# Patient Record
Sex: FEMALE | Race: WHITE | Employment: OTHER | URBAN - METROPOLITAN AREA
[De-identification: names, ages, dates, MRNs, and addresses within clinical notes are randomized per-mention and may not be internally consistent; named-entity substitution may affect disease eponyms.]

---

## 2020-01-02 ENCOUNTER — HOSPITAL ENCOUNTER (OUTPATIENT)
Age: 65
Setting detail: SPECIMEN
Discharge: HOME | End: 2020-01-02

## 2020-01-02 LAB
ANION GAP SERPL CALCULATED.3IONS-SCNC: 11 MMOL/L (ref 9–17)
BUN BLDV-MCNC: 19 MG/DL (ref 8–23)
BUN/CREAT BLD: 26 (ref 9–20)
CALCIUM SERPL-MCNC: 8.5 MG/DL (ref 8.6–10.4)
CHLORIDE BLD-SCNC: 101 MMOL/L (ref 98–107)
CO2: 26 MMOL/L (ref 20–31)
CREAT SERPL-MCNC: 0.73 MG/DL (ref 0.5–0.9)
GFR AFRICAN AMERICAN: >60 ML/MIN
GFR NON-AFRICAN AMERICAN: >60 ML/MIN
GFR SERPL CREATININE-BSD FRML MDRD: ABNORMAL ML/MIN/{1.73_M2}
GFR SERPL CREATININE-BSD FRML MDRD: ABNORMAL ML/MIN/{1.73_M2}
GLUCOSE BLD-MCNC: 280 MG/DL (ref 70–99)
HCT VFR BLD CALC: 31.8 % (ref 36.3–47.1)
HEMOGLOBIN: 10 G/DL (ref 11.9–15.1)
MCH RBC QN AUTO: 30 PG (ref 25.2–33.5)
MCHC RBC AUTO-ENTMCNC: 31.4 G/DL (ref 28.4–34.8)
MCV RBC AUTO: 95.5 FL (ref 82.6–102.9)
NRBC AUTOMATED: 0 PER 100 WBC
PDW BLD-RTO: 16.3 % (ref 11.8–14.4)
PLATELET # BLD: ABNORMAL K/UL (ref 138–453)
PLATELET, FLUORESCENCE: 30 K/UL (ref 138–453)
PLATELET, IMMATURE FRACTION: 4.8 % (ref 1.1–10.3)
PMV BLD AUTO: ABNORMAL FL (ref 8.1–13.5)
POTASSIUM SERPL-SCNC: 4.4 MMOL/L (ref 3.7–5.3)
RBC # BLD: 3.33 M/UL (ref 3.95–5.11)
SODIUM BLD-SCNC: 138 MMOL/L (ref 135–144)
WBC # BLD: 3.1 K/UL (ref 3.5–11.3)

## 2020-01-02 PROCEDURE — 80048 BASIC METABOLIC PNL TOTAL CA: CPT

## 2020-01-02 PROCEDURE — 36415 COLL VENOUS BLD VENIPUNCTURE: CPT

## 2020-01-02 PROCEDURE — 85055 RETICULATED PLATELET ASSAY: CPT

## 2020-01-02 PROCEDURE — P9603 ONE-WAY ALLOW PRORATED MILES: HCPCS

## 2020-01-02 PROCEDURE — 85027 COMPLETE CBC AUTOMATED: CPT

## 2020-01-04 ENCOUNTER — HOSPITAL ENCOUNTER (OUTPATIENT)
Age: 65
Setting detail: SPECIMEN
Discharge: HOME | End: 2020-01-04

## 2020-01-04 LAB
HCT VFR BLD CALC: 33.1 % (ref 36.3–47.1)
HEMOGLOBIN: 10.4 G/DL (ref 11.9–15.1)
MCH RBC QN AUTO: 29.7 PG (ref 25.2–33.5)
MCHC RBC AUTO-ENTMCNC: 31.4 G/DL (ref 28.4–34.8)
MCV RBC AUTO: 94.6 FL (ref 82.6–102.9)
NRBC AUTOMATED: 0 PER 100 WBC
PDW BLD-RTO: 16.6 % (ref 11.8–14.4)
PLATELET # BLD: ABNORMAL K/UL (ref 138–453)
PLATELET, FLUORESCENCE: 33 K/UL (ref 138–453)
PLATELET, IMMATURE FRACTION: 4.1 % (ref 1.1–10.3)
PMV BLD AUTO: ABNORMAL FL (ref 8.1–13.5)
RBC # BLD: 3.5 M/UL (ref 3.95–5.11)
WBC # BLD: 2.4 K/UL (ref 3.5–11.3)

## 2020-01-04 PROCEDURE — 85055 RETICULATED PLATELET ASSAY: CPT

## 2020-01-04 PROCEDURE — 36415 COLL VENOUS BLD VENIPUNCTURE: CPT

## 2020-01-04 PROCEDURE — 85027 COMPLETE CBC AUTOMATED: CPT

## 2020-01-04 PROCEDURE — P9603 ONE-WAY ALLOW PRORATED MILES: HCPCS

## 2020-01-09 ENCOUNTER — HOSPITAL ENCOUNTER (OUTPATIENT)
Age: 65
Setting detail: SPECIMEN
Discharge: HOME | End: 2020-01-09

## 2020-01-09 LAB
ANION GAP SERPL CALCULATED.3IONS-SCNC: 11 MMOL/L (ref 9–17)
BUN BLDV-MCNC: 13 MG/DL (ref 8–23)
BUN/CREAT BLD: 23 (ref 9–20)
CALCIUM SERPL-MCNC: 8.6 MG/DL (ref 8.6–10.4)
CHLORIDE BLD-SCNC: 103 MMOL/L (ref 98–107)
CO2: 25 MMOL/L (ref 20–31)
CREAT SERPL-MCNC: 0.56 MG/DL (ref 0.5–0.9)
GFR AFRICAN AMERICAN: >60 ML/MIN
GFR NON-AFRICAN AMERICAN: >60 ML/MIN
GFR SERPL CREATININE-BSD FRML MDRD: ABNORMAL ML/MIN/{1.73_M2}
GFR SERPL CREATININE-BSD FRML MDRD: ABNORMAL ML/MIN/{1.73_M2}
GLUCOSE BLD-MCNC: 229 MG/DL (ref 70–99)
HCT VFR BLD CALC: 32.3 % (ref 36.3–47.1)
HEMOGLOBIN: 10 G/DL (ref 11.9–15.1)
MCH RBC QN AUTO: 29.8 PG (ref 25.2–33.5)
MCHC RBC AUTO-ENTMCNC: 31 G/DL (ref 28.4–34.8)
MCV RBC AUTO: 96.1 FL (ref 82.6–102.9)
NRBC AUTOMATED: 0 PER 100 WBC
PDW BLD-RTO: 17 % (ref 11.8–14.4)
PLATELET # BLD: 50 K/UL (ref 138–453)
PMV BLD AUTO: 11.1 FL (ref 8.1–13.5)
POTASSIUM SERPL-SCNC: 4.1 MMOL/L (ref 3.7–5.3)
RBC # BLD: 3.36 M/UL (ref 3.95–5.11)
SODIUM BLD-SCNC: 139 MMOL/L (ref 135–144)
WBC # BLD: 2.6 K/UL (ref 3.5–11.3)

## 2020-01-09 PROCEDURE — 85027 COMPLETE CBC AUTOMATED: CPT

## 2020-01-09 PROCEDURE — 36415 COLL VENOUS BLD VENIPUNCTURE: CPT

## 2020-01-09 PROCEDURE — P9603 ONE-WAY ALLOW PRORATED MILES: HCPCS

## 2020-01-09 PROCEDURE — 80048 BASIC METABOLIC PNL TOTAL CA: CPT

## 2020-11-18 ENCOUNTER — HOSPITAL ENCOUNTER (OUTPATIENT)
Age: 65
Setting detail: SPECIMEN
Discharge: HOME | End: 2020-11-18
Payer: COMMERCIAL

## 2020-11-18 LAB
ABSOLUTE EOS #: 0.1 K/UL (ref 0–0.44)
ABSOLUTE IMMATURE GRANULOCYTE: <0.03 K/UL (ref 0–0.3)
ABSOLUTE LYMPH #: 1.43 K/UL (ref 1.1–3.7)
ABSOLUTE MONO #: 0.46 K/UL (ref 0.1–1.2)
ABSOLUTE RETIC #: 0.12 M/UL (ref 0.03–0.08)
BASOPHILS # BLD: 1 % (ref 0–2)
BASOPHILS ABSOLUTE: 0.03 K/UL (ref 0–0.2)
BNP INTERPRETATION: NORMAL
C-REACTIVE PROTEIN: 54.9 MG/L (ref 0–5)
DIFFERENTIAL TYPE: ABNORMAL
EOSINOPHILS RELATIVE PERCENT: 2 % (ref 1–4)
FERRITIN: 126 UG/L (ref 13–150)
FOLATE: 13.9 NG/ML
HCT VFR BLD CALC: 39.2 % (ref 36.3–47.1)
HEMOGLOBIN: 12.2 G/DL (ref 11.9–15.1)
IMMATURE GRANULOCYTES: 0 %
IMMATURE RETIC FRACT: 28.9 % (ref 2.7–18.3)
INR BLD: 1.2
IRON SATURATION: 23 % (ref 20–55)
IRON: 58 UG/DL (ref 37–145)
LYMPHOCYTES # BLD: 27 % (ref 24–43)
MCH RBC QN AUTO: 28 PG (ref 25.2–33.5)
MCHC RBC AUTO-ENTMCNC: 31.1 G/DL (ref 28.4–34.8)
MCV RBC AUTO: 89.9 FL (ref 82.6–102.9)
MONOCYTES # BLD: 9 % (ref 3–12)
NRBC AUTOMATED: 0 PER 100 WBC
PDW BLD-RTO: 15.9 % (ref 11.8–14.4)
PLATELET # BLD: 141 K/UL (ref 138–453)
PLATELET ESTIMATE: ABNORMAL
PMV BLD AUTO: 10.9 FL (ref 8.1–13.5)
PRO-BNP: 52 PG/ML
PROTHROMBIN TIME: 12 SEC (ref 9.4–12.6)
RBC # BLD: 4.36 M/UL (ref 3.95–5.11)
RBC # BLD: ABNORMAL 10*6/UL
RETIC %: 2.8 % (ref 0.5–1.9)
RETIC HEMOGLOBIN: 32.3 PG (ref 28.2–35.7)
SEDIMENTATION RATE, ERYTHROCYTE: 112 MM (ref 0–30)
SEG NEUTROPHILS: 61 % (ref 36–65)
SEGMENTED NEUTROPHILS ABSOLUTE COUNT: 3.37 K/UL (ref 1.5–8.1)
TOTAL IRON BINDING CAPACITY: 254 UG/DL (ref 250–450)
UNSATURATED IRON BINDING CAPACITY: 196 UG/DL (ref 112–347)
VITAMIN B-12: 1034 PG/ML (ref 232–1245)
WBC # BLD: 5.4 K/UL (ref 3.5–11.3)
WBC # BLD: ABNORMAL 10*3/UL

## 2020-11-18 PROCEDURE — 85025 COMPLETE CBC W/AUTO DIFF WBC: CPT

## 2020-11-18 PROCEDURE — 83880 ASSAY OF NATRIURETIC PEPTIDE: CPT

## 2020-11-18 PROCEDURE — 82728 ASSAY OF FERRITIN: CPT

## 2020-11-18 PROCEDURE — 85610 PROTHROMBIN TIME: CPT

## 2020-11-18 PROCEDURE — 82746 ASSAY OF FOLIC ACID SERUM: CPT

## 2020-11-18 PROCEDURE — 83540 ASSAY OF IRON: CPT

## 2020-11-18 PROCEDURE — 86038 ANTINUCLEAR ANTIBODIES: CPT

## 2020-11-18 PROCEDURE — 82607 VITAMIN B-12: CPT

## 2020-11-18 PROCEDURE — 86140 C-REACTIVE PROTEIN: CPT

## 2020-11-18 PROCEDURE — 83550 IRON BINDING TEST: CPT

## 2020-11-18 PROCEDURE — 85652 RBC SED RATE AUTOMATED: CPT

## 2020-11-18 PROCEDURE — 85045 AUTOMATED RETICULOCYTE COUNT: CPT

## 2020-11-19 LAB — ANTI-NUCLEAR ANTIBODY (ANA): NEGATIVE

## 2022-02-08 ENCOUNTER — APPOINTMENT (OUTPATIENT)
Dept: GENERAL RADIOLOGY | Facility: CLINIC | Age: 67
DRG: 641 | End: 2022-02-08
Payer: COMMERCIAL

## 2022-02-08 ENCOUNTER — APPOINTMENT (OUTPATIENT)
Dept: CT IMAGING | Facility: CLINIC | Age: 67
DRG: 641 | End: 2022-02-08
Payer: COMMERCIAL

## 2022-02-08 ENCOUNTER — HOSPITAL ENCOUNTER (INPATIENT)
Age: 67
LOS: 2 days | Discharge: HOME | DRG: 641 | End: 2022-02-10
Attending: EMERGENCY MEDICINE | Admitting: INTERNAL MEDICINE
Payer: COMMERCIAL

## 2022-02-08 DIAGNOSIS — J18.9 PNEUMONIA OF LEFT LOWER LOBE DUE TO INFECTIOUS ORGANISM: ICD-10-CM

## 2022-02-08 DIAGNOSIS — K74.60 CIRRHOSIS OF LIVER WITHOUT ASCITES, UNSPECIFIED HEPATIC CIRRHOSIS TYPE (HCC): ICD-10-CM

## 2022-02-08 DIAGNOSIS — J90 PLEURAL EFFUSION ON LEFT: ICD-10-CM

## 2022-02-08 DIAGNOSIS — E11.65 HYPERGLYCEMIA DUE TO DIABETES MELLITUS (HCC): Primary | ICD-10-CM

## 2022-02-08 DIAGNOSIS — E27.8 ADRENAL NODULE (HCC): ICD-10-CM

## 2022-02-08 DIAGNOSIS — K52.9 COLITIS: ICD-10-CM

## 2022-02-08 DIAGNOSIS — K56.41 FECAL IMPACTION (HCC): ICD-10-CM

## 2022-02-08 DIAGNOSIS — E87.6 HYPOKALEMIA: ICD-10-CM

## 2022-02-08 DIAGNOSIS — D64.9 CHRONIC ANEMIA: ICD-10-CM

## 2022-02-08 LAB
ABSOLUTE EOS #: 0.1 K/UL (ref 0–0.4)
ABSOLUTE IMMATURE GRANULOCYTE: ABNORMAL K/UL (ref 0–0.3)
ABSOLUTE LYMPH #: 0.7 K/UL (ref 1–4.8)
ABSOLUTE MONO #: 0.3 K/UL (ref 0.1–1.2)
ALBUMIN SERPL-MCNC: 2.6 G/DL (ref 3.5–5.2)
ALBUMIN/GLOBULIN RATIO: 0.8 (ref 1–2.5)
ALLEN TEST: ABNORMAL
ALP BLD-CCNC: 97 U/L (ref 35–104)
ALT SERPL-CCNC: 9 U/L (ref 5–33)
AMMONIA: 89 UMOL/L (ref 11–51)
AMYLASE: 15 U/L (ref 28–100)
ANION GAP SERPL CALCULATED.3IONS-SCNC: 12 MMOL/L (ref 9–17)
AST SERPL-CCNC: 23 U/L
BASOPHILS # BLD: 1 % (ref 0–2)
BASOPHILS ABSOLUTE: 0 K/UL (ref 0–0.2)
BETA-HYDROXYBUTYRATE: 0.23 MMOL/L (ref 0.02–0.27)
BILIRUB SERPL-MCNC: 0.7 MG/DL (ref 0.3–1.2)
BUN BLDV-MCNC: 12 MG/DL (ref 8–23)
BUN/CREAT BLD: ABNORMAL (ref 9–20)
CALCIUM SERPL-MCNC: 8 MG/DL (ref 8.6–10.4)
CHLORIDE BLD-SCNC: 95 MMOL/L (ref 98–107)
CO2: 29 MMOL/L (ref 20–31)
CREAT SERPL-MCNC: 0.5 MG/DL (ref 0.5–0.9)
DIFFERENTIAL TYPE: ABNORMAL
DIRECT EXAM: NORMAL
EOSINOPHILS RELATIVE PERCENT: 2 % (ref 1–4)
FIO2: ABNORMAL
GFR AFRICAN AMERICAN: >60 ML/MIN
GFR NON-AFRICAN AMERICAN: >60 ML/MIN
GFR SERPL CREATININE-BSD FRML MDRD: ABNORMAL ML/MIN/{1.73_M2}
GFR SERPL CREATININE-BSD FRML MDRD: ABNORMAL ML/MIN/{1.73_M2}
GLUCOSE BLD-MCNC: 389 MG/DL (ref 70–99)
HCO3 VENOUS: 30.3 MMOL/L (ref 22–29)
HCT VFR BLD CALC: 26.6 % (ref 36–46)
HEMOGLOBIN: 8.5 G/DL (ref 12–16)
IMMATURE GRANULOCYTES: ABNORMAL %
INR BLD: 1.1
LACTIC ACID, SEPSIS WHOLE BLOOD: NORMAL MMOL/L (ref 0.5–1.9)
LACTIC ACID, SEPSIS WHOLE BLOOD: NORMAL MMOL/L (ref 0.5–1.9)
LACTIC ACID, SEPSIS: 1.7 MMOL/L (ref 0.5–1.9)
LACTIC ACID, SEPSIS: 1.7 MMOL/L (ref 0.5–1.9)
LIPASE: 18 U/L (ref 13–60)
LYMPHOCYTES # BLD: 27 % (ref 24–44)
Lab: NORMAL
MCH RBC QN AUTO: 24.3 PG (ref 26–34)
MCHC RBC AUTO-ENTMCNC: 31.9 G/DL (ref 31–37)
MCV RBC AUTO: 76.1 FL (ref 80–100)
MODE: ABNORMAL
MONOCYTES # BLD: 11 % (ref 2–11)
NEGATIVE BASE EXCESS, VEN: ABNORMAL (ref 0–2)
NRBC AUTOMATED: ABNORMAL PER 100 WBC
O2 DEVICE/FLOW/%: ABNORMAL
O2 SAT, VEN: 88 % (ref 60–85)
PARTIAL THROMBOPLASTIN TIME: 30.1 SEC (ref 21.3–31.3)
PATIENT TEMP: 37
PCO2, VEN: 37.6 MM HG (ref 41–51)
PDW BLD-RTO: 18.8 % (ref 12.5–15.4)
PH VENOUS: 7.51 (ref 7.32–7.43)
PLATELET # BLD: 83 K/UL (ref 140–450)
PLATELET ESTIMATE: ABNORMAL
PMV BLD AUTO: 8.3 FL (ref 6–12)
PO2, VEN: 49.5 MM HG (ref 30–50)
POC PCO2 TEMP: ABNORMAL MM HG
POC PH TEMP: ABNORMAL
POC PO2 TEMP: ABNORMAL MM HG
POSITIVE BASE EXCESS, VEN: 7 (ref 0–3)
POTASSIUM SERPL-SCNC: 2.4 MMOL/L (ref 3.7–5.3)
PROTHROMBIN TIME: 12 SEC (ref 9.4–12.6)
RBC # BLD: 3.5 M/UL (ref 4–5.2)
RBC # BLD: ABNORMAL 10*6/UL
SAMPLE SITE: ABNORMAL
SARS-COV-2, RAPID: NOT DETECTED
SEG NEUTROPHILS: 59 % (ref 36–66)
SEGMENTED NEUTROPHILS ABSOLUTE COUNT: 1.6 K/UL (ref 1.8–7.7)
SODIUM BLD-SCNC: 136 MMOL/L (ref 135–144)
SPECIMEN DESCRIPTION: NORMAL
SPECIMEN DESCRIPTION: NORMAL
TOTAL CO2, VENOUS: ABNORMAL MMOL/L (ref 23–30)
TOTAL PROTEIN: 6 G/DL (ref 6.4–8.3)
TROPONIN INTERP: ABNORMAL
TROPONIN INTERP: ABNORMAL
TROPONIN T: ABNORMAL NG/ML
TROPONIN T: ABNORMAL NG/ML
TROPONIN, HIGH SENSITIVITY: 25 NG/L (ref 0–14)
TROPONIN, HIGH SENSITIVITY: 26 NG/L (ref 0–14)
WBC # BLD: 2.7 K/UL (ref 3.5–11)
WBC # BLD: ABNORMAL 10*3/UL

## 2022-02-08 PROCEDURE — 99285 EMERGENCY DEPT VISIT HI MDM: CPT

## 2022-02-08 PROCEDURE — 87635 SARS-COV-2 COVID-19 AMP PRB: CPT

## 2022-02-08 PROCEDURE — 71045 X-RAY EXAM CHEST 1 VIEW: CPT

## 2022-02-08 PROCEDURE — 82010 KETONE BODYS QUAN: CPT

## 2022-02-08 PROCEDURE — 96366 THER/PROPH/DIAG IV INF ADDON: CPT

## 2022-02-08 PROCEDURE — 74177 CT ABD & PELVIS W/CONTRAST: CPT

## 2022-02-08 PROCEDURE — 96365 THER/PROPH/DIAG IV INF INIT: CPT

## 2022-02-08 PROCEDURE — 82140 ASSAY OF AMMONIA: CPT

## 2022-02-08 PROCEDURE — 80048 BASIC METABOLIC PNL TOTAL CA: CPT

## 2022-02-08 PROCEDURE — 96367 TX/PROPH/DG ADDL SEQ IV INF: CPT

## 2022-02-08 PROCEDURE — 87040 BLOOD CULTURE FOR BACTERIA: CPT

## 2022-02-08 PROCEDURE — 83735 ASSAY OF MAGNESIUM: CPT

## 2022-02-08 PROCEDURE — 85610 PROTHROMBIN TIME: CPT

## 2022-02-08 PROCEDURE — 83690 ASSAY OF LIPASE: CPT

## 2022-02-08 PROCEDURE — 2580000003 HC RX 258: Performed by: EMERGENCY MEDICINE

## 2022-02-08 PROCEDURE — 83605 ASSAY OF LACTIC ACID: CPT

## 2022-02-08 PROCEDURE — 6370000000 HC RX 637 (ALT 250 FOR IP): Performed by: EMERGENCY MEDICINE

## 2022-02-08 PROCEDURE — 96375 TX/PRO/DX INJ NEW DRUG ADDON: CPT

## 2022-02-08 PROCEDURE — 6360000004 HC RX CONTRAST MEDICATION: Performed by: EMERGENCY MEDICINE

## 2022-02-08 PROCEDURE — 87804 INFLUENZA ASSAY W/OPTIC: CPT

## 2022-02-08 PROCEDURE — 6360000002 HC RX W HCPCS: Performed by: EMERGENCY MEDICINE

## 2022-02-08 PROCEDURE — 80053 COMPREHEN METABOLIC PANEL: CPT

## 2022-02-08 PROCEDURE — 36415 COLL VENOUS BLD VENIPUNCTURE: CPT

## 2022-02-08 PROCEDURE — 85025 COMPLETE CBC W/AUTO DIFF WBC: CPT

## 2022-02-08 PROCEDURE — 96368 THER/DIAG CONCURRENT INF: CPT

## 2022-02-08 PROCEDURE — 82803 BLOOD GASES ANY COMBINATION: CPT

## 2022-02-08 PROCEDURE — 84484 ASSAY OF TROPONIN QUANT: CPT

## 2022-02-08 PROCEDURE — 1200000000 HC SEMI PRIVATE

## 2022-02-08 PROCEDURE — 85730 THROMBOPLASTIN TIME PARTIAL: CPT

## 2022-02-08 PROCEDURE — 51702 INSERT TEMP BLADDER CATH: CPT

## 2022-02-08 PROCEDURE — 93005 ELECTROCARDIOGRAM TRACING: CPT | Performed by: EMERGENCY MEDICINE

## 2022-02-08 PROCEDURE — 82150 ASSAY OF AMYLASE: CPT

## 2022-02-08 RX ORDER — SODIUM CHLORIDE 0.9 % (FLUSH) 0.9 %
5-40 SYRINGE (ML) INJECTION PRN
Status: DISCONTINUED | OUTPATIENT
Start: 2022-02-08 | End: 2022-02-10 | Stop reason: HOSPADM

## 2022-02-08 RX ORDER — POTASSIUM CHLORIDE 20 MEQ/1
40 TABLET, EXTENDED RELEASE ORAL ONCE
Status: COMPLETED | OUTPATIENT
Start: 2022-02-08 | End: 2022-02-08

## 2022-02-08 RX ORDER — SPIRONOLACTONE 100 MG/1
100 TABLET, FILM COATED ORAL DAILY
COMMUNITY
Start: 2021-09-02 | End: 2022-09-18

## 2022-02-08 RX ORDER — DIPHENHYDRAMINE HYDROCHLORIDE 50 MG/ML
25 INJECTION INTRAMUSCULAR; INTRAVENOUS ONCE
Status: COMPLETED | OUTPATIENT
Start: 2022-02-08 | End: 2022-02-08

## 2022-02-08 RX ORDER — NADOLOL 40 MG/1
40 TABLET ORAL DAILY
COMMUNITY
End: 2022-09-18

## 2022-02-08 RX ORDER — POTASSIUM CHLORIDE 7.45 MG/ML
10 INJECTION INTRAVENOUS ONCE
Status: COMPLETED | OUTPATIENT
Start: 2022-02-08 | End: 2022-02-08

## 2022-02-08 RX ORDER — FUROSEMIDE 40 MG/1
40 TABLET ORAL 2 TIMES DAILY
COMMUNITY
End: 2022-09-18

## 2022-02-08 RX ORDER — MAGNESIUM SULFATE 1 G/100ML
1000 INJECTION INTRAVENOUS ONCE
Status: COMPLETED | OUTPATIENT
Start: 2022-02-08 | End: 2022-02-08

## 2022-02-08 RX ORDER — 0.9 % SODIUM CHLORIDE 0.9 %
70 INTRAVENOUS SOLUTION INTRAVENOUS ONCE
Status: COMPLETED | OUTPATIENT
Start: 2022-02-08 | End: 2022-02-08

## 2022-02-08 RX ORDER — TRAZODONE HYDROCHLORIDE 100 MG/1
100 TABLET ORAL NIGHTLY
COMMUNITY
Start: 2021-11-05

## 2022-02-08 RX ORDER — SODIUM CHLORIDE AND POTASSIUM CHLORIDE 300; 900 MG/100ML; MG/100ML
INJECTION, SOLUTION INTRAVENOUS CONTINUOUS
Status: DISCONTINUED | OUTPATIENT
Start: 2022-02-08 | End: 2022-02-08 | Stop reason: SDUPTHER

## 2022-02-08 RX ORDER — PANTOPRAZOLE SODIUM 40 MG/1
40 TABLET, DELAYED RELEASE ORAL 2 TIMES DAILY
Status: ON HOLD | COMMUNITY
End: 2022-08-30 | Stop reason: ALTCHOICE

## 2022-02-08 RX ORDER — 0.9 % SODIUM CHLORIDE 0.9 %
1000 INTRAVENOUS SOLUTION INTRAVENOUS ONCE
Status: COMPLETED | OUTPATIENT
Start: 2022-02-08 | End: 2022-02-09

## 2022-02-08 RX ORDER — SERTRALINE HYDROCHLORIDE 100 MG/1
100 TABLET, FILM COATED ORAL DAILY
Status: ON HOLD | COMMUNITY
End: 2022-09-09

## 2022-02-08 RX ADMIN — Medication 10 ML: at 21:51

## 2022-02-08 RX ADMIN — SODIUM CHLORIDE 1000 ML: 9 INJECTION, SOLUTION INTRAVENOUS at 23:50

## 2022-02-08 RX ADMIN — PIPERACILLIN AND TAZOBACTAM 3375 MG: 3; .375 INJECTION, POWDER, LYOPHILIZED, FOR SOLUTION INTRAVENOUS at 23:29

## 2022-02-08 RX ADMIN — IOPAMIDOL 75 ML: 755 INJECTION, SOLUTION INTRAVENOUS at 21:52

## 2022-02-08 RX ADMIN — POTASSIUM CHLORIDE 40 MEQ: 1500 TABLET, EXTENDED RELEASE ORAL at 20:26

## 2022-02-08 RX ADMIN — POTASSIUM CHLORIDE 10 MEQ: 7.46 INJECTION, SOLUTION INTRAVENOUS at 20:36

## 2022-02-08 RX ADMIN — MAGNESIUM SULFATE HEPTAHYDRATE 1000 MG: 1 INJECTION, SOLUTION INTRAVENOUS at 20:30

## 2022-02-08 RX ADMIN — SODIUM CHLORIDE 70 ML: 9 INJECTION, SOLUTION INTRAVENOUS at 21:51

## 2022-02-08 RX ADMIN — DIPHENHYDRAMINE HYDROCHLORIDE 25 MG: 50 INJECTION INTRAMUSCULAR; INTRAVENOUS at 20:24

## 2022-02-08 ASSESSMENT — ENCOUNTER SYMPTOMS
SHORTNESS OF BREATH: 1
NAUSEA: 1
COUGH: 0
ABDOMINAL DISTENTION: 1
DIARRHEA: 0
VOMITING: 1
BACK PAIN: 0
BLOOD IN STOOL: 0
EYE PAIN: 0
ABDOMINAL PAIN: 1
CONSTIPATION: 1

## 2022-02-08 NOTE — Clinical Note
Patient Class: Inpatient [101]   REQUIRED: Diagnosis: Hypokalemia [097405]   Estimated Length of Stay: Estimated stay of more than 2 midnights   Admitting Provider: Te Renner [[id number]]

## 2022-02-09 PROBLEM — Z79.4 TYPE 2 DIABETES MELLITUS WITH HYPERGLYCEMIA, WITH LONG-TERM CURRENT USE OF INSULIN (HCC): Status: ACTIVE | Noted: 2022-02-09

## 2022-02-09 PROBLEM — D61.818 PANCYTOPENIA (HCC): Status: ACTIVE | Noted: 2022-02-09

## 2022-02-09 PROBLEM — E11.65 HYPERGLYCEMIA DUE TO TYPE 2 DIABETES MELLITUS (HCC): Status: ACTIVE | Noted: 2022-02-09

## 2022-02-09 PROBLEM — I89.0 LYMPHEDEMA: Status: ACTIVE | Noted: 2022-02-09

## 2022-02-09 PROBLEM — I89.0 LYMPHEDEMA: Chronic | Status: ACTIVE | Noted: 2022-02-09

## 2022-02-09 LAB
ABSOLUTE EOS #: 0.05 K/UL (ref 0–0.4)
ABSOLUTE EOS #: 0.1 K/UL (ref 0–0.4)
ABSOLUTE IMMATURE GRANULOCYTE: 0 K/UL (ref 0–0.3)
ABSOLUTE IMMATURE GRANULOCYTE: ABNORMAL K/UL (ref 0–0.3)
ABSOLUTE LYMPH #: 0.47 K/UL (ref 1–4.8)
ABSOLUTE LYMPH #: 0.6 K/UL (ref 1–4.8)
ABSOLUTE MONO #: 0.16 K/UL (ref 0.1–0.8)
ABSOLUTE MONO #: 0.2 K/UL (ref 0.1–1.2)
ABSOLUTE RETIC #: 0.08 M/UL (ref 0.03–0.08)
ABSOLUTE RETIC #: 0.09 M/UL (ref 0.03–0.08)
ALBUMIN SERPL-MCNC: 2.4 G/DL (ref 3.5–5.2)
ALBUMIN/GLOBULIN RATIO: 0.7 (ref 1–2.5)
ALP BLD-CCNC: 91 U/L (ref 35–104)
ALT SERPL-CCNC: 10 U/L (ref 5–33)
AMMONIA: 42 UMOL/L (ref 11–51)
ANION GAP SERPL CALCULATED.3IONS-SCNC: 7 MMOL/L (ref 9–17)
ANION GAP SERPL CALCULATED.3IONS-SCNC: 7 MMOL/L (ref 9–17)
AST SERPL-CCNC: 26 U/L
BASOPHILS # BLD: 1 % (ref 0–2)
BASOPHILS # BLD: 1 % (ref 0–2)
BASOPHILS ABSOLUTE: 0 K/UL (ref 0–0.2)
BASOPHILS ABSOLUTE: 0.03 K/UL (ref 0–0.2)
BILIRUB SERPL-MCNC: 0.7 MG/DL (ref 0.3–1.2)
BUN BLDV-MCNC: 11 MG/DL (ref 8–23)
BUN BLDV-MCNC: 9 MG/DL (ref 8–23)
BUN/CREAT BLD: ABNORMAL (ref 9–20)
BUN/CREAT BLD: ABNORMAL (ref 9–20)
CALCIUM SERPL-MCNC: 7.8 MG/DL (ref 8.6–10.4)
CALCIUM SERPL-MCNC: 8 MG/DL (ref 8.6–10.4)
CHLORIDE BLD-SCNC: 100 MMOL/L (ref 98–107)
CHLORIDE BLD-SCNC: 95 MMOL/L (ref 98–107)
CO2: 30 MMOL/L (ref 20–31)
CO2: 33 MMOL/L (ref 20–31)
CREAT SERPL-MCNC: 0.5 MG/DL (ref 0.5–0.9)
CREAT SERPL-MCNC: 0.6 MG/DL (ref 0.5–0.9)
DIFFERENTIAL TYPE: ABNORMAL
DIFFERENTIAL TYPE: ABNORMAL
EKG ATRIAL RATE: 82 BPM
EKG P AXIS: 60 DEGREES
EKG P-R INTERVAL: 144 MS
EKG Q-T INTERVAL: 406 MS
EKG QRS DURATION: 92 MS
EKG QTC CALCULATION (BAZETT): 474 MS
EKG R AXIS: 13 DEGREES
EKG T AXIS: 86 DEGREES
EKG VENTRICULAR RATE: 82 BPM
EOSINOPHILS RELATIVE PERCENT: 2 % (ref 1–4)
EOSINOPHILS RELATIVE PERCENT: 4 % (ref 1–4)
ESTIMATED AVERAGE GLUCOSE: 246 MG/DL
FERRITIN: 20 UG/L (ref 13–150)
FOLATE: 13.5 NG/ML
GFR AFRICAN AMERICAN: >60 ML/MIN
GFR AFRICAN AMERICAN: >60 ML/MIN
GFR NON-AFRICAN AMERICAN: >60 ML/MIN
GFR NON-AFRICAN AMERICAN: >60 ML/MIN
GFR SERPL CREATININE-BSD FRML MDRD: ABNORMAL ML/MIN/{1.73_M2}
GLUCOSE BLD-MCNC: 256 MG/DL (ref 65–105)
GLUCOSE BLD-MCNC: 264 MG/DL (ref 65–105)
GLUCOSE BLD-MCNC: 358 MG/DL (ref 65–105)
GLUCOSE BLD-MCNC: 369 MG/DL (ref 70–99)
GLUCOSE BLD-MCNC: 415 MG/DL (ref 70–99)
GLUCOSE BLD-MCNC: 451 MG/DL (ref 65–105)
HBA1C MFR BLD: 10.2 % (ref 4–6)
HCT VFR BLD CALC: 25.1 % (ref 36–46)
HCT VFR BLD CALC: 27.7 % (ref 36.3–47.1)
HEMOGLOBIN: 8 G/DL (ref 12–16)
HEMOGLOBIN: 8.2 G/DL (ref 11.9–15.1)
IMMATURE GRANULOCYTES: 0 %
IMMATURE GRANULOCYTES: ABNORMAL %
IMMATURE RETIC FRACT: 25.8 % (ref 2.7–18.3)
IMMATURE RETIC FRACT: 30.2 % (ref 2.7–18.3)
INR BLD: 1.1
IRON SATURATION: 15 % (ref 20–55)
IRON: 41 UG/DL (ref 37–145)
LYMPHOCYTES # BLD: 18 % (ref 24–44)
LYMPHOCYTES # BLD: 30 % (ref 24–44)
MAGNESIUM: 1.8 MG/DL (ref 1.6–2.6)
MCH RBC QN AUTO: 24.4 PG (ref 25.2–33.5)
MCH RBC QN AUTO: 24.6 PG (ref 26–34)
MCHC RBC AUTO-ENTMCNC: 29.6 G/DL (ref 28.4–34.8)
MCHC RBC AUTO-ENTMCNC: 31.9 G/DL (ref 31–37)
MCV RBC AUTO: 77.2 FL (ref 80–100)
MCV RBC AUTO: 82.4 FL (ref 82.6–102.9)
MONOCYTES # BLD: 10 % (ref 2–11)
MONOCYTES # BLD: 6 % (ref 1–7)
MORPHOLOGY: ABNORMAL
NRBC AUTOMATED: 0 PER 100 WBC
NRBC AUTOMATED: ABNORMAL PER 100 WBC
PDW BLD-RTO: 17.5 % (ref 11.8–14.4)
PDW BLD-RTO: 18.8 % (ref 12.5–15.4)
PLATELET # BLD: 70 K/UL (ref 140–450)
PLATELET # BLD: 77 K/UL (ref 138–453)
PLATELET ESTIMATE: ABNORMAL
PLATELET ESTIMATE: ABNORMAL
PMV BLD AUTO: 10.6 FL (ref 8.1–13.5)
PMV BLD AUTO: 8.4 FL (ref 6–12)
POTASSIUM SERPL-SCNC: 2.7 MMOL/L (ref 3.7–5.3)
POTASSIUM SERPL-SCNC: 3.4 MMOL/L (ref 3.7–5.3)
POTASSIUM SERPL-SCNC: 3.5 MMOL/L (ref 3.7–5.3)
PROCALCITONIN: 0.06 NG/ML
PROTHROMBIN TIME: 11.6 SEC (ref 9.4–12.6)
RBC # BLD: 3.25 M/UL (ref 4–5.2)
RBC # BLD: 3.36 M/UL (ref 3.95–5.11)
RBC # BLD: ABNORMAL 10*6/UL
RBC # BLD: ABNORMAL 10*6/UL
RETIC %: 2.5 % (ref 0.5–1.9)
RETIC %: 2.5 % (ref 0.5–1.9)
RETIC HEMOGLOBIN: 26.7 PG (ref 28.2–35.7)
RETIC HEMOGLOBIN: 27.4 PG (ref 28.2–35.7)
SEG NEUTROPHILS: 55 % (ref 36–66)
SEG NEUTROPHILS: 73 % (ref 36–66)
SEGMENTED NEUTROPHILS ABSOLUTE COUNT: 1.1 K/UL (ref 1.8–7.7)
SEGMENTED NEUTROPHILS ABSOLUTE COUNT: 1.89 K/UL (ref 1.8–7.7)
SODIUM BLD-SCNC: 135 MMOL/L (ref 135–144)
SODIUM BLD-SCNC: 137 MMOL/L (ref 135–144)
TOTAL IRON BINDING CAPACITY: 273 UG/DL (ref 250–450)
TOTAL PROTEIN: 5.8 G/DL (ref 6.4–8.3)
UNSATURATED IRON BINDING CAPACITY: 232 UG/DL (ref 112–347)
VITAMIN B-12: 1177 PG/ML (ref 232–1245)
VITAMIN D 25-HYDROXY: 24.3 NG/ML (ref 30–100)
WBC # BLD: 1.9 K/UL (ref 3.5–11)
WBC # BLD: 2.6 K/UL (ref 3.5–11.3)
WBC # BLD: ABNORMAL 10*3/UL
WBC # BLD: ABNORMAL 10*3/UL

## 2022-02-09 PROCEDURE — 96376 TX/PRO/DX INJ SAME DRUG ADON: CPT

## 2022-02-09 PROCEDURE — 6370000000 HC RX 637 (ALT 250 FOR IP): Performed by: NURSE PRACTITIONER

## 2022-02-09 PROCEDURE — 82947 ASSAY GLUCOSE BLOOD QUANT: CPT

## 2022-02-09 PROCEDURE — 6370000000 HC RX 637 (ALT 250 FOR IP): Performed by: EMERGENCY MEDICINE

## 2022-02-09 PROCEDURE — 84145 PROCALCITONIN (PCT): CPT

## 2022-02-09 PROCEDURE — 82746 ASSAY OF FOLIC ACID SERUM: CPT

## 2022-02-09 PROCEDURE — 2700000000 HC OXYGEN THERAPY PER DAY

## 2022-02-09 PROCEDURE — 2580000003 HC RX 258: Performed by: NURSE PRACTITIONER

## 2022-02-09 PROCEDURE — 2580000003 HC RX 258: Performed by: EMERGENCY MEDICINE

## 2022-02-09 PROCEDURE — 1200000000 HC SEMI PRIVATE

## 2022-02-09 PROCEDURE — 82306 VITAMIN D 25 HYDROXY: CPT

## 2022-02-09 PROCEDURE — 99223 1ST HOSP IP/OBS HIGH 75: CPT | Performed by: NURSE PRACTITIONER

## 2022-02-09 PROCEDURE — 82607 VITAMIN B-12: CPT

## 2022-02-09 PROCEDURE — 83036 HEMOGLOBIN GLYCOSYLATED A1C: CPT

## 2022-02-09 PROCEDURE — 96367 TX/PROPH/DG ADDL SEQ IV INF: CPT

## 2022-02-09 PROCEDURE — 36415 COLL VENOUS BLD VENIPUNCTURE: CPT

## 2022-02-09 PROCEDURE — 85045 AUTOMATED RETICULOCYTE COUNT: CPT

## 2022-02-09 PROCEDURE — 82140 ASSAY OF AMMONIA: CPT

## 2022-02-09 PROCEDURE — 96361 HYDRATE IV INFUSION ADD-ON: CPT

## 2022-02-09 PROCEDURE — 96368 THER/DIAG CONCURRENT INF: CPT

## 2022-02-09 PROCEDURE — 82728 ASSAY OF FERRITIN: CPT

## 2022-02-09 PROCEDURE — 83550 IRON BINDING TEST: CPT

## 2022-02-09 PROCEDURE — 94640 AIRWAY INHALATION TREATMENT: CPT

## 2022-02-09 PROCEDURE — 83540 ASSAY OF IRON: CPT

## 2022-02-09 PROCEDURE — 96366 THER/PROPH/DIAG IV INF ADDON: CPT

## 2022-02-09 PROCEDURE — 85610 PROTHROMBIN TIME: CPT

## 2022-02-09 PROCEDURE — 85025 COMPLETE CBC W/AUTO DIFF WBC: CPT

## 2022-02-09 PROCEDURE — 6360000002 HC RX W HCPCS: Performed by: EMERGENCY MEDICINE

## 2022-02-09 PROCEDURE — 80053 COMPREHEN METABOLIC PANEL: CPT

## 2022-02-09 PROCEDURE — 84132 ASSAY OF SERUM POTASSIUM: CPT

## 2022-02-09 RX ORDER — BUDESONIDE 0.25 MG/2ML
0.25 INHALANT ORAL 2 TIMES DAILY
Status: DISCONTINUED | OUTPATIENT
Start: 2022-02-09 | End: 2022-02-09 | Stop reason: CLARIF

## 2022-02-09 RX ORDER — ONDANSETRON 4 MG/1
4 TABLET, ORALLY DISINTEGRATING ORAL EVERY 8 HOURS PRN
Status: DISCONTINUED | OUTPATIENT
Start: 2022-02-09 | End: 2022-02-10 | Stop reason: HOSPADM

## 2022-02-09 RX ORDER — POTASSIUM CHLORIDE 20 MEQ/1
40 TABLET, EXTENDED RELEASE ORAL ONCE
Status: COMPLETED | OUTPATIENT
Start: 2022-02-09 | End: 2022-02-09

## 2022-02-09 RX ORDER — MAGNESIUM SULFATE 1 G/100ML
1000 INJECTION INTRAVENOUS PRN
Status: DISCONTINUED | OUTPATIENT
Start: 2022-02-09 | End: 2022-02-10 | Stop reason: HOSPADM

## 2022-02-09 RX ORDER — OXYBUTYNIN CHLORIDE 5 MG/1
5 TABLET ORAL 2 TIMES DAILY PRN
Status: DISCONTINUED | OUTPATIENT
Start: 2022-02-09 | End: 2022-02-10 | Stop reason: HOSPADM

## 2022-02-09 RX ORDER — POTASSIUM CHLORIDE 7.45 MG/ML
10 INJECTION INTRAVENOUS ONCE
Status: COMPLETED | OUTPATIENT
Start: 2022-02-09 | End: 2022-02-09

## 2022-02-09 RX ORDER — ALBUTEROL SULFATE 2.5 MG/3ML
2.5 SOLUTION RESPIRATORY (INHALATION) EVERY 6 HOURS PRN
Status: DISCONTINUED | OUTPATIENT
Start: 2022-02-09 | End: 2022-02-10 | Stop reason: HOSPADM

## 2022-02-09 RX ORDER — POLYETHYLENE GLYCOL 3350 17 G/17G
17 POWDER, FOR SOLUTION ORAL DAILY
Status: DISCONTINUED | OUTPATIENT
Start: 2022-02-09 | End: 2022-02-10 | Stop reason: HOSPADM

## 2022-02-09 RX ORDER — ARFORMOTEROL TARTRATE 15 UG/2ML
15 SOLUTION RESPIRATORY (INHALATION) 2 TIMES DAILY
Status: DISCONTINUED | OUTPATIENT
Start: 2022-02-09 | End: 2022-02-09 | Stop reason: CLARIF

## 2022-02-09 RX ORDER — SODIUM CHLORIDE 9 MG/ML
INJECTION, SOLUTION INTRAVENOUS CONTINUOUS
Status: DISCONTINUED | OUTPATIENT
Start: 2022-02-09 | End: 2022-02-10

## 2022-02-09 RX ORDER — BISACODYL 10 MG
10 SUPPOSITORY, RECTAL RECTAL DAILY
Status: DISCONTINUED | OUTPATIENT
Start: 2022-02-09 | End: 2022-02-10 | Stop reason: HOSPADM

## 2022-02-09 RX ORDER — ONDANSETRON 2 MG/ML
4 INJECTION INTRAMUSCULAR; INTRAVENOUS EVERY 6 HOURS PRN
Status: DISCONTINUED | OUTPATIENT
Start: 2022-02-09 | End: 2022-02-10 | Stop reason: HOSPADM

## 2022-02-09 RX ORDER — TRAZODONE HYDROCHLORIDE 100 MG/1
100 TABLET ORAL NIGHTLY
Status: DISCONTINUED | OUTPATIENT
Start: 2022-02-09 | End: 2022-02-10 | Stop reason: HOSPADM

## 2022-02-09 RX ORDER — PANTOPRAZOLE SODIUM 40 MG/1
40 TABLET, DELAYED RELEASE ORAL 2 TIMES DAILY
Status: DISCONTINUED | OUTPATIENT
Start: 2022-02-09 | End: 2022-02-10 | Stop reason: HOSPADM

## 2022-02-09 RX ORDER — MAGNESIUM SULFATE 1 G/100ML
1000 INJECTION INTRAVENOUS PRN
Status: DISCONTINUED | OUTPATIENT
Start: 2022-02-09 | End: 2022-02-09 | Stop reason: SDUPTHER

## 2022-02-09 RX ORDER — LACTULOSE 10 G/15ML
20 SOLUTION ORAL ONCE
Status: COMPLETED | OUTPATIENT
Start: 2022-02-09 | End: 2022-02-09

## 2022-02-09 RX ORDER — DEXTROSE MONOHYDRATE 50 MG/ML
100 INJECTION, SOLUTION INTRAVENOUS PRN
Status: DISCONTINUED | OUTPATIENT
Start: 2022-02-09 | End: 2022-02-10 | Stop reason: HOSPADM

## 2022-02-09 RX ORDER — POTASSIUM CHLORIDE 20 MEQ/1
40 TABLET, EXTENDED RELEASE ORAL PRN
Status: DISCONTINUED | OUTPATIENT
Start: 2022-02-09 | End: 2022-02-09 | Stop reason: SDUPTHER

## 2022-02-09 RX ORDER — SODIUM CHLORIDE 9 MG/ML
INJECTION, SOLUTION INTRAVENOUS CONTINUOUS
Status: CANCELLED | OUTPATIENT
Start: 2022-02-09

## 2022-02-09 RX ORDER — SODIUM CHLORIDE 9 MG/ML
25 INJECTION, SOLUTION INTRAVENOUS PRN
Status: DISCONTINUED | OUTPATIENT
Start: 2022-02-09 | End: 2022-02-10 | Stop reason: HOSPADM

## 2022-02-09 RX ORDER — BUDESONIDE AND FORMOTEROL FUMARATE DIHYDRATE 80; 4.5 UG/1; UG/1
2 AEROSOL RESPIRATORY (INHALATION) 2 TIMES DAILY
Status: DISCONTINUED | OUTPATIENT
Start: 2022-02-09 | End: 2022-02-09

## 2022-02-09 RX ORDER — ALBUTEROL SULFATE 90 UG/1
2 AEROSOL, METERED RESPIRATORY (INHALATION) EVERY 6 HOURS PRN
Status: DISCONTINUED | OUTPATIENT
Start: 2022-02-09 | End: 2022-02-10 | Stop reason: HOSPADM

## 2022-02-09 RX ORDER — ALBUTEROL SULFATE 90 UG/1
2 AEROSOL, METERED RESPIRATORY (INHALATION) EVERY 6 HOURS PRN
Status: DISCONTINUED | OUTPATIENT
Start: 2022-02-09 | End: 2022-02-09

## 2022-02-09 RX ORDER — POTASSIUM CHLORIDE 7.45 MG/ML
10 INJECTION INTRAVENOUS PRN
Status: DISCONTINUED | OUTPATIENT
Start: 2022-02-09 | End: 2022-02-09 | Stop reason: SDUPTHER

## 2022-02-09 RX ORDER — PRAVASTATIN SODIUM 20 MG
10 TABLET ORAL DAILY
Status: DISCONTINUED | OUTPATIENT
Start: 2022-02-09 | End: 2022-02-10 | Stop reason: HOSPADM

## 2022-02-09 RX ORDER — BUDESONIDE AND FORMOTEROL FUMARATE DIHYDRATE 80; 4.5 UG/1; UG/1
2 AEROSOL RESPIRATORY (INHALATION) 2 TIMES DAILY
Status: DISCONTINUED | OUTPATIENT
Start: 2022-02-09 | End: 2022-02-10 | Stop reason: HOSPADM

## 2022-02-09 RX ORDER — OXYBUTYNIN CHLORIDE 5 MG/1
5 TABLET ORAL 2 TIMES DAILY PRN
COMMUNITY

## 2022-02-09 RX ORDER — DIPHENHYDRAMINE HCL 25 MG
25 TABLET ORAL EVERY 8 HOURS PRN
Status: DISCONTINUED | OUTPATIENT
Start: 2022-02-09 | End: 2022-02-10 | Stop reason: HOSPADM

## 2022-02-09 RX ORDER — INSULIN GLARGINE 100 [IU]/ML
40 INJECTION, SOLUTION SUBCUTANEOUS DAILY
Status: ON HOLD | COMMUNITY
End: 2022-08-30 | Stop reason: SDUPTHER

## 2022-02-09 RX ORDER — ACETAMINOPHEN 650 MG/1
650 SUPPOSITORY RECTAL EVERY 6 HOURS PRN
Status: DISCONTINUED | OUTPATIENT
Start: 2022-02-09 | End: 2022-02-10 | Stop reason: HOSPADM

## 2022-02-09 RX ORDER — LIRAGLUTIDE 6 MG/ML
1.2 INJECTION SUBCUTANEOUS DAILY
COMMUNITY

## 2022-02-09 RX ORDER — DEXTROSE MONOHYDRATE 25 G/50ML
12.5 INJECTION, SOLUTION INTRAVENOUS PRN
Status: DISCONTINUED | OUTPATIENT
Start: 2022-02-09 | End: 2022-02-09 | Stop reason: RX

## 2022-02-09 RX ORDER — POLYETHYLENE GLYCOL 3350 17 G/17G
17 POWDER, FOR SOLUTION ORAL DAILY PRN
Status: DISCONTINUED | OUTPATIENT
Start: 2022-02-09 | End: 2022-02-10 | Stop reason: HOSPADM

## 2022-02-09 RX ORDER — MAGNESIUM SULFATE 1 G/100ML
1000 INJECTION INTRAVENOUS ONCE
Status: COMPLETED | OUTPATIENT
Start: 2022-02-09 | End: 2022-02-09

## 2022-02-09 RX ORDER — SODIUM CHLORIDE 0.9 % (FLUSH) 0.9 %
10 SYRINGE (ML) INJECTION PRN
Status: DISCONTINUED | OUTPATIENT
Start: 2022-02-09 | End: 2022-02-10 | Stop reason: HOSPADM

## 2022-02-09 RX ORDER — ACETAMINOPHEN 325 MG/1
650 TABLET ORAL EVERY 6 HOURS PRN
Status: DISCONTINUED | OUTPATIENT
Start: 2022-02-09 | End: 2022-02-10 | Stop reason: HOSPADM

## 2022-02-09 RX ORDER — PRAVASTATIN SODIUM 10 MG
10 TABLET ORAL NIGHTLY
COMMUNITY

## 2022-02-09 RX ORDER — INSULIN GLARGINE 100 [IU]/ML
27 INJECTION, SOLUTION SUBCUTANEOUS 2 TIMES DAILY
Status: DISCONTINUED | OUTPATIENT
Start: 2022-02-09 | End: 2022-02-10 | Stop reason: HOSPADM

## 2022-02-09 RX ORDER — NADOLOL 20 MG/1
40 TABLET ORAL DAILY
Status: DISCONTINUED | OUTPATIENT
Start: 2022-02-09 | End: 2022-02-10 | Stop reason: HOSPADM

## 2022-02-09 RX ORDER — NICOTINE POLACRILEX 4 MG
15 LOZENGE BUCCAL PRN
Status: DISCONTINUED | OUTPATIENT
Start: 2022-02-09 | End: 2022-02-10 | Stop reason: HOSPADM

## 2022-02-09 RX ORDER — SERTRALINE HYDROCHLORIDE 100 MG/1
100 TABLET, FILM COATED ORAL DAILY
Status: DISCONTINUED | OUTPATIENT
Start: 2022-02-09 | End: 2022-02-10 | Stop reason: HOSPADM

## 2022-02-09 RX ORDER — POTASSIUM CHLORIDE 7.45 MG/ML
10 INJECTION INTRAVENOUS PRN
Status: DISCONTINUED | OUTPATIENT
Start: 2022-02-09 | End: 2022-02-10 | Stop reason: HOSPADM

## 2022-02-09 RX ORDER — SODIUM CHLORIDE 0.9 % (FLUSH) 0.9 %
5-40 SYRINGE (ML) INJECTION EVERY 12 HOURS SCHEDULED
Status: DISCONTINUED | OUTPATIENT
Start: 2022-02-09 | End: 2022-02-10 | Stop reason: HOSPADM

## 2022-02-09 RX ORDER — ALBUTEROL SULFATE 90 UG/1
2 AEROSOL, METERED RESPIRATORY (INHALATION) EVERY 6 HOURS PRN
COMMUNITY

## 2022-02-09 RX ORDER — POLYETHYLENE GLYCOL 3350 17 G/17G
17 POWDER, FOR SOLUTION ORAL DAILY PRN
Status: ON HOLD | COMMUNITY
End: 2022-09-03 | Stop reason: SDUPTHER

## 2022-02-09 RX ORDER — POTASSIUM CHLORIDE 20 MEQ/1
40 TABLET, EXTENDED RELEASE ORAL PRN
Status: DISCONTINUED | OUTPATIENT
Start: 2022-02-09 | End: 2022-02-10 | Stop reason: HOSPADM

## 2022-02-09 RX ADMIN — BISACODYL 10 MG: 10 SUPPOSITORY RECTAL at 11:42

## 2022-02-09 RX ADMIN — INSULIN LISPRO 15 UNITS: 100 INJECTION, SOLUTION INTRAVENOUS; SUBCUTANEOUS at 18:31

## 2022-02-09 RX ADMIN — APIXABAN 5 MG: 5 TABLET, FILM COATED ORAL at 22:09

## 2022-02-09 RX ADMIN — BUDESONIDE AND FORMOTEROL FUMARATE DIHYDRATE 2 PUFF: 80; 4.5 AEROSOL RESPIRATORY (INHALATION) at 21:12

## 2022-02-09 RX ADMIN — LACTULOSE 20 G: 20 SOLUTION ORAL at 00:37

## 2022-02-09 RX ADMIN — POTASSIUM CHLORIDE 40 MEQ: 20 TABLET, EXTENDED RELEASE ORAL at 11:42

## 2022-02-09 RX ADMIN — POLYETHYLENE GLYCOL 3350 17 G: 17 POWDER, FOR SOLUTION ORAL at 11:42

## 2022-02-09 RX ADMIN — NADOLOL 40 MG: 20 TABLET ORAL at 15:19

## 2022-02-09 RX ADMIN — INSULIN GLARGINE 27 UNITS: 100 INJECTION, SOLUTION SUBCUTANEOUS at 22:09

## 2022-02-09 RX ADMIN — INSULIN LISPRO 5 UNITS: 100 INJECTION, SOLUTION INTRAVENOUS; SUBCUTANEOUS at 22:10

## 2022-02-09 RX ADMIN — APIXABAN 5 MG: 5 TABLET, FILM COATED ORAL at 15:19

## 2022-02-09 RX ADMIN — SERTRALINE HYDROCHLORIDE 100 MG: 100 TABLET ORAL at 15:19

## 2022-02-09 RX ADMIN — SODIUM CHLORIDE: 9 INJECTION, SOLUTION INTRAVENOUS at 12:09

## 2022-02-09 RX ADMIN — POTASSIUM CHLORIDE 10 MEQ: 7.46 INJECTION, SOLUTION INTRAVENOUS at 00:43

## 2022-02-09 RX ADMIN — POTASSIUM CHLORIDE 40 MEQ: 20 TABLET, EXTENDED RELEASE ORAL at 19:20

## 2022-02-09 RX ADMIN — PRAVASTATIN SODIUM 10 MG: 20 TABLET ORAL at 15:19

## 2022-02-09 RX ADMIN — SODIUM CHLORIDE, PRESERVATIVE FREE 10 ML: 5 INJECTION INTRAVENOUS at 22:09

## 2022-02-09 RX ADMIN — PANTOPRAZOLE SODIUM 40 MG: 40 TABLET, DELAYED RELEASE ORAL at 22:09

## 2022-02-09 RX ADMIN — TRAZODONE HYDROCHLORIDE 100 MG: 100 TABLET ORAL at 22:09

## 2022-02-09 RX ADMIN — DIPHENHYDRAMINE HCL 25 MG: 25 TABLET ORAL at 16:25

## 2022-02-09 RX ADMIN — MAGNESIUM SULFATE HEPTAHYDRATE 1000 MG: 1 INJECTION, SOLUTION INTRAVENOUS at 00:58

## 2022-02-09 RX ADMIN — INSULIN GLARGINE 27 UNITS: 100 INJECTION, SOLUTION SUBCUTANEOUS at 15:20

## 2022-02-09 RX ADMIN — VANCOMYCIN HYDROCHLORIDE 1500 MG: 1 INJECTION, POWDER, LYOPHILIZED, FOR SOLUTION INTRAVENOUS at 00:14

## 2022-02-09 RX ADMIN — PANTOPRAZOLE SODIUM 40 MG: 40 TABLET, DELAYED RELEASE ORAL at 15:19

## 2022-02-09 RX ADMIN — INSULIN LISPRO 18 UNITS: 100 INJECTION, SOLUTION INTRAVENOUS; SUBCUTANEOUS at 11:42

## 2022-02-09 RX ADMIN — POTASSIUM CHLORIDE 40 MEQ: 20 TABLET, EXTENDED RELEASE ORAL at 00:40

## 2022-02-09 ASSESSMENT — ENCOUNTER SYMPTOMS
SHORTNESS OF BREATH: 0
WHEEZING: 0
DIARRHEA: 0
STRIDOR: 0
VOMITING: 1
NAUSEA: 0
COUGH: 0
BLOOD IN STOOL: 0
ABDOMINAL PAIN: 1
CONSTIPATION: 1

## 2022-02-09 NOTE — PROGRESS NOTES
Transitions of Care Pharmacy Service   Medication Review    The patient's list of current home medications has been reviewed and updated. Source(s) of information: [facility]/Previous encounter notes    Patient was unable to discuss her medications with me. I updated her medication to the best of my ability with the information from her chart and from [employer]. Please feel free to call with any questions about this encounter. Thank you. Manjit Hunter, [facility]  Transitions of Care Pharmacy Service  Phone:  [phone]  Fax: [phone]      Prior to Admission medications    Medication Sig Start Date End Date Taking?  Authorizing Provider   oxybutynin (DITROPAN) 5 MG tablet Take 5 mg by mouth 2 times daily as needed (bladder spasms)   Yes Historical Provider, MD   insulin glargine (BASAGLAR KWIKPEN) 100 UNIT/ML injection pen Inject 27 Units into the skin 2 times daily   Yes Historical Provider, MD   polyethylene glycol (GLYCOLAX) 17 g packet Take 17 g by mouth daily as needed for Constipation   Yes Historical Provider, MD   Liraglutide (VICTOZA) 18 MG/3ML SOPN SC injection Inject 1.2 mg into the skin daily   Yes Historical Provider, MD   pravastatin (PRAVACHOL) 10 MG tablet Take 10 mg by mouth daily   Yes Historical Provider, MD   fluticasone-vilanterol (BREO ELLIPTA) 100-25 MCG/INH AEPB inhaler Inhale into the lungs daily   Yes Historical Provider, MD   albuterol sulfate HFA (VENTOLIN HFA) 108 (90 Base) MCG/ACT inhaler Inhale 2 puffs into the lungs every 6 hours as needed for Wheezing or Shortness of Breath   Yes Historical Provider, MD   apixaban (ELIQUIS) 5 MG TABS tablet Take 5 mg by mouth 2 times daily    Yes Historical Provider, MD   furosemide (LASIX) 40 MG tablet Take 40 mg by mouth 2 times daily    Yes Historical Provider, MD   sertraline (ZOLOFT) 100 MG tablet Take 100 mg by mouth daily    Yes Historical Provider, MD   spironolactone (ALDACTONE) 100 MG tablet Take 100 mg by mouth daily 9/2/21  Yes Historical Provider, MD   traZODone (DESYREL) 100 MG tablet Take 100 mg by mouth nightly 11/5/21  Yes Historical Provider, MD   pantoprazole (PROTONIX) 40 MG tablet Take 40 mg by mouth 2 times daily   Yes Historical Provider, MD   nadolol (CORGARD) 40 MG tablet Take 40 mg by mouth daily   Yes Historical Provider, MD

## 2022-02-09 NOTE — ED NOTES
[facility] ETA of 0745. Ride booked but RN will call around for sooner ETA.      Sandhya Gabriel RN  02/08/22 0988

## 2022-02-09 NOTE — ED NOTES
[facility] called, relayed blood glucose is 415 to [facility]     Carlos Cuenca, EZRA  02/09/22 4737

## 2022-02-09 NOTE — H&P
[facility]  Office: [address], DO, Dilcia Higgins, DO, Reynaldo Robert, DO, Maynor Mcclellancaryn Chatman, DO, Flori Barahona MD, Keyon Drake MD, Maddi Carrillo MD, Megan Montes MD, Candida Guardado MD, Jory Lopez MD, Jared Rausch MD, Hue Mendes, DO, Claudy Smith DO, Krishan Otoole MD,  Paul Hand DO, Inocencia Dahl MD, Rafal Parish MD, Alycia Wellington MD, Lorrie Palm MD, Bria Desir MD, Tay Chery, [facility], [facility], CNP, Lorin Thomson, CNP, Jared Torres, CNS, Evonne Herrera, CNP, Jorge L Schuster, CNP, Evette Berrios, CNP, Sallie Du, CNP, Shazia Hardy, CNP, Nayeli Araujo PA-C, Kiana Prater, [facility], Marlo Wilson, [facility], Daniel Dee, CNP, Francis Major, CNP, Jose Lopez, CNP, Jade Ledesma, CNP, Vianney Nicole, CNP, Nestor Gallardo, [address]    HISTORY AND PHYSICAL EXAMINATION            Date:   2/9/2022  Patient name:  Leatha Klein  Date of admission:  2/8/2022  6:41 PM  MRN:   [id number]  Account:  [de-identified]  YOB: 1955  PCP:    Leonardo Thornton MD  Room:   2024/2024-01  Code Status:    Full Code    Chief Complaint:     Chief Complaint   Patient presents with    Nausea    Diarrhea    Abdominal Pain     pt has ascites, pt has been tapped past year. History Obtained From:     patient, electronic medical record    History of Present Illness:     Leatha Klein is a 74 y.o. Non- / non  female who presents with Nausea, Diarrhea, and Abdominal Pain (pt has ascites, pt has been tapped past year. )   and is admitted to the hospital for the management of Hypokalemia. According to the ER notes the patient presented with chief complaints of nausea, vomiting, and abdominal pain. Patient tells me her biggest complaint has been nausea/vomitting and constipation. She states she has been able to have very small infrequent bowel movements but nothing significant.   She complains of gas pains. He denies fever, chills, shortness of breath or chest pain. She does chronically wear 2 to 3 L nasal cannula. She states states her  gives her her medicine so she is not sure what she takes but she admits to noncompliance. She said sometimes he does forget. Patient states that she has been bedbound for quite a while but she is not sure how long. She thinks is been at least 4 years since she ambulated. She is alert and oriented to person place and year but she is a poor historian. According to the ER notes when squad arrived to transport the patient showed a glucose over 500. Her medical history includes atrial fib, CAD, CHF, COPD, dementia, fibromyalgia, hypertension, liver disease, and ELONARDA. Per the ER physician the patient's had a history of a paracentesis sometime in 21. Chest x-ray shows borderline cardiomegaly without evidence of CHF. There is note of pleural thickening in the lateral aspect of the left hemithorax differential diagnoses include scarring versus loculated fluid. CT of the abdomen pelvis shows left lower lobe consolidative changes with possible pneumonia with a small left pleural effusion. The stability of this collection is indeterminate by imaging, and although there is no definite gas in the pleural space recommend correlation to exclude empyema. . There is also a large volume of stool in the mildly distended rectosigmoid with wall thickening and surrounding fat stranding. The colon is distended proximal to the rectosigmoid up to 8.4 cm. Differential considerations include colitis, fecal impaction or colonic ileus. Radiologist also recommends 1 year follow-up related to a 12 mm indeterminate right adrenal nodule. Initial potassium was 2.4 after replacement she is 3.5. Initial glucose was 389 and is currently 264. Beta hydroxy 0.23. Ammonia was 89. Patient received a dose of lactulose. Plan to repeat ammonia level.   Patient states she has been on lactulose before at home and refuses to take it. Initial hemoglobin 8.5 and 8.0. The chart her average hemoglobin ranges around 10. The patient denies noticing any dark or bloody stools. She does have thrombocytopenia which appears to be chronic. Plan to continue IV hydration, Dulcolax suppository, MiraLAX, and increase insulin sliding scale to high corrective algorithm. Nursing to verify home meds. Patient had a dose of vancomycin and Zosyn in [place]. Past Medical History:     Past Medical History:   Diagnosis Date    Arthritis     Atrial fibrillation ([address]     CAD (coronary artery disease)     CHF (congestive heart failure) (HCC)     COPD (chronic obstructive pulmonary disease) (HCC)     Dementia (HCC)     Fibromyalgia     Headache     Hypertension     Liver disease     LEONARDA (obstructive sleep apnea)     Panic attack         Past Surgical History:     Past Surgical History:   Procedure Laterality Date    APPENDECTOMY      CARDIAC CATHETERIZATION      CHOLECYSTECTOMY      HYSTERECTOMY      LAMINECTOMY      OVARY REMOVAL          Medications Prior to Admission:     Prior to Admission medications    Medication Sig Start Date End Date Taking?  Authorizing Provider   oxybutynin (DITROPAN) 5 MG tablet Take 5 mg by mouth 2 times daily as needed (bladder spasms)   Yes Historical Provider, MD   insulin glargine (BASAGLAR KWIKPEN) 100 UNIT/ML injection pen Inject 27 Units into the skin 2 times daily   Yes Historical Provider, MD   polyethylene glycol (GLYCOLAX) 17 g packet Take 17 g by mouth daily as needed for Constipation   Yes Historical Provider, MD   Liraglutide (VICTOZA) 18 MG/3ML SOPN SC injection Inject 1.2 mg into the skin daily   Yes Historical Provider, MD   pravastatin (PRAVACHOL) 10 MG tablet Take 10 mg by mouth daily   Yes Historical Provider, MD   fluticasone-vilanterol (BREO ELLIPTA) 100-25 MCG/INH AEPB inhaler Inhale into the lungs daily   Yes Historical Provider, MD albuterol sulfate HFA (VENTOLIN HFA) 108 (90 Base) MCG/ACT inhaler Inhale 2 puffs into the lungs every 6 hours as needed for Wheezing or Shortness of Breath   Yes Historical Provider, MD   apixaban (ELIQUIS) 5 MG TABS tablet Take 5 mg by mouth 2 times daily    Yes Historical Provider, MD   furosemide (LASIX) 40 MG tablet Take 40 mg by mouth 2 times daily    Yes Historical Provider, MD   sertraline (ZOLOFT) 100 MG tablet Take 100 mg by mouth daily    Yes Historical Provider, MD   spironolactone (ALDACTONE) 100 MG tablet Take 100 mg by mouth daily 9/2/21  Yes Historical Provider, MD   traZODone (DESYREL) 100 MG tablet Take 100 mg by mouth nightly 11/5/21  Yes Historical Provider, MD   pantoprazole (PROTONIX) 40 MG tablet Take 40 mg by mouth 2 times daily   Yes Historical Provider, MD   nadolol (CORGARD) 40 MG tablet Take 40 mg by mouth daily   Yes Historical Provider, MD        Allergies:     Aspirin, Effexor [venlafaxine], and Seroquel [quetiapine]    Social History:     Tobacco:    reports that she has never smoked. She has never used smokeless tobacco.  Alcohol:      reports previous alcohol use. Drug Use:  reports no history of drug use. Family History:     Family History   Problem Relation Age of Onset    Heart Failure Mother     Cancer Father     Cancer Sister     Cancer Brother        Review of Systems:     Positive and Negative as described in HPI. Review of Systems   Constitutional: Positive for appetite change (Poor appetite). Negative for chills, diaphoresis and fever. HENT: Negative for congestion and hearing loss. Respiratory: Negative for cough, shortness of breath, wheezing and stridor. On home O2   Cardiovascular: Negative for chest pain, palpitations and leg swelling. Gastrointestinal: Positive for abdominal pain (Gas pain), constipation (She states she has been able to have very small stools) and vomiting. Negative for blood in stool, diarrhea and nausea.    Genitourinary: Negative for dysuria and frequency. Chronic Pedroza   Musculoskeletal: Negative for myalgias. Chronically bedbound with generalized weakness   Skin: Negative for rash. Neurological: Negative for dizziness, seizures and headaches. Psychiatric/Behavioral: The patient is not nervous/anxious. Physical Exam:   BP (!) 115/50   Pulse 71   Temp 97.7 °F (36.5 °C) (Oral)   Resp 18   Ht 5' 2\" (1.575 m)   Wt 216 lb (98 kg)   SpO2 100%   BMI 39.51 kg/m²   Temp (24hrs), Av °F (36.7 °C), Min:97.5 °F (36.4 °C), Max:98.8 °F (37.1 °C)    Recent Labs     22  0904 22  1112   POCGLU 264* 451*       Intake/Output Summary (Last 24 hours) at 2022 1300  Last data filed at 2022 0012  Gross per 24 hour   Intake 250 ml   Output --   Net 250 ml       Physical Exam  Vitals and nursing note reviewed. HENT:      Mouth/Throat:      Mouth: Mucous membranes are dry. Eyes:      Extraocular Movements: Extraocular movements intact. Cardiovascular:      Rate and Rhythm: Normal rate and regular rhythm. Pulses: Normal pulses. Heart sounds: Normal heart sounds. Pulmonary:      Effort: Pulmonary effort is normal.      Breath sounds: Normal breath sounds. Abdominal:      General: Bowel sounds are decreased. Palpations: Abdomen is soft. Tenderness: There is no abdominal tenderness. Musculoskeletal:      Right lower leg: Edema present. Left lower leg: Edema present. Skin:     General: Skin is warm and dry. Capillary Refill: Capillary refill takes less than 2 seconds. Comments: Chronic vascular changes/lymphedema   Neurological:      General: No focal deficit present. Mental Status: She is alert and oriented to person, place, and time. GCS: GCS eye subscore is 4. GCS verbal subscore is 5. GCS motor subscore is 6.    Psychiatric:         Mood and Affect: Mood normal.         Investigations:      Laboratory Testing:  Recent Results (from the past 24 hour(s))   EKG 12 Lead    Collection Time: 02/08/22  6:47 PM   Result Value Ref Range    Ventricular Rate 82 BPM    Atrial Rate 82 BPM    P-R Interval 144 ms    QRS Duration 92 ms    Q-T Interval 406 ms    QTc Calculation (Bazett) 474 ms    P Axis 60 degrees    R Axis 13 degrees    T Axis 86 degrees   Comprehensive Metabolic Panel    Collection Time: 02/08/22  6:50 PM   Result Value Ref Range    Glucose 389 (H) 70 - 99 mg/dL    BUN 12 8 - 23 mg/dL    CREATININE 0.50 0.50 - 0.90 mg/dL    Bun/Cre Ratio NOT REPORTED 9 - 20    Calcium 8.0 (L) 8.6 - 10.4 mg/dL    Sodium 136 135 - 144 mmol/L    Potassium 2.4 (LL) 3.7 - 5.3 mmol/L    Chloride 95 (L) 98 - 107 mmol/L    CO2 29 20 - 31 mmol/L    Anion Gap 12 9 - 17 mmol/L    Alkaline Phosphatase 97 35 - 104 U/L    ALT 9 5 - 33 U/L    AST 23 <32 U/L    Total Bilirubin 0.70 0.3 - 1.2 mg/dL    Total Protein 6.0 (L) 6.4 - 8.3 g/dL    Albumin 2.6 (L) 3.5 - 5.2 g/dL    Albumin/Globulin Ratio 0.8 (L) 1.0 - 2.5    GFR Non-African American >60 >60 mL/min    GFR African American >60 >60 mL/min    GFR Comment          GFR Staging NOT REPORTED    CBC Auto Differential    Collection Time: 02/08/22  6:50 PM   Result Value Ref Range    WBC 2.7 (L) 3.5 - 11.0 k/uL    RBC 3.50 (L) 4.0 - 5.2 m/uL    Hemoglobin 8.5 (L) 12.0 - 16.0 g/dL    Hematocrit 26.6 (L) 36 - 46 %    MCV 76.1 (L) 80 - 100 fL    MCH 24.3 (L) 26 - 34 pg    MCHC 31.9 31 - 37 g/dL    RDW 18.8 (H) 12.5 - 15.4 %    Platelets 83 (L) 650 - 450 k/uL    MPV 8.3 6.0 - 12.0 fL    NRBC Automated NOT REPORTED per 100 WBC    Differential Type NOT REPORTED     Seg Neutrophils 59 36 - 66 %    Lymphocytes 27 24 - 44 %    Monocytes 11 2 - 11 %    Eosinophils % 2 1 - 4 %    Basophils 1 0 - 2 %    Immature Granulocytes NOT REPORTED 0 %    Segs Absolute 1.60 (L) 1.8 - 7.7 k/uL    Absolute Lymph # 0.70 (L) 1.0 - 4.8 k/uL    Absolute Mono # 0.30 0.1 - 1.2 k/uL    Absolute Eos # 0.10 0.0 - 0.4 k/uL    Basophils Absolute 0.00 0.0 - 0.2 k/uL Absolute Immature Granulocyte NOT REPORTED 0.00 - 0.30 k/uL    WBC Morphology NOT REPORTED     RBC Morphology NOT REPORTED     Platelet Estimate NOT REPORTED    Amylase    Collection Time: 02/08/22  6:50 PM   Result Value Ref Range    Amylase 15 (L) 28 - 100 U/L   Lipase    Collection Time: 02/08/22  6:50 PM   Result Value Ref Range    Lipase 18 13 - 60 U/L   Protime-INR    Collection Time: 02/08/22  6:50 PM   Result Value Ref Range    Protime 12.0 9.4 - 12.6 sec    INR 1.1    Troponin    Collection Time: 02/08/22  6:50 PM   Result Value Ref Range    Troponin, High Sensitivity 26 (H) 0 - 14 ng/L    Troponin T NOT REPORTED <0.03 ng/mL    Troponin Interp NOT REPORTED    Beta-Hydroxybutyrate    Collection Time: 02/08/22  6:50 PM   Result Value Ref Range    Beta-Hydroxybutyrate 0.23 0.02 - 0.27 mmol/L   Lactate, Sepsis    Collection Time: 02/08/22  6:50 PM   Result Value Ref Range    Lactic Acid, Sepsis 1.7 0.5 - 1.9 mmol/L    Lactic Acid, Sepsis, Whole Blood NOT REPORTED 0.5 - 1.9 mmol/L   Ammonia    Collection Time: 02/08/22  6:50 PM   Result Value Ref Range    Ammonia 89 (H) 11 - 51 umol/L   COVID-19, Rapid    Collection Time: 02/08/22  6:55 PM    Specimen: Nasopharyngeal Swab   Result Value Ref Range    Specimen Description . NASOPHARYNGEAL SWAB     SARS-CoV-2, Rapid Not Detected Not Detected   Rapid Influenza A/B Antigens    Collection Time: 02/08/22  7:02 PM    Specimen: Nasopharyngeal Swab   Result Value Ref Range    Specimen Description . NASOPHARYNGEAL SWAB     Special Requests NOT REPORTED     Direct Exam       NEGATIVE for Influenza A + B antigens. PCR testing to confirm this result is available upon request.  Specimen will be saved in the laboratory for 7 days. Please call [phone] if PCR testing is indicated.    Venous Blood Gas, POC    Collection Time: 02/08/22  7:10 PM   Result Value Ref Range    pH, Josh 7.514 (H) 7.320 - 7.430    pCO2, Josh 37.6 (L) 41.0 - 51.0 mm Hg pO2, Josh 49.5 30.0 - 50.0 mm Hg    HCO3, Venous 30.3 (H) 22.0 - 29.0 mmol/L    Total CO2, Venous NOT REPORTED 23.0 - 30.0 mmol/L    Negative Base Excess, Josh NOT REPORTED 0.0 - 2.0    Positive Base Excess, Josh 7 (H) 0.0 - 3.0    O2 Sat, Josh 88 (H) 60.0 - 85.0 %    O2 Device/Flow/% NOT REPORTED     Baltazar Test NOT REPORTED     Sample Site NOT REPORTED     Mode NOT REPORTED     FIO2 NOT REPORTED     Pt Temp 37.0     POC pH Temp NOT REPORTED     POC pCO2 Temp NOT REPORTED mm Hg    POC pO2 Temp NOT REPORTED mm Hg   Culture, Blood 1    Collection Time: 02/08/22  8:30 PM    Specimen: Blood   Result Value Ref Range    Specimen Description . BLOOD     Special Requests RIGHT HAND TV:18ML     Culture NO GROWTH <24 HRS    Culture, Blood 1    Collection Time: 02/08/22  8:41 PM    Specimen: Blood   Result Value Ref Range    Specimen Description . BLOOD     Special Requests LEFT HAND TV:15ML     Culture NO GROWTH <24 HRS    APTT    Collection Time: 02/08/22  9:15 PM   Result Value Ref Range    PTT 30.1 21.3 - 31.3 sec   Lactate, Sepsis    Collection Time: 02/08/22  9:16 PM   Result Value Ref Range    Lactic Acid, Sepsis 1.7 0.5 - 1.9 mmol/L    Lactic Acid, Sepsis, Whole Blood NOT REPORTED 0.5 - 1.9 mmol/L   Troponin    Collection Time: 02/08/22  9:16 PM   Result Value Ref Range    Troponin, High Sensitivity 25 (H) 0 - 14 ng/L    Troponin T NOT REPORTED <0.03 ng/mL    Troponin Interp NOT REPORTED    Magnesium    Collection Time: 02/08/22 11:51 PM   Result Value Ref Range    Magnesium 1.8 1.6 - 2.6 mg/dL   Basic Metabolic Panel    Collection Time: 02/08/22 11:51 PM   Result Value Ref Range    Glucose 369 (H) 70 - 99 mg/dL    BUN 11 8 - 23 mg/dL    CREATININE 0.60 0.50 - 0.90 mg/dL    Bun/Cre Ratio NOT REPORTED 9 - 20    Calcium 8.0 (L) 8.6 - 10.4 mg/dL    Sodium 135 135 - 144 mmol/L    Potassium 2.7 (LL) 3.7 - 5.3 mmol/L    Chloride 95 (L) 98 - 107 mmol/L    CO2 33 (H) 20 - 31 mmol/L    Anion Gap 7 (L) 9 - 17 mmol/L    GFR Non-African American >60 >60 mL/min    GFR African American >60 >60 mL/min    GFR Comment          GFR Staging NOT REPORTED    CBC Auto Differential    Collection Time: 02/09/22  6:06 AM   Result Value Ref Range    WBC 1.9 (L) 3.5 - 11.0 k/uL    RBC 3.25 (L) 4.0 - 5.2 m/uL    Hemoglobin 8.0 (L) 12.0 - 16.0 g/dL    Hematocrit 25.1 (L) 36 - 46 %    MCV 77.2 (L) 80 - 100 fL    MCH 24.6 (L) 26 - 34 pg    MCHC 31.9 31 - 37 g/dL    RDW 18.8 (H) 12.5 - 15.4 %    Platelets 70 (L) 508 - 450 k/uL    MPV 8.4 6.0 - 12.0 fL    NRBC Automated NOT REPORTED per 100 WBC    Differential Type NOT REPORTED     Immature Granulocytes NOT REPORTED 0 %    Absolute Immature Granulocyte NOT REPORTED 0.00 - 0.30 k/uL    WBC Morphology NOT REPORTED     RBC Morphology NOT REPORTED     Platelet Estimate NOT REPORTED     Seg Neutrophils 55 36 - 66 %    Lymphocytes 30 24 - 44 %    Monocytes 10 2 - 11 %    Eosinophils % 4 1 - 4 %    Basophils 1 0 - 2 %    Segs Absolute 1.10 (L) 1.8 - 7.7 k/uL    Absolute Lymph # 0.60 (L) 1.0 - 4.8 k/uL    Absolute Mono # 0.20 0.1 - 1.2 k/uL    Absolute Eos # 0.10 0.0 - 0.4 k/uL    Basophils Absolute 0.00 0.0 - 0.2 k/uL   Comprehensive Metabolic Panel    Collection Time: 02/09/22  6:06 AM   Result Value Ref Range    Glucose 415 (HH) 70 - 99 mg/dL    BUN 9 8 - 23 mg/dL    CREATININE 0.50 0.50 - 0.90 mg/dL    Bun/Cre Ratio NOT REPORTED 9 - 20    Calcium 7.8 (L) 8.6 - 10.4 mg/dL    Sodium 137 135 - 144 mmol/L    Potassium 3.5 (L) 3.7 - 5.3 mmol/L    Chloride 100 98 - 107 mmol/L    CO2 30 20 - 31 mmol/L    Anion Gap 7 (L) 9 - 17 mmol/L    Alkaline Phosphatase 91 35 - 104 U/L    ALT 10 5 - 33 U/L    AST 26 <32 U/L    Total Bilirubin 0.70 0.3 - 1.2 mg/dL    Total Protein 5.8 (L) 6.4 - 8.3 g/dL    Albumin 2.4 (L) 3.5 - 5.2 g/dL    Albumin/Globulin Ratio 0.7 (L) 1.0 - 2.5    GFR Non-African American >60 >60 mL/min    GFR African American >60 >60 mL/min    GFR Comment          GFR Staging NOT REPORTED    Protime-INR    Collection Time: 02/09/22  6:06 AM   Result Value Ref Range    Protime 11.6 9.4 - 12.6 sec    INR 1.1    POC Glucose Fingerstick    Collection Time: 02/09/22  9:04 AM   Result Value Ref Range    POC Glucose 264 (H) 65 - 105 mg/dL   Ammonia    Collection Time: 02/09/22 10:15 AM   Result Value Ref Range    Ammonia 42 11 - 51 umol/L   Ferritin    Collection Time: 02/09/22 10:15 AM   Result Value Ref Range    Ferritin 20 13 - 150 ug/L   POC Glucose Fingerstick    Collection Time: 02/09/22 11:12 AM   Result Value Ref Range    POC Glucose 451 (HH) 65 - 105 mg/dL   Reticulocytes    Collection Time: 02/09/22 11:21 AM   Result Value Ref Range    Retic % 2.5 (H) 0.5 - 1.9 %    Absolute Retic # 0.081 (H) 0.030 - 0.080 M/uL    Immature Retic Fract 30.200 (H) 2.7 - 18.3 %    Retic Hemoglobin 27.4 (L) 28.2 - 35.7 pg       Imaging/Diagnostics:  CT ABDOMEN PELVIS W IV CONTRAST Additional Contrast? None    Result Date: 2/8/2022  Left lower lobe consolidative changes. Correlate for pneumonia. Small left pleural effusion with thickened, enhancing pleura. The sterility of this collection is indeterminate by imaging, and although there is no definite gas in the pleural space, recommend clinical correlation to exclude empyema. Large volume of stool in the mildly distended rectosigmoid with wall thickening and surrounding fat stranding. The colon is distended proximal to the rectosigmoid up to 8.4 cm. Differential considerations include stercoral colitis, fecal impaction, and colonic ileus. There is no pneumatosis or free air. Hepatic cirrhosis with stigmata of portal hypertension including esophageal/paraesophageal and gastric varices with small volume perihepatic ascites. 12 mm indeterminate right adrenal nodule which statistically still most likely represents an adenoma. This can be followed up in 1 year with adrenal washout CT or chemical shift MRI. XR CHEST PORTABLE    Result Date: 2/8/2022  1.  Borderline cardiomegaly, without evidence of CHF 2. Scar versus atelectasis, left mid to lower lung zone 3. Pleural based thickening, lateral aspect of the left hemithorax. Differential considerations would include scarring versus loculated fluid. Assessment :      Hospital Problems           Last Modified POA    * (Principal) Hypokalemia 2/9/2022 Yes    CHF (congestive heart failure) (HCC) (Chronic) 2/9/2022 Yes    Atrial fibrillation (HCC) (Chronic) 2/9/2022 Yes    Hypertension (Chronic) 2/9/2022 Yes    LEONARDA (obstructive sleep apnea) (Chronic) 2/9/2022 Yes    Hyperglycemia due to diabetes mellitus ([address] 2/9/2022 Yes    Lymphedema (Chronic) 2/9/2022 Yes    Noncompliance 2/9/2022 Yes    CAD (coronary artery disease) (Chronic) 2/9/2022 Yes    COPD (chronic obstructive pulmonary disease) ([address] (Chronic) 2/9/2022 Yes    Hyperammonemia ([address] 2/9/2022 Yes    Chronic anemia 2/9/2022 Yes    Cirrhosis of liver without ascites ([address] 2/9/2022 Yes    Fecal impaction ([address] 2/9/2022 Yes    Pancytopenia ([address] 2/9/2022 Yes          Plan:     Patient status inpatient in the  Med/Surge     No obvious s/s of infection. Check procalcitonin. Monitor off ATB for now    Hypokalemia; potassium slowly improving. Continue to replace per electrolyte protocol replacement algorithm. Repeat potassium again at 1300    Patient has chronic Pedroza with frequent UTIs; check UA/culture    Constipation CT evidence of infection; Dulcolax suppository now and MiraLAX daily    Type 2 diabetes with home insulin use; patient admits to noncompliance. Start high correction insulin sliding scale. Start home Lantus dose. Check hemoglobin A1c    Hyperammonia; patient received one dose of lactulose. Repeat ammonia level now    History of CHF; waiting for nursing to verify home medication list.  For now monitor closely for fluid overload. Waiting for home medication list every updated    pancytopenia; plan to check iron/folate, vitamin B12, vitamin D.  And peripheral blood smear    History of atrial fib with Eliquis; resume home Eliquis and BB    History of COPD with chronic oxygen use; maintain nasal cannula oxygen keep sats greater than 90 to 91%. Resume home Breo    History of CAD; monitor telemetry. Resume home statin and BB    History of LEONARDA; patient states she is not on CPAP or BiPAP at home currently    Consult  to assist with discharge planning. There is concern that the patient is unable to provide care for herself and is not always getting her medications    Consultations:   [address] [place] [address]    Patient is admitted as inpatient status because of co-morbidities listed above, severity of signs and symptoms as outlined, requirement for current medical therapies and most importantly because of direct risk to patient if care not provided in a hospital setting. Expected length of stay > 48 hours.     LEILA Spear - CNP  2/9/2022  1:00 PM    Copy sent to Dr. Leslie Jackson MD

## 2022-02-09 NOTE — ED NOTES
[place] [id number] ambulance unable to provide ALS transport.       Parisa Jaramillo, EZRA  02/08/22 9060

## 2022-02-09 NOTE — FLOWSHEET NOTE
Patient is awake and alert while reclining in the bed. Patient is on the phone, but requests writer to enter the room. Patient states that she has support from her spouse and daughter and denies any spiritual or emotional concerns. Patient appears to be coping adequately and seems to have adequate support. Patient expresses appreciation for the visit. Spiritual Care will follow as needed. 02/09/22 1630   Encounter Summary   Services provided to: Patient   Referral/Consult From: [facility]   Support System Spouse; Children   Continue Visiting   (2/9/22)   Complexity of Encounter Low   Length of Encounter 15 minutes   Routine   Type Initial   Assessment Approachable   Intervention Active listening;Explored coping resources   Outcome Expressed gratitude

## 2022-02-09 NOTE — ED NOTES
Pt repositioned for comfort, complete linen change. Small amount orange juice given.  Pt denies any complaints, resting comfortably     Belen Srivastava RN  02/09/22 0123

## 2022-02-09 NOTE — FLOWSHEET NOTE
02/09/22 0931   C-SSRS Suicide Screening   1) Within the past month, have you wished you were dead or wished you could go to sleep and not wake up? Yes   2) Have you actually had any thoughts of killing yourself? Yes   3) Have you been thinking about how you might kill yourself? No   4) Have you had these thoughts and had some intention of acting on them? No   5) Have you started to work out or worked out the details of how to kill yourself? Do you intend to carry out this plan? No   6) Have you ever done anything, started to do anything, or prepared to do anything to end your life? No     Patient is bed bound and has thoughts of hurting self in the past. Does not feel suicidal now. Wants to be there for her grandchildren. Wants to be able to function again. Will discuss with nurse.

## 2022-02-09 NOTE — ED PROVIDER NOTES
[facility] ED  [address] [facility]  Phone: [phone]        ADDENDUM:      Care of this patient was assumed from Dr. Tristen Weber. The patient was seen for Nausea, Diarrhea, and Abdominal Pain (pt has ascites, pt has been tapped past year. )  . The patient's initial evaluation and plan have been discussed with the prior provider who initially evaluated the patient. Nursing Notes, Past Medical Hx, Past Surgical Hx, Allergies, were all reviewed. PAST MEDICAL HISTORY    has a past medical history of Arthritis, Atrial fibrillation ([address] CAD (coronary artery disease), CHF (congestive heart failure) ([address] COPD (chronic obstructive pulmonary disease) ([address] Dementia ([address] Fibromyalgia, Headache, Hypertension, Liver disease, LEONARDA (obstructive sleep apnea), and Panic attack. SURGICAL HISTORY      has a past surgical history that includes Cardiac catheterization; Cholecystectomy; Hysterectomy; laminectomy; Ovary removal; and Appendectomy. CURRENT MEDICATIONS       Previous Medications    APIXABAN (ELIQUIS) 5 MG TABS TABLET    as directed    FUROSEMIDE (LASIX) 40 MG TABLET    as directed    METFORMIN HCL  MG/5ML SRER    2  tablets with breakfast and two tablets with evening meal    NADOLOL (CORGARD) 40 MG TABLET    Take 40 mg by mouth daily    PANTOPRAZOLE (PROTONIX) 40 MG TABLET    Take 40 mg by mouth 2 times daily    SERTRALINE (ZOLOFT) 50 MG TABLET    1 tablet    SPIRONOLACTONE (ALDACTONE) 100 MG TABLET    Take 100 mg by mouth daily    TRAZODONE (DESYREL) 100 MG TABLET    Take 100 mg by mouth nightly       ALLERGIES     is allergic to aspirin, effexor [venlafaxine], and seroquel [quetiapine]. Diagnostic Results     LABS:   Results for orders placed or performed during the hospital encounter of 02/08/22   COVID-19, Rapid    Specimen: Nasopharyngeal Swab   Result Value Ref Range    Specimen Description . NASOPHARYNGEAL SWAB     SARS-CoV-2, Rapid Not Detected Not Detected Rapid Influenza A/B Antigens    Specimen: Nasopharyngeal Swab   Result Value Ref Range    Specimen Description . NASOPHARYNGEAL SWAB     Special Requests NOT REPORTED     Direct Exam       NEGATIVE for Influenza A + B antigens. PCR testing to confirm this result is available upon request.  Specimen will be saved in the laboratory for 7 days. Please call [phone] if PCR testing is indicated.    Comprehensive Metabolic Panel   Result Value Ref Range    Glucose 389 (H) 70 - 99 mg/dL    BUN 12 8 - 23 mg/dL    CREATININE 0.50 0.50 - 0.90 mg/dL    Bun/Cre Ratio NOT REPORTED 9 - 20    Calcium 8.0 (L) 8.6 - 10.4 mg/dL    Sodium 136 135 - 144 mmol/L    Potassium 2.4 (LL) 3.7 - 5.3 mmol/L    Chloride 95 (L) 98 - 107 mmol/L    CO2 29 20 - 31 mmol/L    Anion Gap 12 9 - 17 mmol/L    Alkaline Phosphatase 97 35 - 104 U/L    ALT 9 5 - 33 U/L    AST 23 <32 U/L    Total Bilirubin 0.70 0.3 - 1.2 mg/dL    Total Protein 6.0 (L) 6.4 - 8.3 g/dL    Albumin 2.6 (L) 3.5 - 5.2 g/dL    Albumin/Globulin Ratio 0.8 (L) 1.0 - 2.5    GFR Non-African American >60 >60 mL/min    GFR African American >60 >60 mL/min    GFR Comment          GFR Staging NOT REPORTED    CBC Auto Differential   Result Value Ref Range    WBC 2.7 (L) 3.5 - 11.0 k/uL    RBC 3.50 (L) 4.0 - 5.2 m/uL    Hemoglobin 8.5 (L) 12.0 - 16.0 g/dL    Hematocrit 26.6 (L) 36 - 46 %    MCV 76.1 (L) 80 - 100 fL    MCH 24.3 (L) 26 - 34 pg    MCHC 31.9 31 - 37 g/dL    RDW 18.8 (H) 12.5 - 15.4 %    Platelets 83 (L) 489 - 450 k/uL    MPV 8.3 6.0 - 12.0 fL    NRBC Automated NOT REPORTED per 100 WBC    Differential Type NOT REPORTED     Seg Neutrophils 59 36 - 66 %    Lymphocytes 27 24 - 44 %    Monocytes 11 2 - 11 %    Eosinophils % 2 1 - 4 %    Basophils 1 0 - 2 %    Immature Granulocytes NOT REPORTED 0 %    Segs Absolute 1.60 (L) 1.8 - 7.7 k/uL    Absolute Lymph # 0.70 (L) 1.0 - 4.8 k/uL    Absolute Mono # 0.30 0.1 - 1.2 k/uL    Absolute Eos # 0.10 0.0 - 0.4 k/uL    Basophils Absolute 0.00 0.0 - 0.2 k/uL    Absolute Immature Granulocyte NOT REPORTED 0.00 - 0.30 k/uL    WBC Morphology NOT REPORTED     RBC Morphology NOT REPORTED     Platelet Estimate NOT REPORTED    Amylase   Result Value Ref Range    Amylase 15 (L) 28 - 100 U/L   Lipase   Result Value Ref Range    Lipase 18 13 - 60 U/L   Protime-INR   Result Value Ref Range    Protime 12.0 9.4 - 12.6 sec    INR 1.1    Troponin   Result Value Ref Range    Troponin, High Sensitivity 26 (H) 0 - 14 ng/L    Troponin T NOT REPORTED <0.03 ng/mL    Troponin Interp NOT REPORTED    Beta-Hydroxybutyrate   Result Value Ref Range    Beta-Hydroxybutyrate 0.23 0.02 - 0.27 mmol/L   Lactate, Sepsis   Result Value Ref Range    Lactic Acid, Sepsis 1.7 0.5 - 1.9 mmol/L    Lactic Acid, Sepsis, Whole Blood NOT REPORTED 0.5 - 1.9 mmol/L   Lactate, Sepsis   Result Value Ref Range    Lactic Acid, Sepsis 1.7 0.5 - 1.9 mmol/L    Lactic Acid, Sepsis, Whole Blood NOT REPORTED 0.5 - 1.9 mmol/L   Ammonia   Result Value Ref Range    Ammonia 89 (H) 11 - 51 umol/L   APTT   Result Value Ref Range    PTT 30.1 21.3 - 31.3 sec   Troponin   Result Value Ref Range    Troponin, High Sensitivity 25 (H) 0 - 14 ng/L    Troponin T NOT REPORTED <0.03 ng/mL    Troponin Interp NOT REPORTED    Venous Blood Gas, POC   Result Value Ref Range    pH, Josh 7.514 (H) 7.320 - 7.430    pCO2, Josh 37.6 (L) 41.0 - 51.0 mm Hg    pO2, Josh 49.5 30.0 - 50.0 mm Hg    HCO3, Venous 30.3 (H) 22.0 - 29.0 mmol/L    Total CO2, Venous NOT REPORTED 23.0 - 30.0 mmol/L    Negative Base Excess, Josh NOT REPORTED 0.0 - 2.0    Positive Base Excess, Josh 7 (H) 0.0 - 3.0    O2 Sat, Josh 88 (H) 60.0 - 85.0 %    O2 Device/Flow/% NOT REPORTED     Baltazar Test NOT REPORTED     Sample Site NOT REPORTED     Mode NOT REPORTED     FIO2 NOT REPORTED     Pt Temp 37.0     POC pH Temp NOT REPORTED     POC pCO2 Temp NOT REPORTED mm Hg    POC pO2 Temp NOT REPORTED mm Hg       RADIOLOGY:  CT ABDOMEN PELVIS W IV CONTRAST Additional Contrast? None   Final Result   Left lower lobe consolidative changes. Correlate for pneumonia. Small left pleural effusion with thickened, enhancing pleura. The sterility   of this collection is indeterminate by imaging, and although there is no   definite gas in the pleural space, recommend clinical correlation to exclude   empyema. Large volume of stool in the mildly distended rectosigmoid with wall   thickening and surrounding fat stranding. The colon is distended proximal to   the rectosigmoid up to 8.4 cm. Differential considerations include stercoral   colitis, fecal impaction, and colonic ileus. There is no pneumatosis or free   air. Hepatic cirrhosis with stigmata of portal hypertension including   esophageal/paraesophageal and gastric varices with small volume perihepatic   ascites. 12 mm indeterminate right adrenal nodule which statistically still most   likely represents an adenoma. This can be followed up in 1 year with adrenal   washout CT or chemical shift MRI. XR CHEST PORTABLE   Final Result   1. Borderline cardiomegaly, without evidence of CHF   2. Scar versus atelectasis, left mid to lower lung zone   3. Pleural based thickening, lateral aspect of the left hemithorax. Differential considerations would include scarring versus loculated fluid.              RECENT VITALS:  BP: 134/62, Temp: 98.8 °F (37.1 °C), Pulse: 76, Resp: 15     ED Course     The patient was given the following medications:  Orders Placed This Encounter   Medications    magnesium sulfate 1000 mg in dextrose 5% 100 mL IVPB    potassium chloride 10 mEq/100 mL IVPB (Peripheral Line)    potassium chloride (KLOR-CON M) extended release tablet 40 mEq    DISCONTD: 0.9% NaCl with KCl 40 mEq infusion    diphenhydrAMINE (BENADRYL) injection 25 mg    0.9 % sodium chloride bolus    potassium chloride 40 mEq in sodium chloride 0.9 % 1,000 mL infusion    iopamidol (ISOVUE-370) 76 % injection 75 mL    sodium chloride flush 0.9 % injection 5-40 mL    0.9 % sodium chloride bolus    vancomycin (VANCOCIN) 1,500 mg in dextrose 5 % 250 mL IVPB     Order Specific Question:   Antimicrobial Indications     Answer:   Intra-Abdominal Infection     Order Specific Question:   Antimicrobial Indications     Answer:   Pneumonia (CAP)    piperacillin-tazobactam (ZOSYN) 3,375 mg in dextrose 5 % 50 mL IVPB extended infusion (mini-bag)     Order Specific Question:   Antimicrobial Indications     Answer:   Pneumonia (CAP)     Order Specific Question:   Antimicrobial Indications     Answer:   Intra-Abdominal Infection       Medical Decision Making           The patient is a 72-year-old female with history of diabetes noncompliant with her insulin, atrial fibrillation on Eliquis, cirrhosis, ascites, chronic anemia, and last paracentesis approximately 1 year ago who presents for evaluation of generalized abdominal pain, nausea, diarrhea, and generalized itching. Vital signs are stable. Point-of-care glucose was elevated and full work-up has been initiated. Results are pending at time of signout. Lactic acid is normal.  Troponin slightly elevated at 25 and 26. EKG 1847 shows sinus rhythm, rate 82 bpm, normal axis, normal intervals, no ST elevation or depression, generalized T wave flattening, poor R wave progression, Q wave in 3 and aVR, no previous EKG for comparison. She denies any chest pain or shortness of breath. VBG shows metabolic alkalosis. CMP shows hyperglycemia of 389, normal kidney function, and hypokalemia of 2.4. Her potassium was replaced with 10 mEq IV, 40 mEq p.o., and 1 mg magnesium IV. LFTs are unremarkable. Lipase, coags, beta hydroxybutyrate, rapid flu and rapid Covid are unremarkable. Blood cultures were sent. CT abdomen pelvis shows left lower lobe consolidative changes with possibility of pneumonia. There is a small left pleural effusion with thickened enhancing pleura. There is large volume of stool in the mildly distended rectosigmoid with wall thickening and surrounding fat stranding. There is concern for possible colitis. The patient was started on Vanco and Zosyn. CT scan shows incidental findings of cirrhosis and adrenal adenoma and the patient was updated on this. Her pruritus was controlled with Benadryl and IV fluids. Her ammonia was elevated and she was treated with lactulose. This should also help with her constipation. I have low suspicion for sepsis. The patient will need to be admitted for further evaluation and treatment. I spoke to the [facility] FAW[facility], Deleon, at 10:18 PM and she agrees to accept the patient for transfer at [facility]. Disposition     FINAL IMPRESSION      1. Hyperglycemia due to diabetes mellitus ([address]    2. Hypokalemia    3. Chronic anemia    4. Pneumonia of left lower lobe due to infectious organism    5. Colitis    6. Fecal impaction ([address]    7. Cirrhosis of liver without ascites, unspecified hepatic cirrhosis type ([address]    8. Adrenal nodule ([address]    9.  Pleural effusion on left          DISPOSITION/PLAN   DISPOSITION Admitted 02/08/2022 10:26:53 PM      CONDITION ON DISPOSITION:   Stable            (Please note that portions of this note were completed with a voice recognition program.  Efforts were made to edit the dictations but occasionally words are mis-transcribed.)    Carlos Ch DO  Emergency Medicine Physician                  Carlos Ch DO  02/09/22 0005

## 2022-02-09 NOTE — ED NOTES
Room assignment @ [place] [address] 2024. Report number [phone].      Sharla Nye RN  02/08/22 0875

## 2022-02-09 NOTE — ED NOTES
Call light in reach, pt remains on monitor, pt watching TV .lights dimmed.       Josh Alanis RN  02/08/22 0684

## 2022-02-09 NOTE — ED PROVIDER NOTES
[facility]  [address] [facility]  Phone: [phone]        Pt Name: Dedrick Soulier  MRN: [id number]  [place] 1955  Date of evaluation: 2/8/22      CHIEF COMPLAINT       Chief Complaint   Patient presents with    Nausea    Diarrhea    Abdominal Pain     pt has ascites, pt has been tapped past year. HISTORY OF PRESENT ILLNESS    Dedrick Soulier is a 74 y.o. female who presents chief complaint of nausea and vomiting and abdominal pain, patient has a history of ascites she was tapped last year she says just has been feeling good this week not able to keep much down she has had more abdominal distention no fevers or chills no chest pain or shortness of breath she still has some leg swelling. Patient is on Eliquis. And Lasix she also takes insulin but has not taken much recently when squad arrived they checked her sugar was over 500    REVIEW OF SYSTEMS         Review of Systems   Constitutional: Negative for chills and fever. HENT: Negative for congestion and ear pain. Eyes: Negative for pain and visual disturbance. Respiratory: Positive for shortness of breath. Negative for cough. Cardiovascular: Positive for leg swelling. Negative for chest pain and palpitations. Gastrointestinal: Positive for abdominal distention, abdominal pain, constipation, nausea and vomiting. Negative for blood in stool and diarrhea. Endocrine: Positive for polydipsia. Negative for polyuria. Genitourinary: Negative for difficulty urinating, dysuria, frequency, vaginal bleeding and vaginal discharge. Patient has a chronic indwelling Pedroza catheter   Musculoskeletal: Negative for back pain, joint swelling, myalgias, neck pain and neck stiffness. Skin: Negative for rash. Neurological: Positive for numbness. Negative for dizziness, weakness and headaches.         Patient states her hand on the left side feels numb but she is able to move it she also has numbness and a pressure sensation in both feet   Hematological: Negative for adenopathy. Does not bruise/bleed easily. Psychiatric/Behavioral: Negative for confusion, self-injury and suicidal ideas. PAST MEDICAL HISTORY    has no past medical history on file. SURGICAL HISTORY      has no past surgical history on file. CURRENT MEDICATIONS       Previous Medications    APIXABAN (ELIQUIS) 5 MG TABS TABLET    as directed    FUROSEMIDE (LASIX) 40 MG TABLET    as directed    METFORMIN HCL  MG/5ML SRER    2  tablets with breakfast and two tablets with evening meal    NADOLOL (CORGARD) 40 MG TABLET    Take 40 mg by mouth daily    PANTOPRAZOLE (PROTONIX) 40 MG TABLET    Take 40 mg by mouth 2 times daily    SERTRALINE (ZOLOFT) 50 MG TABLET    1 tablet    SPIRONOLACTONE (ALDACTONE) 100 MG TABLET    Take 100 mg by mouth daily    TRAZODONE (DESYREL) 100 MG TABLET    Take 100 mg by mouth nightly       ALLERGIES     is allergic to aspirin, effexor [venlafaxine], and seroquel [quetiapine]. FAMILY HISTORY     has no family status information on file. family history is not on file. SOCIAL HISTORY          PHYSICAL EXAM     INITIAL VITALS:  weight is 92.5 kg (204 lb). Her temperature is 98.8 °F (37.1 °C). Her blood pressure is 108/47 (abnormal) and her pulse is 84. Her respiration is 18. Physical Exam  Constitutional:       General: She is not in acute distress. Appearance: She is well-developed. She is obese. She is ill-appearing. She is not toxic-appearing or diaphoretic. HENT:      Head: Normocephalic and atraumatic. Right Ear: External ear normal.      Left Ear: External ear normal.   Eyes:      Conjunctiva/sclera: Conjunctivae normal.      Pupils: Pupils are equal, round, and reactive to light. Cardiovascular:      Rate and Rhythm: Normal rate and regular rhythm. Pulmonary:      Effort: Pulmonary effort is normal.      Breath sounds: Normal breath sounds.    Abdominal:      General: Bowel sounds are normal. There is distension. Palpations: Abdomen is soft. There is fluid wave. Tenderness: There is generalized abdominal tenderness. Genitourinary:     Comments: Patient has an indwelling Pedroza catheter  Musculoskeletal:         General: No tenderness. Normal range of motion. Cervical back: Normal range of motion. Right lower leg: Edema present. Left lower leg: Edema present. Comments: Patient has some lower extremity edema she has chronic changes to the skin of both lower shins   Skin:     General: Skin is warm and dry. Neurological:      General: No focal deficit present. Mental Status: She is alert and oriented to person, place, and time. Psychiatric:         Mood and Affect: Mood normal.         Behavior: Behavior normal.           DIFFERENTIAL DIAGNOSIS/ MDM:     Nausea vomiting abdominal distention history of ascites will do a work-up patient has been vaccinated for Covid    DIAGNOSTIC RESULTS     EKG: All EKG's are interpreted by the Emergency Department Physician who either signs or Co-signs this chart in the absence of a cardiologist.        RADIOLOGY:   Non-plain film images such as CT, Ultrasound and MRI are read by the radiologist. Plain radiographic images are visualized and the radiologist interpretations are reviewed as follows:         LABS:  No results found for this visit on 02/08/22. EMERGENCY DEPARTMENT COURSE:   Vitals:    Vitals:    02/08/22 1846   BP: (!) 108/47   Pulse: 84   Resp: 18   Temp: 98.8 °F (37.1 °C)   Weight: 92.5 kg (204 lb)     -------------------------  BP: (!) 108/47, Temp: 98.8 °F (37.1 °C), Pulse: 84, Resp: 18          CONSULTS:      PROCEDURES:  None    FINAL IMPRESSION    No diagnosis found. DISPOSITION/PLAN   Of this patient is passed to the oncoming physician at the end of my shift 1915 hrs. pending result    PATIENT REFERRED TO:  No follow-up provider specified.     DISCHARGE MEDICATIONS:  New Prescriptions    No medications on file       (Please note that portions of this note were completed with a voice recognition program.  Efforts were made to edit the dictations but occasionally words are mis-transcribed.)    Mita Esteban MD,, MD, F.A.A.E.M.   Attending Emergency Medicine Physician      Mita Esteban MD  02/08/22 0490

## 2022-02-09 NOTE — ED NOTES
[facility] ambulance unable to provide service at this time.       Leola Scheuermann, EZRA  02/08/22 4729

## 2022-02-09 NOTE — PROGRESS NOTES
Pt admitted to room 2024. Oriented to room, call light and bed mechanics. Side rails up x2. Call light within reach. Orders reviewed.

## 2022-02-10 VITALS
WEIGHT: 211.5 LBS | TEMPERATURE: 98.4 F | HEART RATE: 62 BPM | HEIGHT: 62 IN | OXYGEN SATURATION: 98 % | DIASTOLIC BLOOD PRESSURE: 48 MMHG | BODY MASS INDEX: 38.92 KG/M2 | RESPIRATION RATE: 16 BRPM | SYSTOLIC BLOOD PRESSURE: 99 MMHG

## 2022-02-10 LAB
ALBUMIN SERPL-MCNC: 2.4 G/DL (ref 3.5–5.2)
ALBUMIN/GLOBULIN RATIO: ABNORMAL (ref 1–2.5)
ALP BLD-CCNC: 90 U/L (ref 35–104)
ALT SERPL-CCNC: 14 U/L (ref 5–33)
AMMONIA: 101 UMOL/L (ref 11–51)
ANION GAP SERPL CALCULATED.3IONS-SCNC: 9 MMOL/L (ref 9–17)
AST SERPL-CCNC: 22 U/L
BILIRUB SERPL-MCNC: 0.43 MG/DL (ref 0.3–1.2)
BILIRUBIN DIRECT: 0.22 MG/DL
BILIRUBIN, INDIRECT: 0.21 MG/DL (ref 0–1)
BUN BLDV-MCNC: 11 MG/DL (ref 8–23)
BUN/CREAT BLD: 18 (ref 9–20)
CALCIUM SERPL-MCNC: 8.2 MG/DL (ref 8.6–10.4)
CHLORIDE BLD-SCNC: 107 MMOL/L (ref 98–107)
CO2: 26 MMOL/L (ref 20–31)
CREAT SERPL-MCNC: 0.6 MG/DL (ref 0.5–0.9)
GFR AFRICAN AMERICAN: >60 ML/MIN
GFR NON-AFRICAN AMERICAN: >60 ML/MIN
GFR SERPL CREATININE-BSD FRML MDRD: ABNORMAL ML/MIN/{1.73_M2}
GFR SERPL CREATININE-BSD FRML MDRD: ABNORMAL ML/MIN/{1.73_M2}
GLOBULIN: ABNORMAL G/DL (ref 1.5–3.8)
GLUCOSE BLD-MCNC: 147 MG/DL (ref 65–105)
GLUCOSE BLD-MCNC: 173 MG/DL (ref 65–105)
GLUCOSE BLD-MCNC: 93 MG/DL (ref 65–105)
GLUCOSE BLD-MCNC: 95 MG/DL (ref 70–99)
HCT VFR BLD CALC: 25.9 % (ref 36.3–47.1)
HEMOGLOBIN: 7.7 G/DL (ref 11.9–15.1)
INR BLD: 1.6
MCH RBC QN AUTO: 24.3 PG (ref 25.2–33.5)
MCHC RBC AUTO-ENTMCNC: 29.7 G/DL (ref 28.4–34.8)
MCV RBC AUTO: 81.7 FL (ref 82.6–102.9)
NRBC AUTOMATED: 0 PER 100 WBC
PDW BLD-RTO: 17.4 % (ref 11.8–14.4)
PLATELET # BLD: 71 K/UL (ref 138–453)
PMV BLD AUTO: 9.9 FL (ref 8.1–13.5)
POTASSIUM SERPL-SCNC: 3.9 MMOL/L (ref 3.7–5.3)
PROTHROMBIN TIME: 18.6 SEC (ref 11.5–14.2)
RBC # BLD: 3.17 M/UL (ref 3.95–5.11)
SODIUM BLD-SCNC: 142 MMOL/L (ref 135–144)
TOTAL PROTEIN: 5.6 G/DL (ref 6.4–8.3)
WBC # BLD: 2.8 K/UL (ref 3.5–11.3)

## 2022-02-10 PROCEDURE — 85027 COMPLETE CBC AUTOMATED: CPT

## 2022-02-10 PROCEDURE — 6370000000 HC RX 637 (ALT 250 FOR IP): Performed by: NURSE PRACTITIONER

## 2022-02-10 PROCEDURE — 6370000000 HC RX 637 (ALT 250 FOR IP): Performed by: INTERNAL MEDICINE

## 2022-02-10 PROCEDURE — 99239 HOSP IP/OBS DSCHRG MGMT >30: CPT | Performed by: INTERNAL MEDICINE

## 2022-02-10 PROCEDURE — 82140 ASSAY OF AMMONIA: CPT

## 2022-02-10 PROCEDURE — 97162 PT EVAL MOD COMPLEX 30 MIN: CPT

## 2022-02-10 PROCEDURE — 85610 PROTHROMBIN TIME: CPT

## 2022-02-10 PROCEDURE — 94640 AIRWAY INHALATION TREATMENT: CPT

## 2022-02-10 PROCEDURE — 97167 OT EVAL HIGH COMPLEX 60 MIN: CPT

## 2022-02-10 PROCEDURE — 80048 BASIC METABOLIC PNL TOTAL CA: CPT

## 2022-02-10 PROCEDURE — 94760 N-INVAS EAR/PLS OXIMETRY 1: CPT

## 2022-02-10 PROCEDURE — 80076 HEPATIC FUNCTION PANEL: CPT

## 2022-02-10 PROCEDURE — 36415 COLL VENOUS BLD VENIPUNCTURE: CPT

## 2022-02-10 PROCEDURE — 82947 ASSAY GLUCOSE BLOOD QUANT: CPT

## 2022-02-10 PROCEDURE — 97530 THERAPEUTIC ACTIVITIES: CPT

## 2022-02-10 PROCEDURE — 2580000003 HC RX 258: Performed by: NURSE PRACTITIONER

## 2022-02-10 PROCEDURE — 97535 SELF CARE MNGMENT TRAINING: CPT

## 2022-02-10 RX ORDER — SPIRONOLACTONE 100 MG/1
100 TABLET, FILM COATED ORAL DAILY
Status: DISCONTINUED | OUTPATIENT
Start: 2022-02-10 | End: 2022-02-10 | Stop reason: HOSPADM

## 2022-02-10 RX ORDER — FUROSEMIDE 40 MG/1
40 TABLET ORAL 2 TIMES DAILY
Status: DISCONTINUED | OUTPATIENT
Start: 2022-02-10 | End: 2022-02-10 | Stop reason: HOSPADM

## 2022-02-10 RX ORDER — POTASSIUM CHLORIDE 20 MEQ/1
20 TABLET, EXTENDED RELEASE ORAL DAILY
Qty: 30 TABLET | Refills: 0 | Status: ON HOLD | OUTPATIENT
Start: 2022-02-10 | End: 2022-08-30

## 2022-02-10 RX ADMIN — SERTRALINE HYDROCHLORIDE 100 MG: 100 TABLET ORAL at 09:51

## 2022-02-10 RX ADMIN — POLYETHYLENE GLYCOL 3350 17 G: 17 POWDER, FOR SOLUTION ORAL at 09:50

## 2022-02-10 RX ADMIN — INSULIN GLARGINE 27 UNITS: 100 INJECTION, SOLUTION SUBCUTANEOUS at 09:52

## 2022-02-10 RX ADMIN — DIPHENHYDRAMINE HCL 25 MG: 25 TABLET ORAL at 00:30

## 2022-02-10 RX ADMIN — BUDESONIDE AND FORMOTEROL FUMARATE DIHYDRATE 2 PUFF: 80; 4.5 AEROSOL RESPIRATORY (INHALATION) at 07:46

## 2022-02-10 RX ADMIN — PANTOPRAZOLE SODIUM 40 MG: 40 TABLET, DELAYED RELEASE ORAL at 09:51

## 2022-02-10 RX ADMIN — INSULIN LISPRO 3 UNITS: 100 INJECTION, SOLUTION INTRAVENOUS; SUBCUTANEOUS at 17:41

## 2022-02-10 RX ADMIN — APIXABAN 5 MG: 5 TABLET, FILM COATED ORAL at 09:51

## 2022-02-10 RX ADMIN — NADOLOL 40 MG: 20 TABLET ORAL at 09:50

## 2022-02-10 RX ADMIN — PRAVASTATIN SODIUM 10 MG: 20 TABLET ORAL at 09:51

## 2022-02-10 RX ADMIN — FUROSEMIDE 40 MG: 40 TABLET ORAL at 10:52

## 2022-02-10 RX ADMIN — INSULIN LISPRO 3 UNITS: 100 INJECTION, SOLUTION INTRAVENOUS; SUBCUTANEOUS at 13:39

## 2022-02-10 RX ADMIN — SPIRONOLACTONE 100 MG: 100 TABLET ORAL at 10:52

## 2022-02-10 RX ADMIN — SODIUM CHLORIDE: 9 INJECTION, SOLUTION INTRAVENOUS at 06:00

## 2022-02-10 ASSESSMENT — PAIN SCALES - GENERAL
PAINLEVEL_OUTOF10: 0

## 2022-02-10 NOTE — PROGRESS NOTES
[facility]  Office: [address], DO, Dunia Beltran, DO, Amanda [place], DO, Damien Benavidezi Blood, DO, Loreto Jay MD, Alesia Guy MD, Eva Swartz MD, Muna Metzger MD, Reagan Ruffin MD, Froylan Ceja MD, Chris Polk MD, Jannie Samayoa, DO, Campos Ibrahim, DO, Faiza Coffman MD,  Franky Red, DO, Noemí Scales MD, Dejuan Vazquez MD, Vernon Rene MD, Kwaku Denis MD, Juan Aguilar MD, Kamilla Hammond, [facility], [facility] Brucere, CNP, Freddy Winston, CNP, Lisbeth Strickland, CNS, Gianni Villa, CNP, Kamila Quiles, CNP, Queen Alex, CNP, Christin Parker, CNP, Oumou Brito, CNP, Claudine Churchill PA-C, Delmi Hinds, [facility], Naseem Newberry, [facility], Coco Rene, CNP, Lasandra Runner, CNP, Pati Araujo, CNP, Yfn Kim, CNP, Ubaldo Tay, [facility], [facility]    Progress Note    2/10/2022    10:07 AM    Name:   Sanjay Mario  MRN:     [id number]     Acct:      [de-identified]   Room:   2024/2024-01  IP Day:  2  Admit Date:  2/8/2022  6:41 PM    PCP:   Dixie Campos MD  Code Status:  Full Code    Subjective:     C/C:   Chief Complaint   Patient presents with   Chairez Nausea    Diarrhea    Abdominal Pain     pt has ascites, pt has been tapped past year. Interval History Status: improved. Feels better today  Denies cp/sob/n/v  Previously had diarrhea so took anti-diarrheal (against doctor's advice) and then became constipated: the meds given here allowed her to have large bm    Wants a wheelchair to get to/from doctor's appointments etc-hasn't walked in years    Brief History:     Per my FAWAD:  Christopher Edge is a 74 y.o.  Non- / non  female who presents with Nausea, Diarrhea, and Abdominal Pain (pt has ascites, pt has been tapped past year. )   and is admitted to the hospital for the management of Hypokalemia.     According to the ER notes the patient presented with chief complaints of nausea, vomiting, and abdominal pain. Patient tells me her biggest complaint has been nausea/vomitting and constipation. She states she has been able to have very small infrequent bowel movements but nothing significant. She complains of gas pains. He denies fever, chills, shortness of breath or chest pain. She does chronically wear 2 to 3 L nasal cannula. She states states her  gives her her medicine so she is not sure what she takes but she admits to noncompliance. She said sometimes he does forget. Patient states that she has been bedbound for quite a while but she is not sure how long. She thinks is been at least 4 years since she ambulated. She is alert and oriented to person place and year but she is a poor historian. According to the ER notes when squad arrived to transport the patient showed a glucose over 500. Her medical history includes atrial fib, CAD, CHF, COPD, dementia, fibromyalgia, hypertension, liver disease, and LEONARDA. Per the ER physician the patient's had a history of a paracentesis sometime in 21. \"    Review of Systems:     Constitutional:  negative for chills, fevers, sweats  Respiratory:  negative for cough, dyspnea on exertion, shortness of breath, wheezing  Cardiovascular:  negative for chest pain, chest pressure/discomfort, lower extremity edema, palpitations  Gastrointestinal:  negative for abdominal pain, diarrhea, nausea, vomiting  Neurological:  negative for dizziness, headache    Medications: Allergies:     Allergies   Allergen Reactions    Aspirin      Bleeding disorder    Effexor [Venlafaxine]     Seroquel [Quetiapine]        Current Meds:   Scheduled Meds:    sodium chloride flush  5-40 mL IntraVENous 2 times per day    insulin lispro  0-18 Units SubCUTAneous TID     insulin lispro  0-9 Units SubCUTAneous Nightly    bisacodyl  10 mg Rectal Daily    polyethylene glycol  17 g Oral Daily    apixaban  5 mg Oral BID    insulin glargine  27 Units SubCUTAneous BID    nadolol 40 mg Oral Daily    pantoprazole  40 mg Oral BID    sertraline  100 mg Oral Daily    pravastatin  10 mg Oral Daily    traZODone  100 mg Oral Nightly    budesonide-formoterol  2 puff Inhalation BID     Continuous Infusions:    sodium chloride      dextrose      sodium chloride 50 mL/hr at 02/10/22 0603     PRN Meds: sodium chloride, acetaminophen **OR** acetaminophen, magnesium sulfate, ondansetron **OR** ondansetron, polyethylene glycol, sodium chloride flush, dextrose, glucagon (rDNA), glucose, potassium chloride **OR** potassium alternative oral replacement **OR** potassium chloride, dextrose bolus (hypoglycemia), dextrose bolus (hypoglycemia), oxybutynin, albuterol, albuterol sulfate HFA, diphenhydrAMINE, sodium chloride flush    Data:     Past Medical History:   has a past medical history of Arthritis, Atrial fibrillation ([facility] CAD (coronary artery disease), CHF (congestive heart failure) ([facility] COPD (chronic obstructive pulmonary disease) ([facility] D[facility] ([facility] Fibromyalgia, Headache, Hypertension, Liver disease, LEONARDA (obstructive sleep apnea), and Panic attack. Social History:   reports that she has never smoked. She has never used smokeless tobacco. She reports previous alcohol use. She reports that she does not use drugs. Family History:   Family History   Problem Relation Age of Onset    Heart Failure Mother     Cancer Father     Cancer Sister     Cancer Brother        Vitals:  BP (!) 108/44   Pulse 68   Temp 97.5 °F (36.4 °C) (Oral)   Resp 20   Ht 5' 2\" (1.575 m)   Wt 211 lb 8 oz (95.9 kg)   SpO2 95%   BMI 38.68 kg/m²   Temp (24hrs), Av °F (36.7 °C), Min:97.5 °F (36.4 °C), Max:98.6 °F (37 °C)    Recent Labs     22  1112 22  1630 22  2105 02/10/22  0614   POCGLU 451* 358* 256* 93       I/O (24Hr):     Intake/Output Summary (Last 24 hours) at 2/10/2022 1007  Last data filed at 2/10/2022 0603  Gross per 24 hour   Intake 894.81 ml   Output 1175 ml   Net -280.19 ml Labs:  Hematology:  Recent Labs     02/08/22 1850 02/08/22 1850 02/09/22 0606 02/09/22  1343 02/10/22  0618   WBC 2.7*   < > 1.9* 2.6* 2.8*   RBC 3.50*   < > 3.25* 3.36* 3.17*   HGB 8.5*   < > 8.0* 8.2* 7.7*   HCT 26.6*   < > 25.1* 27.7* 25.9*   MCV 76.1*   < > 77.2* 82.4* 81.7*   MCH 24.3*   < > 24.6* 24.4* 24.3*   MCHC 31.9   < > 31.9 29.6 29.7   RDW 18.8*   < > 18.8* 17.5* 17.4*   PLT 83*   < > 70* 77* 71*   MPV 8.3   < > 8.4 10.6 9.9   INR 1.1  --  1.1  --  1.6    < > = values in this interval not displayed. Chemistry:  Recent Labs     02/08/22  1850 02/08/22  1850 02/08/22  2116 02/08/22  2351 02/08/22  2351 02/09/22  0606 02/09/22  1343 02/10/22  0618      < >  --  135  --  137  --  142   K 2.4*   < >  --  2.7*   < > 3.5* 3.4* 3.9   CL 95*   < >  --  95*  --  100  --  107   CO2 29   < >  --  33*  --  30  --  26   GLUCOSE 389*   < >  --  369*  --  415*  --  95   BUN 12   < >  --  11  --  9  --  11   CREATININE 0.50   < >  --  0.60  --  0.50  --  0.60   MG  --   --   --  1.8  --   --   --   --    ANIONGAP 12   < >  --  7*  --  7*  --  9   LABGLOM >60   < >  --  >60  --  >60  --  >60   GFRAA >60   < >  --  >60  --  >60  --  >60   CALCIUM 8.0*   < >  --  8.0*  --  7.8*  --  8.2*   TROPHS 26*  --  25*  --   --   --   --   --     < > = values in this interval not displayed.      Recent Labs     02/08/22  1850 02/09/22  0606 02/09/22  0904 02/09/22  1015 02/09/22  1112 02/09/22  1630 02/09/22  2105 02/10/22  0614 02/10/22  0618   PROT 6.0* 5.8*  --   --   --   --   --   --  5.6*   LABALBU 2.6* 2.4*  --   --   --   --   --   --  2.4*   LABA1C  --  10.2*  --   --   --   --   --   --   --    AST 23 26  --   --   --   --   --   --  22   ALT 9 10  --   --   --   --   --   --  14   ALKPHOS 97 91  --   --   --   --   --   --  90   BILITOT 0.70 0.70  --   --   --   --   --   --  0.43   BILIDIR  --   --   --   --   --   --   --   --  0.22   AMMONIA 89*  --   --  42  --   --   --   --  101*   AMYLASE 15*  -- --   --   --   --   --   --   --    LIPASE 18  --   --   --   --   --   --   --   --    POCGLU  --   --  264*  --  451* 358* 256* 93  --      ABG:  Lab Results   Component Value Date    FIO2 NOT REPORTED 02/08/2022     Lab Results   Component Value Date/Time    SPECIAL LEFT HAND TV:15ML 02/08/2022 08:41 PM     Lab Results   Component Value Date/Time    CULTURE NO GROWTH 1 DAY 02/08/2022 08:41 PM       Radiology:  CT ABDOMEN PELVIS W IV CONTRAST Additional Contrast? None    Result Date: 2/8/2022  Left lower lobe consolidative changes. Correlate for pneumonia. Small left pleural effusion with thickened, enhancing pleura. The sterility of this collection is indeterminate by imaging, and although there is no definite gas in the pleural space, recommend clinical correlation to exclude empyema. Large volume of stool in the mildly distended rectosigmoid with wall thickening and surrounding fat stranding. The colon is distended proximal to the rectosigmoid up to 8.4 cm. Differential considerations include stercoral colitis, fecal impaction, and colonic ileus. There is no pneumatosis or free air. Hepatic cirrhosis with stigmata of portal hypertension including esophageal/paraesophageal and gastric varices with small volume perihepatic ascites. 12 mm indeterminate right adrenal nodule which statistically still most likely represents an adenoma. This can be followed up in 1 year with adrenal washout CT or chemical shift MRI. XR CHEST PORTABLE    Result Date: 2/8/2022  1. Borderline cardiomegaly, without evidence of CHF 2. Scar versus atelectasis, left mid to lower lung zone 3. Pleural based thickening, lateral aspect of the left hemithorax. Differential considerations would include scarring versus loculated fluid.        Physical Examination:        General appearance:  alert, cooperative and no distress  Mental Status:  oriented to person, place and time and normal affect  Lungs:  clear to auscultation bilaterally, normal effort  Heart:  regular rate and rhythm, no murmur  Abdomen:  soft, nontender, nondistended, normal bowel sounds, no masses, hepatomegaly, splenomegaly; obese  Extremities:  no edema, redness, tenderness in the calves  Skin:  no gross lesions, rashes, induration    Assessment:        Hospital Problems           Last Modified POA    * (Principal) Hypokalemia 2/9/2022 Yes    CHF (congestive heart failure) (HCC) (Chronic) 2/9/2022 Yes    Atrial fibrillation ([address] (Chronic) 2/9/2022 Yes    Hypertension (Chronic) 2/9/2022 Yes    LEONARDA (obstructive sleep apnea) (Chronic) 2/9/2022 Yes    Hyperglycemia due to diabetes mellitus ([address] 2/9/2022 Yes    Lymphedema (Chronic) 2/9/2022 Yes    Noncompliance 2/9/2022 Yes    CAD (coronary artery disease) (Chronic) 2/9/2022 Yes    COPD (chronic obstructive pulmonary disease) (HCC) (Chronic) 2/9/2022 Yes    Hyperammonemia ([address] 2/9/2022 Yes    Chronic anemia 2/9/2022 Yes    Cirrhosis of liver without ascites ([address] 2/9/2022 Yes    Fecal impaction ([address] 2/9/2022 Yes    Pancytopenia ([address] 2/9/2022 Yes          Plan:        k replaced  Glucose controlled  Pt/ot  Order wheelchair for patient  Stop ivf  Resume lasix and aldactone    Rom DENNY Blood, DO  2/10/2022  10:07 AM

## 2022-02-10 NOTE — DISCHARGE INSTR - COC
Continuity of Care Form    Patient Name: Jadon Marcus   :  1955  MRN:  [id number]    Admit date:  2022  Discharge date:  2/10/22    Code Status Order: Full Code   Advance Directives:      Admitting Physician:  aMrbella Chatman DO  PCP: Dwain Bailey MD    Discharging Nurse: [facility] Unit/Room#:   Discharging Unit Phone Number: [phone]    Emergency Contact:   Extended Emergency Contact Information  Primary Emergency Contact: Carolina baird  Home Phone: [phone]  Relation: Child  Secondary Emergency Contact: Sal Zapata  Home Phone: [phone]  Relation: Spouse    Past Surgical History:  Past Surgical History:   Procedure Laterality Date    APPENDECTOMY      CARDIAC CATHETERIZATION      CHOLECYSTECTOMY      HYSTERECTOMY      LAMINECTOMY      OVARY REMOVAL         Immunization History:   Immunization History   Administered Date(s) Administered    COVID-19, J&J, PF, 0.5 mL 2021       Active Problems:  Patient Active Problem List   Diagnosis Code    Hypokalemia E87.6    CHF (congestive heart failure) ([facility]) I50.9    [facility] I48.91    Hypertension I10    LEONARDA (obstructive sleep apnea) G47.33    Hyperglycemia due to diabetes mellitus ([facility] E11.65    Lymphedema I89.0    Noncompliance Z91.19    CAD (coronary artery disease) I25.10    COPD (chronic obstructive pulmonary disease) ([facility] J44.9    Hyperammonemia (HCC) E72.20    Thrombocytopenia (HCC) D69.6    Chronic anemia D64.9    Cirrhosis of liver without ascites ([facility] K74.60    Fecal impaction ([facility]) K56.41    Pancytopenia ([facility]) D61.818       Isolation/Infection:   Isolation            No Isolation          Patient Infection Status       None to display            Nurse Assessment:  Last Vital Signs: BP (!) 104/51   Pulse 66   Temp 98.3 °F (36.8 °C) (Oral)   Resp 16   Ht 5' 2\" (1.575 m)   Wt 211 lb 8 oz (95.9 kg)   SpO2 100%   BMI 38.68 kg/m²     Last documented pain score (0-10 scale): Pain Level: 0  Last Weight:   Wt Readings from Last 1 Encounters:   02/10/22 211 lb 8 oz (95.9 kg)     Mental Status:  oriented and alert    IV Access:  - None    Nursing Mobility/ADLs:  Walking   Dependent  Transfer  Dependent  Bathing  Dependent  Dressing  Dependent  Toileting  Dependent  Feeding  Independent  Med Admin  Assisted  Med Delivery   whole    Wound Care Documentation and Therapy:        Elimination:  Continence: Bowel: Yes  Bladder: No  Urinary Catheter: Indication for Use of Catheter: chronic angeles catheter,present on admission     Colostomy/Ileostomy/Ileal Conduit: No       Date of Last BM: 2/10    Intake/Output Summary (Last 24 hours) at 2/10/2022 1420  Last data filed at 2/10/2022 0603  Gross per 24 hour   Intake 894.81 ml   Output 1175 ml   Net -280.19 ml     I/O last 3 completed shifts: In: 1144.8 [I.V.:994.8; IV Piggyback:150]  Out: 1175 [\[facility]\"":7096]    Safety Concerns: At Risk for Falls    Impairments/Disabilities:      None    Nutrition Therapy:  Current Nutrition Therapy:   - Oral Diet:  Carb Control 4 carbs/meal (1800kcals/day)    Routes of Feeding: Oral  Liquids: No Restrictions  Daily Fluid Restriction: no  Last Modified Barium Swallow with Video (Video Swallowing Test): not done    Treatments at the Time of Hospital Discharge:   Respiratory Treatments: yes  Oxygen Therapy:  is on oxygen at 2 L/min per nasal cannula.   Ventilator:    - No ventilator support    Rehab Therapies: Physical Therapy and Occupational Therapy  Weight Bearing Status/Restrictions: No weight bearing restirctions  Other Medical Equipment (for information only, NOT a DME order):  wheelchair  Other Treatments: Skilled nursing assessment  Med teaching and compliance  HHA for assist with personal care  Angeles catheter care    Patient's personal belongings (please select all that are sent with patient):  Glasses    RN SIGNATURE:  Electronically signed by Jeff Mcdowell RN on 2/10/22 at 4:08 PM EST    CASE MANAGEMENT/SOCIAL WORK SECTION    Inpatient Status Date: ***    Readmission Risk Assessment Score:  Readmission Risk              Risk of Unplanned Readmission:  21           Discharging to Facility/ Agency   Name: [facility] [address]  Address:  Phone: [phone]  Fax: [phone]      / signature: Electronically signed by Paulette Colby RN on 2/10/22 at 2:20 PM EST    PHYSICIAN SECTION    Prognosis: Fair    Condition at Discharge: Stable    Rehab Potential (if transferring to Rehab): Fair    Recommended Labs or Other Treatments After Discharge: pt/ot    Physician Certification: I certify the above information and transfer of Gil Muñiz  is necessary for the continuing treatment of the diagnosis listed and that she requires Home Care for greater 30 days.      Update Admission H&P: No change in H&P    PHYSICIAN SIGNATURE:  Electronically signed by Flynn Chatman DO on 2/10/22 at 5:59 PM EST

## 2022-02-10 NOTE — PLAN OF CARE
Problem: Falls - Risk of:  Goal: Will remain free from falls  Description: Will remain free from falls  Outcome: Ongoing  Goal: Absence of physical injury  Description: Absence of physical injury  Outcome: Ongoing     Problem: Skin Integrity:  Goal: Will show no infection signs and symptoms  Description: Will show no infection signs and symptoms  Outcome: Ongoing  Goal: Absence of new skin breakdown  Description: Absence of new skin breakdown  Outcome: Ongoing     Problem: Injury - Risk of, Physical Injury:  Goal: Will remain free from falls  Description: Will remain free from falls  Outcome: Ongoing  Goal: Absence of physical injury  Description: Absence of physical injury  Outcome: Ongoing     Problem: Fluid Volume:  Goal: Ability to achieve a balanced intake and output will improve  Description: Ability to achieve a balanced intake and output will improve  Outcome: Ongoing     Problem: Physical Regulation:  Goal: Ability to maintain clinical measurements within normal limits will improve  Description: Ability to maintain clinical measurements within normal limits will improve  Outcome: Ongoing  Goal: Will show no signs and symptoms of electrolyte imbalance  Description: Will show no signs and symptoms of electrolyte imbalance  Outcome: Ongoing

## 2022-02-10 NOTE — FLOWSHEET NOTE
SPIRITUAL CARE DEPARTMENT - [facility] [address]  PROGRESS NOTE    Shift date: 02/10/2022  Shift day: Thursday   Shift # 1    Room # 2024/2024-01   Name: Patrick Lopez            Age: 74 y.o. Gender: female          Roman Catholic: [address] [employer] [place] orthodox: NONE    Referral: Routine Visit    Admit Date & Time: 2/8/2022  6:41 PM    PATIENT/EVENT DESCRIPTION:  Patrick Lopez is a 74 y.o. female   Patient sitting up in bed eating her breakfast. Patient welcomed this  in for a visit. SPIRITUAL ASSESSMENT/INTERVENTION:  Patient spoke openly to this . This  actively and compassionately listened to the patient as she shared family dynamics. This patient would like to see her grandson more. This  explored the patients feelings with her and offered words of encouragement to her. This  listened to the patient talk about her medical issues and was empathetic about her ongoing medical problems with her back. This  prayed with the patient and read and also gave the patient the [address]. The patient was grateful for the visit. This  let the patient know that  services were available to her. SPIRITUAL CARE FOLLOW-UP PLAN:  Chaplains will remain available to offer spiritual and emotional support as needed. Electronically signed by Flor Agarwal on 2/10/2022 at 10:27 AM.  [facility]  [phone]     02/10/22 0900   Encounter Summary   Services provided to: Patient   Referral/Consult From: [facility] Family members   Continue Visiting   (02/10/2022)   Complexity of Encounter Low   Length of Encounter 30 minutes   Spiritual Assessment Completed Yes   Routine   Type Follow up   Assessment Calm; Approachable; Loneliness;Coping; Sad   Intervention Active listening;Explored feelings, thoughts, concerns;Nurtured hope;Prayer;[place];Provided reading materials/devotional materials;Sustaining presence/ Ministry of presence;Empowerment; Discussed meaning/purpose   Outcome Comfort;Expressed gratitude;Engaged in conversation;Expressed feelings/needs/concerns;Coping;Encouraged;Receptive;Venting emotion

## 2022-02-10 NOTE — DISCHARGE INSTR - ACTIVITY
Follow with physician in regards to Ct scan results -12 mm indeterminate right adrenal nodule which statistically still most   likely represents an adenoma. This can be followed up in 1 year with adrenal   washout CT or chemical shift MRI.     Take all medications as prescribed
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
## 2022-02-10 NOTE — CARE COORDINATION
Case Management Initial Discharge Plan  Abraham Sampson,             Met with:spouse/SO to discuss discharge plans. Information verified: address, contacts, phone number, , insurance Yes  PCP: Rob Carrillo MD  Date of last visit: 2 weeks ago   comes to home      Insurance Provider: Olvin Abbott Dual    Discharge Planning    Living Arrangements:  Spouse/Significant Other   Support Systems:  Spouse/Significant Dwight Alvarez has 1st story apt  0 stairs to climb to get into front door, 0 stairs to climb to reach second floor  Location of bedroom/bathroom in home  - main floor     Patient able to perform ADL's:Assisted    Current Services (outpatient & in home) SN, PT/OT, HHA  DME equipment: hospital bed, nebulizer, O2 2Lprn  DME provider:  - N/A    Pharmacy: [address]    Potential Assistance Needed:  Home Care, W/C    Patient agreeable to home care: Yes  Freedom of choice provided:  yes    Prior SNF/Rehab Placement and Facility: [facility]  Agreeable to SNF/Rehab: No  [place] of choice provided: n/a   Evaluation: n/a    Expected Discharge date:  22  Patient expects to be discharged to: Follow Up Appointment: Best Day/ Time:      Transportation provider: ambulance  Transportation arrangements needed for discharge: Yes    Readmission Risk              Risk of Unplanned Readmission:  21             Does patient have a readmission risk score greater than 14?: Yes  If yes, follow-up appointment must be made within 7 days of discharge. Goal of Care:       Discharge Plan: Pt requesting writer call  to discuss D/C plan. Spoke to spouse on phone. Lives with  who is caregiver in 1st story apt. Has hospital bed and is bed bound and can't stand since history of back surgery. Pt has chronic angeles. Admitted for hypokalemia.   W/C ordered per  Blood and face sheet, script and face to face note faxed to [facility]. Will be delivered to patients home today after 4pm.  Pt has been D/C and call to [facility]. Will be transported home today at 7pm by ambulance and  informed. Current with [facility] for SN, PT/OT, HHA. Magui informed of admission and D/C home today for resumption of care. BLAISE initated. The Plan for Transition of Care is related to the following treatment goals: SN, PT/OT, HHA    The Patient and/or patient representative  was provided with a choice of provider and agrees   with the discharge plan. [x] Yes [] No    Freedom of choice list was provided with basic dialogue that supports the patient's individualized plan of care/goals, treatment preferences and shares the quality data associated with the providers.  [x] Yes [] No              Electronically signed by Pk Ding RN on 2/10/22 at 2:25 PM EST

## 2022-02-10 NOTE — PROGRESS NOTES
CLINICAL PHARMACY NOTE: MEDS TO BEDS    Total # of Prescriptions Filled: 1   The following medications were delivered to the patient:  · POTASSIUM CHLORIDE CARLOTTA ER 20    Additional Documentation:

## 2022-02-10 NOTE — PLAN OF CARE
Problem: Falls - Risk of:  Goal: Will remain free from falls  Description: Will remain free from falls  2/10/2022 1212 by Ila Carrillo RN  Outcome: Ongoing  Patient is a fall risk during this admission. Fall risk assessment was performed. Patient is absent of falls. Bed is in the lowest position. Wheels on the bed are locked. Call light and bed side table are within reach. Clutter is removed. Patient was educated to call out when needing assistance or wanting to get out of bed. Patient offered toileting assistance during rounding. Hourly rounds have been performed. Problem: Skin Integrity:  Goal: Will show no infection signs and symptoms  Description: Will show no infection signs and symptoms  2/10/2022 1212 by Ila Carrillo RN  Outcome: Ongoing     Problem: Skin Integrity:  Goal: Absence of new skin breakdown  Description: Absence of new skin breakdown  2/10/2022 1212 by Ila Carrillo RN  Outcome: Ongoing   Skin assessment ongoing. Pressure ulcer preventions being practiced - see charting for details. Patient turned every two hours.     Problem: Fluid Volume:  Goal: Ability to achieve a balanced intake and output will improve  Description: Ability to achieve a balanced intake and output will improve  2/10/2022 1212 by Ila Carrillo RN  Outcome: Ongoing     Problem: Physical Regulation:  Goal: Ability to maintain clinical measurements within normal limits will improve  Description: Ability to maintain clinical measurements within normal limits will improve  2/10/2022 1212 by Ila Carrillo RN  Outcome: Ongoing     Problem: Physical Regulation:  Goal: Will show no signs and symptoms of electrolyte imbalance  Description: Will show no signs and symptoms of electrolyte imbalance  2/10/2022 1212 by Ila Carrillo RN  Outcome: Ongoing

## 2022-02-10 NOTE — DISCHARGE SUMMARY
[facility]  Office: [address], DO, Nestor Lombardi, DO, Surjit Guy, DO, Gabino Mitchell Blood, DO, Sam Turner MD, Lucia Damon MD, Julia Chavez MD, Mat Carson MD, Bia Gonzalez MD, Jory Frances MD, Ana Lilia Sanders MD, Davey Lay, DO, Alycia Dozier DO, Bethany Carvajal MD,  Jamarcus Rodriguez, DO, Tomy Parks MD, Shantanu Neal MD, Yonny Pimentel MD, Mike Diaz MD, Brady Hutson MD, Mitesh Barone, [facility], [facility], CNP, Mohit Reyes, CNP, Toni Beach, CNS, Atilio Esparza, CNP, Juany Landers, CNP, Marcel Grajeda, CNP, Ashkan Ocean, CNP, Forrest Adrian, CNP, Lou Hernandez PA-C, Vaishnavi Min, DNP, Landon Grider, [facility], [place], CNP, Filiberto Hollis, CNP, Rosa Isela Flair, CNP, Brian Jatin, CNP, Johnny Restrepo, CNP, [facility]    Discharge Summary     Patient ID: Chato Bourgeois  :  1955   MRN: [id number]     ACCOUNT:  [de-identified]   Patient's PCP: Yajaira Wilde MD  Admit Date: 2022   Discharge Date: 2/10/2022     Length of Stay: 2  Code Status:  Full Code  Admitting Physician: Marnie Chatman DO  Discharge Physician: Marnie Chatman DO     Active Discharge Diagnoses:     Hospital Problem Lists:  Principal Problem:    Hypokalemia  Active Problems:    CHF (congestive heart failure) (HCC)    Atrial fibrillation (HCC)    Hypertension    LEONARDA (obstructive sleep apnea)    Hyperglycemia due to diabetes mellitus ([facility] [address]    Lymphedema    Noncompliance    CAD (coronary artery disease)    COPD (chronic obstructive pulmonary disease) (HCC)    Hyperammonemia (HCC)    Chronic anemia    Cirrhosis of liver without ascites ([facility] [address]    Fecal impaction (HCC)    Pancytopenia ([facility] [address]  Resolved Problems:    * No resolved hospital problems.  *      Admission Condition:  fair     Discharged Condition: stable    Hospital Stay:     Hospital Course:  Chato Bourgeois is a 74 y.o. female who was admitted for the management of  Hypokalemia , presented to ER with Nausea, Diarrhea, and Abdominal Pain (pt has ascites, pt has been tapped past year. )    Admitted with hypokalemia, hyperglycemia and had n/v/abd pain as well. She had constipation, was given meds and had large bm and felt much better, able to take in po. Her k was replaced and glucose controlled. She had admitted to noncompliance with meds and med compliance was encouraged. Significant therapeutic interventions: see above    Significant Diagnostic Studies:   Labs / Micro:  CBC:   Lab Results   Component Value Date    WBC 2.8 02/10/2022    RBC 3.17 02/10/2022    HGB 7.7 02/10/2022    HCT 25.9 02/10/2022    MCV 81.7 02/10/2022    MCH 24.3 02/10/2022    MCHC 29.7 02/10/2022    RDW 17.4 02/10/2022    PLT 71 02/10/2022     CMP:    Lab Results   Component Value Date    GLUCOSE 95 02/10/2022     02/10/2022    K 3.9 02/10/2022     02/10/2022    CO2 26 02/10/2022    BUN 11 02/10/2022    CREATININE 0.60 02/10/2022    ANIONGAP 9 02/10/2022    ALKPHOS 90 02/10/2022    ALT 14 02/10/2022    AST 22 02/10/2022    BILITOT 0.43 02/10/2022    LABALBU 2.4 02/10/2022    ALBUMIN NOT REPORTED 02/10/2022    LABGLOM >60 02/10/2022    GFRAA >60 02/10/2022    GFR      02/10/2022    GFR NOT REPORTED 02/10/2022    PROT 5.6 02/10/2022    CALCIUM 8.2 02/10/2022        Radiology:  CT ABDOMEN PELVIS W IV CONTRAST Additional Contrast? None    Result Date: 2/8/2022  Left lower lobe consolidative changes. Correlate for pneumonia. Small left pleural effusion with thickened, enhancing pleura. The sterility of this collection is indeterminate by imaging, and although there is no definite gas in the pleural space, recommend clinical correlation to exclude empyema. Large volume of stool in the mildly distended rectosigmoid with wall thickening and surrounding fat stranding. The colon is distended proximal to the rectosigmoid up to 8.4 cm.   Differential considerations include stercoral colitis, fecal impaction, and colonic ileus. There is no pneumatosis or free air. Hepatic cirrhosis with stigmata of portal hypertension including esophageal/paraesophageal and gastric varices with small volume perihepatic ascites. 12 mm indeterminate right adrenal nodule which statistically still most likely represents an adenoma. This can be followed up in 1 year with adrenal washout CT or chemical shift MRI. XR CHEST PORTABLE    Result Date: 2/8/2022  1. Borderline cardiomegaly, without evidence of CHF 2. Scar versus atelectasis, left mid to lower lung zone 3. Pleural based thickening, lateral aspect of the left hemithorax. Differential considerations would include scarring versus loculated fluid. Consultations:    Consults:     Final Specialist Recommendations/Findings:   IP CONSULT TO SOCIAL WORK  PHARMACY TO DOSE MEDICATION      The patient was seen and examined on day of discharge and this discharge summary is in conjunction with any daily progress note from day of discharge.     Discharge plan:     Disposition: Home [place] [address]    Physician Follow Up:     King Matt MD  [address] [phone]    In 1 week         Requiring Further Evaluation/Follow Up POST HOSPITALIZATION/Incidental Findings: glucose and k levels    Diet: diabetic diet    Activity: As tolerated    Instructions to Patient: take medications as prescribed      Discharge Medications:      Medication List      START taking these medications    potassium chloride 20 MEQ extended release tablet  Commonly known as: KLOR-CON M  Take 1 tablet by mouth daily        CONTINUE taking these medications    Basaglar KwikPen 100 UNIT/ML injection pen  Generic drug: insulin glargine     Breo Ellipta 100-25 MCG/INH Aepb inhaler  Generic drug: fluticasone-vilanterol     Eliquis 5 MG Tabs tablet  Generic drug: apixaban     furosemide 40 MG tablet  Commonly known as: LASIX     nadolol 40 MG tablet  Commonly known as: CORGARD     oxybutynin 5 MG tablet  Commonly known as: DITROPAN     pantoprazole 40 MG tablet  Commonly known as: PROTONIX     polyethylene glycol 17 g packet  Commonly known as: GLYCOLAX     pravastatin 10 MG tablet  Commonly known as: PRAVACHOL     spironolactone 100 MG tablet  Commonly known as: ALDACTONE     traZODone 100 MG tablet  Commonly known as: DESYREL     Ventolin  (90 Base) MCG/ACT inhaler  Generic drug: albuterol sulfate HFA     Victoza 18 MG/3ML Sopn SC injection  Generic drug: Liraglutide     Zoloft 100 MG tablet  Generic drug: sertraline           Where to Get Your Medications      These medications were sent to [facility] [phone] - [phone]  [address] [place] [address]    Phone: [phone]   potassium chloride 20 MEQ extended release tablet         No discharge procedures on file. Time Spent on discharge is  33 mins in patient examination, evaluation, counseling as well as medication reconciliation, prescriptions for required medications, discharge plan and follow up. Electronically signed by   Ford Chatman DO  2/10/2022  10:15 AM      Thank you Dr. Victoria Reyna MD for the opportunity to be involved in this patient's care.

## 2022-02-10 NOTE — PLAN OF CARE
Leatha Klein was evaluated today and a DME order was entered for a standard wheelchair because she requires this to successfully complete daily living tasks of eating, bathing, toileting, personal cares, grooming and hygiene. A standard manual wheelchair is necessary due to patient's impaired ambulation and mobility restrictions and would be unable to resolve these daily living tasks using a cane or walker. The patient is capable of using a standard wheelchair safely in their home and can maneuver within their home with adequate access. There is a caregiver available to provide necessary assistance. The need for this equipment was discussed with the patient and she understands, is in agreement, and has not expressed an unwillingness to use the wheelchair. Leatha Klein can self propel a wheelchair and needs anti-rollback device because of ramps. Leatha Klein has upper body instability which requires the use of a safety belt for proper positioning. Leatha Klein requires elevating legrest due to significant edema of the lower extremities and requires elevation-she gets this intermittently due to cirrhosis.     Rom DENNY Blood, DO

## 2022-02-11 ENCOUNTER — CARE COORDINATION (OUTPATIENT)
Dept: CASE MANAGEMENT | Age: 67
End: 2022-02-11

## 2022-02-11 LAB
PATHOLOGIST REVIEW: NORMAL
SURGICAL PATHOLOGY REPORT: NORMAL

## 2022-02-11 NOTE — PROGRESS NOTES
Patient discharged to home via [facility] [address]. Discharge instructions given and explained to patient, signature obtained. All patient's belongings packed and taken with patient at time of discharge. Patient stable for discharge.

## 2022-02-11 NOTE — CARE COORDINATION
[facility] Transitions Initial Follow Up Call    Call within 2 business days of discharge: Yes    Patient: Jud Blanchard Patient : 1955   MRN: [id number]   Discharge Date: 2/10/22 RARS: Readmission Risk Score: 16 ( )      Last Discharge [facility]       Complaint Diagnosis Description Type Department Provider    22 Nausea; Diarrhea; Abdominal Pain Hyperglycemia due to diabetes mellitus ([facility] [address] . .. ED to Hosp-Admission (Discharged) (ADMITTED) GUERA Chatman, DO; Carlos MEJIA Wo. .. Attempted to contact. Answering machine picks up to state, \"unavailable\". Care Transitions will attempt to call again. Follow Up  No future appointments.     Lauro Alonso RN

## 2022-02-14 ENCOUNTER — CARE COORDINATION (OUTPATIENT)
Dept: CASE MANAGEMENT | Age: 67
End: 2022-02-14

## 2022-02-14 LAB
CULTURE: NORMAL
CULTURE: NORMAL
Lab: NORMAL
Lab: NORMAL
SPECIMEN DESCRIPTION: NORMAL
SPECIMEN DESCRIPTION: NORMAL

## 2022-07-19 ENCOUNTER — HOSPITAL ENCOUNTER (EMERGENCY)
Age: 67
Discharge: HOME | End: 2022-07-20
Attending: STUDENT IN AN ORGANIZED HEALTH CARE EDUCATION/TRAINING PROGRAM
Payer: COMMERCIAL

## 2022-07-19 ENCOUNTER — APPOINTMENT (OUTPATIENT)
Dept: CT IMAGING | Age: 67
End: 2022-07-19
Payer: COMMERCIAL

## 2022-07-19 VITALS
OXYGEN SATURATION: 92 % | HEIGHT: 62 IN | DIASTOLIC BLOOD PRESSURE: 80 MMHG | HEART RATE: 87 BPM | SYSTOLIC BLOOD PRESSURE: 97 MMHG | RESPIRATION RATE: 20 BRPM | BODY MASS INDEX: 40.3 KG/M2 | TEMPERATURE: 98.2 F | WEIGHT: 219 LBS

## 2022-07-19 DIAGNOSIS — L03.115 CELLULITIS OF RIGHT LOWER EXTREMITY: ICD-10-CM

## 2022-07-19 DIAGNOSIS — R10.9 ABDOMINAL PAIN, UNSPECIFIED ABDOMINAL LOCATION: Primary | ICD-10-CM

## 2022-07-19 DIAGNOSIS — K59.00 CONSTIPATION, UNSPECIFIED CONSTIPATION TYPE: ICD-10-CM

## 2022-07-19 PROCEDURE — 74176 CT ABD & PELVIS W/O CONTRAST: CPT

## 2022-07-19 PROCEDURE — 36415 COLL VENOUS BLD VENIPUNCTURE: CPT

## 2022-07-19 PROCEDURE — 85025 COMPLETE CBC W/AUTO DIFF WBC: CPT

## 2022-07-19 PROCEDURE — 80048 BASIC METABOLIC PNL TOTAL CA: CPT

## 2022-07-19 PROCEDURE — 99284 EMERGENCY DEPT VISIT MOD MDM: CPT

## 2022-07-19 ASSESSMENT — PAIN SCALES - GENERAL: PAINLEVEL_OUTOF10: 6

## 2022-07-19 ASSESSMENT — PAIN DESCRIPTION - LOCATION: LOCATION: ABDOMEN

## 2022-07-19 ASSESSMENT — PAIN - FUNCTIONAL ASSESSMENT: PAIN_FUNCTIONAL_ASSESSMENT: 0-10

## 2022-07-20 LAB
ABSOLUTE EOS #: 0.08 K/UL (ref 0–0.44)
ABSOLUTE IMMATURE GRANULOCYTE: 0.02 K/UL (ref 0–0.3)
ABSOLUTE LYMPH #: 0.89 K/UL (ref 1.1–3.7)
ABSOLUTE MONO #: 0.33 K/UL (ref 0.1–1.2)
ANION GAP SERPL CALCULATED.3IONS-SCNC: 6 MMOL/L (ref 9–17)
BASOPHILS # BLD: 1 % (ref 0–2)
BASOPHILS ABSOLUTE: <0.03 K/UL (ref 0–0.2)
BUN BLDV-MCNC: 15 MG/DL (ref 8–23)
BUN/CREAT BLD: 19 (ref 9–20)
CALCIUM SERPL-MCNC: 8.4 MG/DL (ref 8.6–10.4)
CHLORIDE BLD-SCNC: 98 MMOL/L (ref 98–107)
CO2: 34 MMOL/L (ref 20–31)
CREAT SERPL-MCNC: 0.8 MG/DL (ref 0.5–0.9)
EOSINOPHILS RELATIVE PERCENT: 2 % (ref 1–4)
GFR AFRICAN AMERICAN: >60 ML/MIN
GFR NON-AFRICAN AMERICAN: >60 ML/MIN
GFR SERPL CREATININE-BSD FRML MDRD: ABNORMAL ML/MIN/{1.73_M2}
GLUCOSE BLD-MCNC: 174 MG/DL (ref 70–99)
HCT VFR BLD CALC: 30.9 % (ref 36.3–47.1)
HEMOGLOBIN: 9.2 G/DL (ref 11.9–15.1)
IMMATURE GRANULOCYTES: 1 %
LYMPHOCYTES # BLD: 22 % (ref 24–43)
MCH RBC QN AUTO: 24.9 PG (ref 25.2–33.5)
MCHC RBC AUTO-ENTMCNC: 29.8 G/DL (ref 28.4–34.8)
MCV RBC AUTO: 83.7 FL (ref 82.6–102.9)
MONOCYTES # BLD: 8 % (ref 3–12)
NRBC AUTOMATED: 0 PER 100 WBC
PDW BLD-RTO: 18.1 % (ref 11.8–14.4)
PLATELET # BLD: 85 K/UL (ref 138–453)
PMV BLD AUTO: 10 FL (ref 8.1–13.5)
POTASSIUM SERPL-SCNC: 3.1 MMOL/L (ref 3.7–5.3)
RBC # BLD: 3.69 M/UL (ref 3.95–5.11)
RBC # BLD: ABNORMAL 10*6/UL
SEG NEUTROPHILS: 66 % (ref 36–65)
SEGMENTED NEUTROPHILS ABSOLUTE COUNT: 2.8 K/UL (ref 1.5–8.1)
SODIUM BLD-SCNC: 138 MMOL/L (ref 135–144)
WBC # BLD: 4.1 K/UL (ref 3.5–11.3)

## 2022-07-20 PROCEDURE — 6370000000 HC RX 637 (ALT 250 FOR IP): Performed by: NURSE PRACTITIONER

## 2022-07-20 RX ORDER — CEPHALEXIN 500 MG/1
500 CAPSULE ORAL 4 TIMES DAILY
Qty: 40 CAPSULE | Refills: 0 | Status: SHIPPED | OUTPATIENT
Start: 2022-07-20 | End: 2022-07-30

## 2022-07-20 RX ORDER — POLYETHYLENE GLYCOL 3350 17 G/17G
17 POWDER, FOR SOLUTION ORAL DAILY
Qty: 116 G | Refills: 0 | Status: ON HOLD | OUTPATIENT
Start: 2022-07-20 | End: 2022-08-30

## 2022-07-20 RX ADMIN — MAGNESIUM CITRATE 296 ML: 1.75 LIQUID ORAL at 03:01

## 2022-07-20 NOTE — ED PROVIDER NOTES
[address] ED  eMERGENCY dEPARTMENT eNCOUnter      Pt Name: Ana Silver  MRN: [id number]  [place] 1955  Date of evaluation: 7/19/2022  Provider: LEILA Calle CNP    CHIEF COMPLAINT       Chief Complaint   Patient presents with    Abdominal Pain     States her abdomen has been distended for x5 years    Wound Check     Pt states her home health sent her in to have her right leg checked for infection         HISTORY OF PRESENT ILLNESS  (Location/Symptom, Timing/Onset, Context/Setting, Quality, Duration, Modifying Factors, Severity.)   Ana Silver is a 74 y.o. female who presents to the emergency department via EMS for abd pain/distension intermittent x5 years, evaluation of a wound to her right lower leg with onset of 2 weeks ago, and to have her renal function checked. She has a hx of cirrhosis of the liver. Denies injury to the leg. Denies fever, chills, N/V/D. Reports constipation for a few days. She is nonambulatory which is unchanged. Rates her pain 6/10 at this time. Nursing Notes were reviewed.     ALLERGIES     Aspirin, Effexor [venlafaxine], and Seroquel [quetiapine]    CURRENT MEDICATIONS       Previous Medications    ALBUTEROL SULFATE HFA (VENTOLIN HFA) 108 (90 BASE) MCG/ACT INHALER    Inhale 2 puffs into the lungs every 6 hours as needed for Wheezing or Shortness of Breath    APIXABAN (ELIQUIS) 5 MG TABS TABLET    Take 5 mg by mouth 2 times daily     FLUTICASONE-VILANTEROL (BREO ELLIPTA) 100-25 MCG/INH AEPB INHALER    Inhale into the lungs daily    FUROSEMIDE (LASIX) 40 MG TABLET    Take 40 mg by mouth 2 times daily     INSULIN GLARGINE (BASAGLAR KWIKPEN) 100 UNIT/ML INJECTION PEN    Inject 27 Units into the skin 2 times daily    LIRAGLUTIDE (VICTOZA) 18 MG/3ML SOPN SC INJECTION    Inject 1.2 mg into the skin daily    NADOLOL (CORGARD) 40 MG TABLET    Take 40 mg by mouth daily    OXYBUTYNIN (DITROPAN) 5 MG TABLET    Take 5 mg by mouth 2 times daily as needed (bladder spasms)    PANTOPRAZOLE (PROTONIX) 40 MG TABLET    Take 40 mg by mouth 2 times daily    POLYETHYLENE GLYCOL (GLYCOLAX) 17 G PACKET    Take 17 g by mouth daily as needed for Constipation    POTASSIUM CHLORIDE (KLOR-CON M) 20 MEQ EXTENDED RELEASE TABLET    Take 1 tablet by mouth daily    PRAVASTATIN (PRAVACHOL) 10 MG TABLET    Take 10 mg by mouth daily    SERTRALINE (ZOLOFT) 100 MG TABLET    Take 100 mg by mouth daily     SPIRONOLACTONE (ALDACTONE) 100 MG TABLET    Take 100 mg by mouth daily    TRAZODONE (DESYREL) 100 MG TABLET    Take 100 mg by mouth nightly       PAST MEDICAL HISTORY         Diagnosis Date    Arthritis     Atrial fibrillation (HCC)     CAD (coronary artery disease)     CHF (congestive heart failure) (HCC)     COPD (chronic obstructive pulmonary disease) (HCC)     Dementia (HCC)     Fibromyalgia     Headache     Hypertension     Liver disease     LEONARDA (obstructive sleep apnea)     Panic attack        SURGICAL HISTORY           Procedure Laterality Date    APPENDECTOMY      CARDIAC CATHETERIZATION      CHOLECYSTECTOMY      HYSTERECTOMY (CERVIX STATUS UNKNOWN)      LAMINECTOMY      OVARY REMOVAL           FAMILY HISTORY           Problem Relation Age of Onset    Heart Failure Mother     Cancer Father     Cancer Sister     Cancer Brother      Family Status   Relation Name Status    Mother      Father      Sister      Brother          SOCIAL HISTORY      reports that she has never smoked. She has never used smokeless tobacco. She reports that she does not currently use alcohol. She reports that she does not use drugs. REVIEW OF SYSTEMS    (2-9 systems for level 4, 10 or more for level 5)     Review of Systems   Constitutional:  Negative for chills, diaphoresis, fatigue and fever. HENT:  Negative for congestion and sore throat. Respiratory:  Negative for cough and shortness of breath. Cardiovascular:  Negative for chest pain.    Gastrointestinal:  Positive for abdominal distention and abdominal pain. Negative for diarrhea, nausea and vomiting. Genitourinary:  Negative for dysuria. Musculoskeletal:  Negative for arthralgias, back pain, myalgias and neck pain. Skin:  Positive for color change and wound. Negative for rash. Neurological:  Negative for dizziness, speech difficulty, light-headedness and headaches. Except as noted above the remainder of the review of systems was reviewed and negative. PHYSICAL EXAM    (up to 7 for level 4, 8 or more for level 5)     ED Triage Vitals [07/19/22 2141]   BP Temp Temp Source Heart Rate Resp SpO2 Height Weight   97/80 98.2 °F (36.8 °C) Oral 87 20 92 % 5' 2\" (1.575 m) 219 lb (99.3 kg)     Physical Exam  Vitals reviewed. Constitutional:       General: She is not in acute distress. Appearance: She is well-developed. She is not diaphoretic. Eyes:      General: No scleral icterus. Conjunctiva/sclera: Conjunctivae normal.   Cardiovascular:      Rate and Rhythm: Normal rate. Pulses: Normal pulses. Pulmonary:      Effort: Pulmonary effort is normal. No respiratory distress. Breath sounds: No stridor. Abdominal:      Palpations: Abdomen is soft. Tenderness: There is no abdominal tenderness. There is no guarding. Comments: Abd round   Musculoskeletal:      Cervical back: Neck supple. Right lower leg: Edema (1+) present. Left lower leg: Edema (1+) present. Skin:     General: Skin is warm and dry. Capillary Refill: Capillary refill takes 2 to 3 seconds. Findings: No rash. Comments: Chronic vascular discoloration to BLE. There is weeping edema with an open, superficial area to her right anterior lower leg. There is tan-colored drainage on the dressing. Neurological:      Mental Status: She is alert and oriented to person, place, and time.    Psychiatric:         Behavior: Behavior normal.          DIAGNOSTIC RESULTS     RADIOLOGY:   Non-plain film images such as volvulus or obstruction. Colonic dilatation extends to the anus. Small bowel loops are normal in caliber. Pelvis: The urinary bladder is decompressed by Pedroza catheter. There is a small fat containing right inguinal hernia. The uterus is surgically absent. Peritoneum/Retroperitoneum: Mildly prominent nonspecific upper abdominal lymph nodes. There are also upper abdominal varices. No free air or free fluid. There is a protuberant abdomen with thinning of the anterior abdominal wall musculature. Bones/Soft Tissues: Multilevel degenerative changes in the thoracic and lumbar spine. Remote postsurgical changes in the lower lumbar spine. The colon is redundant and markedly distended by gas and stool. Chronic dilatation extends to the anus. Differential diagnosis includes Juan Jose syndrome, colonic ileus and rectosigmoid fecal impaction. Cholelithiasis in a contracted gallbladder. Cirrhosis with portal venous hypertension and splenomegaly. Incomplete visualization of grossly unchanged left basilar pleural and parenchymal disease with left lower lobe consolidation, volume loss and loculated pleural fluid with thickened pleura. Persistent rounded pneumonia and chronic empyema are considerations. Follow-up CT chest recommended.          LABS:  Labs Reviewed   BASIC METABOLIC PANEL - Abnormal; Notable for the following components:       Result Value    Glucose 174 (*)     Calcium 8.4 (*)     Potassium 3.1 (*)     CO2 34 (*)     Anion Gap 6 (*)     All other components within normal limits   CBC WITH AUTO DIFFERENTIAL - Abnormal; Notable for the following components:    RBC 3.69 (*)     Hemoglobin 9.2 (*)     Hematocrit 30.9 (*)     MCH 24.9 (*)     RDW 18.1 (*)     Platelets 85 (*)     Seg Neutrophils 66 (*)     Lymphocytes 22 (*)     Immature Granulocytes 1 (*)     Absolute Lymph # 0.89 (*)     All other components within normal limits       All other labs were within normal range or not returned as of this dictation. EMERGENCY DEPARTMENT COURSE and DIFFERENTIAL DIAGNOSIS/MDM:   Vitals:    Vitals:    07/19/22 2141   BP: 97/80   Pulse: 87   Resp: 20   Temp: 98.2 °F (36.8 °C)   TempSrc: Oral   SpO2: 92%   Weight: 219 lb (99.3 kg)   Height: 5' 2\" (1.575 m)       MEDICATIONS GIVEN IN THE ED:  Medications   magnesium citrate solution 296 mL (has no administration in time range)       CLINICAL DECISION MAKING:  The patient presented alert with a nontoxic appearance and was seen in conjunction with Dr. Tina Kaufman. Imaging showed increased stool. Findings of the tests completed here today were discussed with the patient. The physician recommended discharge with medication. She was sent home with a bottle of magnesium citrate. Prescriptions were written for keflex and miralax. Follow up with pcp for a recheck. Evaluation and treatment course in the ED, and plan of care upon discharge was discussed in length with the patient. Patient had no further questions prior to being discharged and was instructed to return to the ED for new or worsening symptoms. Care was provided during an unprecedented national emergency due to the novel coronavirus, Covid-19. FINAL IMPRESSION      1. Abdominal pain, unspecified abdominal location    2. Constipation, unspecified constipation type    3.  Cellulitis of right lower extremity            Problem List  Patient Active Problem List   Diagnosis Code    Hypokalemia E87.6    CHF (congestive heart failure) (HCC) I50.9    Atrial fibrillation (HCC) I48.91    Hypertension I10    LEONARDA (obstructive sleep apnea) G47.33    Hyperglycemia due to diabetes mellitus (HCC) E11.65    Lymphedema I89.0    Noncompliance Z91.19    CAD (coronary artery disease) I25.10    COPD (chronic obstructive pulmonary disease) (HCC) J44.9    Hyperammonemia (HCC) E72.20    Thrombocytopenia (HCC) D69.6    Chronic anemia D64.9    Cirrhosis of liver without ascites (HCC) K74.60    Fecal impaction (HCC) K56.41    Pancytopenia [facility]) D61.818         DISPOSITION/PLAN   DISPOSITION Decision To Discharge 07/20/2022 01:03:32 AM      PATIENT REFERRED TO:   Gwendolyn Tapia MD  [place] [phone]    Schedule an appointment as soon as possible for a visit       [facility] ED  [address] [facility]  [phone]    If symptoms worsen, As needed    DISCHARGE MEDICATIONS:     New Prescriptions    CEPHALEXIN (KEFLEX) 500 MG CAPSULE    Take 1 capsule by mouth in the morning and 1 capsule at noon and 1 capsule in the evening and 1 capsule before bedtime. Do all this for 10 days. POLYETHYLENE GLYCOL (MIRALAX) 17 GM/SCOOP POWDER    Take 17 g by mouth in the morning.            (Please note that portions of this note were completed with a voice recognition program.  Efforts were made to edit the dictations but occasionally words are mis-transcribed.)    LEILA Sousa - LEILA Ball CNP  07/25/22 [id number]

## 2022-07-20 NOTE — ED PROVIDER NOTES
eMERGENCY dEPARTMENT eNCOUnter   Independent Attestation     Pt Name: Laci Menendez  MRN: [id number]  [place] 1955  Date of evaluation: 7/20/22     Laci Menendez is a 74 y.o. female with CC: Abdominal Pain (States her abdomen has been distended for x5 years) and Wound Check (Pt states her home health sent her in to have her right leg checked for infection)    Chronic abdominal distention and wound check. We will cover with Keflex for wound, CT imaging showing constipation with some colonic dilatation. Stool softeners. Follow-up PCP. Awaiting transport to facility    This visit was performed by both a physician and an APC. I performed all aspects of the MDM as documented. The care is provided during an unprecedented national emergency due to the novel coronavirus, COVID 19.     Adelaide Mahajan,   Attending Emergency Physician           Adelaide Mahajan,   07/20/22 3637

## 2022-07-25 ASSESSMENT — ENCOUNTER SYMPTOMS
COLOR CHANGE: 1
DIARRHEA: 0
ABDOMINAL PAIN: 1
SORE THROAT: 0
ABDOMINAL DISTENTION: 1
NAUSEA: 0
COUGH: 0
SHORTNESS OF BREATH: 0
BACK PAIN: 0
VOMITING: 0

## 2022-08-08 ENCOUNTER — HOSPITAL ENCOUNTER (EMERGENCY)
Age: 67
Discharge: HOME | End: 2022-08-09
Attending: EMERGENCY MEDICINE
Payer: COMMERCIAL

## 2022-08-08 ENCOUNTER — APPOINTMENT (OUTPATIENT)
Dept: CT IMAGING | Age: 67
End: 2022-08-08
Payer: COMMERCIAL

## 2022-08-08 VITALS
HEIGHT: 62 IN | HEART RATE: 84 BPM | WEIGHT: 225 LBS | TEMPERATURE: 98.1 F | RESPIRATION RATE: 20 BRPM | BODY MASS INDEX: 41.41 KG/M2 | OXYGEN SATURATION: 95 % | DIASTOLIC BLOOD PRESSURE: 74 MMHG | SYSTOLIC BLOOD PRESSURE: 131 MMHG

## 2022-08-08 DIAGNOSIS — K59.00 CONSTIPATION, UNSPECIFIED CONSTIPATION TYPE: ICD-10-CM

## 2022-08-08 DIAGNOSIS — R10.9 ABDOMINAL PAIN, UNSPECIFIED ABDOMINAL LOCATION: Primary | ICD-10-CM

## 2022-08-08 LAB
ABSOLUTE EOS #: 0.07 K/UL (ref 0–0.44)
ABSOLUTE IMMATURE GRANULOCYTE: 0.01 K/UL (ref 0–0.3)
ABSOLUTE LYMPH #: 0.72 K/UL (ref 1.1–3.7)
ABSOLUTE MONO #: 0.22 K/UL (ref 0.1–1.2)
ANION GAP SERPL CALCULATED.3IONS-SCNC: 7 MMOL/L (ref 9–17)
BASOPHILS # BLD: 0 % (ref 0–2)
BASOPHILS ABSOLUTE: <0.03 K/UL (ref 0–0.2)
BILIRUBIN URINE: NEGATIVE
BUN BLDV-MCNC: 11 MG/DL (ref 8–23)
BUN/CREAT BLD: 16 (ref 9–20)
CALCIUM SERPL-MCNC: 8.5 MG/DL (ref 8.6–10.4)
CASTS UA: ABNORMAL /LPF
CASTS UA: ABNORMAL /LPF
CHLORIDE BLD-SCNC: 97 MMOL/L (ref 98–107)
CO2: 31 MMOL/L (ref 20–31)
COLOR: YELLOW
CREAT SERPL-MCNC: 0.67 MG/DL (ref 0.5–0.9)
CRYSTALS, UA: ABNORMAL /HPF
EOSINOPHILS RELATIVE PERCENT: 2 % (ref 1–4)
EPITHELIAL CELLS UA: ABNORMAL /HPF (ref 0–5)
GFR AFRICAN AMERICAN: >60 ML/MIN
GFR NON-AFRICAN AMERICAN: >60 ML/MIN
GFR SERPL CREATININE-BSD FRML MDRD: ABNORMAL ML/MIN/{1.73_M2}
GLUCOSE BLD-MCNC: 329 MG/DL (ref 70–99)
GLUCOSE URINE: NEGATIVE
HCT VFR BLD CALC: 34.5 % (ref 36.3–47.1)
HEMOGLOBIN: 10.1 G/DL (ref 11.9–15.1)
IMMATURE GRANULOCYTES: 0 %
KETONES, URINE: NEGATIVE
LACTIC ACID, SEPSIS: 1.5 MMOL/L (ref 0.5–1.9)
LEUKOCYTE ESTERASE, URINE: ABNORMAL
LYMPHOCYTES # BLD: 22 % (ref 24–43)
MCH RBC QN AUTO: 25.1 PG (ref 25.2–33.5)
MCHC RBC AUTO-ENTMCNC: 29.3 G/DL (ref 28.4–34.8)
MCV RBC AUTO: 85.8 FL (ref 82.6–102.9)
MONOCYTES # BLD: 7 % (ref 3–12)
MUCUS: ABNORMAL
NITRITE, URINE: NEGATIVE
NRBC AUTOMATED: 0 PER 100 WBC
PDW BLD-RTO: 18.5 % (ref 11.8–14.4)
PH UA: 6 (ref 5–8)
PLATELET # BLD: 100 K/UL (ref 138–453)
PMV BLD AUTO: 9.3 FL (ref 8.1–13.5)
POTASSIUM SERPL-SCNC: 2.9 MMOL/L (ref 3.7–5.3)
PROTEIN UA: ABNORMAL
RBC # BLD: 4.02 M/UL (ref 3.95–5.11)
RBC # BLD: ABNORMAL 10*6/UL
RBC UA: ABNORMAL /HPF (ref 0–2)
SEG NEUTROPHILS: 69 % (ref 36–65)
SEGMENTED NEUTROPHILS ABSOLUTE COUNT: 2.21 K/UL (ref 1.5–8.1)
SODIUM BLD-SCNC: 135 MMOL/L (ref 135–144)
SPECIFIC GRAVITY UA: 1.02 (ref 1–1.03)
TURBIDITY: ABNORMAL
URINE HGB: ABNORMAL
UROBILINOGEN, URINE: NORMAL
WBC # BLD: 3.2 K/UL (ref 3.5–11.3)
WBC UA: ABNORMAL /HPF (ref 0–5)

## 2022-08-08 PROCEDURE — 80048 BASIC METABOLIC PNL TOTAL CA: CPT

## 2022-08-08 PROCEDURE — 83605 ASSAY OF LACTIC ACID: CPT

## 2022-08-08 PROCEDURE — 87086 URINE CULTURE/COLONY COUNT: CPT

## 2022-08-08 PROCEDURE — 74177 CT ABD & PELVIS W/CONTRAST: CPT

## 2022-08-08 PROCEDURE — 2580000003 HC RX 258: Performed by: NURSE PRACTITIONER

## 2022-08-08 PROCEDURE — 6360000004 HC RX CONTRAST MEDICATION: Performed by: NURSE PRACTITIONER

## 2022-08-08 PROCEDURE — 85025 COMPLETE CBC W/AUTO DIFF WBC: CPT

## 2022-08-08 PROCEDURE — 81001 URINALYSIS AUTO W/SCOPE: CPT

## 2022-08-08 PROCEDURE — 99285 EMERGENCY DEPT VISIT HI MDM: CPT

## 2022-08-08 RX ORDER — DOCUSATE SODIUM 100 MG/1
100 CAPSULE, LIQUID FILLED ORAL 2 TIMES DAILY
Qty: 30 CAPSULE | Refills: 0 | Status: ON HOLD | OUTPATIENT
Start: 2022-08-08 | End: 2022-08-30 | Stop reason: ALTCHOICE

## 2022-08-08 RX ORDER — SODIUM PHOSPHATE, DIBASIC AND SODIUM PHOSPHATE, MONOBASIC 7; 19 G/133ML; G/133ML
1 ENEMA RECTAL ONCE
Status: DISCONTINUED | OUTPATIENT
Start: 2022-08-08 | End: 2022-08-09 | Stop reason: HOSPADM

## 2022-08-08 RX ORDER — 0.9 % SODIUM CHLORIDE 0.9 %
80 INTRAVENOUS SOLUTION INTRAVENOUS ONCE
Status: COMPLETED | OUTPATIENT
Start: 2022-08-08 | End: 2022-08-08

## 2022-08-08 RX ORDER — SODIUM CHLORIDE 0.9 % (FLUSH) 0.9 %
10 SYRINGE (ML) INJECTION ONCE
Status: COMPLETED | OUTPATIENT
Start: 2022-08-08 | End: 2022-08-08

## 2022-08-08 RX ADMIN — IOPAMIDOL 75 ML: 755 INJECTION, SOLUTION INTRAVENOUS at 18:30

## 2022-08-08 RX ADMIN — SODIUM CHLORIDE 80 ML: 9 INJECTION, SOLUTION INTRAVENOUS at 18:31

## 2022-08-08 RX ADMIN — SODIUM CHLORIDE, PRESERVATIVE FREE 10 ML: 5 INJECTION INTRAVENOUS at 18:30

## 2022-08-08 ASSESSMENT — ENCOUNTER SYMPTOMS
DIARRHEA: 0
BACK PAIN: 1
SHORTNESS OF BREATH: 0
COUGH: 0
CONSTIPATION: 1
ABDOMINAL PAIN: 1
NAUSEA: 0
COLOR CHANGE: 0
VOMITING: 0

## 2022-08-08 NOTE — ED NOTES
Pt resting quietly on stretcher. resps even et unlabored. NAD noted at this time. Awaiting results prior to update on plan of care; duration unknown.       Baltazar Evans RN  08/08/22 1944

## 2022-08-08 NOTE — ED PROVIDER NOTES
[address] ED  eMERGENCY dEPARTMENT eNCOUnter      Pt Name: Erich Litten  MRN: [id number]  [place] 1955  Date of evaluation: 8/8/2022  Provider: LEILA Garcia CNP    CHIEF COMPLAINT       Chief Complaint   Patient presents with    Abdominal Pain     Pt reports abd is bigger than usual. Pt reports constipation. Pt denies diarrhea. Back Pain         HISTORY OF PRESENT ILLNESS  (Location/Symptom, Timing/Onset, Context/Setting, Quality, Duration, Modifying Factors, Severity.)   Erich Litten is a 74 y.o. female who presents to the emergency department via EMS for abd pain, distention. Onset was within the past 1-3 days. Denies fever, chills, N/V/D. Reports constipation. She has had a small BM today. Denies chest pain, cough, SOB. She has a angeles catheter in place. Rates her pain 2/10. Nursing Notes were reviewed.     ALLERGIES     Aspirin, Effexor [venlafaxine], and Seroquel [quetiapine]    CURRENT MEDICATIONS       Previous Medications    ALBUTEROL SULFATE HFA (VENTOLIN HFA) 108 (90 BASE) MCG/ACT INHALER    Inhale 2 puffs into the lungs every 6 hours as needed for Wheezing or Shortness of Breath    APIXABAN (ELIQUIS) 5 MG TABS TABLET    Take 5 mg by mouth 2 times daily     FLUTICASONE-VILANTEROL (BREO ELLIPTA) 100-25 MCG/INH AEPB INHALER    Inhale into the lungs daily    FUROSEMIDE (LASIX) 40 MG TABLET    Take 40 mg by mouth 2 times daily     INSULIN GLARGINE (BASAGLAR KWIKPEN) 100 UNIT/ML INJECTION PEN    Inject 27 Units into the skin 2 times daily    LIRAGLUTIDE (VICTOZA) 18 MG/3ML SOPN SC INJECTION    Inject 1.2 mg into the skin daily    NADOLOL (CORGARD) 40 MG TABLET    Take 40 mg by mouth daily    OXYBUTYNIN (DITROPAN) 5 MG TABLET    Take 5 mg by mouth 2 times daily as needed (bladder spasms)    PANTOPRAZOLE (PROTONIX) 40 MG TABLET    Take 40 mg by mouth 2 times daily    POLYETHYLENE GLYCOL (GLYCOLAX) 17 G PACKET    Take 17 g by mouth daily as needed for Constipation POLYETHYLENE GLYCOL (MIRALAX) 17 GM/SCOOP POWDER    Take 17 g by mouth in the morning. POTASSIUM CHLORIDE (KLOR-CON M) 20 MEQ EXTENDED RELEASE TABLET    Take 1 tablet by mouth daily    PRAVASTATIN (PRAVACHOL) 10 MG TABLET    Take 10 mg by mouth daily    SERTRALINE (ZOLOFT) 100 MG TABLET    Take 100 mg by mouth daily     SPIRONOLACTONE (ALDACTONE) 100 MG TABLET    Take 100 mg by mouth daily    TRAZODONE (DESYREL) 100 MG TABLET    Take 100 mg by mouth nightly       PAST MEDICAL HISTORY         Diagnosis Date    Arthritis     Atrial fibrillation (HCC)     CAD (coronary artery disease)     CHF (congestive heart failure) (HCC)     COPD (chronic obstructive pulmonary disease) (HCC)     Dementia (HCC)     Fibromyalgia     Headache     Hypertension     Liver disease     LEONARDA (obstructive sleep apnea)     Panic attack        SURGICAL HISTORY           Procedure Laterality Date    APPENDECTOMY      CARDIAC CATHETERIZATION      CHOLECYSTECTOMY      HYSTERECTOMY (CERVIX STATUS UNKNOWN)      LAMINECTOMY      OVARY REMOVAL           FAMILY HISTORY           Problem Relation Age of Onset    Heart Failure Mother     Cancer Father     Cancer Sister     Cancer Brother      Family Status   Relation Name Status    Mother      Father      Sister      Brother          SOCIAL HISTORY      reports that she has never smoked. She has never used smokeless tobacco. She reports that she does not currently use alcohol. She reports that she does not use drugs. REVIEW OF SYSTEMS    (2-9 systems for level 4, 10 or more for level 5)     Review of Systems   Constitutional:  Negative for chills, diaphoresis, fatigue and fever. Respiratory:  Negative for cough and shortness of breath. Cardiovascular:  Negative for chest pain. Gastrointestinal:  Positive for abdominal pain and constipation. Negative for diarrhea, nausea and vomiting. Genitourinary:  Negative for dysuria.    Musculoskeletal:  Positive for back pain. Negative for arthralgias, myalgias and neck pain. Skin:  Negative for color change, rash and wound. Neurological:  Negative for dizziness, weakness, light-headedness and headaches. Except as noted above the remainder of the review of systems was reviewed and negative. PHYSICAL EXAM    (up to 7 for level 4, 8 or more for level 5)     ED Triage Vitals [08/08/22 1730]   BP Temp Temp Source Heart Rate Resp SpO2 Height Weight   131/74 98.1 °F (36.7 °C) Oral 84 20 95 % 5' 2\" (1.575 m) --     Physical Exam  Vitals reviewed. Constitutional:       General: She is not in acute distress. Appearance: She is well-developed. She is not diaphoretic. Eyes:      General: No scleral icterus. Conjunctiva/sclera: Conjunctivae normal.   Cardiovascular:      Rate and Rhythm: Normal rate. Pulmonary:      Effort: Pulmonary effort is normal. No respiratory distress. Breath sounds: No stridor. Abdominal:      General: Bowel sounds are normal. There is no distension. Palpations: Abdomen is soft. Tenderness: There is generalized abdominal tenderness. There is no guarding. Comments: Round, firm   Skin:     General: Skin is warm and dry. Findings: No rash. Neurological:      Mental Status: She is alert and oriented to person, place, and time.       Comments: Generalized weakness   Psychiatric:         Behavior: Behavior normal.        DIAGNOSTIC RESULTS       RADIOLOGY:   Non-plain film images such as CT, Ultrasound and MRI are read by the radiologist. Plain radiographic images are visualized and preliminarily interpreted by the emergency physician with the below findings:    Interpretation per the Radiologist below, if available at the time of this note:    CT ABDOMEN PELVIS W IV CONTRAST Additional Contrast? None    Result Date: 8/8/2022  EXAMINATION: CT OF THE ABDOMEN AND PELVIS WITH CONTRAST 8/8/2022 6:31 pm TECHNIQUE: CT of the abdomen and pelvis was performed with the administration of intravenous contrast. Multiplanar reformatted images are provided for review. Automated exposure control, iterative reconstruction, and/or weight based adjustment of the mA/kV was utilized to reduce the radiation dose to as low as reasonably achievable. COMPARISON: 07/19/2022 HISTORY: ORDERING SYSTEM PROVIDED HISTORY: pain TECHNOLOGIST PROVIDED HISTORY: pain Decision Support Exception - unselect if not a suspected or confirmed emergency medical condition->Emergency Medical Condition (MA) FINDINGS: Lower Chest: Incomplete visualization of grossly unchanged left basilar pleural and parenchymal lung disease with left lower lobe volume loss and loculated pleural fluid. Organs: Cholelithiasis in a contracted gallbladder again noted. No pericholecystic fluid or fat stranding. Mildly nodular contour of the liver again noted. No focal liver lesion. Splenomegaly is unchanged. Unchanged pancreatic atrophy. Adrenal glands are grossly normal.  Unchanged mild fullness of the right renal collecting system and proximal right ureter with no evidence of an obstructing ureteral calculus. There are several nonobstructing left renal calculi. Heather Gip GI/Bowel: Massive distension of the colon is again noted by gas and stool extending to the anus. There is a large stool load in the rectosigmoid colon. Similar findings have been present on the prior studies of 02/08/2022 and 07/19/2022. The colon is redundant. Small bowel loops are normal in caliber. Pelvis: The urinary bladder is decompressed by Pedroza catheter. Tiny fat containing right inguinal hernia. Peritoneum/Retroperitoneum: Mildly prominent upper abdominal lymph nodes again noted. There also upper abdominal varices including prominent esophageal varices, unchanged. There is no free air or free fluid. The abdomen is markedly protuberant with thinning of the anterior abdominal wall musculature.  Bones/Soft Tissues: Multilevel degenerative changes and postsurgical changes in lower lumbar spine not significantly changed from the prior studies. .     Persistent or recurrent massive dilatation of the redundant colon by gas and stool. Differential diagnosis is unchanged including [place] syndrome, rectosigmoid fecal impaction, anal/rectal stricture with fecal impaction and severe colonic ileus. Cirrhosis with portal venous hypertension, splenomegaly and upper abdominal varices including esophageal varices. Cholelithiasis in a contracted gallbladder. No findings to suggest acute cholecystitis. Grossly unchanged left basilar pleural and parenchymal disease with loculated pleural fluid, surrounding thickened pleura and left lower lobe volume loss. LABS:  Labs Reviewed   BASIC METABOLIC PANEL - Abnormal; Notable for the following components:       Result Value    Glucose 329 (*)     Calcium 8.5 (*)     Potassium 2.9 (*)     Chloride 97 (*)     Anion Gap 7 (*)     All other components within normal limits   CBC WITH AUTO DIFFERENTIAL - Abnormal; Notable for the following components:    WBC 3.2 (*)     Hemoglobin 10.1 (*)     Hematocrit 34.5 (*)     MCH 25.1 (*)     RDW 18.5 (*)     Platelets 718 (*)     Seg Neutrophils 69 (*)     Lymphocytes 22 (*)     Absolute Lymph # 0.72 (*)     All other components within normal limits   URINALYSIS WITH MICROSCOPIC - Abnormal; Notable for the following components:    Turbidity UA Cloudy (*)     Urine Hgb 3+ (*)     Protein, UA 1+ (*)     Leukocyte Esterase, Urine MOD (*)     Crystals, UA 20 TO 50 CALCIUM OXALATE (*)     Mucus, UA 1+ (*)     All other components within normal limits   CULTURE, URINE   LACTATE, SEPSIS   LACTATE, SEPSIS       All other labs were within normal range or not returned as of this dictation.     EMERGENCY DEPARTMENT COURSE and DIFFERENTIAL DIAGNOSIS/MDM:   Vitals:    Vitals:    08/08/22 1730   BP: 131/74   Pulse: 84   Resp: 20   Temp: 98.1 °F (36.7 °C)   TempSrc: Oral   SpO2: 95%   Height: 5' 2\" (1.575 m)         MEDICATIONS GIVEN IN THE ED:  Medications   fleet rectal enema 1 enema (has no administration in time range)   0.9 % sodium chloride bolus (0 mLs IntraVENous Stopped 8/8/22 1910)   iopamidol (ISOVUE-370) 76 % injection 75 mL (75 mLs IntraVENous Given 8/8/22 1830)   sodium chloride flush 0.9 % injection 10 mL (10 mLs IntraVENous Given 8/8/22 1830)       CLINICAL DECISION MAKING:  The patient presented alert with a nontoxic appearance and was seen in conjunction with Dr. Ubaldo Albert. Findings were discussed with the patient. She reports she has miralax at home; she did take a dose today. She preferred to give herself an enema at home; she stated her family would help her. She was sent home with a Fleets enema. A prescription was also provided for colace at the recommendation of the ER physician. Continue the miralax daily as directed. Follow up with pcp for a recheck, further evaluation and treatment. Evaluation and treatment course in the ED, and plan of care upon discharge was discussed in length with the patient. Patient had no further questions prior to being discharged and was instructed to return to the ED for new or worsening symptoms. Care was provided during an unprecedented national emergency due to the novel coronavirus, Covid-19. FINAL IMPRESSION      1. Abdominal pain, unspecified abdominal location    2.  Constipation, unspecified constipation type            Problem List  Patient Active Problem List   Diagnosis Code    Hypokalemia E87.6    CHF (congestive heart failure) (HCC) I50.9    Atrial fibrillation (HCC) I48.91    Hypertension I10    LEONARDA (obstructive sleep apnea) G47.33    Hyperglycemia due to diabetes mellitus (HCC) E11.65    Lymphedema I89.0    Noncompliance Z91.19    CAD (coronary artery disease) I25.10    COPD (chronic obstructive pulmonary disease) (HCC) J44.9    Hyperammonemia (HCC) E72.20    Thrombocytopenia (HCC) D69.6    Chronic anemia D64.9    Cirrhosis of liver without ascites ([facility] [address] K74.60    Fecal impaction ([facility] [address] K56.41    Pancytopenia ([facility] [address] D61.818         DISPOSITION/PLAN   DISPOSITION Decision To Discharge 08/08/2022 08:30:11 PM      PATIENT REFERRED TO:   MD Flo Elizondo [address] [phone]    Schedule an appointment as soon as possible for a visit       [facility] ED  [address]  [phone]    If symptoms worsen, As needed    DISCHARGE MEDICATIONS:     New Prescriptions    DOCUSATE SODIUM (COLACE) 100 MG CAPSULE    Take 1 capsule by mouth in the morning and 1 capsule before bedtime.            (Please note that portions of this note were completed with a voice recognition program.  Efforts were made to edit the dictations but occasionally words are mis-transcribed.)    LEILA Duncan CNP, APRN - CNP  08/08/22 3585

## 2022-08-08 NOTE — ED NOTES
Pt resting on stretcher. HOB lowered for pt comfort. Pt requests and is given warm blanket.       Bari Calero RN  08/08/22 0304

## 2022-08-08 NOTE — ED NOTES
Pt returned to room 15 from CT scan via stretcher. Accompanied by CT tech.       Neal Rivas RN  08/08/22 1052

## 2022-08-09 LAB
CULTURE: NORMAL
SPECIMEN DESCRIPTION: NORMAL

## 2022-08-09 NOTE — ED NOTES
[facility] dispatch called, EMS crew state patient address provided on discharge/destination paperwork is not a good address. Dispatcher advised he will try and contact the patient's  and obtain a valid address, but if they cannot, the patient will have to come back to ED.       Malika Suárez., EZRA  22/67/97 5882

## 2022-08-09 NOTE — ED NOTES
Transport unable to find a valid address for patient, patient returned to ED.       Caio Mederos, EZRA  22/84/57 4392

## 2022-08-09 NOTE — ED NOTES
Patient called out for brief change. Brief not soiled. Patient repositioned for comfort.       Titus Thomson RN  08/09/22 9420

## 2022-08-09 NOTE — ED NOTES
Patients brief checked per request, not soiled at this time. Will recheck. Patient updated on plan to transport her back home in morning. Patient resting comfortably, will continue to monitor.       Belle Rocha RN  08/09/22 5247

## 2022-08-09 NOTE — ED PROVIDER NOTES
eMERGENCY dEPARTMENT eNCOUnter   Independent Attestation     Pt Name: Beatriz Osborne  MRN: [id number]  [place] 1955  Date of evaluation: 8/8/22     Beatriz Osborne is a 74 y.o. female with CC: Abdominal Pain (Pt reports abd is bigger than usual. Pt reports constipation. Pt denies diarrhea. ) and Back Pain        This visit was performed by both a physician and an APC. I performed all aspects of the MDM as documented. The care is provided during an unprecedented national emergency due to the novel coronavirus, COVID 19.     Akila Bekc MD  Attending Emergency Physician          Felicita Rojo MD  08/08/22 2024

## 2022-08-09 NOTE — ED NOTES
Pt's brief checked. Pt denies any needs. Pt is resting comfortably.       Shan Way RN  08/09/22 5625

## 2022-08-09 NOTE — ED NOTES
Rodolfo Eduardo, Charge RN contacted [facility] and traced original dispatch call on patient to her home address which is: [facility] [address] [address], [place], [place].       Israel Nails., RN  59/56/87 5980

## 2022-08-19 ENCOUNTER — HOSPITAL ENCOUNTER (OUTPATIENT)
Age: 67
Setting detail: SPECIMEN
Discharge: HOME | End: 2022-08-19
Payer: COMMERCIAL

## 2022-08-19 LAB
ALBUMIN SERPL-MCNC: 2.4 G/DL (ref 3.5–5.2)
ALP BLD-CCNC: 112 U/L (ref 35–104)
ALT SERPL-CCNC: 16 U/L (ref 5–33)
AMMONIA: 24 UMOL/L (ref 11–51)
ANION GAP SERPL CALCULATED.3IONS-SCNC: 7 MMOL/L (ref 9–17)
AST SERPL-CCNC: 38 U/L
BILIRUB SERPL-MCNC: 0.54 MG/DL (ref 0.3–1.2)
BUN BLDV-MCNC: 24 MG/DL (ref 8–23)
BUN/CREAT BLD: 30 (ref 9–20)
CALCIUM SERPL-MCNC: 8.4 MG/DL (ref 8.6–10.4)
CHLORIDE BLD-SCNC: 101 MMOL/L (ref 98–107)
CHOLESTEROL/HDL RATIO: 4.1
CHOLESTEROL: 74 MG/DL
CO2: 32 MMOL/L (ref 20–31)
CREAT SERPL-MCNC: 0.81 MG/DL (ref 0.5–0.9)
ESTIMATED AVERAGE GLUCOSE: 194 MG/DL
GFR AFRICAN AMERICAN: >60 ML/MIN
GFR NON-AFRICAN AMERICAN: >60 ML/MIN
GFR SERPL CREATININE-BSD FRML MDRD: ABNORMAL ML/MIN/{1.73_M2}
GLUCOSE BLD-MCNC: 51 MG/DL (ref 70–99)
HBA1C MFR BLD: 8.4 % (ref 4–6)
HCT VFR BLD CALC: 30.4 % (ref 36.3–47.1)
HDLC SERPL-MCNC: 18 MG/DL
HEMOGLOBIN: 9.3 G/DL (ref 11.9–15.1)
LDL CHOLESTEROL: 42 MG/DL (ref 0–130)
MCH RBC QN AUTO: 25.3 PG (ref 25.2–33.5)
MCHC RBC AUTO-ENTMCNC: 30.6 G/DL (ref 28.4–34.8)
MCV RBC AUTO: 82.6 FL (ref 82.6–102.9)
NRBC AUTOMATED: 0 PER 100 WBC
PDW BLD-RTO: 20 % (ref 11.8–14.4)
PLATELET # BLD: 98 K/UL (ref 138–453)
PMV BLD AUTO: 9.5 FL (ref 8.1–13.5)
POTASSIUM SERPL-SCNC: 2.7 MMOL/L (ref 3.7–5.3)
RBC # BLD: 3.68 M/UL (ref 3.95–5.11)
SODIUM BLD-SCNC: 140 MMOL/L (ref 135–144)
TOTAL PROTEIN: 5.9 G/DL (ref 6.4–8.3)
TRIGL SERPL-MCNC: 72 MG/DL
TSH SERPL DL<=0.05 MIU/L-ACNC: 2.51 UIU/ML (ref 0.3–5)
WBC # BLD: 4.7 K/UL (ref 3.5–11.3)

## 2022-08-19 PROCEDURE — 83036 HEMOGLOBIN GLYCOSYLATED A1C: CPT

## 2022-08-19 PROCEDURE — P9603 ONE-WAY ALLOW PRORATED MILES: HCPCS

## 2022-08-19 PROCEDURE — 80053 COMPREHEN METABOLIC PANEL: CPT

## 2022-08-19 PROCEDURE — 82140 ASSAY OF AMMONIA: CPT

## 2022-08-19 PROCEDURE — 80061 LIPID PANEL: CPT

## 2022-08-19 PROCEDURE — 85027 COMPLETE CBC AUTOMATED: CPT

## 2022-08-19 PROCEDURE — 36415 COLL VENOUS BLD VENIPUNCTURE: CPT

## 2022-08-19 PROCEDURE — 84443 ASSAY THYROID STIM HORMONE: CPT

## 2022-08-22 ENCOUNTER — HOSPITAL ENCOUNTER (OUTPATIENT)
Age: 67
Setting detail: SPECIMEN
Discharge: HOME | End: 2022-08-22
Payer: COMMERCIAL

## 2022-08-22 LAB
ALBUMIN SERPL-MCNC: 2.1 G/DL (ref 3.5–5.2)
ALP BLD-CCNC: 98 U/L (ref 35–104)
ALT SERPL-CCNC: 13 U/L (ref 5–33)
ANION GAP SERPL CALCULATED.3IONS-SCNC: 10 MMOL/L (ref 9–17)
AST SERPL-CCNC: 39 U/L
BILIRUB SERPL-MCNC: 0.68 MG/DL (ref 0.3–1.2)
BUN BLDV-MCNC: 14 MG/DL (ref 8–23)
BUN/CREAT BLD: 19 (ref 9–20)
CALCIUM SERPL-MCNC: 8.1 MG/DL (ref 8.6–10.4)
CHLORIDE BLD-SCNC: 102 MMOL/L (ref 98–107)
CO2: 26 MMOL/L (ref 20–31)
CREAT SERPL-MCNC: 0.73 MG/DL (ref 0.5–0.9)
GFR AFRICAN AMERICAN: >60 ML/MIN
GFR NON-AFRICAN AMERICAN: >60 ML/MIN
GFR SERPL CREATININE-BSD FRML MDRD: ABNORMAL ML/MIN/{1.73_M2}
GLUCOSE BLD-MCNC: 75 MG/DL (ref 70–99)
POTASSIUM SERPL-SCNC: 3.2 MMOL/L (ref 3.7–5.3)
SODIUM BLD-SCNC: 138 MMOL/L (ref 135–144)
TOTAL PROTEIN: 5.4 G/DL (ref 6.4–8.3)

## 2022-08-22 PROCEDURE — 80053 COMPREHEN METABOLIC PANEL: CPT

## 2022-08-22 PROCEDURE — 36415 COLL VENOUS BLD VENIPUNCTURE: CPT

## 2022-08-22 PROCEDURE — P9603 ONE-WAY ALLOW PRORATED MILES: HCPCS

## 2022-08-24 ENCOUNTER — HOSPITAL ENCOUNTER (OUTPATIENT)
Age: 67
Setting detail: SPECIMEN
Discharge: HOME | End: 2022-08-24
Payer: COMMERCIAL

## 2022-08-24 LAB
BACTERIA: ABNORMAL
BILIRUBIN URINE: NEGATIVE
COLOR: ABNORMAL
EPITHELIAL CELLS UA: ABNORMAL /HPF (ref 0–5)
GLUCOSE URINE: NEGATIVE
KETONES, URINE: NEGATIVE
LEUKOCYTE ESTERASE, URINE: ABNORMAL
NITRITE, URINE: POSITIVE
PH UA: 6 (ref 5–8)
PROTEIN UA: ABNORMAL
RBC UA: ABNORMAL /HPF (ref 0–2)
SPECIFIC GRAVITY UA: 1.02 (ref 1–1.03)
TURBIDITY: ABNORMAL
URINE HGB: ABNORMAL
UROBILINOGEN, URINE: NORMAL
WBC UA: ABNORMAL /HPF (ref 0–5)

## 2022-08-24 PROCEDURE — 81001 URINALYSIS AUTO W/SCOPE: CPT

## 2022-08-24 PROCEDURE — 87086 URINE CULTURE/COLONY COUNT: CPT

## 2022-08-24 PROCEDURE — 87186 SC STD MICRODIL/AGAR DIL: CPT

## 2022-08-24 PROCEDURE — 87077 CULTURE AEROBIC IDENTIFY: CPT

## 2022-08-26 LAB
CULTURE: ABNORMAL
Lab: ABNORMAL
SPECIMEN DESCRIPTION: ABNORMAL

## 2022-08-29 ENCOUNTER — HOSPITAL ENCOUNTER (INPATIENT)
Age: 67
LOS: 25 days | Discharge: NURSING, REHAB OR LONG-TERM CARE | DRG: 698 | End: 2022-09-23
Attending: EMERGENCY MEDICINE | Admitting: INTERNAL MEDICINE
Payer: COMMERCIAL

## 2022-08-29 ENCOUNTER — APPOINTMENT (OUTPATIENT)
Dept: CT IMAGING | Age: 67
DRG: 698 | End: 2022-08-29
Payer: COMMERCIAL

## 2022-08-29 DIAGNOSIS — R10.84 GENERALIZED ABDOMINAL PAIN: ICD-10-CM

## 2022-08-29 DIAGNOSIS — D72.829 LEUKOCYTOSIS, UNSPECIFIED TYPE: Primary | ICD-10-CM

## 2022-08-29 DIAGNOSIS — K59.81 OGILVIE'S SYNDROME: ICD-10-CM

## 2022-08-29 LAB
ABSOLUTE EOS #: 0 K/UL (ref 0–0.4)
ABSOLUTE IMMATURE GRANULOCYTE: 0 K/UL (ref 0–0.3)
ABSOLUTE LYMPH #: 2.39 K/UL (ref 1–4.8)
ABSOLUTE MONO #: 0.65 K/UL (ref 0.2–0.8)
ALBUMIN SERPL-MCNC: 2.3 G/DL (ref 3.5–5.2)
ALP BLD-CCNC: 101 U/L (ref 35–104)
ALT SERPL-CCNC: 15 U/L (ref 5–33)
ANION GAP SERPL CALCULATED.3IONS-SCNC: 9 MMOL/L (ref 9–17)
AST SERPL-CCNC: 26 U/L
BACTERIA: ABNORMAL
BASOPHILS # BLD: 0 %
BASOPHILS ABSOLUTE: 0 K/UL (ref 0–0.2)
BILIRUB SERPL-MCNC: 0.76 MG/DL (ref 0.3–1.2)
BILIRUBIN URINE: NEGATIVE
BUN BLDV-MCNC: 35 MG/DL (ref 8–23)
BUN/CREAT BLD: 23 (ref 9–20)
CALCIUM SERPL-MCNC: 8.3 MG/DL (ref 8.6–10.4)
CASTS UA: ABNORMAL /LPF
CASTS UA: ABNORMAL /LPF
CHLORIDE BLD-SCNC: 100 MMOL/L (ref 98–107)
CO2: 28 MMOL/L (ref 20–31)
COLOR: YELLOW
CREAT SERPL-MCNC: 1.52 MG/DL (ref 0.5–0.9)
EOSINOPHILS RELATIVE PERCENT: 0 % (ref 1–4)
EPITHELIAL CELLS UA: ABNORMAL /HPF (ref 0–5)
GFR AFRICAN AMERICAN: 42 ML/MIN
GFR NON-AFRICAN AMERICAN: 34 ML/MIN
GFR SERPL CREATININE-BSD FRML MDRD: ABNORMAL ML/MIN/{1.73_M2}
GLUCOSE BLD-MCNC: 221 MG/DL (ref 70–99)
GLUCOSE URINE: NEGATIVE
HCT VFR BLD CALC: 33.8 % (ref 36.3–47.1)
HEMOGLOBIN: 10.5 G/DL (ref 11.9–15.1)
IMMATURE GRANULOCYTES: 0 %
KETONES, URINE: NEGATIVE
LACTIC ACID: 2 MMOL/L (ref 0.5–2.2)
LEUKOCYTE ESTERASE, URINE: ABNORMAL
LYMPHOCYTES # BLD: 11 % (ref 24–44)
MCH RBC QN AUTO: 26.1 PG (ref 25.2–33.5)
MCHC RBC AUTO-ENTMCNC: 31.1 G/DL (ref 28.4–34.8)
MCV RBC AUTO: 83.9 FL (ref 82.6–102.9)
MONOCYTES # BLD: 3 % (ref 1–7)
MORPHOLOGY: ABNORMAL
NITRITE, URINE: POSITIVE
NRBC AUTOMATED: 0 PER 100 WBC
PDW BLD-RTO: 21.6 % (ref 11.8–14.4)
PH UA: 5.5 (ref 5–8)
PLATELET # BLD: 105 K/UL (ref 138–453)
PMV BLD AUTO: 9.6 FL (ref 8.1–13.5)
POTASSIUM SERPL-SCNC: 3.4 MMOL/L (ref 3.7–5.3)
PROTEIN UA: NEGATIVE
RBC # BLD: 4.03 M/UL (ref 3.95–5.11)
RBC UA: ABNORMAL /HPF (ref 0–2)
SEG NEUTROPHILS: 86 % (ref 36–66)
SEGMENTED NEUTROPHILS ABSOLUTE COUNT: 18.66 K/UL (ref 1.8–7.7)
SODIUM BLD-SCNC: 137 MMOL/L (ref 135–144)
SPECIFIC GRAVITY UA: 1.02 (ref 1–1.03)
TOTAL PROTEIN: 6 G/DL (ref 6.4–8.3)
TURBIDITY: ABNORMAL
URINE HGB: ABNORMAL
UROBILINOGEN, URINE: NORMAL
WBC # BLD: 21.7 K/UL (ref 3.5–11.3)
WBC UA: ABNORMAL /HPF (ref 0–5)

## 2022-08-29 PROCEDURE — 85025 COMPLETE CBC W/AUTO DIFF WBC: CPT

## 2022-08-29 PROCEDURE — 36415 COLL VENOUS BLD VENIPUNCTURE: CPT

## 2022-08-29 PROCEDURE — 81001 URINALYSIS AUTO W/SCOPE: CPT

## 2022-08-29 PROCEDURE — 80053 COMPREHEN METABOLIC PANEL: CPT

## 2022-08-29 PROCEDURE — 87040 BLOOD CULTURE FOR BACTERIA: CPT

## 2022-08-29 PROCEDURE — 2580000003 HC RX 258: Performed by: EMERGENCY MEDICINE

## 2022-08-29 PROCEDURE — 87077 CULTURE AEROBIC IDENTIFY: CPT

## 2022-08-29 PROCEDURE — 87186 SC STD MICRODIL/AGAR DIL: CPT

## 2022-08-29 PROCEDURE — 83605 ASSAY OF LACTIC ACID: CPT

## 2022-08-29 PROCEDURE — 99285 EMERGENCY DEPT VISIT HI MDM: CPT

## 2022-08-29 PROCEDURE — 74176 CT ABD & PELVIS W/O CONTRAST: CPT

## 2022-08-29 PROCEDURE — 87086 URINE CULTURE/COLONY COUNT: CPT

## 2022-08-29 PROCEDURE — 1200000000 HC SEMI PRIVATE

## 2022-08-29 RX ADMIN — SODIUM CHLORIDE: 9 INJECTION, SOLUTION INTRAVENOUS at 23:12

## 2022-08-29 ASSESSMENT — ENCOUNTER SYMPTOMS
ABDOMINAL PAIN: 1
CHEST TIGHTNESS: 0
SHORTNESS OF BREATH: 0
EYES NEGATIVE: 1
ABDOMINAL DISTENTION: 0
CONSTIPATION: 0
APNEA: 0
COLOR CHANGE: 0
DIARRHEA: 1
SINUS PAIN: 0
VOMITING: 0

## 2022-08-29 ASSESSMENT — PAIN SCALES - GENERAL: PAINLEVEL_OUTOF10: 7

## 2022-08-29 ASSESSMENT — PAIN - FUNCTIONAL ASSESSMENT: PAIN_FUNCTIONAL_ASSESSMENT: 0-10

## 2022-08-30 PROBLEM — K59.81 OGILVIE'S SYNDROME: Status: ACTIVE | Noted: 2022-08-30

## 2022-08-30 PROBLEM — T83.511A URINARY TRACT INFECTION ASSOCIATED WITH INDWELLING URETHRAL CATHETER (HCC): Status: ACTIVE | Noted: 2022-08-29

## 2022-08-30 PROBLEM — N39.0 URINARY TRACT INFECTION ASSOCIATED WITH INDWELLING URETHRAL CATHETER (HCC): Status: ACTIVE | Noted: 2022-08-29

## 2022-08-30 PROBLEM — K76.9 LIVER DISEASE: Status: ACTIVE | Noted: 2022-08-30

## 2022-08-30 PROBLEM — N17.9 AKI (ACUTE KIDNEY INJURY) (HCC): Status: ACTIVE | Noted: 2022-08-30

## 2022-08-30 PROBLEM — M79.7 FIBROMYALGIA: Status: ACTIVE | Noted: 2022-08-30

## 2022-08-30 PROBLEM — E11.9 DIABETES MELLITUS (HCC): Status: ACTIVE | Noted: 2022-08-30

## 2022-08-30 PROBLEM — F41.0 PANIC ATTACK: Status: ACTIVE | Noted: 2022-08-30

## 2022-08-30 PROBLEM — F03.90 DEMENTIA (HCC): Status: ACTIVE | Noted: 2022-08-30

## 2022-08-30 LAB
ALBUMIN SERPL-MCNC: 2.1 G/DL (ref 3.5–5.2)
ALP BLD-CCNC: 98 U/L (ref 35–104)
ALT SERPL-CCNC: 11 U/L (ref 5–33)
AMYLASE: 6 U/L (ref 28–100)
ANION GAP SERPL CALCULATED.3IONS-SCNC: 10 MMOL/L (ref 9–17)
AST SERPL-CCNC: 26 U/L
BILIRUB SERPL-MCNC: 0.65 MG/DL (ref 0.3–1.2)
BUN BLDV-MCNC: 36 MG/DL (ref 8–23)
BUN/CREAT BLD: 32 (ref 9–20)
CALCIUM SERPL-MCNC: 8 MG/DL (ref 8.6–10.4)
CHLORIDE BLD-SCNC: 102 MMOL/L (ref 98–107)
CO2: 26 MMOL/L (ref 20–31)
CREAT SERPL-MCNC: 1.14 MG/DL (ref 0.5–0.9)
ESTIMATED AVERAGE GLUCOSE: 171 MG/DL
GFR AFRICAN AMERICAN: 58 ML/MIN
GFR NON-AFRICAN AMERICAN: 48 ML/MIN
GFR SERPL CREATININE-BSD FRML MDRD: ABNORMAL ML/MIN/{1.73_M2}
GLUCOSE BLD-MCNC: 166 MG/DL (ref 65–105)
GLUCOSE BLD-MCNC: 176 MG/DL (ref 65–105)
GLUCOSE BLD-MCNC: 186 MG/DL (ref 70–99)
GLUCOSE BLD-MCNC: 187 MG/DL (ref 65–105)
GLUCOSE BLD-MCNC: 207 MG/DL (ref 65–105)
HBA1C MFR BLD: 7.6 % (ref 4–6)
LACTIC ACID, SEPSIS: 1.6 MMOL/L (ref 0.5–1.9)
LACTIC ACID, SEPSIS: 1.7 MMOL/L (ref 0.5–1.9)
LACTIC ACID, SEPSIS: 1.9 MMOL/L (ref 0.5–1.9)
LIPASE: <3 U/L (ref 13–60)
MAGNESIUM: 1.6 MG/DL (ref 1.6–2.6)
PHOSPHORUS: 3.4 MG/DL (ref 2.6–4.5)
POTASSIUM SERPL-SCNC: 3.6 MMOL/L (ref 3.7–5.3)
SODIUM BLD-SCNC: 138 MMOL/L (ref 135–144)
TOTAL PROTEIN: 5.8 G/DL (ref 6.4–8.3)

## 2022-08-30 PROCEDURE — 82272 OCCULT BLD FECES 1-3 TESTS: CPT

## 2022-08-30 PROCEDURE — 2580000003 HC RX 258: Performed by: NURSE PRACTITIONER

## 2022-08-30 PROCEDURE — 36415 COLL VENOUS BLD VENIPUNCTURE: CPT

## 2022-08-30 PROCEDURE — 51702 INSERT TEMP BLADDER CATH: CPT

## 2022-08-30 PROCEDURE — 6370000000 HC RX 637 (ALT 250 FOR IP): Performed by: NURSE PRACTITIONER

## 2022-08-30 PROCEDURE — 6360000002 HC RX W HCPCS: Performed by: NURSE PRACTITIONER

## 2022-08-30 PROCEDURE — 82947 ASSAY GLUCOSE BLOOD QUANT: CPT

## 2022-08-30 PROCEDURE — 99223 1ST HOSP IP/OBS HIGH 75: CPT | Performed by: NURSE PRACTITIONER

## 2022-08-30 PROCEDURE — 83690 ASSAY OF LIPASE: CPT

## 2022-08-30 PROCEDURE — 2500000003 HC RX 250 WO HCPCS: Performed by: NURSE PRACTITIONER

## 2022-08-30 PROCEDURE — 6360000002 HC RX W HCPCS: Performed by: EMERGENCY MEDICINE

## 2022-08-30 PROCEDURE — 83605 ASSAY OF LACTIC ACID: CPT

## 2022-08-30 PROCEDURE — 83735 ASSAY OF MAGNESIUM: CPT

## 2022-08-30 PROCEDURE — 84100 ASSAY OF PHOSPHORUS: CPT

## 2022-08-30 PROCEDURE — 2580000003 HC RX 258: Performed by: EMERGENCY MEDICINE

## 2022-08-30 PROCEDURE — 82150 ASSAY OF AMYLASE: CPT

## 2022-08-30 PROCEDURE — 83630 LACTOFERRIN FECAL (QUAL): CPT

## 2022-08-30 PROCEDURE — 6360000002 HC RX W HCPCS: Performed by: STUDENT IN AN ORGANIZED HEALTH CARE EDUCATION/TRAINING PROGRAM

## 2022-08-30 PROCEDURE — 83036 HEMOGLOBIN GLYCOSYLATED A1C: CPT

## 2022-08-30 PROCEDURE — 2700000000 HC OXYGEN THERAPY PER DAY

## 2022-08-30 PROCEDURE — 80053 COMPREHEN METABOLIC PANEL: CPT

## 2022-08-30 PROCEDURE — 6370000000 HC RX 637 (ALT 250 FOR IP): Performed by: STUDENT IN AN ORGANIZED HEALTH CARE EDUCATION/TRAINING PROGRAM

## 2022-08-30 PROCEDURE — 94760 N-INVAS EAR/PLS OXIMETRY 1: CPT

## 2022-08-30 PROCEDURE — 1200000000 HC SEMI PRIVATE

## 2022-08-30 PROCEDURE — 87040 BLOOD CULTURE FOR BACTERIA: CPT

## 2022-08-30 RX ORDER — POTASSIUM CHLORIDE 20 MEQ/1
40 TABLET, EXTENDED RELEASE ORAL PRN
Status: DISCONTINUED | OUTPATIENT
Start: 2022-08-30 | End: 2022-09-01

## 2022-08-30 RX ORDER — POTASSIUM CHLORIDE 7.45 MG/ML
10 INJECTION INTRAVENOUS
Status: DISPENSED | OUTPATIENT
Start: 2022-08-30 | End: 2022-08-30

## 2022-08-30 RX ORDER — OXYBUTYNIN CHLORIDE 5 MG/1
5 TABLET ORAL 2 TIMES DAILY PRN
Status: DISCONTINUED | OUTPATIENT
Start: 2022-08-30 | End: 2022-09-23 | Stop reason: HOSPADM

## 2022-08-30 RX ORDER — POLYETHYLENE GLYCOL 3350 17 G/17G
17 POWDER, FOR SOLUTION ORAL 2 TIMES DAILY
Status: DISCONTINUED | OUTPATIENT
Start: 2022-08-30 | End: 2022-08-30

## 2022-08-30 RX ORDER — GABAPENTIN 300 MG/1
300 CAPSULE ORAL 3 TIMES DAILY
COMMUNITY

## 2022-08-30 RX ORDER — ALBUTEROL SULFATE 90 UG/1
2 AEROSOL, METERED RESPIRATORY (INHALATION) EVERY 6 HOURS PRN
Status: DISCONTINUED | OUTPATIENT
Start: 2022-08-30 | End: 2022-09-23 | Stop reason: HOSPADM

## 2022-08-30 RX ORDER — ACETAMINOPHEN 650 MG/1
650 SUPPOSITORY RECTAL EVERY 6 HOURS PRN
Status: DISCONTINUED | OUTPATIENT
Start: 2022-08-30 | End: 2022-09-23 | Stop reason: HOSPADM

## 2022-08-30 RX ORDER — POLYETHYLENE GLYCOL 3350 17 G/17G
17 POWDER, FOR SOLUTION ORAL 4 TIMES DAILY
Status: DISPENSED | OUTPATIENT
Start: 2022-08-30 | End: 2022-09-01

## 2022-08-30 RX ORDER — 0.9 % SODIUM CHLORIDE 0.9 %
500 INTRAVENOUS SOLUTION INTRAVENOUS ONCE
Status: COMPLETED | OUTPATIENT
Start: 2022-08-30 | End: 2022-08-30

## 2022-08-30 RX ORDER — ACETAMINOPHEN 325 MG/1
650 TABLET ORAL EVERY 6 HOURS PRN
Status: DISCONTINUED | OUTPATIENT
Start: 2022-08-30 | End: 2022-09-23 | Stop reason: HOSPADM

## 2022-08-30 RX ORDER — INSULIN LISPRO 100 [IU]/ML
0-4 INJECTION, SOLUTION INTRAVENOUS; SUBCUTANEOUS NIGHTLY
Status: DISCONTINUED | OUTPATIENT
Start: 2022-08-30 | End: 2022-09-03

## 2022-08-30 RX ORDER — POLYETHYLENE GLYCOL 3350 17 G/17G
17 POWDER, FOR SOLUTION ORAL DAILY PRN
Status: DISCONTINUED | OUTPATIENT
Start: 2022-08-30 | End: 2022-08-30

## 2022-08-30 RX ORDER — ONDANSETRON 2 MG/ML
4 INJECTION INTRAMUSCULAR; INTRAVENOUS EVERY 6 HOURS PRN
Status: DISCONTINUED | OUTPATIENT
Start: 2022-08-30 | End: 2022-09-23 | Stop reason: HOSPADM

## 2022-08-30 RX ORDER — SERTRALINE HYDROCHLORIDE 100 MG/1
100 TABLET, FILM COATED ORAL DAILY
Status: DISCONTINUED | OUTPATIENT
Start: 2022-08-31 | End: 2022-09-04

## 2022-08-30 RX ORDER — ENOXAPARIN SODIUM 100 MG/ML
30 INJECTION SUBCUTANEOUS 2 TIMES DAILY
Status: DISCONTINUED | OUTPATIENT
Start: 2022-08-30 | End: 2022-09-04

## 2022-08-30 RX ORDER — ONDANSETRON 4 MG/1
4 TABLET, ORALLY DISINTEGRATING ORAL EVERY 8 HOURS PRN
Status: DISCONTINUED | OUTPATIENT
Start: 2022-08-30 | End: 2022-09-23 | Stop reason: HOSPADM

## 2022-08-30 RX ORDER — OMEPRAZOLE 20 MG/1
20 CAPSULE, DELAYED RELEASE ORAL 2 TIMES DAILY
COMMUNITY
End: 2022-09-18

## 2022-08-30 RX ORDER — SODIUM CHLORIDE 9 MG/ML
INJECTION, SOLUTION INTRAVENOUS CONTINUOUS
Status: DISCONTINUED | OUTPATIENT
Start: 2022-08-30 | End: 2022-09-03

## 2022-08-30 RX ORDER — 0.9 % SODIUM CHLORIDE 0.9 %
1000 INTRAVENOUS SOLUTION INTRAVENOUS ONCE
Status: DISCONTINUED | OUTPATIENT
Start: 2022-08-30 | End: 2022-09-23 | Stop reason: HOSPADM

## 2022-08-30 RX ORDER — POTASSIUM CHLORIDE 750 MG/1
10 TABLET, EXTENDED RELEASE ORAL DAILY
Status: ON HOLD | COMMUNITY
End: 2022-09-09

## 2022-08-30 RX ORDER — LACTULOSE 10 G/15ML
20 SOLUTION ORAL 2 TIMES DAILY
Status: ON HOLD | COMMUNITY
End: 2022-09-09

## 2022-08-30 RX ORDER — MIDODRINE HYDROCHLORIDE 10 MG/1
10 TABLET ORAL ONCE
Status: COMPLETED | OUTPATIENT
Start: 2022-08-30 | End: 2022-08-30

## 2022-08-30 RX ORDER — MAGNESIUM SULFATE 1 G/100ML
1000 INJECTION INTRAVENOUS PRN
Status: DISCONTINUED | OUTPATIENT
Start: 2022-08-30 | End: 2022-09-17 | Stop reason: SDUPTHER

## 2022-08-30 RX ORDER — SENNA PLUS 8.6 MG/1
1 TABLET ORAL DAILY
COMMUNITY

## 2022-08-30 RX ORDER — POTASSIUM CHLORIDE 7.45 MG/ML
10 INJECTION INTRAVENOUS PRN
Status: DISCONTINUED | OUTPATIENT
Start: 2022-08-30 | End: 2022-09-01

## 2022-08-30 RX ORDER — 0.9 % SODIUM CHLORIDE 0.9 %
500 INTRAVENOUS SOLUTION INTRAVENOUS ONCE
Status: COMPLETED | OUTPATIENT
Start: 2022-08-30 | End: 2022-08-31

## 2022-08-30 RX ORDER — SODIUM CHLORIDE 0.9 % (FLUSH) 0.9 %
5-40 SYRINGE (ML) INJECTION EVERY 12 HOURS SCHEDULED
Status: DISCONTINUED | OUTPATIENT
Start: 2022-08-30 | End: 2022-09-23 | Stop reason: HOSPADM

## 2022-08-30 RX ORDER — LANOLIN ALCOHOL/MO/W.PET/CERES
325 CREAM (GRAM) TOPICAL
COMMUNITY

## 2022-08-30 RX ORDER — SODIUM CHLORIDE 0.9 % (FLUSH) 0.9 %
10 SYRINGE (ML) INJECTION PRN
Status: DISCONTINUED | OUTPATIENT
Start: 2022-08-30 | End: 2022-09-23 | Stop reason: HOSPADM

## 2022-08-30 RX ORDER — MAGNESIUM SULFATE 1 G/100ML
1000 INJECTION INTRAVENOUS
Status: COMPLETED | OUTPATIENT
Start: 2022-08-30 | End: 2022-08-30

## 2022-08-30 RX ORDER — LOSARTAN POTASSIUM 25 MG/1
25 TABLET ORAL DAILY
COMMUNITY
End: 2022-09-18

## 2022-08-30 RX ORDER — BUDESONIDE AND FORMOTEROL FUMARATE DIHYDRATE 80; 4.5 UG/1; UG/1
2 AEROSOL RESPIRATORY (INHALATION) 2 TIMES DAILY
Status: DISCONTINUED | OUTPATIENT
Start: 2022-08-30 | End: 2022-09-06

## 2022-08-30 RX ORDER — POTASSIUM CHLORIDE 20 MEQ/1
10 TABLET, EXTENDED RELEASE ORAL DAILY
Status: DISCONTINUED | OUTPATIENT
Start: 2022-08-30 | End: 2022-09-10

## 2022-08-30 RX ORDER — PANTOPRAZOLE SODIUM 40 MG/1
40 TABLET, DELAYED RELEASE ORAL
Status: DISCONTINUED | OUTPATIENT
Start: 2022-08-31 | End: 2022-09-04

## 2022-08-30 RX ORDER — DEXTROSE MONOHYDRATE 100 MG/ML
INJECTION, SOLUTION INTRAVENOUS CONTINUOUS PRN
Status: DISCONTINUED | OUTPATIENT
Start: 2022-08-30 | End: 2022-09-23 | Stop reason: HOSPADM

## 2022-08-30 RX ORDER — SODIUM CHLORIDE 9 MG/ML
INJECTION, SOLUTION INTRAVENOUS PRN
Status: DISCONTINUED | OUTPATIENT
Start: 2022-08-30 | End: 2022-09-23 | Stop reason: HOSPADM

## 2022-08-30 RX ORDER — INSULIN LISPRO 100 [IU]/ML
0-8 INJECTION, SOLUTION INTRAVENOUS; SUBCUTANEOUS
Status: DISCONTINUED | OUTPATIENT
Start: 2022-08-30 | End: 2022-09-03

## 2022-08-30 RX ORDER — INSULIN GLARGINE 100 [IU]/ML
40 INJECTION, SOLUTION SUBCUTANEOUS DAILY
Status: ON HOLD | COMMUNITY
End: 2022-09-18 | Stop reason: SDUPTHER

## 2022-08-30 RX ADMIN — POLYETHYLENE GLYCOL 3350 17 G: 17 POWDER, FOR SOLUTION ORAL at 20:21

## 2022-08-30 RX ADMIN — POTASSIUM CHLORIDE 10 MEQ: 7.45 INJECTION INTRAVENOUS at 03:20

## 2022-08-30 RX ADMIN — SODIUM CHLORIDE: 9 INJECTION, SOLUTION INTRAVENOUS at 16:29

## 2022-08-30 RX ADMIN — MAGNESIUM SULFATE HEPTAHYDRATE 1000 MG: 1 INJECTION, SOLUTION INTRAVENOUS at 11:44

## 2022-08-30 RX ADMIN — POTASSIUM CHLORIDE 10 MEQ: 7.45 INJECTION INTRAVENOUS at 04:23

## 2022-08-30 RX ADMIN — SODIUM CHLORIDE 500 ML: 0.9 INJECTION, SOLUTION INTRAVENOUS at 00:05

## 2022-08-30 RX ADMIN — POTASSIUM CHLORIDE 10 MEQ: 7.46 INJECTION, SOLUTION INTRAVENOUS at 12:47

## 2022-08-30 RX ADMIN — MIDODRINE HYDROCHLORIDE 10 MG: 10 TABLET ORAL at 20:19

## 2022-08-30 RX ADMIN — ENOXAPARIN SODIUM 30 MG: 100 INJECTION SUBCUTANEOUS at 10:00

## 2022-08-30 RX ADMIN — POLYETHYLENE GLYCOL 3350 17 G: 17 POWDER, FOR SOLUTION ORAL at 16:36

## 2022-08-30 RX ADMIN — CEFEPIME 2000 MG: 2 INJECTION, POWDER, FOR SOLUTION INTRAVENOUS at 00:00

## 2022-08-30 RX ADMIN — SILVER SULFADIAZINE: 10 CREAM TOPICAL at 20:20

## 2022-08-30 RX ADMIN — POTASSIUM CHLORIDE 10 MEQ: 7.45 INJECTION INTRAVENOUS at 05:28

## 2022-08-30 RX ADMIN — POTASSIUM CHLORIDE 10 MEQ: 7.46 INJECTION, SOLUTION INTRAVENOUS at 10:01

## 2022-08-30 RX ADMIN — POTASSIUM CHLORIDE 10 MEQ: 7.45 INJECTION INTRAVENOUS at 06:30

## 2022-08-30 RX ADMIN — POLYETHYLENE GLYCOL 3350 17 G: 17 POWDER, FOR SOLUTION ORAL at 12:42

## 2022-08-30 RX ADMIN — ONDANSETRON 4 MG: 2 INJECTION INTRAMUSCULAR; INTRAVENOUS at 23:50

## 2022-08-30 RX ADMIN — SODIUM CHLORIDE: 9 INJECTION, SOLUTION INTRAVENOUS at 05:31

## 2022-08-30 RX ADMIN — SODIUM CHLORIDE 500 ML: 9 INJECTION, SOLUTION INTRAVENOUS at 22:37

## 2022-08-30 RX ADMIN — MAGNESIUM SULFATE HEPTAHYDRATE 1000 MG: 1 INJECTION, SOLUTION INTRAVENOUS at 12:47

## 2022-08-30 RX ADMIN — POTASSIUM CHLORIDE 10 MEQ: 7.46 INJECTION, SOLUTION INTRAVENOUS at 11:45

## 2022-08-30 RX ADMIN — POTASSIUM CHLORIDE 10 MEQ: 7.45 INJECTION INTRAVENOUS at 14:17

## 2022-08-30 RX ADMIN — ENOXAPARIN SODIUM 30 MG: 100 INJECTION SUBCUTANEOUS at 20:19

## 2022-08-30 RX ADMIN — MAGNESIUM SULFATE HEPTAHYDRATE 1000 MG: 1 INJECTION, SOLUTION INTRAVENOUS at 10:06
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
## 2022-08-30 NOTE — PROGRESS NOTES
Transitions of Care Pharmacy Service   Medication Review    The patient's list of prescription medications was confirmed by her pharmacies. Source(s) of information: [facility] refill report, Melissa Boston, [address] [facility] pharmacy      Please feel free to call me with any questions about this encounter. Thank you. Turner Mosquera, [facility]   Transitions of Care Pharmacy Service  Phone:  [phone]  Fax: [phone]      Electronically signed by Turner Mosquera [facility] on 8/30/2022 at 6:44 PM           Medications Prior to Admission:   insulin glargine (LANTUS SOLOSTAR) 100 UNIT/ML injection pen, Inject 40 Units into the skin Daily  omeprazole (PRILOSEC) 20 MG delayed release capsule, Take 20 mg by mouth 2 times daily  potassium chloride (KLOR-CON M) 10 MEQ extended release tablet, Take 10 mEq by mouth daily  ferrous sulfate (FE TABS 325) 325 (65 Fe) MG EC tablet, Take 325 mg by mouth daily (with breakfast)  gabapentin (NEURONTIN) 300 MG capsule, Take 300 mg by mouth 3 times daily.   losartan (COZAAR) 25 MG tablet, Take 25 mg by mouth daily  senna (SENOKOT) 8.6 MG tablet, Take 1 tablet by mouth daily  lactulose (CHRONULAC) 10 GM/15ML solution, Take 20 g by mouth 2 times daily 30 mL BID  silver sulfADIAZINE (SILVADENE) 1 % cream, Apply topically nightly Indications: to burns  oxybutynin (DITROPAN) 5 MG tablet, Take 5 mg by mouth 2 times daily as needed (bladder spasms)  polyethylene glycol (GLYCOLAX) 17 g packet, Take 17 g by mouth daily as needed for Constipation  Liraglutide (VICTOZA) 18 MG/3ML SOPN SC injection, Inject 1.2 mg into the skin daily  pravastatin (PRAVACHOL) 10 MG tablet, Take 10 mg by mouth nightly  fluticasone-vilanterol (BREO ELLIPTA) 100-25 MCG/INH AEPB inhaler, Inhale 1 puff into the lungs daily  albuterol sulfate HFA (VENTOLIN HFA) 108 (90 Base) MCG/ACT inhaler, Inhale 2 puffs into the lungs every 6 hours as needed for Wheezing or Shortness of Breath  apixaban (ELIQUIS) 5 MG TABS tablet, Take 5 mg by mouth 2 times daily   furosemide (LASIX) 40 MG tablet, Take 40 mg by mouth 2 times daily   sertraline (ZOLOFT) 100 MG tablet, Take 100 mg by mouth daily   spironolactone (ALDACTONE) 100 MG tablet, Take 100 mg by mouth daily  traZODone (DESYREL) 100 MG tablet, Take 100 mg by mouth nightly  nadolol (CORGARD) 40 MG tablet, Take 40 mg by mouth daily

## 2022-08-30 NOTE — PROGRESS NOTES
Pt admitted to room 2022 from ER. Oriented to room and call light/tv controls. Bed in lowest position, wheels locked, 2/4 side rails up  Call light in reach, room free of clutter, adequate lighting provided. RN unable to complete database d/t patient's dementia and phone number of spouse Felicita Garland [phone] on facesheet is disconnected. [id number] Patient is a diabetic. RN Shelia Mas NP regarding insulin scales and accu checks. RN also update NP on current bp with 500mL NS bolus almost complete. [address] rec consult entered.
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
## 2022-08-30 NOTE — ED NOTES
ED to inpatient nurses report     Chief Complaint   Patient presents with    Abdominal Pain     N/V/D    Hematuria     X few months        Present to ED from home for abd pain and N/V/D that started yesterday. Pt rates abd pain 7/10. Pt states hx cirrhosis. Pt has angeles catheter in place. Pt also c/o hematuria. Pt states hematuria has been going on for a few months. LOC: alert and orientated to name, place, date  Vital signs   Vitals:    08/29/22 2043 08/29/22 2044 08/29/22 2144 08/29/22 2145   BP:    107/69   Pulse: 75      Resp:       Temp:       SpO2: (!) 80% 91% 97%    Weight:       Height:          Oxygen Baseline 3L NC    Current needs required 3L NC   LDAs:   Peripheral IV 08/29/22 Left Forearm (Active)   Site Assessment Clean, dry & intact 08/29/22 2102   Line Status Blood return noted;Normal saline locked;Specimen collected; Flushed 08/29/22 2102     Mobility: Requires assistance * 1  Fall Risk:    Pending ED orders: cefepime, lactic, cultures.   Present condition: stable  Code Status:   Consults: IP CONSULT TO INTERNAL MEDICINE  []  Hospitalist  Completed  [] yes [] no Who:   [x]  Medicine  Completed  [x] yes [] No Who:   []  Cardiology  Completed  [] yes [] No Who:   []  GI   Completed  [] yes [] No Who:   []  Neurology  Completed  [] yes [] No Who:   []  Nephrology Completed  [] yes [] No Who:    []  Vascular  Completed  [] yes [] No Who:   []  Ortho  Completed  [] yes [] No Who:     []  Surgery  Completed  [] yes [] No Who:    []  Urology  Completed  [] yes [] No Who:    []  CT Surgery Completed  [] yes [] No Who:   []  Podiatry  Completed  [] yes [] No Who:    []  Other    Completed  [] yes [] No Who:    Interventions: CT abd/pelvis, NS @ 125mL/hr  Important Events: WBC 21.7, hgb 10.5, BUN 35, Creatinine 1.52; urine-->nitrate pos, mod leuks, mod bacteria, +2hgb ; CT abd/pelvis--> 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

## 2022-08-30 NOTE — CARE COORDINATION
Social Work-Contacted patient's  regarding dc plans. He has been caring for patient for 5 years. She has been bed bound. She has a cash lift, but  states that he can no longer operate it. She was recently dc form [facility] after being there for 2 weeks. Discussed with him if he would like her to go to SNF for short term care. He states that he does not know what he wants. Discussed if he spoke with the Ananth Jolley  regarding aide service. He states that he spoke with  and she is to be adding services.  Left message with [facility] regarding Passport services/ Nabil Borden

## 2022-08-30 NOTE — ACP (ADVANCE CARE PLANNING)
Advance Care Planning     Advance Care Planning Activator (Inpatient)  Conversation Note      Date of ACP Conversation: 8/30/2022     Conversation Conducted with: Patient with Decision Making Capacity    ACP Activator: Rhetthelene Mancuso, [address]:     Current Designated Health Care Decision Maker:     Click here to complete [address] including section of the Healthcare Decision Maker Relationship (ie \"Primary\")  Today we documented Decision Maker(s) consistent with Legal Next of Kin hierarchy. Care Preferences    Ventilation: \"If you were in your present state of health and suddenly became very ill and were unable to breathe on your own, what would your preference be about the use of a ventilator (breathing machine) if it were available to you? \"      Would the patient desire the use of ventilator (breathing machine)?: yes    \"If your health worsens and it becomes clear that your chance of recovery is unlikely, what would your preference be about the use of a ventilator (breathing machine) if it were available to you? \"     Would the patient desire the use of ventilator (breathing machine)?: No      Resuscitation  \"CPR works best to restart the heart when there is a sudden event, like a heart attack, in someone who is otherwise healthy. Unfortunately, CPR does not typically restart the heart for people who have serious health conditions or who are very sick. \"    \"In the event your heart stopped as a result of an underlying serious health condition, would you want attempts to be made to restart your heart (answer \"yes\" for attempt to resuscitate) or would you prefer a natural death (answer \"no\" for do not attempt to resuscitate)? \" yes       [] Yes   [x] No   Educated Patient / Kaden Ramirez regarding differences between Advance Directives and portable DNR orders.     Length of ACP Conversation in minutes:  2    Conversation Outcomes:  [x] ACP discussion completed  [] Existing advance directive reviewed with patient; no changes to patient's previously recorded wishes  [] New Advance Directive completed  [] Portable Do Not Rescitate prepared for Provider review and signature  [] POLST/POST/MOLST/MOST prepared for Provider review and signature      Follow-up plan:    [] Schedule follow-up conversation to continue planning  [] Referred individual to Provider for additional questions/concerns   [] Advised patient/agent/surrogate to review completed ACP document and update if needed with changes in condition, patient preferences or care setting    [] This note routed to one or more involved healthcare providers
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
## 2022-08-30 NOTE — DISCHARGE INSTR - COC
None to display            Nurse Assessment:  Last Vital Signs: BP (!) 99/36   Pulse 65   Temp 97.5 °F (36.4 °C) (Oral)   Resp 19   Ht 5' 2\" (1.575 m)   Wt 208 lb 6.4 oz (94.5 kg)   SpO2 99%   BMI 38.12 kg/m²     Last documented pain score (0-10 scale): Pain Level: 7  Last Weight:   Wt Readings from Last 1 Encounters:   08/30/22 208 lb 6.4 oz (94.5 kg)     Mental Status:  alert and Confused at times    IV Access:  - None    Nursing Mobility/ADLs:  Walking   Dependent  Transfer  Dependent  Bathing  Dependent  Dressing  Dependent  Toileting  Dependent  Feeding  Assisted  Med Admin  Dependent  Med Delivery   whole, crushed, and crush big pills and give with apple sauce or pudding    Wound Care Documentation and Therapy:   Wound Care Documentation:  Wound 09/13/22 Abdomen Lower;Medial;Right (Active)   Wound Image   09/19/22 1719   Wound Etiology Burn 09/19/22 1719   Dressing Status Clean 09/18/22 2000   Wound Cleansed Soap and water 09/16/22 0000   Dressing/Treatment Betadine swabs/povidone iodine 09/19/22 1719   Dressing Change Due 09/20/22 09/19/22 1719   Wound Length (cm) 4.8 cm 09/19/22 1719   Wound Width (cm) 5.3 cm 09/19/22 1719   Wound Depth (cm) 0.1 cm 09/19/22 1719   Wound Surface Area (cm^2) 25.44 cm^2 09/19/22 1719   Wound Volume (cm^3) 2.544 cm^3 09/19/22 1719   Wound Assessment Dry;Eschar dry 09/19/22 1719   Drainage Amount None 09/19/22 1719   Drainage Description Thin 09/13/22 2318   Odor None 09/19/22 1719   Priya-wound Assessment Intact 09/19/22 1719   Margins Undefined edges 09/19/22 1719   Wound Thickness Description not for Pressure Injury Full thickness 09/19/22 1719   Number of days: 5       Wound 09/13/22 Radial Distal;Right ecchymotic (Active)   Dressing Status Dry 09/18/22 2000   Wound Cleansed Soap and water 09/14/22 2000   Dressing/Treatment Open to air 09/16/22 0000   Drainage Amount None 09/16/22 0000   Margins Attached edges 09/14/22 0308   Number of days: 5       Wound 09/14/22 Coccyx Mid small in mid with another area above coccyx and another small area below coccyx right buttocks (Active)   Wound Etiology Pressure Stage 2 09/16/22 0000   Dressing Status Intact;Dry;Clean 09/19/22 1300   Wound Cleansed Soap and water 09/15/22 0000   Dressing/Treatment Zinc paste 09/19/22 1300   Drainage Amount Scant 09/16/22 0000   Drainage Description Sanguinous 09/16/22 0000   Margins Defined edges 09/16/22 0000   Wound Thickness Description not for Pressure Injury Partial thickness 09/16/22 0000   Number of days: 5       Wound Abdomen Left;Medial;Upper (Active)   Wound Image   09/19/22 1719   Wound Etiology Burn 09/19/22 1719   Wound Cleansed Soap and water 09/19/22 1719   Dressing/Treatment Alginate with Ag; Foam impregnated with Ag 09/19/22 1719   Dressing Change Due 09/21/22 09/19/22 1719   Wound Length (cm) 3.5 cm 09/19/22 1719   Wound Width (cm) 2.8 cm 09/19/22 1719   Wound Depth (cm) 0.1 cm 09/19/22 1719   Wound Surface Area (cm^2) 9.8 cm^2 09/19/22 1719   Wound Volume (cm^3) 0.98 cm^3 09/19/22 1719   Wound Assessment Pink/red;Granulation tissue 09/19/22 1719   Drainage Amount Moderate 09/19/22 1719   Drainage Description Serosanguinous 09/19/22 1719   Odor None 09/19/22 1719   Margins Attached edges; Defined edges 09/19/22 1719   Wound Thickness Description not for Pressure Injury Full thickness 09/19/22 1719   Number of days:        Abdominal wound care: upper wound: use calcium alginate to cover wound, then cover with foam dressing. Change dressing every other day and as needed if loose or soiled. Lower abdominal and small lower abdominal wounds with dry stable eschar: paint daily with betadine daily. Elimination:  Continence:    Bowel: No  Bladder: No  Urinary Catheter: Indication for Use of Catheter: Acute urinary retention/obstruction   Colostomy/Ileostomy/Ileal Conduit: No       Date of Last BM: 09/22/2022    Intake/Output Summary (Last 24 hours) at 8/30/2022 1411  Last data filed at 8/30/2022 0525  Gross per 24 hour   Intake --   Output 400 ml   Net -400 ml     I/O last 3 completed shifts:  In: -   Out: 400 [Urine:400]    Safety Concerns:     Sundowners [place] and At Risk for Falls    Impairments/Disabilities:      Hearing    Nutrition Therapy:  Current Nutrition Therapy:   - Oral Nutrition Supplement:  Standard  three times a day    Routes of Feeding: Oral  Liquids: Nectar Thick Liquids  Daily Fluid Restriction: no  Last Modified Barium Swallow with Video (Video Swallowing Test): done on ***/***    Treatments at the Time of Hospital Discharge:   Respiratory Treatments: Nebulizers, inhaler  Oxygen Therapy:  is on oxygen at 3-5 L/min per nasal cannula. As needed  Ventilator:    - No ventilator support    Rehab Therapies: Physical Therapy, Occupational Therapy, and Speech/Language Therapy  Weight Bearing Status/Restrictions: No weight bearing restrictions  Other Medical Equipment (for information only, NOT a DME order):  wheelchair  Other Treatments: ***    Patient's personal belongings (please select all that are sent with patient): Foot wear, pants, shirts, under garments     RN SIGNATURE:  Electronically signed by Lakia Palma RN on 9/23/22 at 11:47 AM EDT    CASE MANAGEMENT/SOCIAL WORK SECTION    Inpatient Status Date: 08/29/2022    Readmission Risk Assessment Score:  Readmission Risk              Risk of Unplanned Readmission:  28           Discharging to Facility/ Agency   The [facility] you have chosen:  Name: [employer]   Address: [address]  Phone:  [phone]  Fax: [phone]     / signature: Electronically signed by ALFONSO Garcia on 9/16/22 at 3:44 PM EDT    PHYSICIAN SECTION    Prognosis: Fair    Condition at Discharge: Stable    Rehab Potential (if transferring to Rehab): Fair    Recommended Labs or Other Treatments After Discharge: PT, OT and speech therapy evaluate and treat.   Advance from puréed diet to regular diet as swallow function improves. CBC, BMP and mag in 1 week    Physician Certification: I certify the above information and transfer of Behzad Bone  is necessary for the continuing treatment of the diagnosis listed and that she requires [facility] for less 30 days.      Update Admission H&P: No change in H&P    PHYSICIAN SIGNATURE:  Electronically signed by Chang Carrillo DO on 9/23/2022 at 11:12 AM

## 2022-08-30 NOTE — ED PROVIDER NOTES
Code    Hypokalemia E87.6    CHF (congestive heart failure) (HCC) I50.9    Atrial fibrillation (HCC) I48.91    Hypertension I10    LEONARDA (obstructive sleep apnea) G47.33    Hyperglycemia due to diabetes mellitus (HCC) E11.65    Lymphedema I89.0    Noncompliance Z91.19    CAD (coronary artery disease) I25.10    COPD (chronic obstructive pulmonary disease) (HCC) J44.9    Hyperammonemia (HCC) E72.20    Thrombocytopenia (HCC) D69.6    Chronic anemia D64.9    Cirrhosis of liver without ascites (HCC) K74.60    Fecal impaction (HCC) K56.41    Pancytopenia (HCC) D61.818    Leukocytosis D72.829     SURGICAL HISTORY       Past Surgical History:   Procedure Laterality Date    APPENDECTOMY      CARDIAC CATHETERIZATION      CHOLECYSTECTOMY      HYSTERECTOMY (CERVIX STATUS UNKNOWN)      LAMINECTOMY      OVARY REMOVAL       CURRENT MEDICATIONS       Previous Medications    ALBUTEROL SULFATE HFA (VENTOLIN HFA) 108 (90 BASE) MCG/ACT INHALER    Inhale 2 puffs into the lungs every 6 hours as needed for Wheezing or Shortness of Breath    APIXABAN (ELIQUIS) 5 MG TABS TABLET    Take 5 mg by mouth 2 times daily     DOCUSATE SODIUM (COLACE) 100 MG CAPSULE    Take 1 capsule by mouth in the morning and 1 capsule before bedtime.     FLUTICASONE-VILANTEROL (BREO ELLIPTA) 100-25 MCG/INH AEPB INHALER    Inhale into the lungs daily    FUROSEMIDE (LASIX) 40 MG TABLET    Take 40 mg by mouth 2 times daily     INSULIN GLARGINE (BASAGLAR KWIKPEN) 100 UNIT/ML INJECTION PEN    Inject 27 Units into the skin 2 times daily    LIRAGLUTIDE (VICTOZA) 18 MG/3ML SOPN SC INJECTION    Inject 1.2 mg into the skin daily    NADOLOL (CORGARD) 40 MG TABLET    Take 40 mg by mouth daily    OXYBUTYNIN (DITROPAN) 5 MG TABLET    Take 5 mg by mouth 2 times daily as needed (bladder spasms)    PANTOPRAZOLE (PROTONIX) 40 MG TABLET    Take 40 mg by mouth 2 times daily    POLYETHYLENE GLYCOL (GLYCOLAX) 17 G PACKET    Take 17 g by mouth daily as needed for Constipation POLYETHYLENE GLYCOL (MIRALAX) 17 GM/SCOOP POWDER    Take 17 g by mouth in the morning. POTASSIUM CHLORIDE (KLOR-CON M) 20 MEQ EXTENDED RELEASE TABLET    Take 1 tablet by mouth daily    PRAVASTATIN (PRAVACHOL) 10 MG TABLET    Take 10 mg by mouth daily    SERTRALINE (ZOLOFT) 100 MG TABLET    Take 100 mg by mouth daily     SPIRONOLACTONE (ALDACTONE) 100 MG TABLET    Take 100 mg by mouth daily    TRAZODONE (DESYREL) 100 MG TABLET    Take 100 mg by mouth nightly     ALLERGIES     is allergic to aspirin, effexor [venlafaxine], and seroquel [quetiapine]. FAMILY HISTORY     She indicated that her mother is . She indicated that her father is . She indicated that her sister is . She indicated that her brother is . SOCIAL HISTORY       Social History     Tobacco Use    Smoking status: Never    Smokeless tobacco: Never   Substance Use Topics    Alcohol use: Not Currently     Comment: She states she used to be a heavy drinker but she quit about 13 years ago    Drug use: Never     PHYSICAL EXAM     INITIAL VITALS: /69   Pulse 75   Temp 97.9 °F (36.6 °C)   Resp 20   Ht 5' 2\" (1.575 m)   Wt 225 lb (102.1 kg)   SpO2 97%   BMI 41.15 kg/m²    Physical Exam  Constitutional:       General: She is not in acute distress. Appearance: She is well-developed. HENT:      Head: Normocephalic. Eyes:      Pupils: Pupils are equal, round, and reactive to light. Cardiovascular:      Rate and Rhythm: Normal rate and regular rhythm. Heart sounds: Normal heart sounds. Pulmonary:      Effort: Pulmonary effort is normal. No respiratory distress. Breath sounds: Normal breath sounds. Abdominal:      General: Bowel sounds are normal. There is distension. Palpations: Abdomen is soft. Tenderness: There is generalized abdominal tenderness. Musculoskeletal:         General: Normal range of motion. Skin:     General: Skin is warm and dry.    Neurological:      Mental Status: She is alert and oriented to person, place, and time. MEDICAL DECISION MAKIN-year-old female presents emergency room for generalized abdominal discomfort and concern of urinary tract infection. Patient has chronic indwelling Pedroza urinalysis is overall unhelpful at this time however does show white blood cells and bacteria does show many epithelial cells as well. Patient does have unspecified leukocytosis with a white count of 21.4. Etiology is unclear at this time. Plan is to start broad-spectrum antibiotics. Case discussed with Naomi. Patient noted to have significantly distended abdomen on exam she does have appropriate bowel sounds. CT imaging does show considerable distention of the colon without acute obstruction. This has been shown on previous imaging studies before and appears relatively unchanged. CRITICAL CARE:       PROCEDURES:    Procedures    DIAGNOSTIC RESULTS   EKG:All EKG's are interpreted by the Emergency Department Physician who either signs or Co-signs this chart in the absence of a cardiologist.        RADIOLOGY:All plain film, CT, MRI, and formal ultrasound images (except ED bedside ultrasound) are read by the radiologist, see reports below, unless otherwisenoted in MDM or here. CT ABDOMEN PELVIS WO CONTRAST Additional Contrast? None   Final Result   Severe dilatation of the colon without evidence of obstruction, with   air-fluid levels within the colon, concerning for [place] syndrome, however   anal/rectal stricture cannot be ruled out. Colonic dilatation was visualized   on prior exams. Sequelae of cirrhosis with portal hypertension, including paraesophageal   varices, similar to prior. LABS: All lab results were reviewed by myself, and all abnormals are listed below.   Labs Reviewed   CBC WITH AUTO DIFFERENTIAL - Abnormal; Notable for the following components:       Result Value    WBC 21.7 (*)     Hemoglobin 10.5 (*)     Hematocrit 33.8 (*)     RDW 21.6 (*)     Platelets 892 (*)     Seg Neutrophils 86 (*)     Lymphocytes 11 (*)     Eosinophils % 0 (*)     Segs Absolute 18.66 (*)     All other components within normal limits   COMPREHENSIVE METABOLIC PANEL - Abnormal; Notable for the following components:    Glucose 221 (*)     BUN 35 (*)     Creatinine 1.52 (*)     Bun/Cre Ratio 23 (*)     Calcium 8.3 (*)     Potassium 3.4 (*)     Total Protein 6.0 (*)     Albumin 2.3 (*)     GFR Non- 34 (*)     GFR  42 (*)     All other components within normal limits   URINALYSIS WITH REFLEX TO CULTURE - Abnormal; Notable for the following components:    Turbidity UA Cloudy (*)     Urine Hgb 2+ (*)     Nitrite, Urine POSITIVE (*)     Leukocyte Esterase, Urine MOD (*)     All other components within normal limits   MICROSCOPIC URINALYSIS - Abnormal; Notable for the following components:    Bacteria, UA MODERATE (*)     All other components within normal limits   CULTURE, URINE   CULTURE, BLOOD 1   CULTURE, BLOOD 1   LACTIC ACID   LACTATE, SEPSIS   LACTATE, SEPSIS       EMERGENCY DEPARTMENTCOURSE:         Vitals:    Vitals:    08/29/22 2043 08/29/22 2044 08/29/22 2144 08/29/22 2145   BP:    107/69   Pulse: 75      Resp:       Temp:       SpO2: (!) 80% 91% 97%    Weight:       Height:           The patient was given the following medications while in the emergency department:  Orders Placed This Encounter   Medications    sodium chloride 0.9 % 1,000 mL infusion    cefepime (MAXIPIME) 2000 mg IVPB minibag     Order Specific Question:   Antimicrobial Indications     Answer:   Urinary Tract Infection     CONSULTS:  IP CONSULT TO INTERNAL MEDICINE    FINAL IMPRESSION      1. Leukocytosis, unspecified type    2. Generalized abdominal pain          DISPOSITION/PLAN   DISPOSITION Admitted 08/29/2022 11:13:51 PM      PATIENT REFERRED TO:  No follow-up provider specified.   DISCHARGE MEDICATIONS:  New Prescriptions    No medications on file     Barbara Suarez MD  Attending Emergency Physician      Care during this encounter was due to an unprecedented national emergency due to COVID-19.             Paola Em MD  08/29/22 5368

## 2022-08-31 ENCOUNTER — APPOINTMENT (OUTPATIENT)
Dept: GENERAL RADIOLOGY | Age: 67
DRG: 698 | End: 2022-08-31
Payer: COMMERCIAL

## 2022-08-31 PROBLEM — M47.812 CERVICAL SPONDYLOSIS WITHOUT MYELOPATHY: Status: ACTIVE | Noted: 2022-08-31

## 2022-08-31 PROBLEM — I85.00 ESOPHAGEAL VARICES (HCC): Status: ACTIVE | Noted: 2022-08-31

## 2022-08-31 PROBLEM — A41.9 SEPSIS (HCC): Status: ACTIVE | Noted: 2019-10-15

## 2022-08-31 PROBLEM — R19.7 DIARRHEA: Status: ACTIVE | Noted: 2022-08-31

## 2022-08-31 PROBLEM — J96.21 ACUTE ON CHRONIC RESPIRATORY FAILURE WITH HYPOXEMIA (HCC): Status: ACTIVE | Noted: 2020-02-21

## 2022-08-31 PROBLEM — G83.4 CAUDA EQUINA SYNDROME (HCC): Status: ACTIVE | Noted: 2020-09-20

## 2022-08-31 PROBLEM — I08.1 DISORDERS OF BOTH MITRAL AND TRICUSPID VALVES: Status: ACTIVE | Noted: 2022-08-31

## 2022-08-31 PROBLEM — G25.0 ESSENTIAL TREMOR: Status: ACTIVE | Noted: 2022-08-31

## 2022-08-31 PROBLEM — G06.2 EPIDURAL ABSCESS: Status: ACTIVE | Noted: 2022-08-31

## 2022-08-31 PROBLEM — N13.30 HYDRONEPHROSIS: Status: ACTIVE | Noted: 2022-08-31

## 2022-08-31 PROBLEM — J15.9 BACTERIAL PNEUMONIA: Status: ACTIVE | Noted: 2022-08-31

## 2022-08-31 PROBLEM — M15.9 DEGENERATIVE JOINT DISEASE INVOLVING MULTIPLE JOINTS: Status: ACTIVE | Noted: 2022-08-31

## 2022-08-31 PROBLEM — M54.16 LUMBAR RADICULOPATHY: Status: ACTIVE | Noted: 2020-09-20

## 2022-08-31 PROBLEM — K21.9 GASTROESOPHAGEAL REFLUX DISEASE: Status: ACTIVE | Noted: 2022-08-31

## 2022-08-31 PROBLEM — I95.9 HYPOTENSION: Status: ACTIVE | Noted: 2022-08-31

## 2022-08-31 PROBLEM — J45.909 ASTHMA WITHOUT STATUS ASTHMATICUS: Status: ACTIVE | Noted: 2022-08-31

## 2022-08-31 PROBLEM — S00.93XA HEAD CONTUSION: Status: ACTIVE | Noted: 2022-08-31

## 2022-08-31 PROBLEM — R65.21 SHOCK, SEPTIC (HCC): Status: ACTIVE | Noted: 2022-08-31

## 2022-08-31 PROBLEM — I50.32 CHRONIC DIASTOLIC CHF (CONGESTIVE HEART FAILURE), NYHA CLASS 3 (HCC): Status: ACTIVE | Noted: 2022-08-31

## 2022-08-31 PROBLEM — K70.30 ALCOHOLIC CIRRHOSIS (HCC): Status: ACTIVE | Noted: 2022-08-31

## 2022-08-31 PROBLEM — S32.000D COMPRESSION FRACTURE OF LUMBAR VERTEBRA WITH ROUTINE HEALING: Status: ACTIVE | Noted: 2022-08-31

## 2022-08-31 PROBLEM — R53.83 FATIGUE: Status: ACTIVE | Noted: 2022-08-31

## 2022-08-31 PROBLEM — R60.1 ANASARCA: Status: ACTIVE | Noted: 2021-08-07

## 2022-08-31 PROBLEM — A41.9 SHOCK, SEPTIC (HCC): Status: ACTIVE | Noted: 2022-08-31

## 2022-08-31 PROBLEM — M51.26 DISPLACEMENT OF LUMBAR INTERVERTEBRAL DISC WITHOUT MYELOPATHY: Status: ACTIVE | Noted: 2022-08-31

## 2022-08-31 PROBLEM — G93.32 CHRONIC FATIGUE SYNDROME: Status: ACTIVE | Noted: 2022-08-31

## 2022-08-31 PROBLEM — R33.8 ACUTE URINARY RETENTION: Status: ACTIVE | Noted: 2020-09-20

## 2022-08-31 PROBLEM — N17.9 AKI (ACUTE KIDNEY INJURY) (HCC): Status: RESOLVED | Noted: 2022-08-30 | Resolved: 2022-08-31

## 2022-08-31 LAB
ABSOLUTE EOS #: 0 K/UL (ref 0–0.44)
ABSOLUTE IMMATURE GRANULOCYTE: 0.21 K/UL (ref 0–0.3)
ABSOLUTE LYMPH #: 0.83 K/UL (ref 1.1–3.7)
ABSOLUTE MONO #: 0.83 K/UL (ref 0.1–1.2)
ANION GAP SERPL CALCULATED.3IONS-SCNC: 7 MMOL/L (ref 9–17)
BASOPHILS # BLD: 0 % (ref 0–2)
BASOPHILS ABSOLUTE: 0 K/UL (ref 0–0.2)
BUN BLDV-MCNC: 28 MG/DL (ref 8–23)
BUN/CREAT BLD: 36 (ref 9–20)
CALCIUM SERPL-MCNC: 7.7 MG/DL (ref 8.6–10.4)
CHLORIDE BLD-SCNC: 107 MMOL/L (ref 98–107)
CO2: 24 MMOL/L (ref 20–31)
CREAT SERPL-MCNC: 0.77 MG/DL (ref 0.5–0.9)
EOSINOPHILS RELATIVE PERCENT: 0 % (ref 1–4)
GFR AFRICAN AMERICAN: >60 ML/MIN
GFR NON-AFRICAN AMERICAN: >60 ML/MIN
GFR SERPL CREATININE-BSD FRML MDRD: ABNORMAL ML/MIN/{1.73_M2}
GLUCOSE BLD-MCNC: 148 MG/DL (ref 65–105)
GLUCOSE BLD-MCNC: 165 MG/DL (ref 70–99)
GLUCOSE BLD-MCNC: 167 MG/DL (ref 65–105)
GLUCOSE BLD-MCNC: 179 MG/DL (ref 65–105)
HCT VFR BLD CALC: 33.4 % (ref 36.3–47.1)
HEMOGLOBIN: 10.2 G/DL (ref 11.9–15.1)
IMMATURE GRANULOCYTES: 1 %
LACTIC ACID, SEPSIS: 1.1 MMOL/L (ref 0.5–1.9)
LACTIC ACID, SEPSIS: 1.6 MMOL/L (ref 0.5–1.9)
LYMPHOCYTES # BLD: 4 % (ref 24–43)
MAGNESIUM: 2.1 MG/DL (ref 1.6–2.6)
MCH RBC QN AUTO: 26.3 PG (ref 25.2–33.5)
MCHC RBC AUTO-ENTMCNC: 30.5 G/DL (ref 28.4–34.8)
MCV RBC AUTO: 86.1 FL (ref 82.6–102.9)
MONOCYTES # BLD: 4 % (ref 3–12)
MORPHOLOGY: ABNORMAL
NRBC AUTOMATED: 0 PER 100 WBC
PDW BLD-RTO: 21.3 % (ref 11.8–14.4)
PHOSPHORUS: 2.8 MG/DL (ref 2.6–4.5)
PLATELET # BLD: 121 K/UL (ref 138–453)
PMV BLD AUTO: 9.4 FL (ref 8.1–13.5)
POTASSIUM SERPL-SCNC: 3.1 MMOL/L (ref 3.7–5.3)
RBC # BLD: 3.88 M/UL (ref 3.95–5.11)
SEG NEUTROPHILS: 91 % (ref 36–65)
SEGMENTED NEUTROPHILS ABSOLUTE COUNT: 18.83 K/UL (ref 1.5–8.1)
SODIUM BLD-SCNC: 138 MMOL/L (ref 135–144)
WBC # BLD: 20.7 K/UL (ref 3.5–11.3)

## 2022-08-31 PROCEDURE — 2000000000 HC ICU R&B

## 2022-08-31 PROCEDURE — 82947 ASSAY GLUCOSE BLOOD QUANT: CPT

## 2022-08-31 PROCEDURE — 2580000003 HC RX 258: Performed by: NURSE PRACTITIONER

## 2022-08-31 PROCEDURE — 6370000000 HC RX 637 (ALT 250 FOR IP): Performed by: NURSE PRACTITIONER

## 2022-08-31 PROCEDURE — 83735 ASSAY OF MAGNESIUM: CPT

## 2022-08-31 PROCEDURE — 80048 BASIC METABOLIC PNL TOTAL CA: CPT

## 2022-08-31 PROCEDURE — 99233 SBSQ HOSP IP/OBS HIGH 50: CPT | Performed by: NURSE PRACTITIONER

## 2022-08-31 PROCEDURE — 2580000003 HC RX 258: Performed by: FAMILY MEDICINE

## 2022-08-31 PROCEDURE — 83605 ASSAY OF LACTIC ACID: CPT

## 2022-08-31 PROCEDURE — 6360000002 HC RX W HCPCS: Performed by: NURSE PRACTITIONER

## 2022-08-31 PROCEDURE — 6370000000 HC RX 637 (ALT 250 FOR IP): Performed by: STUDENT IN AN ORGANIZED HEALTH CARE EDUCATION/TRAINING PROGRAM

## 2022-08-31 PROCEDURE — 74018 RADEX ABDOMEN 1 VIEW: CPT

## 2022-08-31 PROCEDURE — 36415 COLL VENOUS BLD VENIPUNCTURE: CPT

## 2022-08-31 PROCEDURE — 85025 COMPLETE CBC W/AUTO DIFF WBC: CPT

## 2022-08-31 PROCEDURE — 71045 X-RAY EXAM CHEST 1 VIEW: CPT

## 2022-08-31 PROCEDURE — 2700000000 HC OXYGEN THERAPY PER DAY

## 2022-08-31 PROCEDURE — 6360000002 HC RX W HCPCS: Performed by: FAMILY MEDICINE

## 2022-08-31 PROCEDURE — 84100 ASSAY OF PHOSPHORUS: CPT

## 2022-08-31 PROCEDURE — 94761 N-INVAS EAR/PLS OXIMETRY MLT: CPT

## 2022-08-31 PROCEDURE — 2500000003 HC RX 250 WO HCPCS: Performed by: NURSE PRACTITIONER

## 2022-08-31 RX ORDER — 0.9 % SODIUM CHLORIDE 0.9 %
500 INTRAVENOUS SOLUTION INTRAVENOUS ONCE
Status: COMPLETED | OUTPATIENT
Start: 2022-08-31 | End: 2022-08-31

## 2022-08-31 RX ORDER — TRAZODONE HYDROCHLORIDE 100 MG/1
100 TABLET ORAL NIGHTLY
Status: DISCONTINUED | OUTPATIENT
Start: 2022-09-01 | End: 2022-09-23 | Stop reason: HOSPADM

## 2022-08-31 RX ORDER — MORPHINE SULFATE 2 MG/ML
1 INJECTION, SOLUTION INTRAMUSCULAR; INTRAVENOUS EVERY 4 HOURS PRN
Status: DISCONTINUED | OUTPATIENT
Start: 2022-08-31 | End: 2022-09-23 | Stop reason: HOSPADM

## 2022-08-31 RX ORDER — 0.9 % SODIUM CHLORIDE 0.9 %
250 INTRAVENOUS SOLUTION INTRAVENOUS ONCE
Status: COMPLETED | OUTPATIENT
Start: 2022-08-31 | End: 2022-08-31

## 2022-08-31 RX ADMIN — SODIUM CHLORIDE: 9 INJECTION, SOLUTION INTRAVENOUS at 08:28

## 2022-08-31 RX ADMIN — SODIUM CHLORIDE 250 ML: 9 INJECTION, SOLUTION INTRAVENOUS at 04:09

## 2022-08-31 RX ADMIN — POLYETHYLENE GLYCOL 3350 17 G: 17 POWDER, FOR SOLUTION ORAL at 22:06

## 2022-08-31 RX ADMIN — MORPHINE SULFATE 1 MG: 2 INJECTION, SOLUTION INTRAMUSCULAR; INTRAVENOUS at 11:45

## 2022-08-31 RX ADMIN — ONDANSETRON 4 MG: 2 INJECTION INTRAMUSCULAR; INTRAVENOUS at 06:34

## 2022-08-31 RX ADMIN — CEFEPIME 2000 MG: 2 INJECTION, POWDER, FOR SOLUTION INTRAVENOUS at 00:36

## 2022-08-31 RX ADMIN — ENOXAPARIN SODIUM 30 MG: 100 INJECTION SUBCUTANEOUS at 22:06

## 2022-08-31 RX ADMIN — Medication 7 MCG/MIN: at 08:29

## 2022-08-31 RX ADMIN — SODIUM CHLORIDE: 9 INJECTION, SOLUTION INTRAVENOUS at 00:27

## 2022-08-31 RX ADMIN — POTASSIUM CHLORIDE 10 MEQ: 1500 TABLET, EXTENDED RELEASE ORAL at 11:46

## 2022-08-31 RX ADMIN — SODIUM CHLORIDE 500 ML: 0.9 INJECTION, SOLUTION INTRAVENOUS at 02:19

## 2022-08-31 RX ADMIN — Medication 5 MCG/MIN: at 06:31

## 2022-08-31 RX ADMIN — PANTOPRAZOLE SODIUM 40 MG: 40 TABLET, DELAYED RELEASE ORAL at 05:31

## 2022-08-31 RX ADMIN — ENOXAPARIN SODIUM 30 MG: 100 INJECTION SUBCUTANEOUS at 11:46

## 2022-08-31 RX ADMIN — MORPHINE SULFATE 1 MG: 2 INJECTION, SOLUTION INTRAMUSCULAR; INTRAVENOUS at 16:16

## 2022-08-31 RX ADMIN — SERTRALINE 100 MG: 100 TABLET, FILM COATED ORAL at 11:46

## 2022-08-31 RX ADMIN — POLYETHYLENE GLYCOL 3350 17 G: 17 POWDER, FOR SOLUTION ORAL at 16:05

## 2022-08-31 RX ADMIN — POLYETHYLENE GLYCOL 3350 17 G: 17 POWDER, FOR SOLUTION ORAL at 18:05

## 2022-08-31 RX ADMIN — CEFEPIME 2000 MG: 2 INJECTION, POWDER, FOR SOLUTION INTRAVENOUS at 22:04

## 2022-08-31 ASSESSMENT — PAIN DESCRIPTION - DESCRIPTORS: DESCRIPTORS: DISCOMFORT

## 2022-08-31 ASSESSMENT — PAIN SCALES - GENERAL
PAINLEVEL_OUTOF10: 7
PAINLEVEL_OUTOF10: 8

## 2022-08-31 ASSESSMENT — PAIN DESCRIPTION - LOCATION: LOCATION: BACK;ABDOMEN

## 2022-08-31 NOTE — PROGRESS NOTES
Jose Benton will be transferred to [facility], room 2029, for A levophed gtt. Report called to nurse.
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
## 2022-08-31 NOTE — PROGRESS NOTES
toxic-appearing or diaphoretic. HENT:      Head: Normocephalic and atraumatic. Right Ear: External ear normal.      Left Ear: External ear normal.      Nose: Nose normal.      Mouth/Throat:      Mouth: Mucous membranes are dry. Eyes:      General: No scleral icterus. Right eye: No discharge. Left eye: No discharge. Extraocular Movements: Extraocular movements intact. Conjunctiva/sclera: Conjunctivae normal.      Pupils: Pupils are equal, round, and reactive to light. Cardiovascular:      Rate and Rhythm: Normal rate and regular rhythm. Pulses: Normal pulses. Heart sounds: Normal heart sounds. No murmur heard. No friction rub. No gallop. Pulmonary:      Effort: Pulmonary effort is normal. No respiratory distress. Breath sounds: No wheezing, rhonchi or rales. Abdominal:      General: There is distension. Palpations: There is no mass. Tenderness: There is no abdominal tenderness. There is guarding. Hernia: No hernia is present. Comments: Tinkling bowel sounds right upper quadrant, hypoactive left abd quadrants   Musculoskeletal:         General: Normal range of motion. Cervical back: Normal range of motion and neck supple. Skin:     Coloration: Skin is not jaundiced. Findings: No bruising, erythema, lesion or rash. Neurological:      Mental Status: She is alert. Mental status is at baseline. Psychiatric:         Attention and Perception: She is inattentive. She perceives visual hallucinations. Mood and Affect: Mood is anxious. Affect is flat. Speech: Speech normal.         Judgment: Judgment is impulsive.        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    [place] (acute kidney injury) ([facility] 8/30/2022 Yes    Hypotension 8/31/2022 Yes    Hypertension (Chronic) 8/30/2022 Yes    LEONARDA (obstructive sleep apnea) (Chronic) 8/30/2022 Yes    Lymphedema (Chronic) 8/30/2022 Yes    COPD (chronic obstructive pulmonary disease) (HCC) (Chronic) 8/30/2022 Yes       Medications: Allergies:     Allergies   Allergen Reactions    Quetiapine      Other reaction(s): Unknown  Other reaction(s): Hallucinations  seroquel    Venlafaxine      Other reaction(s): Hallucinations, Unknown  effexor      Aspirin Other (See Comments)     Bleeding disorder  Other reaction(s): Unknown  Bleeding disorder      Fentanyl      Other reaction(s): Unknown    Other Other (See Comments)     \"NO SUPPOSED TO HAVE ASPIRIN\"    Quetiapine Fumarate      Other reaction(s): Unknown    Venlafaxine Hcl      Other reaction(s): Unknown       Current Meds:   Scheduled Meds:    sodium chloride  500 mL IntraVENous Once    sodium chloride flush  5-40 mL IntraVENous 2 times per day    enoxaparin  30 mg SubCUTAneous BID    cefepime  2,000 mg IntraVENous Q24H    polyethylene glycol  17 g Oral 4x Daily    insulin lispro  0-8 Units SubCUTAneous TID WC    insulin lispro  0-4 Units SubCUTAneous Nightly    sertraline  100 mg Oral Daily    potassium chloride  10 mEq Oral Daily    pantoprazole  40 mg Oral QAM AC    budesonide-formoterol  2 puff Inhalation BID    silver sulfADIAZINE   Topical Nightly    [Held by provider] sodium chloride  1,000 mL IntraVENous Once     Continuous Infusions:    sodium chloride      dextrose      sodium chloride 75 mL/hr at 08/31/22 0027     PRN Meds: sodium chloride flush, sodium chloride, potassium chloride **OR** potassium alternative oral replacement **OR** potassium chloride, magnesium sulfate, ondansetron **OR** ondansetron, acetaminophen **OR** acetaminophen, glucose, dextrose bolus **OR** dextrose bolus, glucagon (rDNA), dextrose, oxybutynin, albuterol sulfate HFA    Data:     Past Medical History:   has a past medical history of Arthritis, CAD (coronary artery disease), CHF (congestive heart failure) ([facility] [address] COPD (chronic obstructive pulmonary disease) ([facility] [address] Dementia ([facility] Diabetes mellitus ([facility] Fibromyalgia, Headache, Hypertension, Liver disease, LEONARDA (obstructive sleep apnea), Panic attack, and Paroxysmal atrial fibrillation ([facility] Vitals:  BP (!) 91/36   Pulse 65   Temp 97.8 °F (36.6 °C) (Oral)   Resp 18   Ht 5' 2\" (1.575 m)   Wt 208 lb 6.4 oz (94.5 kg)   SpO2 100%   BMI 38.12 kg/m²   Temp (24hrs), Av.6 °F (36.4 °C), Min:97.3 °F (36.3 °C), Max:97.8 °F (36.6 °C)    Recent Labs     22  0624 22  1109 22  1506 22  1924   POCGLU 176* 187* 207* 166*       I/O (24Hr): Intake/Output Summary (Last 24 hours) at 2022 0236  Last data filed at 2022 2358  Gross per 24 hour   Intake --   Output 1300 ml   Net -1300 ml       Labs:    [unfilled]    Lab Results   Component Value Date/Time    SPECIAL 7ML RT [facility] 2022 12:57 AM     Lab Results   Component Value Date/Time    CULTURE NO GROWTH 12 HOURS 2022 12:57 AM       [unfilled]    Radiology:    CT ABDOMEN PELVIS WO CONTRAST Additional Contrast? None    Result Date: 2022  Severe dilatation of the colon without evidence of obstruction, with air-fluid levels within the colon, concerning for Juan Jose syndrome, however anal/rectal stricture cannot be ruled out. Colonic dilatation was visualized on prior exams. Sequelae of cirrhosis with portal hypertension, including paraesophageal varices, similar to prior.        LEILA Felix NP  2022  2:36 AM

## 2022-08-31 NOTE — CONSULTS
Pulmonary Medicine and [address] MD      Patient - Davi Medrano   MRN -  [id number]   [facility]   - 1955      Date of Admission -  2022  8:35 PM  Date of evaluation -  2022  Room - -   Judy Yao MD Primary Care Physician - Royer Villalba MD     Reason for Consult    Sepsis     Assessment   Chronic hypoxic respiratory failure  Hypotension/Septic Shock requiring vasopressors   UTI/Indwelling angeles  Juan Jose's syndrome   Obstructive sleep apnea/Obesity  BROOKLYN   COPD ? A. fib, CAD, CHF, DM, dementia, HTN, liver cirrhosis, esophageal varices    Recommendations   Oxygen via nasal cannula, keep SPO2 90% or greater  Continue Levophed, titrate for MAP 65 to 75 mmHg   IV fluids at 50 ml/hr  Continue cefepime   Continue Symbicort   Colorectal surgery following, plans for decompressive colonoscopy in the a.m.   X-ray chest in am  Labs: CBC and BMP in am  DVT prophylaxis with low molecular weight heparin  GI prophylaxis, Protonix  Will follow with you    Problem List      Patient Active Problem List   Diagnosis    Hypokalemia    CHF (congestive heart failure) (HCC)    Atrial fibrillation (HCC)    Hypertension    LEONARDA (obstructive sleep apnea)    Hyperglycemia due to diabetes mellitus ([address]    Lymphedema    Noncompliance    CAD (coronary artery disease)    COPD (chronic obstructive pulmonary disease) (HCC)    Hyperammonemia (HCC)    Thrombocytopenia (HCC)    Chronic anemia    Cirrhosis of liver without ascites (HCC)    Fecal impaction (HCC)    Pancytopenia (HCC)    Urinary tract infection associated with indwelling urethral catheter (HCC)    Juan Jose's syndrome    BROOKLYN (acute kidney injury) ([address]    Dementia ([address]    Diabetes mellitus ([address]    Fibromyalgia    Liver disease    Panic attack    Hypotension    Chronic diastolic CHF (congestive heart failure), NYHA class 3 ([address]    Acute urinary retention    Acute on chronic respiratory failure with hypoxemia (HCC)    Alcoholic cirrhosis (HCC)    Anasarca    Asthma without status asthmaticus    Bacterial pneumonia    Cauda equina syndrome (HCC)    Sepsis (HCC)    Hydronephrosis    Hyperlipidemia    Head contusion    Gastroesophageal reflux disease    Chronic fatigue syndrome    Fatigue    Essential tremor    Esophageal varices (HCC)    Epidural abscess    Displacement of lumbar intervertebral disc without myelopathy    Degenerative joint disease involving multiple joints    Diarrhea    Disorders of both mitral and tricuspid valves    Cervical spondylosis without myelopathy    Compression fracture of lumbar vertebra with routine healing    Lumbar radiculopathy       hospitals     Collette Breech is 74 y.o., female presented to the emergency room 2 days ago with complaints of generalized abdominal pain and concern for urinary tract infection. She has a chronic indwelling Pedroza and apparently urine had looked more cloudy than normal and she did see some blood in it. He also was reporting abdominal distention as well as for pain. He denied any nausea or vomiting. She did have loose stools however they prior to presentation. He had CT of the abdomen which showed dilatation of the colon without evidence of obstruction concerning for Trinity syndrome. She was admitted for further management of this and urinary tract infection. She apparently had some hypotension and hallucinations overnight, transferred to ICU. This morning she was started on Levophed drip, PICC line was placed. She is currently in bed no distress on Levophed 9 mics. She denies any chest pain shortness of breath or cough. She does complain of significant abdominal discomfort. She has FMS in place. She wears oxygen at home 3 L years, she thinks due to heart failure, she tells me she never smoked.     PMHx   Past Medical History      Diagnosis Date    Arthritis     CAD (coronary artery disease)     CHF (congestive heart failure) ([facility] abdomen  Neurologic - CN II-XII are grossly intact. There are no focal motor or sensory deficits  Skin - No bruising or bleeding  Extremities - No cyanosis, clubbing or edema.   Scratches/sores to bilateral lower extremities    Labs  - Old records and notes have been reviewed in [facility]   CBC     Lab Results   Component Value Date/Time    WBC 20.7 08/31/2022 06:42 AM    RBC 3.88 08/31/2022 06:42 AM    HGB 10.2 08/31/2022 06:42 AM    HCT 33.4 08/31/2022 06:42 AM     08/31/2022 06:42 AM    MCV 86.1 08/31/2022 06:42 AM    MCH 26.3 08/31/2022 06:42 AM    MCHC 30.5 08/31/2022 06:42 AM    RDW 21.3 08/31/2022 06:42 AM    LYMPHOPCT 4 08/31/2022 06:42 AM    MONOPCT 4 08/31/2022 06:42 AM    BASOPCT 0 08/31/2022 06:42 AM    MONOSABS 0.83 08/31/2022 06:42 AM    LYMPHSABS 0.83 08/31/2022 06:42 AM    EOSABS 0.00 08/31/2022 06:42 AM    BASOSABS 0.00 08/31/2022 06:42 AM    DIFFTYPE NOT REPORTED 02/09/2022 01:43 PM     BMP   Lab Results   Component Value Date/Time     08/31/2022 06:42 AM    K 3.1 08/31/2022 06:42 AM     08/31/2022 06:42 AM    CO2 24 08/31/2022 06:42 AM    BUN 28 08/31/2022 06:42 AM    CREATININE 0.77 08/31/2022 06:42 AM    GLUCOSE 165 08/31/2022 06:42 AM    CALCIUM 7.7 08/31/2022 06:42 AM    MG 2.1 08/31/2022 06:42 AM     LFTS  Lab Results   Component Value Date/Time    ALKPHOS 98 08/30/2022 07:54 AM    ALT 11 08/30/2022 07:54 AM    AST 26 08/30/2022 07:54 AM    PROT 5.8 08/30/2022 07:54 AM    BILITOT 0.65 08/30/2022 07:54 AM    BILIDIR 0.22 02/10/2022 06:18 AM    IBILI 0.21 02/10/2022 06:18 AM    LABALBU 2.1 08/30/2022 07:54 AM     PTT  Lab Results   Component Value Date    APTT 30.1 02/08/2022     INR   Lab Results   Component Value Date    INR 1.6 02/10/2022    INR 1.1 02/09/2022    INR 1.1 02/08/2022    PROTIME 18.6 (H) 02/10/2022    PROTIME 11.6 02/09/2022    PROTIME 12.0 02/08/2022       Radiology    CT Scans  CT abdomen pelvis 8/29/2022  Severe dilatation of the colon without evidence of

## 2022-08-31 NOTE — PROGRESS NOTES
Physician Progress Note      Zeferino Rivera  CSN #:                  [id number]  :                       1955  ADMIT DATE:       2022 8:35 PM  [address] DATE:  RESPONDING  PROVIDER #:        KIKE GUANACO APRN - NP          QUERY TEXT:    Pt admitted with UTI, Juan Jose's syndrome. Pt noted to have hypotension   requiring Levophed and ICU bed. If possible, please document in the progress   notes and discharge summary if you are evaluating and/or treating any of the   following: The medical record reflects the following:  Risk Factors: UTI, Juan Jose syndrome  Clinical Indicators: WBC 21.7-> 20.7 , Lactic 2.0-> 1.9->1.7->1.6->1.1->1.6. UA: cloudy, + nitrate, mod leukocyte, mod bacteria , 20-50 WBC. hypotension   overnight 89/36- 500 cc bolus did not correct. tx to ICU for levophed. Hypotensive 83/59, 80/34. per PN :  BP has been running in the 80s with   MAP in the 50s. She has received a 500 mL NS bolus x1 and 10 mg midodrine. Treatment: IVF bolus 1750 cc total, IVF @ 50, Cefepime,  midodrine 10mg x1,   Levophed  Options provided:  -- Septic Shock  -- Hypovolemic Shock  -- Hypovolemia without Shock  -- Hypotension without Shock  -- Other - I will add my own diagnosis  -- Disagree - Not applicable / Not valid  -- Disagree - Clinically unable to determine / Unknown  -- Refer to Clinical Documentation Reviewer    PROVIDER RESPONSE TEXT:    This patient is in septic shock.     Query created by: Lidia Perez on 2022 8:14 AM      Electronically signed by:  Tracey Barroso NP 2022 8:42 AM
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
## 2022-08-31 NOTE — PROGRESS NOTES
General Surgery:  Daily Progress Note                    PATIENT NAME: Gene Sanchez     TODAY'S DATE: 8/31/2022, 6:21 AM  CC:  I'm the same    SUBJECTIVE:     Pt seen and examined at bedside.  Hypotensive overnight, similar to initial vitals but not responsive to bolus per primary, transfer to ICU for levophed, afebrile, NSR, still distended this AM and struggling to pass flatus, denies CP or SOB       OBJECTIVE:   VITALS:  BP (!) 89/36   Pulse 69   Temp 97.7 °F (36.5 °C)   Resp 18   Ht 5' 2\" (1.575 m)   Wt 209 lb (94.8 kg)   SpO2 99%   BMI 38.23 kg/m²      INTAKE/OUTPUT:      Intake/Output Summary (Last 24 hours) at 8/31/2022 5612  Last data filed at 8/30/2022 2358  Gross per 24 hour   Intake --   Output 900 ml   Net -900 ml       PHYSICAL EXAM:  General Appearance: alert, oriented to self and situation, obese  HEENT:  Normocephalic, atraumatic, mucus membranes moist   Heart: Heart regular rate and rhythm  Lungs: No chest wall tenderness., Breathing Pattern: regular, no distress  Abdomen:taut, tympanitic, ttp L hemiabdomen, distended, without guarding or peritoneal signs   Extremities: dependent edema to BLE  Skin: rash to BLE pretibial area    Data:  CBC with Differential:    Lab Results   Component Value Date/Time    WBC 21.7 08/29/2022 09:08 PM    RBC 4.03 08/29/2022 09:08 PM    HGB 10.5 08/29/2022 09:08 PM    HCT 33.8 08/29/2022 09:08 PM     08/29/2022 09:08 PM    MCV 83.9 08/29/2022 09:08 PM    MCH 26.1 08/29/2022 09:08 PM    MCHC 31.1 08/29/2022 09:08 PM    RDW 21.6 08/29/2022 09:08 PM    LYMPHOPCT 11 08/29/2022 09:08 PM    MONOPCT 3 08/29/2022 09:08 PM    BASOPCT 0 08/29/2022 09:08 PM    MONOSABS 0.65 08/29/2022 09:08 PM    LYMPHSABS 2.39 08/29/2022 09:08 PM    EOSABS 0.00 08/29/2022 09:08 PM    BASOSABS 0.00 08/29/2022 09:08 PM    DIFFTYPE NOT REPORTED 02/09/2022 01:43 PM     BMP:    Lab Results   Component Value Date/Time     08/30/2022 07:54 AM    K 3.6 08/30/2022 07:54 AM     08/30/2022 07:54 AM    CO2 26 08/30/2022 07:54 AM    BUN 36 08/30/2022 07:54 AM    LABALBU 2.1 08/30/2022 07:54 AM    CREATININE 1.14 08/30/2022 07:54 AM    CALCIUM 8.0 08/30/2022 07:54 AM    GFRAA 58 08/30/2022 07:54 AM    LABGLOM 48 08/30/2022 07:54 AM    GLUCOSE 186 08/30/2022 07:54 AM     Magnesium:    Lab Results   Component Value Date/Time    MG 1.6 08/30/2022 07:54 AM     Phosphorus:    Lab Results   Component Value Date/Time    PHOS 3.4 08/30/2022 07:54 AM       Radiology Review:  CT ABDOMEN PELVIS WO CONTRAST Additional Contrast? 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 Colonic dilatation was visualized on prior exams. Sequelae of cirrhosis with portal hypertension, including paraesophageal varices, similar to prior.         ASSESSMENT:  Active Hospital Problems    Diagnosis Date Noted    Hypotension [I95.9] 08/31/2022     Priority: Medium    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] 08/30/2022     Priority: Medium    BROOKLYN (acute kidney injury) ([facility] [N17.9] 08/30/2022     Priority: Medium    Urinary tract infection associated with indwelling urethral catheter ([facility] [facility] [B32.383N, N39.0] 08/29/2022     Priority: Medium    Lymphedema [I89.0] 02/09/2022    COPD (chronic obstructive pulmonary disease) ([facility] [J44.9]     LEONARDA (obstructive sleep apnea) [G47.33]     Hypertension [I10]        74 y.o. female with oglvies syndrome and severe colonic dilation    Plan:  CLD today and continue miralax, NPO at midnight for decompressive colonoscopy in AM  KUB this morning to assess for free air  Obtain BMP, Mg, Phos  Will attempt bedside rectal tube decompression  If patient worsens clinically or has evidence of free air will need emergent operative intervention, discussed with patient and questions answered    Electronically signed by Nita Hall DO  on 8/31/2022 at 6:21 AM  I Dr. Shani Elias saw and examined the patient. I have edited the above and agree with the above. Alvin Parrish  Colorectal Surgery

## 2022-08-31 NOTE — PLAN OF CARE
Problem: Pain  Goal: Verbalizes/displays adequate comfort level or baseline comfort level  Outcome: Progressing  Flowsheets (Taken 8/31/2022 0322)  Verbalizes/displays adequate comfort level or baseline comfort level:   Encourage patient to monitor pain and request assistance   Assess pain using appropriate pain scale   Implement non-pharmacological measures as appropriate and evaluate response   Consider cultural and social influences on pain and pain management     Problem: Skin/Tissue Integrity  Goal: Absence of new skin breakdown  Description: 1. Monitor for areas of redness and/or skin breakdown  2. Assess vascular access sites hourly  3. Every 4-6 hours minimum:  Change oxygen saturation probe site  4. Every 4-6 hours:  If on nasal continuous positive airway pressure, respiratory therapy assess nares and determine need for appliance change or resting period. Outcome: Progressing  Note: Checked for incontinence every 2 hours and prn. Pericare as needed. Chronic angeles catheter in place. Assisted to reposition off back Q2hrs. On waffle mattress. Heels off bed with pillows. Mepilex as preventative to coccyx. Mepilex to skin tear on abdomen. Silvadene ointment applied to burns. Will continue to monitor. Will continue to monitor.        Problem: Safety - Adult  Goal: Free from fall injury  Outcome: Progressing  Flowsheets (Taken 8/31/2022 0322)  Free From Fall Injury: Instruct family/caregiver on patient safety     Problem: ABCDS Injury Assessment  Goal: Absence of physical injury  Outcome: Progressing  Flowsheets (Taken 8/31/2022 0322)  Absence of Physical Injury: Implement safety measures based on patient assessment     Problem: Chronic Conditions and Co-morbidities  Goal: Patient's chronic conditions and co-morbidity symptoms are monitored and maintained or improved  Outcome: Progressing  Flowsheets (Taken 8/31/2022 0322)  Care Plan - Patient's Chronic Conditions and Co-Morbidity Symptoms are Monitored and Maintained or Improved:   Monitor and assess patient's chronic conditions and comorbid symptoms for stability, deterioration, or improvement   Collaborate with multidisciplinary team to address chronic and comorbid conditions and prevent exacerbation or deterioration   Update acute care plan with appropriate goals if chronic or comorbid symptoms are exacerbated and prevent overall improvement and discharge

## 2022-09-01 ENCOUNTER — ANESTHESIA EVENT (OUTPATIENT)
Dept: OPERATING ROOM | Age: 67
DRG: 698 | End: 2022-09-01
Payer: COMMERCIAL

## 2022-09-01 ENCOUNTER — ANESTHESIA (OUTPATIENT)
Dept: OPERATING ROOM | Age: 67
DRG: 698 | End: 2022-09-01
Payer: COMMERCIAL

## 2022-09-01 ENCOUNTER — APPOINTMENT (OUTPATIENT)
Dept: GENERAL RADIOLOGY | Age: 67
DRG: 698 | End: 2022-09-01
Payer: COMMERCIAL

## 2022-09-01 LAB
ABSOLUTE EOS #: 0 K/UL (ref 0–0.4)
ABSOLUTE IMMATURE GRANULOCYTE: 0 K/UL (ref 0–0.3)
ABSOLUTE LYMPH #: 1.23 K/UL (ref 1–4.8)
ABSOLUTE MONO #: 1.03 K/UL (ref 0.2–0.8)
ANION GAP SERPL CALCULATED.3IONS-SCNC: 12 MMOL/L (ref 9–17)
BASOPHILS # BLD: 0 %
BASOPHILS ABSOLUTE: 0 K/UL (ref 0–0.2)
BUN BLDV-MCNC: 23 MG/DL (ref 8–23)
BUN/CREAT BLD: 31 (ref 9–20)
CALCIUM SERPL-MCNC: 8 MG/DL (ref 8.6–10.4)
CHLORIDE BLD-SCNC: 107 MMOL/L (ref 98–107)
CO2: 18 MMOL/L (ref 20–31)
CREAT SERPL-MCNC: 0.75 MG/DL (ref 0.5–0.9)
EOSINOPHILS RELATIVE PERCENT: 0 % (ref 1–4)
GFR AFRICAN AMERICAN: >60 ML/MIN
GFR NON-AFRICAN AMERICAN: >60 ML/MIN
GFR SERPL CREATININE-BSD FRML MDRD: ABNORMAL ML/MIN/{1.73_M2}
GLUCOSE BLD-MCNC: 186 MG/DL (ref 65–105)
GLUCOSE BLD-MCNC: 196 MG/DL (ref 65–105)
GLUCOSE BLD-MCNC: 208 MG/DL (ref 70–99)
GLUCOSE BLD-MCNC: 221 MG/DL (ref 65–105)
GLUCOSE BLD-MCNC: 224 MG/DL (ref 65–105)
HCT VFR BLD CALC: 34.9 % (ref 36.3–47.1)
HEMOGLOBIN: 10.3 G/DL (ref 11.9–15.1)
IMMATURE GRANULOCYTES: 0 %
LYMPHOCYTES # BLD: 6 % (ref 24–44)
MAGNESIUM: 2.2 MG/DL (ref 1.6–2.6)
MCH RBC QN AUTO: 25.9 PG (ref 25.2–33.5)
MCHC RBC AUTO-ENTMCNC: 29.5 G/DL (ref 28.4–34.8)
MCV RBC AUTO: 87.7 FL (ref 82.6–102.9)
MONOCYTES # BLD: 5 % (ref 1–7)
MORPHOLOGY: ABNORMAL
NRBC AUTOMATED: 0 PER 100 WBC
PDW BLD-RTO: 21.4 % (ref 11.8–14.4)
PLATELET # BLD: 124 K/UL (ref 138–453)
PMV BLD AUTO: 9.5 FL (ref 8.1–13.5)
POTASSIUM SERPL-SCNC: 2.9 MMOL/L (ref 3.7–5.3)
RBC # BLD: 3.98 M/UL (ref 3.95–5.11)
SEG NEUTROPHILS: 89 % (ref 36–66)
SEGMENTED NEUTROPHILS ABSOLUTE COUNT: 18.24 K/UL (ref 1.8–7.7)
SODIUM BLD-SCNC: 137 MMOL/L (ref 135–144)
WBC # BLD: 20.5 K/UL (ref 3.5–11.3)

## 2022-09-01 PROCEDURE — 80048 BASIC METABOLIC PNL TOTAL CA: CPT

## 2022-09-01 PROCEDURE — 2580000003 HC RX 258: Performed by: STUDENT IN AN ORGANIZED HEALTH CARE EDUCATION/TRAINING PROGRAM

## 2022-09-01 PROCEDURE — 6370000000 HC RX 637 (ALT 250 FOR IP): Performed by: NURSE PRACTITIONER

## 2022-09-01 PROCEDURE — 2500000003 HC RX 250 WO HCPCS: Performed by: STUDENT IN AN ORGANIZED HEALTH CARE EDUCATION/TRAINING PROGRAM

## 2022-09-01 PROCEDURE — 3700000000 HC ANESTHESIA ATTENDED CARE: Performed by: COLON & RECTAL SURGERY

## 2022-09-01 PROCEDURE — 82947 ASSAY GLUCOSE BLOOD QUANT: CPT

## 2022-09-01 PROCEDURE — 2709999900 HC NON-CHARGEABLE SUPPLY: Performed by: COLON & RECTAL SURGERY

## 2022-09-01 PROCEDURE — 6360000002 HC RX W HCPCS: Performed by: NURSE PRACTITIONER

## 2022-09-01 PROCEDURE — 36415 COLL VENOUS BLD VENIPUNCTURE: CPT

## 2022-09-01 PROCEDURE — 6370000000 HC RX 637 (ALT 250 FOR IP): Performed by: STUDENT IN AN ORGANIZED HEALTH CARE EDUCATION/TRAINING PROGRAM

## 2022-09-01 PROCEDURE — 0D7N8ZZ DILATION OF SIGMOID COLON, VIA NATURAL OR ARTIFICIAL OPENING ENDOSCOPIC: ICD-10-PCS | Performed by: COLON & RECTAL SURGERY

## 2022-09-01 PROCEDURE — 3700000001 HC ADD 15 MINUTES (ANESTHESIA): Performed by: COLON & RECTAL SURGERY

## 2022-09-01 PROCEDURE — 6360000002 HC RX W HCPCS: Performed by: INTERNAL MEDICINE

## 2022-09-01 PROCEDURE — 2580000003 HC RX 258: Performed by: NURSE PRACTITIONER

## 2022-09-01 PROCEDURE — 85025 COMPLETE CBC W/AUTO DIFF WBC: CPT

## 2022-09-01 PROCEDURE — 6360000002 HC RX W HCPCS: Performed by: STUDENT IN AN ORGANIZED HEALTH CARE EDUCATION/TRAINING PROGRAM

## 2022-09-01 PROCEDURE — 83735 ASSAY OF MAGNESIUM: CPT

## 2022-09-01 PROCEDURE — 2000000000 HC ICU R&B

## 2022-09-01 PROCEDURE — 99233 SBSQ HOSP IP/OBS HIGH 50: CPT | Performed by: NURSE PRACTITIONER

## 2022-09-01 PROCEDURE — 3609027000 HC COLONOSCOPY: Performed by: COLON & RECTAL SURGERY

## 2022-09-01 PROCEDURE — 6360000002 HC RX W HCPCS: Performed by: ANESTHESIOLOGY

## 2022-09-01 PROCEDURE — 71045 X-RAY EXAM CHEST 1 VIEW: CPT

## 2022-09-01 RX ORDER — POTASSIUM CHLORIDE 20 MEQ/1
40 TABLET, EXTENDED RELEASE ORAL ONCE
Status: COMPLETED | OUTPATIENT
Start: 2022-09-01 | End: 2022-09-01

## 2022-09-01 RX ORDER — POTASSIUM CHLORIDE 29.8 MG/ML
20 INJECTION INTRAVENOUS PRN
Status: DISCONTINUED | OUTPATIENT
Start: 2022-09-01 | End: 2022-09-23 | Stop reason: HOSPADM

## 2022-09-01 RX ORDER — MIDODRINE HYDROCHLORIDE 5 MG/1
5 TABLET ORAL 2 TIMES DAILY WITH MEALS
Status: DISCONTINUED | OUTPATIENT
Start: 2022-09-01 | End: 2022-09-02

## 2022-09-01 RX ORDER — PROPOFOL 10 MG/ML
INJECTION, EMULSION INTRAVENOUS PRN
Status: DISCONTINUED | OUTPATIENT
Start: 2022-09-01 | End: 2022-09-01 | Stop reason: SDUPTHER

## 2022-09-01 RX ADMIN — POLYETHYLENE GLYCOL 3350 17 G: 17 POWDER, FOR SOLUTION ORAL at 10:16

## 2022-09-01 RX ADMIN — POTASSIUM CHLORIDE 20 MEQ: 29.8 INJECTION, SOLUTION INTRAVENOUS at 06:41

## 2022-09-01 RX ADMIN — POTASSIUM CHLORIDE 10 MEQ: 1500 TABLET, EXTENDED RELEASE ORAL at 10:16

## 2022-09-01 RX ADMIN — SODIUM CHLORIDE: 9 INJECTION, SOLUTION INTRAVENOUS at 17:48

## 2022-09-01 RX ADMIN — PANTOPRAZOLE SODIUM 40 MG: 40 TABLET, DELAYED RELEASE ORAL at 06:43

## 2022-09-01 RX ADMIN — ENOXAPARIN SODIUM 30 MG: 100 INJECTION SUBCUTANEOUS at 12:54

## 2022-09-01 RX ADMIN — MIDODRINE HYDROCHLORIDE 5 MG: 5 TABLET ORAL at 10:40

## 2022-09-01 RX ADMIN — CEFEPIME 2000 MG: 2 INJECTION, POWDER, FOR SOLUTION INTRAVENOUS at 12:53

## 2022-09-01 RX ADMIN — SODIUM CHLORIDE, PRESERVATIVE FREE 5 ML: 5 INJECTION INTRAVENOUS at 21:14

## 2022-09-01 RX ADMIN — ENOXAPARIN SODIUM 30 MG: 100 INJECTION SUBCUTANEOUS at 21:11

## 2022-09-01 RX ADMIN — MORPHINE SULFATE 1 MG: 2 INJECTION, SOLUTION INTRAMUSCULAR; INTRAVENOUS at 23:53

## 2022-09-01 RX ADMIN — PROPOFOL 20 MG: 10 INJECTION, EMULSION INTRAVENOUS at 11:22

## 2022-09-01 RX ADMIN — MIDODRINE HYDROCHLORIDE 5 MG: 5 TABLET ORAL at 17:43

## 2022-09-01 RX ADMIN — SODIUM CHLORIDE: 9 INJECTION, SOLUTION INTRAVENOUS at 05:09

## 2022-09-01 RX ADMIN — MORPHINE SULFATE 1 MG: 2 INJECTION, SOLUTION INTRAMUSCULAR; INTRAVENOUS at 10:43

## 2022-09-01 RX ADMIN — TRAZODONE HYDROCHLORIDE 100 MG: 100 TABLET ORAL at 21:14

## 2022-09-01 RX ADMIN — CEFEPIME 2000 MG: 2 INJECTION, POWDER, FOR SOLUTION INTRAVENOUS at 21:13

## 2022-09-01 RX ADMIN — SERTRALINE 100 MG: 100 TABLET, FILM COATED ORAL at 12:54

## 2022-09-01 RX ADMIN — POTASSIUM CHLORIDE 40 MEQ: 1500 TABLET, EXTENDED RELEASE ORAL at 10:16

## 2022-09-01 RX ADMIN — Medication 16 MCG/MIN: at 17:47

## 2022-09-01 RX ADMIN — SODIUM CHLORIDE: 9 INJECTION, SOLUTION INTRAVENOUS at 11:32

## 2022-09-01 RX ADMIN — SILVER SULFADIAZINE: 10 CREAM TOPICAL at 21:11

## 2022-09-01 RX ADMIN — Medication 9 MCG/MIN: at 12:48

## 2022-09-01 RX ADMIN — TRAZODONE HYDROCHLORIDE 100 MG: 100 TABLET ORAL at 00:40

## 2022-09-01 RX ADMIN — INSULIN LISPRO 2 UNITS: 100 INJECTION, SOLUTION INTRAVENOUS; SUBCUTANEOUS at 17:42

## 2022-09-01 RX ADMIN — POTASSIUM CHLORIDE 20 MEQ: 29.8 INJECTION, SOLUTION INTRAVENOUS at 10:12

## 2022-09-01 ASSESSMENT — PAIN DESCRIPTION - ORIENTATION: ORIENTATION: RIGHT;LOWER;UPPER

## 2022-09-01 ASSESSMENT — PAIN SCALES - GENERAL
PAINLEVEL_OUTOF10: 9
PAINLEVEL_OUTOF10: 9

## 2022-09-01 ASSESSMENT — PAIN DESCRIPTION - LOCATION
LOCATION: ABDOMEN;BACK
LOCATION: ABDOMEN

## 2022-09-01 ASSESSMENT — PAIN - FUNCTIONAL ASSESSMENT: PAIN_FUNCTIONAL_ASSESSMENT: PREVENTS OR INTERFERES SOME ACTIVE ACTIVITIES AND ADLS

## 2022-09-01 ASSESSMENT — PAIN DESCRIPTION - ONSET: ONSET: PROGRESSIVE

## 2022-09-01 ASSESSMENT — PAIN DESCRIPTION - DESCRIPTORS
DESCRIPTORS: DISCOMFORT;SHARP
DESCRIPTORS: DISCOMFORT

## 2022-09-01 ASSESSMENT — PAIN DESCRIPTION - FREQUENCY: FREQUENCY: INTERMITTENT

## 2022-09-01 NOTE — ANESTHESIA POSTPROCEDURE EVALUATION
Department of Anesthesiology  Postprocedure Note    Patient: Horace Mcduffie  MRN: [id number]  YOB: 1955  Date of evaluation: 9/1/2022      Procedure Summary     Date: 09/01/22 Room / Location: [place] [address] / [facility] - INPATIENT    Anesthesia Start: 1114 Anesthesia Stop: 1222    Procedure: DECOMPRESSIVE COLONOSCOPY Diagnosis:       Juan Jose's syndrome      ([place]'s syndrome [K59.81])    Surgeons: Justice Eid MD Responsible Provider: Taylor Hutchinson MD    Anesthesia Type: MAC ASA Status: 4 - Emergent          Anesthesia Type: No value filed.     Bhanu Phase I:      Bhanu Phase II:        Anesthesia Post Evaluation    Patient location during evaluation: ICU  Patient participation: complete - patient participated  Level of consciousness: awake  Airway patency: patent  Nausea & Vomiting: no nausea  Complications: no  Cardiovascular status: blood pressure returned to baseline  Respiratory status: acceptable  Hydration status: euvolemic  Comments: Multimodal analgesia pain management as indicated by procedure  Multimodal analgesia pain management approach

## 2022-09-01 NOTE — BRIEF OP NOTE
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]    Post-Op Diagnosis: Same       Procedure(s):  DECOMPRESSIVE COLONOSCOPY    Surgeon(s):  Alvin Fields MD    Assistant:  Resident: DO Magdalena Pena DO     Anesthesia: Monitor Anesthesia Care    Estimated Blood Loss (mL): None    Complications: None    Specimens:   * No specimens in log *    Implants:  * No implants in log *      Drains:   Urinary Catheter 08/30/22 2 Way (Active)   $ Urethral catheter insertion $ Not inserted for procedure 08/30/22 1534   Catheter Indications Urinary retention (acute or chronic), continuous bladder irrigation or bladder outlet obstruction;Prolonged immobilization (e.g. unstable thoracic or lumbar spine, multiple traumatic injuries such as pelvic fractures) 09/01/22 0400   Site Assessment Pink;Moist 09/01/22 0400   Urine Color Sudha 09/01/22 0400   Urine Appearance Sediment 09/01/22 0400   Collection Container Standard 09/01/22 0400   Securement Method Leg strap 09/01/22 0400   Catheter Care Completed Yes 08/31/22 0800   Catheter Best Practices  Bag below bladder;Catheter secured to thigh; Tamper seal intact; Bag not on floor; Lack of dependent loop in tubing;Drainage bag less than half full 09/01/22 0400   Status Draining 09/01/22 0400   Output (mL) 550 mL 09/01/22 0648       Fecal Management System 08/31/22 (Active)   Stool Appearance Loose 09/01/22 0400   Stool Color Brown 09/01/22 0400   Position of Indicator Line Visible 09/01/22 0400   Balloon Volume Verified Yes 09/01/22 0400   Tube Irrigated No 09/01/22 0400   Stool Amount Large 08/31/22 0717       [REMOVED] Urinary Catheter  (Removed)   Site Assessment No urethral drainage 08/31/22 0028   Urine Color Yellow 08/31/22 0028   Urine Appearance Clear 08/31/22 0028   Urine Odor Malodorous 08/30/22 0525   Collection Container Standard 08/31/22 0028 Securement Method Securing device (Describe) 08/30/22 1200   Catheter Care Completed Yes 08/31/22 0028   Status Patent;Draining 08/30/22 0800   Output (mL) 450 mL 08/30/22 1534     Findings:  Successful decompressive colonoscopy. Scope was passed to the mid transverse colon.      Kiana Summers, DO  General Surgery PGY-4

## 2022-09-01 NOTE — PROGRESS NOTES
Colorectal Surgery:  Daily Progress Note                    PATIENT NAME: Sarah Gay     TODAY'S DATE: 9/1/2022  CC:  abdominal discomfort     SUBJECTIVE:     Pt seen and examined at bedside. Afebrile, HR 60's, requiring 9 of levo for pressor support. Endorses generalized abdominal discomfort. No nausea or emesis. Rectal tube with liquid stool. NPO for decompressive colonoscopy today.      OBJECTIVE:   VITALS:  BP (!) 100/33   Pulse 70   Temp 97.5 °F (36.4 °C) (Temporal)   Resp 14   Ht 5' 2\" (1.575 m)   Wt 209 lb (94.8 kg)   SpO2 100%   BMI 38.23 kg/m²      INTAKE/OUTPUT:      Intake/Output Summary (Last 24 hours) at 9/1/2022 1226  Last data filed at 9/1/2022 1132  Gross per 24 hour   Intake 783.1 ml   Output 1450 ml   Net -666.9 ml         PHYSICAL EXAM:  General Appearance: alert, oriented to self and situation, obese  HEENT:  Normocephalic, atraumatic, mucus membranes moist   Heart: Heart regular rate and rhythm  Lungs: No chest wall tenderness., Breathing Pattern: regular, no distress  Abdomen: Distended, minimal diffuse tenderness, tympanic, no peritoneal signs   Extremities: dependent edema to BLE  Skin: rash to BLE pretibial area    Data:  CBC with Differential:    Lab Results   Component Value Date/Time    WBC 20.5 09/01/2022 03:49 AM    RBC 3.98 09/01/2022 03:49 AM    HGB 10.3 09/01/2022 03:49 AM    HCT 34.9 09/01/2022 03:49 AM     09/01/2022 03:49 AM    MCV 87.7 09/01/2022 03:49 AM    MCH 25.9 09/01/2022 03:49 AM    MCHC 29.5 09/01/2022 03:49 AM    RDW 21.4 09/01/2022 03:49 AM    LYMPHOPCT 6 09/01/2022 03:49 AM    MONOPCT 5 09/01/2022 03:49 AM    BASOPCT 0 09/01/2022 03:49 AM    MONOSABS 1.03 09/01/2022 03:49 AM    LYMPHSABS 1.23 09/01/2022 03:49 AM    EOSABS 0.00 09/01/2022 03:49 AM    BASOSABS 0.00 09/01/2022 03:49 AM    DIFFTYPE NOT REPORTED 02/09/2022 01:43 PM     BMP:    Lab Results   Component Value Date/Time     09/01/2022 03:49 AM    K 2.9 09/01/2022 03:49 AM     09/01/2022 03:49 AM    CO2 18 09/01/2022 03:49 AM    BUN 23 09/01/2022 03:49 AM    LABALBU 2.1 08/30/2022 07:54 AM    CREATININE 0.75 09/01/2022 03:49 AM    CALCIUM 8.0 09/01/2022 03:49 AM    GFRAA >60 09/01/2022 03:49 AM    LABGLOM >60 09/01/2022 03:49 AM    GLUCOSE 208 09/01/2022 03:49 AM     Magnesium:    Lab Results   Component Value Date/Time    MG 2.2 09/01/2022 03:49 AM     Phosphorus:    Lab Results   Component Value Date/Time    PHOS 2.8 08/31/2022 06:42 AM       Radiology Review:  CT ABDOMEN PELVIS WO CONTRAST Additional Contrast? None    Result Date: 8/29/2022  Severe dilatation of the colon without evidence of obstruction, with air-fluid levels within the colon, concerning for [place] syndrome, however anal/rectal stricture cannot be ruled out. Colonic dilatation was visualized on prior exams. Sequelae of cirrhosis with portal hypertension, including paraesophageal varices, similar to prior. ASSESSMENT:  Active Hospital Problems    Diagnosis Date Noted    Hypotension [I95.9] 08/31/2022     Priority: Medium    Shock, septic ([facility] [A41.9, R65.21] 08/31/2022     Priority: Medium    [place]'s syndrome [K59.81] 08/30/2022     Priority: Medium    Urinary tract infection associated with indwelling urethral catheter ([facility] [A34.657W, N39.0] 08/29/2022     Priority: Medium    Lymphedema [I89.0] 02/09/2022    COPD (chronic obstructive pulmonary disease) ([facility] [J44.9]     LEONARDA (obstructive sleep apnea) [G47.33]     Hypertension [I10]     Hepatic cirrhosis ([address] [K74.60]        74 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 Will need to continue rectal tube post colonoscopy as well. Replace potassium to goal of 4. Give 40mEq oral now. Continue with IV replacement. Medical management per primary     Cheryle Late, Berenda Albert PGY-4    I Dr. Abilio Tijerina saw and examined the patient.  I have edited the above and agree with the above. Alvin Parrish  Colorectal Surgery

## 2022-09-01 NOTE — OP NOTE
Operative Note      Patient: Landon Mitchell  YOB: 1955  MRN: [id number]    Date of Procedure: 9/1/2022    Pre-Op Diagnosis: [place]se[place]'s syndrome [K59.81]    Post-Op Diagnosis: Same       Procedure(s):  DECOMPRESSIVE COLONOSCOPY    Surgeon(s):  Alvin Pascual Ala, MD    Assistant:   Resident: Cheryle Late, DO    Anesthesia: Monitor Anesthesia Care    Estimated Blood Loss (mL): None    Complications: None    Specimens:   * No specimens in log *    Implants:  * No implants in log *      Drains:   Urinary Catheter 08/30/22 2 Way (Active)   $ Urethral catheter insertion $ Not inserted for procedure 08/30/22 1534   Catheter Indications Urinary retention (acute or chronic), continuous bladder irrigation or bladder outlet obstruction;Prolonged immobilization (e.g. unstable thoracic or lumbar spine, multiple traumatic injuries such as pelvic fractures) 09/01/22 0400   Site Assessment Pink;Moist 09/01/22 0400   Urine Color Sudha 09/01/22 0400   Urine Appearance Sediment 09/01/22 0400   Collection Container Standard 09/01/22 0400   Securement Method Leg strap 09/01/22 0400   Catheter Care Completed Yes 08/31/22 0800   Catheter Best Practices  Bag below bladder;Catheter secured to thigh; Tamper seal intact; Bag not on floor; Lack of dependent loop in tubing;Drainage bag less than half full 09/01/22 0400   Status Draining 09/01/22 0400   Output (mL) 550 mL 09/01/22 0648       Fecal Management System 08/31/22 (Active)   Stool Appearance Loose 09/01/22 0400   Stool Color Brown 09/01/22 0400   Position of Indicator Line Visible 09/01/22 0400   Balloon Volume Verified Yes 09/01/22 0400   Tube Irrigated No 09/01/22 0400   Stool Amount Large 08/31/22 0717       [REMOVED] Urinary Catheter  (Removed)   Site Assessment No urethral drainage 08/31/22 0028   Urine Color Yellow 08/31/22 0028   Urine Appearance Clear 08/31/22 0028   Urine Odor Malodorous 08/30/22 0525   Collection Container Standard 08/31/22 0028   Securement Method Securing device (Describe) 08/30/22 1200   Catheter Care Completed Yes 08/31/22 0028   Status Patent;Draining 08/30/22 0800   Output (mL) 450 mL 08/30/22 1534     Findings: Successful decompressive colonoscopy. Scope was passed to the mid transverse colon. Detailed Description of Procedure:     HISTORY: The patient is a 74 y.o.  female with history of [place]'s. The decision was made to proceed to the operating room for decompressive colonoscopy. Risks, benefits, expected outcome, and alternatives to the procedure were explained and all questions answered. Patient understands and signed a consent for procedure. PROCEDURE: The patient was brought to the operating room and placed on the operating table in a left lateral position. A time out was performed to confirm patient, procedure, location, and allergies. MAC anesthesia was given. The patient's rectal tube balloon was desufflated and the tube moved with a large amount of liquid stool and air released. The tube was then removed. A rectal exam was performed and no abnormalities noted. The scope was then inserted and advanced through the rectum and sigmoid colon. The sigmoid colon was tortuous but the scope was able to be safely advanced. Areas of the sigmoid were dilated with some mucosal hyperenhancement indicative of stretching/dilation. The scope was advanced to the mid transverse colon. No masses seen. The transverse colon was also dilated. The colon was decompressed from the scope location in the mid transverse colon. The colon was able to be decompressed and the scope was carefully withdrawn. No immediate complications. The patient's abdomen was soft and much less distended at the conclusion of the procedure. Prep: Fair. Liquid stool did have to be suctioned throughout the colon. Dr. Cheryl George was present for the entire case. Jennifer Hoyt DO  General Surgery PGY-4  I Dr. Cheryl George was present throughout and performed the entire procedure.      Alvin ESPINOZA Francois  Colorectal Surgery
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
## 2022-09-01 NOTE — ANESTHESIA PRE PROCEDURE
Department of Anesthesiology  Preprocedure Note       Name:  Halima Anaya   Age:  74 y.o.  :  1955                                          MRN:  [id number]         Date:  2022      Surgeon: Abilio Hensley):  Luma Reed MD    Procedure: Procedure(s):  DECOMPRESSIVE COLONOSCOPY    Medications prior to admission:   Prior to Admission medications    Medication Sig Start Date End Date Taking? Authorizing Provider   insulin glargine (LANTUS SOLOSTAR) 100 UNIT/ML injection pen Inject 40 Units into the skin Daily   Yes Historical Provider, MD   omeprazole (PRILOSEC) 20 MG delayed release capsule Take 20 mg by mouth 2 times daily   Yes Historical Provider, MD   potassium chloride (KLOR-CON M) 10 MEQ extended release tablet Take 10 mEq by mouth daily   Yes Historical Provider, MD   ferrous sulfate (FE TABS 325) 325 (65 Fe) MG EC tablet Take 325 mg by mouth daily (with breakfast)   Yes Historical Provider, MD   gabapentin (NEURONTIN) 300 MG capsule Take 300 mg by mouth 3 times daily.    Yes Historical Provider, MD   losartan (COZAAR) 25 MG tablet Take 25 mg by mouth daily   Yes Historical Provider, MD   senna (SENOKOT) 8.6 MG tablet Take 1 tablet by mouth daily   Yes Historical Provider, MD   lactulose (CHRONULAC) 10 GM/15ML solution Take 20 g by mouth 2 times daily 30 mL BID   Yes Historical Provider, MD   silver sulfADIAZINE (SILVADENE) 1 % cream Apply topically nightly Indications: to burns   Yes Historical Provider, MD   oxybutynin (DITROPAN) 5 MG tablet Take 5 mg by mouth 2 times daily as needed (bladder spasms)    Historical Provider, MD   polyethylene glycol (GLYCOLAX) 17 g packet Take 17 g by mouth daily as needed for Constipation    Historical Provider, MD   Liraglutide (VICTOZA) 18 MG/3ML SOPN SC injection Inject 1.2 mg into the skin daily    Historical Provider, MD   pravastatin (PRAVACHOL) 10 MG tablet Take 10 mg by mouth nightly    Historical Provider, MD   fluticasone-vilanterol (BREO ELLIPTA) 100-25 MCG/INH AEPB inhaler Inhale 1 puff into the lungs daily    Historical Provider, MD   albuterol sulfate HFA (VENTOLIN HFA) 108 (90 Base) MCG/ACT inhaler Inhale 2 puffs into the lungs every 6 hours as needed for Wheezing or Shortness of Breath    Historical Provider, MD   apixaban (ELIQUIS) 5 MG TABS tablet Take 5 mg by mouth 2 times daily     Historical Provider, MD   furosemide (LASIX) 40 MG tablet Take 40 mg by mouth 2 times daily     Historical Provider, MD   sertraline (ZOLOFT) 100 MG tablet Take 100 mg by mouth daily     Historical Provider, MD   spironolactone (ALDACTONE) 100 MG tablet Take 100 mg by mouth daily 9/2/21   Historical Provider, MD   traZODone (DESYREL) 100 MG tablet Take 100 mg by mouth nightly 11/5/21   Historical Provider, MD   nadolol (CORGARD) 40 MG tablet Take 40 mg by mouth daily    Historical Provider, MD       Current medications:    Current Facility-Administered Medications   Medication Dose Route Frequency Provider Last Rate Last Admin    [MAR Hold] potassium chloride 20 mEq/50 mL IVPB (Central Line)  20 mEq IntraVENous PRN Mireya Cui MD 50 mL/hr at 09/01/22 1012 20 mEq at 09/01/22 1012    [MAR Hold] midodrine (PROAMATINE) tablet 5 mg  5 mg Oral BID WC LEILA Mathew NP   5 mg at 09/01/22 1040    [MAR Hold] norepinephrine (LEVOPHED) 16 mg in dextrose 5% 250 mL infusion  1-100 mcg/min IntraVENous Continuous Woodward Brittle, APRN - NP 8.4 mL/hr at 09/01/22 1114 9 mcg/min at 09/01/22 1114    [MAR Hold] morphine (PF) injection 1 mg  1 mg IntraVENous Q4H PRN LEILA Mathew NP   1 mg at 09/01/22 1043    [MAR Hold] cefepime (MAXIPIME) 2000 mg IVPB minibag  2,000 mg IntraVENous Q12H Shalini Fitzgerald MD   Stopped at 09/01/22 0204    [MAR Hold] traZODone (DESYREL) tablet 100 mg  100 mg Oral Nightly LEILA Garcia CNP   100 mg at 09/01/22 0040    [MAR Hold] sodium chloride flush 0.9 % injection 5-40 mL  5-40 mL IntraVENous 2 times per day Anshu Parada, APRN - CNP        [MAR Hold] sodium chloride flush 0.9 % injection 10 mL  10 mL IntraVENous PRN LEILA Lincoln CNP        [facility] Hold] 0.9 % sodium chloride infusion   IntraVENous PRN LEILA Lincoln CNP        [facility] Hold] magnesium sulfate 1000 mg in dextrose 5% 100 mL IVPB  1,000 mg IntraVENous PRN LEILA Lincoln CNP        [facility] Hold] enoxaparin Sodium (LOVENOX) injection 30 mg  30 mg SubCUTAneous BID LEILA Lincoln CNP   30 mg at 08/31/22 2206    [MAR Hold] ondansetron (ZOFRAN-ODT) disintegrating tablet 4 mg  4 mg Oral Q8H PRN LEILA Lincoln CNP        Or    [MAR Hold] ondansetron (ZOFRAN) injection 4 mg  4 mg IntraVENous Q6H PRN LEILA Lincoln CNP   4 mg at 08/31/22 0634    [MAR Hold] acetaminophen (TYLENOL) tablet 650 mg  650 mg Oral Q6H PRN LEILA Lincoln CNP        Or    [MAR Hold] acetaminophen (TYLENOL) suppository 650 mg  650 mg Rectal Q6H PRN LEILA Lincoln CNP        [facility] Hold] glucose chewable tablet 16 g  4 tablet Oral PRN LEILA Hermosillo CNP        [facility] Hold] dextrose bolus 10% 125 mL  125 mL IntraVENous PRN LEILA Hermosillo CNP        Or    [MAR Hold] dextrose bolus 10% 250 mL  250 mL IntraVENous PRN LEILA Hermosillo CNP        [MAR Hold] glucagon (rDNA) injection 1 mg  1 mg SubCUTAneous PRN LEILA Hermosillo CNP        [facility] Hold] dextrose 10 % infusion   IntraVENous Continuous PRN LEILA Hermosillo CNP        [facility] Hold] 0.9 % sodium chloride infusion   IntraVENous Continuous LEILA Pena NP 50 mL/hr at 09/01/22 0648 Rate Verify at 09/01/22 0648    polyethylene glycol (GLYCOLAX) packet 17 g  17 g Oral 4x Daily Geeta Peacock, DO   17 g at 09/01/22 1016    [MAR Hold] insulin lispro (HUMALOG) injection vial 0-8 Units  0-8 Units SubCUTAneous TID LEILA Heart NP [facility] Hold] insulin lispro (HUMALOG) injection vial 0-4 Units 0-4 Units SubCUTAneous Nightly LEILA Powell NP        [facility] Hold] sertraline (ZOLOFT) tablet 100 mg  100 mg Oral Daily LEILA Powell NP   100 mg at 08/31/22 1146    [MAR Hold] potassium chloride (KLOR-CON M) extended release tablet 10 mEq  10 mEq Oral Daily LEILA Powell NP   10 mEq at 09/01/22 1016    [MAR Hold] pantoprazole (PROTONIX) tablet 40 mg  40 mg Oral QAM AC LEILA Powell NP   40 mg at 09/01/22 0643    [MAR Hold] oxybutynin (DITROPAN) tablet 5 mg  5 mg Oral BID PRN LEILA Powell NP        [MAR Hold] budesonide-formoterol (SYMBICORT) 80-4.5 MCG/ACT inhaler 2 puff  2 puff Inhalation BID LEILA Powell NP        [facility] Hold] albuterol sulfate HFA (PROVENTIL;VENTOLIN;PROAIR) 108 (90 Base) MCG/ACT inhaler 2 puff  2 puff Inhalation Q6H PRN LEILA Powell NP        [facility] Hold] silver sulfADIAZINE (SILVADENE) 1 % cream   Topical Nightly LEILA Powell NP   Given at 08/30/22 2020    [Held by provider] 0.9 % sodium chloride bolus  1,000 mL IntraVENous Once LEILA Carreon CNP         Facility-Administered Medications Ordered in Other Encounters   Medication Dose Route Frequency Provider Last Rate Last Admin    propofol injection   IntraVENous PRN Roxianne Kocher, DO   20 mg at 09/01/22 1122       Allergies:     Allergies   Allergen Reactions    Quetiapine      Other reaction(s): Unknown  Other reaction(s): Hallucinations  seroquel    Venlafaxine      Other reaction(s): Hallucinations, Unknown  effexor      Aspirin Other (See Comments)     Bleeding disorder  Other reaction(s): Unknown  Bleeding disorder      Fentanyl      Other reaction(s): Unknown    Other Other (See Comments)     \"NO SUPPOSED TO HAVE ASPIRIN\"    Quetiapine Fumarate      Other reaction(s): Unknown    Venlafaxine Hcl      Other reaction(s): Unknown       Problem List:    Patient Active Problem List   Diagnosis Code    Hypokalemia E87.6    CHF (congestive heart failure) ([facility] [address] I50.9    Atrial fibrillation (HCC) I48.91    Hypertension I10    LEONARDA (obstructive sleep apnea) G47.33    Hyperglycemia due to diabetes mellitus (HCC) E11.65    Lymphedema I89.0    Noncompliance Z91.19    CAD (coronary artery disease) I25.10    COPD (chronic obstructive pulmonary disease) (HCC) J44.9    Hyperammonemia (HCC) E72.20    Thrombocytopenia (HCC) D69.6    Chronic anemia D64.9    Hepatic cirrhosis (HCC) K74.60    Fecal impaction (HCC) K56.41    Pancytopenia (HCC) D61.818    Urinary tract infection associated with indwelling urethral catheter (HCC) T83.511A, N39.0    [place]'s syndrome K59.81    Dementia (HCC) F03.90    Diabetes mellitus (HCC) E11.9    Fibromyalgia M79.7    Liver disease K76.9    Panic attack F41.0    Hypotension I95.9    Chronic diastolic CHF (congestive heart failure), NYHA class 3 (HCC) I50.32    Acute urinary retention R33.8    Acute on chronic respiratory failure with hypoxemia (HCC) K08.34    Alcoholic cirrhosis (HCC) A08.25    Anasarca R60.1    Asthma without status asthmaticus J45.909    Bacterial pneumonia J15.9    Cauda equina syndrome (HCC) G83.4    Sepsis (HCC) A41.9    Hydronephrosis N13.30    Hyperlipidemia E78.5    Head contusion S00.93XA    Gastroesophageal reflux disease K21.9    Chronic fatigue syndrome R53.82    Fatigue R53.83    Essential tremor G25.0    Esophageal varices (HCC) I85.00    Epidural abscess G06.2    Displacement of lumbar intervertebral disc without myelopathy M51.26    Degenerative joint disease involving multiple joints M15.9    Diarrhea R19.7    Disorders of both mitral and tricuspid valves I08.1    Cervical spondylosis without myelopathy M47.812    Compression fracture of lumbar vertebra with routine healing S32.000D    Lumbar radiculopathy M54.16    Shock, septic (HCC) A41.9, R65.21       Past Medical History:        Diagnosis Date    Arthritis     CAD (coronary artery disease)     [facility] failure) ([facility]     COPD (chronic obstructive pulmonary disease) (HCC)     Dementia (HCC)     Diabetes mellitus ([facility]     Fibromyalgia     Headache     Hypertension     Liver disease     LEONARDA (obstructive sleep apnea)     Panic attack     Paroxysmal atrial fibrillation (HCC)        Past Surgical History:        Procedure Laterality Date    APPENDECTOMY      CARDIAC CATHETERIZATION      CHOLECYSTECTOMY      HYSTERECTOMY (CERVIX STATUS UNKNOWN)      LAMINECTOMY      OVARY REMOVAL         Social History:    Social History     Tobacco Use    Smoking status: Never    Smokeless tobacco: Never   Substance Use Topics    Alcohol use: Not Currently     Comment: She states she used to be a heavy drinker but she quit about 13 years ago                                Counseling given: Not Answered      Vital Signs (Current):   Vitals:    09/01/22 0800 09/01/22 0900 09/01/22 1000 09/01/22 1100   BP: (!) 108/44 (!) 109/39 (!) 108/43 (!) 100/33   Pulse: 69 67 69 70   Resp: 13 15 13 14   Temp: 97.5 °F (36.4 °C)      TempSrc: Temporal      SpO2: 100% 100% 100% 100%   Weight:       Height:                                                  BP Readings from Last 3 Encounters:   09/01/22 (!) 100/33   08/08/22 131/74   07/19/22 97/80       NPO Status:                                                                                 BMI:   Wt Readings from Last 3 Encounters:   09/01/22 209 lb (94.8 kg)   08/08/22 225 lb (102.1 kg)   07/19/22 219 lb (99.3 kg)     Body mass index is 38.23 kg/m².     CBC:   Lab Results   Component Value Date/Time    WBC 20.5 09/01/2022 03:49 AM    RBC 3.98 09/01/2022 03:49 AM    HGB 10.3 09/01/2022 03:49 AM    HCT 34.9 09/01/2022 03:49 AM    MCV 87.7 09/01/2022 03:49 AM    RDW 21.4 09/01/2022 03:49 AM     09/01/2022 03:49 AM       CMP:   Lab Results   Component Value Date/Time     09/01/2022 03:49 AM    K 2.9 09/01/2022 03:49 AM     09/01/2022 03:49 AM    CO2 18 09/01/2022 03:49 AM    BUN 23 09/01/2022 03:49 AM    CREATININE 0.75 09/01/2022 03:49 AM    GFRAA >60 09/01/2022 03:49 AM    LABGLOM >60 09/01/2022 03:49 AM    GLUCOSE 208 09/01/2022 03:49 AM    PROT 5.8 08/30/2022 07:54 AM    CALCIUM 8.0 09/01/2022 03:49 AM    BILITOT 0.65 08/30/2022 07:54 AM    ALKPHOS 98 08/30/2022 07:54 AM    AST 26 08/30/2022 07:54 AM    ALT 11 08/30/2022 07:54 AM       POC Tests:   Recent Labs     09/01/22  0718   POCGLU 186*       Coags:   Lab Results   Component Value Date/Time    PROTIME 18.6 02/10/2022 06:18 AM    INR 1.6 02/10/2022 06:18 AM    APTT 30.1 02/08/2022 09:15 PM       HCG (If Applicable): No results found for: PREGTESTUR, PREGSERUM, HCG, HCGQUANT     ABGs: No results found for: PHART, PO2ART, KJC7SGS, JXW1WYB, BEART, D4TVOPXN     Type & Screen (If Applicable):  No results found for: LABABO, LABRH    Drug/Infectious Status (If Applicable):  No results found for: HIV, HEPCAB    COVID-19 Screening (If Applicable):   Lab Results   Component Value Date/Time    COVID19 Not Detected 02/08/2022 06:55 PM           Anesthesia Evaluation  Patient summary reviewed and Nursing notes reviewed no history of anesthetic complications:   Airway: Mallampati: II  TM distance: >3 FB   Neck ROM: full  Mouth opening: > = 3 FB   Dental:    (+) edentulous      Pulmonary:   (+) COPD:  sleep apnea:  decreased breath sounds asthma:                            Cardiovascular:  Exercise tolerance: poor (<4 METS),   (+) hypertension:, CAD:, CHF:,           Rate: normal                    Neuro/Psych:   (+) headaches:, psychiatric history:            GI/Hepatic/Renal:   (+) GERD: well controlled, liver disease:,           Endo/Other:    (+) Diabetes, . Abdominal:   (+) obese,           Vascular: Other Findings:           Anesthesia Plan      MAC     ASA 4 - emergent       Induction: intravenous.     MIPS: prophylactic pharmacologic antiemetic agents not administered perioperatively for documented reasons. Anesthetic plan and risks discussed with patient.         Attending anesthesiologist reviewed and agrees with Preprocedure content                Matthias Gabriel DO   9/1/2022

## 2022-09-01 NOTE — PROGRESS NOTES
s/p decompressive colonoscopy   X-ray chest in am  Labs: CBC and BMP in am  DVT prophylaxis with low molecular weight heparin  GI prophylaxis, Protonix  Will follow with you    Mathew Gibson MD, [facility]  Pulmonary Critical Care and Sleep Medicine,  [facility]  Cell: [phone]  Office: [phone]

## 2022-09-01 NOTE — PROGRESS NOTES
End Of Shift Note  [facility]  Summary of shift: 7p-7a    Vitals:    Vitals:    09/01/22 0300 09/01/22 0400 09/01/22 0500 09/01/22 0600   BP: (!) 106/46 (!) 109/45 (!) 106/38 (!) 109/44   Pulse: 67 69 67 68   Resp: 14 14 14 14   Temp:  (!) 96.5 °F (35.8 °C)     TempSrc:  Temporal     SpO2: 100% 100% 100% 100%   Weight:  209 lb (94.8 kg)     Height:            I&O:   Intake/Output Summary (Last 24 hours) at 9/1/2022 0719  Last data filed at 9/1/2022 0648  Gross per 24 hour   Intake 683.1 ml   Output 1585 ml   Net -901.9 ml       Resp Status: 2L NC    Ventilator Settings:    Critical Care IV infusions:   norepinephrine 9 mcg/min (09/01/22 0648)    sodium chloride      dextrose      sodium chloride 50 mL/hr at 09/01/22 0648        LDA:   PICC Double Lumen 74/98/79 Right Cephalic (Active)   Number of days: 0       Peripheral IV 08/29/22 Left Forearm (Active)   Number of days: 2       Urinary Catheter 08/30/22 2 Way (Active)   Number of days: 1       Fecal Management System 08/31/22 (Active)   Number of days: 1       Norepinephrine continues . No c/o pain over night, trazedone for sleep.  Yara Arriaga RN

## 2022-09-02 ENCOUNTER — APPOINTMENT (OUTPATIENT)
Dept: GENERAL RADIOLOGY | Age: 67
DRG: 698 | End: 2022-09-02
Payer: COMMERCIAL

## 2022-09-02 LAB
ABSOLUTE EOS #: 0.22 K/UL (ref 0–0.4)
ABSOLUTE IMMATURE GRANULOCYTE: 0.11 K/UL (ref 0–0.3)
ABSOLUTE LYMPH #: 1.21 K/UL (ref 1–4.8)
ABSOLUTE MONO #: 0.77 K/UL (ref 0.2–0.8)
ANION GAP SERPL CALCULATED.3IONS-SCNC: 13 MMOL/L (ref 9–17)
BASOPHILS # BLD: 0 %
BASOPHILS ABSOLUTE: 0 K/UL (ref 0–0.2)
BUN BLDV-MCNC: 21 MG/DL (ref 8–23)
BUN/CREAT BLD: 29 (ref 9–20)
CALCIUM SERPL-MCNC: 8.3 MG/DL (ref 8.6–10.4)
CHLORIDE BLD-SCNC: 108 MMOL/L (ref 98–107)
CO2: 19 MMOL/L (ref 20–31)
CREAT SERPL-MCNC: 0.72 MG/DL (ref 0.5–0.9)
CULTURE: ABNORMAL
DATE, STOOL #1: ABNORMAL
EOSINOPHILS RELATIVE PERCENT: 2 % (ref 1–4)
GFR AFRICAN AMERICAN: >60 ML/MIN
GFR NON-AFRICAN AMERICAN: >60 ML/MIN
GFR SERPL CREATININE-BSD FRML MDRD: ABNORMAL ML/MIN/{1.73_M2}
GLUCOSE BLD-MCNC: 209 MG/DL (ref 65–105)
GLUCOSE BLD-MCNC: 211 MG/DL (ref 65–105)
GLUCOSE BLD-MCNC: 247 MG/DL (ref 65–105)
GLUCOSE BLD-MCNC: 277 MG/DL (ref 70–99)
GLUCOSE BLD-MCNC: 301 MG/DL (ref 65–105)
HCT VFR BLD CALC: 31.3 % (ref 36.3–47.1)
HEMOCCULT SP1 STL QL: POSITIVE
HEMOGLOBIN: 9.6 G/DL (ref 11.9–15.1)
IMMATURE GRANULOCYTES: 1 %
LACTOFERRIN, QUAL: ABNORMAL
LYMPHOCYTES # BLD: 11 % (ref 24–44)
MAGNESIUM: 2.1 MG/DL (ref 1.6–2.6)
MCH RBC QN AUTO: 26.2 PG (ref 25.2–33.5)
MCHC RBC AUTO-ENTMCNC: 30.7 G/DL (ref 28.4–34.8)
MCV RBC AUTO: 85.3 FL (ref 82.6–102.9)
MONOCYTES # BLD: 7 % (ref 1–7)
MORPHOLOGY: ABNORMAL
NRBC AUTOMATED: 0 PER 100 WBC
PDW BLD-RTO: 21.4 % (ref 11.8–14.4)
PLATELET # BLD: 117 K/UL (ref 138–453)
PMV BLD AUTO: 8.9 FL (ref 8.1–13.5)
POTASSIUM SERPL-SCNC: 2.7 MMOL/L (ref 3.7–5.3)
POTASSIUM SERPL-SCNC: 3.4 MMOL/L (ref 3.7–5.3)
POTASSIUM SERPL-SCNC: 4.1 MMOL/L (ref 3.7–5.3)
RBC # BLD: 3.67 M/UL (ref 3.95–5.11)
SEG NEUTROPHILS: 79 % (ref 36–66)
SEGMENTED NEUTROPHILS ABSOLUTE COUNT: 8.69 K/UL (ref 1.8–7.7)
SODIUM BLD-SCNC: 140 MMOL/L (ref 135–144)
SPECIMEN DESCRIPTION: ABNORMAL
TIME, STOOL #1: 345
WBC # BLD: 11 K/UL (ref 3.5–11.3)

## 2022-09-02 PROCEDURE — 6370000000 HC RX 637 (ALT 250 FOR IP): Performed by: STUDENT IN AN ORGANIZED HEALTH CARE EDUCATION/TRAINING PROGRAM

## 2022-09-02 PROCEDURE — 80048 BASIC METABOLIC PNL TOTAL CA: CPT

## 2022-09-02 PROCEDURE — 82947 ASSAY GLUCOSE BLOOD QUANT: CPT

## 2022-09-02 PROCEDURE — 2700000000 HC OXYGEN THERAPY PER DAY

## 2022-09-02 PROCEDURE — 99233 SBSQ HOSP IP/OBS HIGH 50: CPT | Performed by: NURSE PRACTITIONER

## 2022-09-02 PROCEDURE — 2580000003 HC RX 258: Performed by: STUDENT IN AN ORGANIZED HEALTH CARE EDUCATION/TRAINING PROGRAM

## 2022-09-02 PROCEDURE — 6360000002 HC RX W HCPCS: Performed by: STUDENT IN AN ORGANIZED HEALTH CARE EDUCATION/TRAINING PROGRAM

## 2022-09-02 PROCEDURE — 85025 COMPLETE CBC W/AUTO DIFF WBC: CPT

## 2022-09-02 PROCEDURE — 83735 ASSAY OF MAGNESIUM: CPT

## 2022-09-02 PROCEDURE — 74018 RADEX ABDOMEN 1 VIEW: CPT

## 2022-09-02 PROCEDURE — 84132 ASSAY OF SERUM POTASSIUM: CPT

## 2022-09-02 PROCEDURE — 71045 X-RAY EXAM CHEST 1 VIEW: CPT

## 2022-09-02 PROCEDURE — 6370000000 HC RX 637 (ALT 250 FOR IP): Performed by: NURSE PRACTITIONER

## 2022-09-02 PROCEDURE — 2000000000 HC ICU R&B

## 2022-09-02 PROCEDURE — 2500000003 HC RX 250 WO HCPCS: Performed by: STUDENT IN AN ORGANIZED HEALTH CARE EDUCATION/TRAINING PROGRAM

## 2022-09-02 PROCEDURE — 94761 N-INVAS EAR/PLS OXIMETRY MLT: CPT

## 2022-09-02 RX ORDER — POLYETHYLENE GLYCOL 3350 17 G/17G
17 POWDER, FOR SOLUTION ORAL DAILY
Status: DISCONTINUED | OUTPATIENT
Start: 2022-09-03 | End: 2022-09-23 | Stop reason: HOSPADM

## 2022-09-02 RX ORDER — MIDODRINE HYDROCHLORIDE 5 MG/1
5 TABLET ORAL
Status: DISCONTINUED | OUTPATIENT
Start: 2022-09-02 | End: 2022-09-04

## 2022-09-02 RX ORDER — POTASSIUM CHLORIDE 20 MEQ/1
40 TABLET, EXTENDED RELEASE ORAL ONCE
Status: COMPLETED | OUTPATIENT
Start: 2022-09-02 | End: 2022-09-02

## 2022-09-02 RX ORDER — POTASSIUM CHLORIDE 20 MEQ/1
40 TABLET, EXTENDED RELEASE ORAL ONCE
Status: DISCONTINUED | OUTPATIENT
Start: 2022-09-02 | End: 2022-09-13

## 2022-09-02 RX ADMIN — INSULIN LISPRO 6 UNITS: 100 INJECTION, SOLUTION INTRAVENOUS; SUBCUTANEOUS at 12:00

## 2022-09-02 RX ADMIN — MIDODRINE HYDROCHLORIDE 5 MG: 5 TABLET ORAL at 12:00

## 2022-09-02 RX ADMIN — SODIUM CHLORIDE, PRESERVATIVE FREE 10 ML: 5 INJECTION INTRAVENOUS at 08:20

## 2022-09-02 RX ADMIN — Medication 15 MCG/MIN: at 05:48

## 2022-09-02 RX ADMIN — MIDODRINE HYDROCHLORIDE 5 MG: 5 TABLET ORAL at 08:19

## 2022-09-02 RX ADMIN — MORPHINE SULFATE 1 MG: 2 INJECTION, SOLUTION INTRAMUSCULAR; INTRAVENOUS at 22:41

## 2022-09-02 RX ADMIN — ENOXAPARIN SODIUM 30 MG: 100 INJECTION SUBCUTANEOUS at 08:17

## 2022-09-02 RX ADMIN — POTASSIUM CHLORIDE 20 MEQ: 29.8 INJECTION, SOLUTION INTRAVENOUS at 06:47

## 2022-09-02 RX ADMIN — SERTRALINE 100 MG: 100 TABLET, FILM COATED ORAL at 08:19

## 2022-09-02 RX ADMIN — SODIUM CHLORIDE, PRESERVATIVE FREE 10 ML: 5 INJECTION INTRAVENOUS at 08:10

## 2022-09-02 RX ADMIN — MORPHINE SULFATE 1 MG: 2 INJECTION, SOLUTION INTRAMUSCULAR; INTRAVENOUS at 14:46

## 2022-09-02 RX ADMIN — POTASSIUM CHLORIDE 20 MEQ: 29.8 INJECTION, SOLUTION INTRAVENOUS at 08:15

## 2022-09-02 RX ADMIN — POTASSIUM CHLORIDE 10 MEQ: 1500 TABLET, EXTENDED RELEASE ORAL at 08:19

## 2022-09-02 RX ADMIN — MORPHINE SULFATE 1 MG: 2 INJECTION, SOLUTION INTRAMUSCULAR; INTRAVENOUS at 09:47

## 2022-09-02 RX ADMIN — POTASSIUM CHLORIDE 20 MEQ: 29.8 INJECTION, SOLUTION INTRAVENOUS at 05:43

## 2022-09-02 RX ADMIN — INSULIN LISPRO 2 UNITS: 100 INJECTION, SOLUTION INTRAVENOUS; SUBCUTANEOUS at 17:13

## 2022-09-02 RX ADMIN — POTASSIUM CHLORIDE 40 MEQ: 1500 TABLET, EXTENDED RELEASE ORAL at 06:50

## 2022-09-02 RX ADMIN — PANTOPRAZOLE SODIUM 40 MG: 40 TABLET, DELAYED RELEASE ORAL at 05:46

## 2022-09-02 RX ADMIN — SILVER SULFADIAZINE: 10 CREAM TOPICAL at 20:37

## 2022-09-02 RX ADMIN — CEFEPIME 2000 MG: 2 INJECTION, POWDER, FOR SOLUTION INTRAVENOUS at 20:37

## 2022-09-02 RX ADMIN — ENOXAPARIN SODIUM 30 MG: 100 INJECTION SUBCUTANEOUS at 20:38

## 2022-09-02 RX ADMIN — POTASSIUM CHLORIDE 20 MEQ: 29.8 INJECTION, SOLUTION INTRAVENOUS at 16:45

## 2022-09-02 RX ADMIN — MIDODRINE HYDROCHLORIDE 5 MG: 5 TABLET ORAL at 17:16

## 2022-09-02 RX ADMIN — POTASSIUM CHLORIDE 20 MEQ: 29.8 INJECTION, SOLUTION INTRAVENOUS at 15:30

## 2022-09-02 RX ADMIN — SODIUM CHLORIDE, PRESERVATIVE FREE 10 ML: 5 INJECTION INTRAVENOUS at 20:38

## 2022-09-02 RX ADMIN — INSULIN LISPRO 2 UNITS: 100 INJECTION, SOLUTION INTRAVENOUS; SUBCUTANEOUS at 08:16

## 2022-09-02 RX ADMIN — Medication 14 MCG/MIN: at 22:40

## 2022-09-02 RX ADMIN — CEFEPIME 2000 MG: 2 INJECTION, POWDER, FOR SOLUTION INTRAVENOUS at 09:40

## 2022-09-02 ASSESSMENT — PAIN DESCRIPTION - DESCRIPTORS
DESCRIPTORS: ACHING;DISCOMFORT
DESCRIPTORS: DISCOMFORT
DESCRIPTORS: ACHING;DISCOMFORT

## 2022-09-02 ASSESSMENT — PAIN DESCRIPTION - FREQUENCY: FREQUENCY: INTERMITTENT

## 2022-09-02 ASSESSMENT — PAIN SCALES - GENERAL
PAINLEVEL_OUTOF10: 3
PAINLEVEL_OUTOF10: 0
PAINLEVEL_OUTOF10: 0
PAINLEVEL_OUTOF10: 7
PAINLEVEL_OUTOF10: 5
PAINLEVEL_OUTOF10: 8
PAINLEVEL_OUTOF10: 8
PAINLEVEL_OUTOF10: 7

## 2022-09-02 ASSESSMENT — PAIN DESCRIPTION - LOCATION
LOCATION: BUTTOCKS
LOCATION: BUTTOCKS
LOCATION: GENERALIZED
LOCATION: BUTTOCKS

## 2022-09-02 ASSESSMENT — PAIN - FUNCTIONAL ASSESSMENT: PAIN_FUNCTIONAL_ASSESSMENT: ACTIVITIES ARE NOT PREVENTED

## 2022-09-02 NOTE — PROGRESS NOTES
Pulmonary Critical Care Progress Note  Marlo Hong MD     Patient seen for the follow up of  Urinary tract infection associated with indwelling urethral catheter ([address]     Subjective:  No significant overnight events noted. She is fairly sleepy right now she just received some pain medicine. She remains on Levophed drip, currently at 16 mics. She has FMS in place for decompression. She had decompression colonoscopy yesterday. She denies any significant shortness of breath. No chest pain. Mild occasional cough, mostly dry. Examination:  Vitals: BP (!) 125/46   Pulse 80   Temp 97.9 °F (36.6 °C) (Tympanic)   Resp 14   Ht 5' 2\" (1.575 m)   Wt 214 lb 9.6 oz (97.3 kg)   SpO2 97%   BMI 39.25 kg/m²   General appearance: Sleepy but arousable  Neck: No JVD  Lungs: moderate air exchange, + basal crackles   Heart: regular rate and rhythm, S1, S2 normal, no gallop  Abdomen: Soft, non tender, + BS  Extremities: no cyanosis or clubbing. No significant edema, scratches bilateral lower legs     LABs:  CBC:   Recent Labs     09/01/22  0349 09/02/22  0345   WBC 20.5* 11.0   HGB 10.3* 9.6*   HCT 34.9* 31.3*   * 117*     BMP:   Recent Labs     09/01/22  0349 09/02/22  0345    140   K 2.9* 2.7*   CO2 18* 19*   BUN 23 21   CREATININE 0.75 0.72   LABGLOM >60 >60   GLUCOSE 208* 277*     ABG:  Lab Results   Component Value Date/Time    FIO2 NOT REPORTED 02/08/2022 07:10 PM       Lab Results   Component Value Date/Time    FIO2 NOT REPORTED 02/08/2022 07:10 PM     Radiology:  9/2/22 CXR      Impression:  Chronic hypoxic respiratory failure  Hypotension/Septic Shock requiring vasopressors   UTI/Indwelling angeles  Juan Jose's syndrome   Obstructive sleep apnea/Obesity  [place]   COPD ?   A. fib, CAD, CHF, DM, dementia, HTN, liver cirrhosis, esophageal varices    Recommendations:  Oxygen via nasal cannula, keep SPO2 90% or greater  Continue Levophed, wean as able, keep MAP 65 to 75 mmHg   Continue ProAmatine  IV fluids at 50 ml/hr  Continue cefepime   Continue Symbicort   Electrolyte replacement per sliding scale  Colorectal surgery following, s/p decompressive colonoscopy. Continue FMS for decompression.   X-ray chest in am  Labs: CBC and BMP in am  DVT prophylaxis with low molecular weight heparin  GI prophylaxis, Protonix  Will follow with you    Electronically signed by     Zac Vo MD on 9/2/2022 at 1:58 PM  Pulmonary Critical Care and Sleep Medicine,  [facility]  Cell: [phone]  Office: [phone]

## 2022-09-02 NOTE — PROGRESS NOTES
Colorectal Surgery:  Daily Progress Note                    PATIENT NAME: Horace Mcduffie     TODAY'S DATE: 9/2/2022  CC:  abdominal discomfort     SUBJECTIVE:     Pt seen and examined at bedside. Afebrile, HR 70's, requiring 15 of levo for pressor support. On palpation, no discomfort. No emesis. Rectal tube with liquid stool-100cc since placement after colonoscopy yesterday. KUB pending this morning.    OBJECTIVE:   VITALS:  BP (!) 97/35   Pulse 72   Temp 97.8 °F (36.6 °C) (Temporal)   Resp 14   Ht 5' 2\" (1.575 m)   Wt 214 lb 9.6 oz (97.3 kg)   SpO2 100%   BMI 39.25 kg/m²      INTAKE/OUTPUT:      Intake/Output Summary (Last 24 hours) at 9/2/2022 0708  Last data filed at 9/2/2022 0600  Gross per 24 hour   Intake 1883.61 ml   Output 1050 ml   Net 833.61 ml       PHYSICAL EXAM:  General Appearance: alert, oriented to self and situation, obese  HEENT:  Normocephalic, atraumatic, mucus membranes moist   Heart: Heart regular rate and rhythm  Lungs: No chest wall tenderness., Breathing Pattern: regular, no distress  Abdomen: Distended, minimal diffuse tenderness, tympanic, no peritoneal signs   Extremities: dependent edema to BLE  Skin: rash to BLE pretibial area    Data:  CBC with Differential:    Lab Results   Component Value Date/Time    WBC 11.0 09/02/2022 03:45 AM    RBC 3.67 09/02/2022 03:45 AM    HGB 9.6 09/02/2022 03:45 AM    HCT 31.3 09/02/2022 03:45 AM     09/02/2022 03:45 AM    MCV 85.3 09/02/2022 03:45 AM    MCH 26.2 09/02/2022 03:45 AM    MCHC 30.7 09/02/2022 03:45 AM    RDW 21.4 09/02/2022 03:45 AM    LYMPHOPCT PENDING 09/02/2022 03:45 AM    MONOPCT PENDING 09/02/2022 03:45 AM    BASOPCT PENDING 09/02/2022 03:45 AM    MONOSABS PENDING 09/02/2022 03:45 AM    LYMPHSABS PENDING 09/02/2022 03:45 AM    EOSABS PENDING 09/02/2022 03:45 AM    BASOSABS PENDING 09/02/2022 03:45 AM    DIFFTYPE NOT REPORTED 02/09/2022 01:43 PM     BMP:    Lab Results   Component Value Date/Time     09/02/2022 03:45 AM

## 2022-09-02 NOTE — PLAN OF CARE
Problem: Discharge Planning  Goal: Discharge to home or other facility with appropriate resources  9/2/2022 1823 by Tian Gutierrez RN  Outcome: Progressing  Flowsheets (Taken 9/2/2022 0800)  Discharge to home or other facility with appropriate resources: Identify barriers to discharge with patient and caregiver  9/2/2022 0615 by Pao Coffey RN  Outcome: Progressing  Flowsheets (Taken 9/1/2022 1945)  Discharge to home or other facility with appropriate resources: Identify barriers to discharge with patient and caregiver     Problem: Pain  Goal: Verbalizes/displays adequate comfort level or baseline comfort level  9/2/2022 1823 by Tian Gutierrez RN  Outcome: Progressing  9/2/2022 0615 by Pao Coffey RN  Outcome: Progressing  Flowsheets (Taken 9/1/2022 1945)  Verbalizes/displays adequate comfort level or baseline comfort level: Encourage patient to monitor pain and request assistance     Problem: Skin/Tissue Integrity  Goal: Absence of new skin breakdown  Description: 1. Monitor for areas of redness and/or skin breakdown  2. Assess vascular access sites hourly  3. Every 4-6 hours minimum:  Change oxygen saturation probe site  4. Every 4-6 hours:  If on nasal continuous positive airway pressure, respiratory therapy assess nares and determine need for appliance change or resting period.   9/2/2022 1823 by Tian Gutierrez RN  Outcome: Progressing  9/2/2022 0615 by Pao Coffey RN  Outcome: Progressing     Problem: Safety - Adult  Goal: Free from fall injury  9/2/2022 1823 by Tian Gutierrez RN  Outcome: Progressing  9/2/2022 0615 by Pao Coffey RN  Outcome: Progressing  Flowsheets (Taken 9/1/2022 1945)  Free From Fall Injury: Instruct family/caregiver on patient safety     Problem: ABCDS Injury Assessment  Goal: Absence of physical injury  9/2/2022 1823 by Tian Gutierrez RN  Outcome: Progressing  9/2/2022 0615 by Pao Coffey RN  Outcome: Progressing  Flowsheets (Taken 9/1/2022 1945)  Absence of Physical Injury: Implement safety measures based on patient assessment     Problem: Chronic Conditions and Co-morbidities  Goal: Patient's chronic conditions and co-morbidity symptoms are monitored and maintained or improved  9/2/2022 1823 by Dixon Almodovar RN  Outcome: Progressing  Flowsheets (Taken 9/2/2022 0800)  Care Plan - Patient's Chronic Conditions and Co-Morbidity Symptoms are Monitored and Maintained or Improved: Monitor and assess patient's chronic conditions and comorbid symptoms for stability, deterioration, or improvement  9/2/2022 0615 by Cari Gibbons RN  Outcome: Progressing  Flowsheets (Taken 9/1/2022 1945)  Care Plan - Patient's Chronic Conditions and Co-Morbidity Symptoms are Monitored and Maintained or Improved: Monitor and assess patient's chronic conditions and comorbid symptoms for stability, deterioration, or improvement     Problem: Nutrition Deficit:  Goal: Optimize nutritional status  Outcome: Progressing

## 2022-09-02 NOTE — PROGRESS NOTES
[facility]  PROGRESS NOTE    Room # 2029/2029-01   Name: Davi Medrano              Reason for visit: Routine    I visited the patient. Admit Date & Time: 8/29/2022  8:35 PM    Assessment:  Davi Medrano is a 74 y.o. female. Upon entering the room patient was sleeping. Intervention:   provided a ministry presence and brief prayer. Outcome:  Patient did not respond. Plan:  Chaplains will remain available to offer spiritual and emotional support as needed.     Electronically signed by Richy Gomez on 9/2/2022 at 8:45 AM.  Joseph           09/02/22 0730   Encounter Summary   Encounter Overview/Reason  Initial Encounter   Service Provided For: Patient   Referral/Consult From: [facility]   Support System Unknown   Last Encounter    (9/1/2022)   Complexity of Encounter Low   Begin Time 0730   End Time  0731   Total Time Calculated 1 min   Encounter    Type Initial Screen/Assessment   Assessment/Intervention/Outcome   Assessment Unable to assess   Intervention Prayer (assurance of)/[place]   Outcome Did not respond

## 2022-09-02 NOTE — PLAN OF CARE
Problem: Discharge Planning  Goal: Discharge to home or other facility with appropriate resources  9/2/2022 0615 by Phyllis Rasmussen RN  Outcome: Progressing  Flowsheets (Taken 9/1/2022 1945)  Discharge to home or other facility with appropriate resources: Identify barriers to discharge with patient and caregiver  9/1/2022 1836 by Klarissa Durán RN  Outcome: Progressing     Problem: Pain  Goal: Verbalizes/displays adequate comfort level or baseline comfort level  9/2/2022 0615 by Phyllis Rasmussen RN  Outcome: Progressing  Flowsheets (Taken 9/1/2022 1945)  Verbalizes/displays adequate comfort level or baseline comfort level: Encourage patient to monitor pain and request assistance  9/1/2022 1836 by Klarissa Durán RN  Outcome: Progressing     Problem: Skin/Tissue Integrity  Goal: Absence of new skin breakdown  Description: 1. Monitor for areas of redness and/or skin breakdown  2. Assess vascular access sites hourly  3. Every 4-6 hours minimum:  Change oxygen saturation probe site  4. Every 4-6 hours:  If on nasal continuous positive airway pressure, respiratory therapy assess nares and determine need for appliance change or resting period.   9/2/2022 0615 by Phyllis Rasmussen RN  Outcome: Progressing  9/1/2022 1836 by Klarissa Durán RN  Outcome: Progressing     Problem: Safety - Adult  Goal: Free from fall injury  9/2/2022 0615 by Phyllis Rasmussen RN  Outcome: Progressing  Flowsheets (Taken 9/1/2022 1945)  Free From Fall Injury: Instruct family/caregiver on patient safety  9/1/2022 1836 by Klarissa Durán RN  Outcome: Progressing     Problem: ABCDS Injury Assessment  Goal: Absence of physical injury  9/2/2022 0615 by Phyllis Rasmussen RN  Outcome: Progressing  Flowsheets (Taken 9/1/2022 1945)  Absence of Physical Injury: Implement safety measures based on patient assessment  9/1/2022 1836 by Klarissa Durán RN  Outcome: Progressing     Problem: Chronic Conditions and Co-morbidities  Goal: Patient's chronic conditions and co-morbidity symptoms are monitored and maintained or improved  9/2/2022 0615 by Aaron Garsai, RN  Outcome: Progressing  Flowsheets (Taken 9/1/2022 1945)  Care Plan - Patient's Chronic Conditions and Co-Morbidity Symptoms are Monitored and Maintained or Improved: Monitor and assess patient's chronic conditions and comorbid symptoms for stability, deterioration, or improvement  9/1/2022 1836 by Mark Farooq, RN  Outcome: Progressing
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
## 2022-09-03 ENCOUNTER — APPOINTMENT (OUTPATIENT)
Dept: GENERAL RADIOLOGY | Age: 67
DRG: 698 | End: 2022-09-03
Payer: COMMERCIAL

## 2022-09-03 ENCOUNTER — APPOINTMENT (OUTPATIENT)
Dept: CT IMAGING | Age: 67
DRG: 698 | End: 2022-09-03
Payer: COMMERCIAL

## 2022-09-03 PROBLEM — D72.829 LEUKOCYTOSIS: Status: ACTIVE | Noted: 2022-09-03

## 2022-09-03 PROBLEM — R29.90 STROKE-LIKE SYMPTOMS: Status: ACTIVE | Noted: 2022-09-03

## 2022-09-03 PROBLEM — G93.40 ENCEPHALOPATHY ACUTE: Status: ACTIVE | Noted: 2022-09-03

## 2022-09-03 LAB
ABSOLUTE EOS #: 0.2 K/UL (ref 0–0.4)
ABSOLUTE IMMATURE GRANULOCYTE: 0.1 K/UL (ref 0–0.3)
ABSOLUTE LYMPH #: 1.12 K/UL (ref 1–4.8)
ABSOLUTE MONO #: 1.02 K/UL (ref 0.2–0.8)
ANION GAP SERPL CALCULATED.3IONS-SCNC: 8 MMOL/L (ref 9–17)
BASOPHILS # BLD: 1 %
BASOPHILS ABSOLUTE: 0.1 K/UL (ref 0–0.2)
BUN BLDV-MCNC: 19 MG/DL (ref 8–23)
BUN/CREAT BLD: 26 (ref 9–20)
CALCIUM SERPL-MCNC: 8.3 MG/DL (ref 8.6–10.4)
CHLORIDE BLD-SCNC: 110 MMOL/L (ref 98–107)
CO2: 19 MMOL/L (ref 20–31)
CREAT SERPL-MCNC: 0.72 MG/DL (ref 0.5–0.9)
EOSINOPHILS RELATIVE PERCENT: 2 % (ref 1–4)
GFR AFRICAN AMERICAN: >60 ML/MIN
GFR NON-AFRICAN AMERICAN: >60 ML/MIN
GFR SERPL CREATININE-BSD FRML MDRD: ABNORMAL ML/MIN/{1.73_M2}
GLUCOSE BLD-MCNC: 223 MG/DL (ref 65–105)
GLUCOSE BLD-MCNC: 234 MG/DL (ref 65–105)
GLUCOSE BLD-MCNC: 252 MG/DL (ref 65–105)
GLUCOSE BLD-MCNC: 288 MG/DL (ref 70–99)
GLUCOSE BLD-MCNC: 355 MG/DL (ref 65–105)
HCT VFR BLD CALC: 29.7 % (ref 36.3–47.1)
HEMOGLOBIN: 9.2 G/DL (ref 11.9–15.1)
IMMATURE GRANULOCYTES: 1 %
LYMPHOCYTES # BLD: 11 % (ref 24–44)
MCH RBC QN AUTO: 25.7 PG (ref 25.2–33.5)
MCHC RBC AUTO-ENTMCNC: 31 G/DL (ref 28.4–34.8)
MCV RBC AUTO: 83 FL (ref 82.6–102.9)
MONOCYTES # BLD: 10 % (ref 1–7)
MORPHOLOGY: ABNORMAL
NRBC AUTOMATED: 0 PER 100 WBC
PDW BLD-RTO: 21.5 % (ref 11.8–14.4)
PLATELET # BLD: 105 K/UL (ref 138–453)
PMV BLD AUTO: 8.8 FL (ref 8.1–13.5)
POTASSIUM SERPL-SCNC: 3.8 MMOL/L (ref 3.7–5.3)
RBC # BLD: 3.58 M/UL (ref 3.95–5.11)
SEG NEUTROPHILS: 75 % (ref 36–66)
SEGMENTED NEUTROPHILS ABSOLUTE COUNT: 7.66 K/UL (ref 1.8–7.7)
SODIUM BLD-SCNC: 137 MMOL/L (ref 135–144)
WBC # BLD: 10.2 K/UL (ref 3.5–11.3)

## 2022-09-03 PROCEDURE — 80048 BASIC METABOLIC PNL TOTAL CA: CPT

## 2022-09-03 PROCEDURE — 2580000003 HC RX 258: Performed by: STUDENT IN AN ORGANIZED HEALTH CARE EDUCATION/TRAINING PROGRAM

## 2022-09-03 PROCEDURE — 70498 CT ANGIOGRAPHY NECK: CPT

## 2022-09-03 PROCEDURE — 6360000004 HC RX CONTRAST MEDICATION: Performed by: FAMILY MEDICINE

## 2022-09-03 PROCEDURE — 82947 ASSAY GLUCOSE BLOOD QUANT: CPT

## 2022-09-03 PROCEDURE — 6360000002 HC RX W HCPCS: Performed by: STUDENT IN AN ORGANIZED HEALTH CARE EDUCATION/TRAINING PROGRAM

## 2022-09-03 PROCEDURE — APPSS45 APP SPLIT SHARED TIME 31-45 MINUTES: Performed by: NURSE PRACTITIONER

## 2022-09-03 PROCEDURE — 85025 COMPLETE CBC W/AUTO DIFF WBC: CPT

## 2022-09-03 PROCEDURE — 94761 N-INVAS EAR/PLS OXIMETRY MLT: CPT

## 2022-09-03 PROCEDURE — 2500000003 HC RX 250 WO HCPCS: Performed by: NURSE PRACTITIONER

## 2022-09-03 PROCEDURE — 92610 EVALUATE SWALLOWING FUNCTION: CPT

## 2022-09-03 PROCEDURE — 2000000000 HC ICU R&B

## 2022-09-03 PROCEDURE — 6370000000 HC RX 637 (ALT 250 FOR IP): Performed by: STUDENT IN AN ORGANIZED HEALTH CARE EDUCATION/TRAINING PROGRAM

## 2022-09-03 PROCEDURE — 71045 X-RAY EXAM CHEST 1 VIEW: CPT

## 2022-09-03 PROCEDURE — 2700000000 HC OXYGEN THERAPY PER DAY

## 2022-09-03 PROCEDURE — 99449 NTRPROF PH1/NTRNET/EHR 31/>: CPT | Performed by: PSYCHIATRY & NEUROLOGY

## 2022-09-03 PROCEDURE — 6360000002 HC RX W HCPCS: Performed by: NURSE PRACTITIONER

## 2022-09-03 PROCEDURE — 70450 CT HEAD/BRAIN W/O DYE: CPT

## 2022-09-03 PROCEDURE — 92523 SPEECH SOUND LANG COMPREHEN: CPT

## 2022-09-03 PROCEDURE — 6370000000 HC RX 637 (ALT 250 FOR IP): Performed by: NURSE PRACTITIONER

## 2022-09-03 PROCEDURE — 2580000003 HC RX 258: Performed by: FAMILY MEDICINE

## 2022-09-03 PROCEDURE — 4A03X5D MEASUREMENT OF ARTERIAL FLOW, INTRACRANIAL, EXTERNAL APPROACH: ICD-10-PCS | Performed by: INTERNAL MEDICINE

## 2022-09-03 RX ORDER — INSULIN LISPRO 100 [IU]/ML
0-8 INJECTION, SOLUTION INTRAVENOUS; SUBCUTANEOUS EVERY 4 HOURS
Status: DISCONTINUED | OUTPATIENT
Start: 2022-09-03 | End: 2022-09-04

## 2022-09-03 RX ORDER — POLYETHYLENE GLYCOL 3350 17 G/17G
17 POWDER, FOR SOLUTION ORAL DAILY
Qty: 527 G | Refills: 1 | Status: SHIPPED | OUTPATIENT
Start: 2022-09-03

## 2022-09-03 RX ORDER — SODIUM CHLORIDE 0.9 % (FLUSH) 0.9 %
10 SYRINGE (ML) INJECTION PRN
Status: DISCONTINUED | OUTPATIENT
Start: 2022-09-03 | End: 2022-09-23 | Stop reason: HOSPADM

## 2022-09-03 RX ORDER — 0.9 % SODIUM CHLORIDE 0.9 %
80 INTRAVENOUS SOLUTION INTRAVENOUS ONCE
Status: COMPLETED | OUTPATIENT
Start: 2022-09-03 | End: 2022-09-03

## 2022-09-03 RX ORDER — POLYETHYLENE GLYCOL 3350 17 G/17G
17 POWDER, FOR SOLUTION ORAL DAILY
Qty: 510 G | Refills: 0 | Status: SHIPPED | OUTPATIENT
Start: 2022-09-03 | End: 2022-10-03

## 2022-09-03 RX ORDER — FUROSEMIDE 10 MG/ML
20 INJECTION INTRAMUSCULAR; INTRAVENOUS ONCE
Status: COMPLETED | OUTPATIENT
Start: 2022-09-03 | End: 2022-09-03

## 2022-09-03 RX ADMIN — INSULIN LISPRO 2 UNITS: 100 INJECTION, SOLUTION INTRAVENOUS; SUBCUTANEOUS at 12:23

## 2022-09-03 RX ADMIN — Medication 11 MCG/MIN: at 21:58

## 2022-09-03 RX ADMIN — CEFEPIME 2000 MG: 2 INJECTION, POWDER, FOR SOLUTION INTRAVENOUS at 20:31

## 2022-09-03 RX ADMIN — POTASSIUM CHLORIDE: 2 INJECTION, SOLUTION, CONCENTRATE INTRAVENOUS at 18:29

## 2022-09-03 RX ADMIN — INSULIN LISPRO 4 UNITS: 100 INJECTION, SOLUTION INTRAVENOUS; SUBCUTANEOUS at 08:27

## 2022-09-03 RX ADMIN — IOPAMIDOL 75 ML: 755 INJECTION, SOLUTION INTRAVENOUS at 09:20

## 2022-09-03 RX ADMIN — SODIUM CHLORIDE, PRESERVATIVE FREE 10 ML: 5 INJECTION INTRAVENOUS at 09:21

## 2022-09-03 RX ADMIN — POTASSIUM CHLORIDE 20 MEQ: 29.8 INJECTION, SOLUTION INTRAVENOUS at 08:25

## 2022-09-03 RX ADMIN — ONDANSETRON 4 MG: 2 INJECTION INTRAMUSCULAR; INTRAVENOUS at 12:21

## 2022-09-03 RX ADMIN — I.V. FAT EMULSION 100 ML: 20 EMULSION INTRAVENOUS at 18:31

## 2022-09-03 RX ADMIN — FUROSEMIDE 20 MG: 10 INJECTION, SOLUTION INTRAMUSCULAR; INTRAVENOUS at 14:59

## 2022-09-03 RX ADMIN — SILVER SULFADIAZINE: 10 CREAM TOPICAL at 19:49

## 2022-09-03 RX ADMIN — ENOXAPARIN SODIUM 30 MG: 100 INJECTION SUBCUTANEOUS at 19:49

## 2022-09-03 RX ADMIN — INSULIN LISPRO 8 UNITS: 100 INJECTION, SOLUTION INTRAVENOUS; SUBCUTANEOUS at 23:18

## 2022-09-03 RX ADMIN — SODIUM CHLORIDE, PRESERVATIVE FREE 10 ML: 5 INJECTION INTRAVENOUS at 19:49

## 2022-09-03 RX ADMIN — INSULIN LISPRO 2 UNITS: 100 INJECTION, SOLUTION INTRAVENOUS; SUBCUTANEOUS at 19:49

## 2022-09-03 RX ADMIN — SODIUM CHLORIDE, PRESERVATIVE FREE 10 ML: 5 INJECTION INTRAVENOUS at 08:28

## 2022-09-03 RX ADMIN — INSULIN LISPRO 2 UNITS: 100 INJECTION, SOLUTION INTRAVENOUS; SUBCUTANEOUS at 16:56

## 2022-09-03 RX ADMIN — CEFEPIME 2000 MG: 2 INJECTION, POWDER, FOR SOLUTION INTRAVENOUS at 09:58

## 2022-09-03 RX ADMIN — SODIUM CHLORIDE 80 ML: 0.9 INJECTION, SOLUTION INTRAVENOUS at 09:21

## 2022-09-03 RX ADMIN — ENOXAPARIN SODIUM 30 MG: 100 INJECTION SUBCUTANEOUS at 08:27

## 2022-09-03 ASSESSMENT — PAIN SCALES - GENERAL: PAINLEVEL_OUTOF10: 0

## 2022-09-03 NOTE — PROGRESS NOTES
Per day shift RN, patient began coughing and gagging with clear liquids with 1700 meds. [facility] made aware. Pt now NPO with ice chips only. Upon evening assessment, pt remains weak and lethargic with difficulty swallowing. Will continue to monitor.

## 2022-09-03 NOTE — PROGRESS NOTES
PULMONARY PROGRESS NOTE:    REASON FOR VISIT: UTI, hypotension, resp failure  Interval History:    Not reliable, sleepy  Shortness of Breath: no  Cough: +  Sputum: no          Hemoptysis: no  Chest Pain: no  Fever: no                   Swelling Feet: no  Headache: no                                           Nausea, Emesis, Abdominal Pain: no  Diarrhea: no         Constipation: no    Events since last visit: on levophed  PAST MEDICAL HISTORY:      Scheduled Meds:   insulin lispro  0-8 Units SubCUTAneous Q4H    midodrine  5 mg Oral TID WC    polyethylene glycol  17 g Oral Daily    potassium chloride  40 mEq Oral Once    cefepime  2,000 mg IntraVENous Q12H    traZODone  100 mg Oral Nightly    sodium chloride flush  5-40 mL IntraVENous 2 times per day    enoxaparin  30 mg SubCUTAneous BID    sertraline  100 mg Oral Daily    potassium chloride  10 mEq Oral Daily    pantoprazole  40 mg Oral QAM AC    budesonide-formoterol  2 puff Inhalation BID    silver sulfADIAZINE   Topical Nightly    [Held by provider] sodium chloride  1,000 mL IntraVENous Once     Continuous Infusions:   norepinephrine 11 mcg/min (09/03/22 0958)    sodium chloride 50 mL/hr at 09/03/22 0608    dextrose      sodium chloride 20 mL/hr at 09/01/22 1653     PRN Meds:sodium chloride flush, potassium chloride, morphine, sodium chloride flush, sodium chloride, magnesium sulfate, ondansetron **OR** ondansetron, acetaminophen **OR** acetaminophen, glucose, dextrose bolus **OR** dextrose bolus, glucagon (rDNA), dextrose, oxybutynin, albuterol sulfate HFA        PHYSICAL EXAMINATION:  BP (!) 111/49   Pulse 74   Temp 98.1 °F (36.7 °C) (Temporal)   Resp 14   Ht 5' 2\" (1.575 m)   Wt 215 lb (97.5 kg)   SpO2 100%   BMI 39.32 kg/m²     General : sleepy, on levophed   Neck - supple, no lymphadenopathy, JVD not raised  Heart - regular rhythm, S1 and S2 normal; no additional sounds heard  Lungs - Air Entry- fair bilaterally; breath sounds : vesicular; rales/crackles - absent  Abdomen - soft, no tenderness  Upper Extremities  - no cyanosis, mottling; edema : absent  Lower Extremities: no cyanosis, mottling; edema : absent    Current Laboratory, Radiologic, Microbiologic, and Diagnostic studies reviewed  Data ReviewCBC:   Recent Labs     09/01/22 0349 09/02/22  0345 09/03/22  0400   WBC 20.5* 11.0 10.2   RBC 3.98 3.67* 3.58*   HGB 10.3* 9.6* 9.2*   HCT 34.9* 31.3* 29.7*   * 117* 105*     BMP:   Recent Labs     09/01/22  0349 09/02/22  0345 09/02/22  1320 09/02/22  1835 09/03/22  0400   GLUCOSE 208* 277*  --   --  288*    140  --   --  137   K 2.9* 2.7* 3.4* 4.1 3.8   BUN 23 21  --   --  19   CREATININE 0.75 0.72  --   --  0.72   CALCIUM 8.0* 8.3*  --   --  8.3*     ABGs: No results for input(s): PHART, PO2ART, AIN1PFS, VNP1MWC, BEART, V9LOTSKW, EAS1EQP in the last 72 hours. PT/INR:  No results found for: PTINR      Impression:  Chronic hypoxic respiratory failure - wean O2  Hypotension/Septic Shock requiring vasopressors - wean levophed, keep AMP >65  UTI/Indwelling angeles  Juan Jose's syndrome   Obstructive sleep apnea/Obesity  [place]   COPD ? A. fib, CAD, CHF, DM, dementia, HTN, liver cirrhosis, esophageal varices    Recommendations:  Oxygen via nasal cannula, keep SPO2 90% or greater  Continue Levophed, wean as able, keep MAP 65 to 75 mmHg   Continue ProAmatine  IV fluids at 50 ml/hr  Continue cefepime   Continue Symbicort   Electrolyte replacement per sliding scale  Colorectal surgery following, s/p decompressive colonoscopy. Continue FMS for decompression.   DVT prophylaxis with low molecular weight heparin  GI prophylaxis, Protonix    Electronically signed by Antonia Wheeler MD on 09/03/22 at 11:56 AM.

## 2022-09-03 NOTE — PROGRESS NOTES
Stroke alert called on patient. Patient showing signs of new confusion, not following commands, and less verbal. Sonia Duncan would not squeeze my hands but did wiggle toes. ICU nurse performed NIH and rated her 13. Dr Tacos Morton notified and arrived at bedside. CT head and CTA head/neck ordered.  Pt remains NPO due to coughing with water at final assessment 9/2 5:30 pm.
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
## 2022-09-03 NOTE — PROGRESS NOTES
Colorectal surgery team to sign off at this time. Please call with any questions or concerns.     Rex Davis,  PGY-2  9/3/22 8:19 AM
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
## 2022-09-03 NOTE — PLAN OF CARE
Problem: Discharge Planning  Goal: Discharge to home or other facility with appropriate resources  9/3/2022 0343 by Papa Mcknight RN  Outcome: Progressing  Flowsheets (Taken 9/3/2022 0300)  Discharge to home or other facility with appropriate resources:   Identify barriers to discharge with patient and caregiver   Arrange for needed discharge resources and transportation as appropriate   Identify discharge learning needs (meds, wound care, etc)   Arrange for interpreters to assist at discharge as needed   Refer to discharge planning if patient needs post-hospital services based on physician order or complex needs related to functional status, cognitive ability or social support system     Problem: Skin/Tissue Integrity  Goal: Absence of new skin breakdown  Description: 1. Monitor for areas of redness and/or skin breakdown  2. Assess vascular access sites hourly  3. Every 4-6 hours minimum:  Change oxygen saturation probe site  4. Every 4-6 hours:  If on nasal continuous positive airway pressure, respiratory therapy assess nares and determine need for appliance change or resting period.   9/3/2022 0343 by Papa Mcknight RN  Outcome: Progressing     Problem: Safety - Adult  Goal: Free from fall injury  9/3/2022 0343 by Papa Mcknight RN  Outcome: Progressing  Flowsheets (Taken 9/2/2022 1824 by Jaxson Sanchez RN)  Free From Fall Injury: Instruct family/caregiver on patient safety     Problem: Chronic Conditions and Co-morbidities  Goal: Patient's chronic conditions and co-morbidity symptoms are monitored and maintained or improved  9/3/2022 0343 by Papa Mcknight RN  Outcome: Progressing  Flowsheets (Taken 9/3/2022 0300)  Care Plan - Patient's Chronic Conditions and Co-Morbidity Symptoms are Monitored and Maintained or Improved:   Monitor and assess patient's chronic conditions and comorbid symptoms for stability, deterioration, or improvement   Collaborate with multidisciplinary team to address chronic and comorbid conditions and prevent exacerbation or deterioration   Update acute care plan with appropriate goals if chronic or comorbid symptoms are exacerbated and prevent overall improvement and discharge    Outcome: Progressing  Flowsheets (Taken 9/2/2022 0800)  Care Plan - Patient's Chronic Conditions and Co-Morbidity Symptoms are Monitored and Maintained or Improved: Monitor and assess patient's chronic conditions and comorbid symptoms for stability, deterioration, or improvement

## 2022-09-03 NOTE — PROGRESS NOTES
Colorectal Surgery:  Daily Progress Note                    PATIENT NAME: Behzad Bone     TODAY'S DATE: 9/3/2022  CC:  abdominal discomfort     SUBJECTIVE:     Pt seen and examined at bedside. Afebrile, HR 80's, requiring 13 of levo for pressor support. On palpation, no discomfort. No emesis. Rectal tube with liquid stool-50cc out overnight. KUB pending this morning.      OBJECTIVE:   VITALS:  BP (!) 133/49   Pulse 84   Temp 98.2 °F (36.8 °C) (Temporal)   Resp 15   Ht 5' 2\" (1.575 m)   Wt 215 lb (97.5 kg)   SpO2 99%   BMI 39.32 kg/m²      INTAKE/OUTPUT:      Intake/Output Summary (Last 24 hours) at 9/3/2022 0746  Last data filed at 9/3/2022 0608  Gross per 24 hour   Intake 1723.19 ml   Output 1000 ml   Net 723.19 ml       PHYSICAL EXAM:  General Appearance: alert, oriented to self and situation, obese  HEENT:  Normocephalic, atraumatic, mucus membranes moist   Heart: Heart regular rate and rhythm  Lungs: No chest wall tenderness., Breathing Pattern: regular, no distress  Abdomen: Distended, minimal diffuse tenderness, tympanic, no peritoneal signs   Extremities: dependent edema to BLE  Skin: rash to BLE pretibial area    Data:  CBC with Differential:    Lab Results   Component Value Date/Time    WBC 10.2 09/03/2022 04:00 AM    RBC 3.58 09/03/2022 04:00 AM    HGB 9.2 09/03/2022 04:00 AM    HCT 29.7 09/03/2022 04:00 AM     09/03/2022 04:00 AM    MCV 83.0 09/03/2022 04:00 AM    MCH 25.7 09/03/2022 04:00 AM    MCHC 31.0 09/03/2022 04:00 AM    RDW 21.5 09/03/2022 04:00 AM    LYMPHOPCT 11 09/03/2022 04:00 AM    MONOPCT 10 09/03/2022 04:00 AM    BASOPCT 1 09/03/2022 04:00 AM    MONOSABS 1.02 09/03/2022 04:00 AM    LYMPHSABS 1.12 09/03/2022 04:00 AM    EOSABS 0.20 09/03/2022 04:00 AM    BASOSABS 0.10 09/03/2022 04:00 AM    DIFFTYPE NOT REPORTED 02/09/2022 01:43 PM     BMP:    Lab Results   Component Value Date/Time     09/03/2022 04:00 AM    K 3.8 09/03/2022 04:00 AM     09/03/2022 04:00 AM CO2 19 09/03/2022 04:00 AM    BUN 19 09/03/2022 04:00 AM    LABALBU 2.1 08/30/2022 07:54 AM    CREATININE 0.72 09/03/2022 04:00 AM    CALCIUM 8.3 09/03/2022 04:00 AM    GFRAA >60 09/03/2022 04:00 AM    LABGLOM >60 09/03/2022 04:00 AM    GLUCOSE 288 09/03/2022 04:00 AM     Magnesium:    Lab Results   Component Value Date/Time    MG 2.1 09/02/2022 03:45 AM     Phosphorus:    Lab Results   Component Value Date/Time    PHOS 2.8 08/31/2022 06:42 AM       Radiology Review:  CT ABDOMEN PELVIS WO CONTRAST Additional Contrast? 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 Colonic dilatation was visualized on prior exams. Sequelae of cirrhosis with portal hypertension, including paraesophageal varices, similar to prior. ASSESSMENT:  Active Hospital Problems    Diagnosis Date Noted    Hypotension [I95.9] 08/31/2022     Priority: Medium    Shock, septic ([facility] [A41.9, R65.21] 08/31/2022     Priority: Medium    [place]'s syndrome [K59.81] 08/30/2022     Priority: Medium    Urinary tract infection associated with indwelling urethral catheter ([address] [V70.010U, N39.0] 08/29/2022     Priority: Medium    Lymphedema [I89.0] 02/09/2022    COPD (chronic obstructive pulmonary disease) ([facility] [J44.9]     LEONARDA (obstructive sleep apnea) [G47.33]     Hypertension [I10]     Hepatic cirrhosis ([facility] [K74.60]        74 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 Recommend strict glucose control per primary  Continue decompression with rectal tube, record output   Follow up morning KUB  Ok for heparin gtt from a surgery standpoint.  Would recommend heparin gtt for now rather than resuming eliquis immediately considering there

## 2022-09-03 NOTE — PLAN OF CARE
Problem: Discharge Planning  Goal: Discharge to home or other facility with appropriate resources  9/3/2022 1520 by Jaxson Sanchez RN  Outcome: Progressing  Flowsheets (Taken 9/3/2022 0830)  Discharge to home or other facility with appropriate resources: Identify barriers to discharge with patient and caregiver  9/3/2022 0343 by Papa Mcknight RN  Outcome: Progressing  Flowsheets (Taken 9/3/2022 0300)  Discharge to home or other facility with appropriate resources:   Identify barriers to discharge with patient and caregiver   Arrange for needed discharge resources and transportation as appropriate   Identify discharge learning needs (meds, wound care, etc)   Arrange for interpreters to assist at discharge as needed   Refer to discharge planning if patient needs post-hospital services based on physician order or complex needs related to functional status, cognitive ability or social support system     Problem: Pain  Goal: Verbalizes/displays adequate comfort level or baseline comfort level  9/3/2022 1520 by Jaxson Sanchez RN  Outcome: Progressing  9/3/2022 0343 by Papa Mcknight RN  Outcome: Progressing     Problem: Skin/Tissue Integrity  Goal: Absence of new skin breakdown  Description: 1. Monitor for areas of redness and/or skin breakdown  2. Assess vascular access sites hourly  3. Every 4-6 hours minimum:  Change oxygen saturation probe site  4. Every 4-6 hours:  If on nasal continuous positive airway pressure, respiratory therapy assess nares and determine need for appliance change or resting period.   9/3/2022 1520 by Jaxson Sanchez RN  Outcome: Progressing  9/3/2022 0343 by Papa Mcknight RN  Outcome: Progressing     Problem: Safety - Adult  Goal: Free from fall injury  9/3/2022 1520 by Jasxon Sanchez RN  Outcome: Progressing  Flowsheets (Taken 9/3/2022 1518)  Free From Fall Injury: Instruct family/caregiver on patient safety  9/3/2022 0343 by Papa Mcknight RN  Outcome: Progressing  Flowsheets (Taken 9/2/2022 1824 by Cheryl Paez RN)  Free From Fall Injury: Instruct family/caregiver on patient safety     Problem: ABCDS Injury Assessment  Goal: Absence of physical injury  9/3/2022 1520 by Cheryl Paez RN  Outcome: Progressing  9/3/2022 0343 by Blaine Dunbar RN  Outcome: Progressing     Problem: Chronic Conditions and Co-morbidities  Goal: Patient's chronic conditions and co-morbidity symptoms are monitored and maintained or improved  9/3/2022 1520 by Cheryl Paez RN  Outcome: Progressing  Flowsheets (Taken 9/3/2022 0830)  Care Plan - Patient's Chronic Conditions and Co-Morbidity Symptoms are Monitored and Maintained or Improved: Monitor and assess patient's chronic conditions and comorbid symptoms for stability, deterioration, or improvement  9/3/2022 0343 by Blaine Dunbar RN  Outcome: Progressing  Flowsheets (Taken 9/3/2022 0300)  Care Plan - Patient's Chronic Conditions and Co-Morbidity Symptoms are Monitored and Maintained or Improved:   Monitor and assess patient's chronic conditions and comorbid symptoms for stability, deterioration, or improvement   Collaborate with multidisciplinary team to address chronic and comorbid conditions and prevent exacerbation or deterioration   Update acute care plan with appropriate goals if chronic or comorbid symptoms are exacerbated and prevent overall improvement and discharge     Problem: Nutrition Deficit:  Goal: Optimize nutritional status  9/3/2022 1520 by Cheryl Paez RN  Outcome: Progressing  9/3/2022 0343 by Blaine Dunbar RN  Outcome: Progressing
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
## 2022-09-03 NOTE — PROGRESS NOTES
Comprehensive Nutrition Assessment    Type and Reason for Visit:  Consult (TPN prdering and management)    Nutrition Recommendations/Plan:   Continue NPO status  Start TPN Clinimix 5/20 standard at 41.6 ml/hr via PICC line, total volume 1000 ml/day  Monitor TPN tolerance, GI status, weight, labs, and diet advancement. Malnutrition Assessment:  Malnutrition Status: At risk for malnutrition (Comment) (09/02/22 6728)    Context:  Acute Illness       Nutrition Assessment:    Patient failed bedside swallow study earlier today. Per SLP recommendation patient NPO now. Continue to have abdominal pain. Noted in NP notes that stroke alert was called earlier today, patient has increased confusion and not following commands. Consult came in late, will start standard TPN at 41.6 ml/hr 1000 ml total volume via picc line today with 100 ml lipids. Monitor TPN tolerance and labs and will adjust TPN accordingly. Nutrition Related Findings:    Ogilve's Syndrome and severe colonic dilation  S/p decompressive colonoscopy 9/1. No emesis. BUQ active bowel sounds, BLQ hypoactive. BLE +1 non-pitting. POC Glucose 301 (H). Bed bugs noted. Wound Type: None       Current Nutrition Intake & Therapies:    Average Meal Intake: NPO  Average Supplements Intake: NPO  Diet NPO Exceptions are: Ice Chips    Anthropometric Measures:  Height: 5' 2\" (157.5 cm)  Ideal Body Weight (IBW): 110 lbs (50 kg)       Current Body Weight: 214 lb 9.6 oz (97.3 kg), 195.1 % IBW. Weight Source:  Other (Comment) (Bedside scale)  Current BMI (kg/m2): 39.2                          BMI Categories: Obese Class 2 (BMI 35.0 -39.9)    Estimated Daily Nutrient Needs:  Energy Requirements Based On: Kcal/kg  Weight Used for Energy Requirements: Current  Energy (kcal/day): 1768-3373 kcal (16-18 kcal/kg)  Weight Used for Protein Requirements: Ideal  Protein (g/day):  gm protein (1.8-2.0 g/kg)       Nutrition Diagnosis:   Inadequate protein-energy intake related to inadequate protein-energy intake as evidenced by NPO or clear liquid status due to medical condition, nutrition support - parenteral nutrition    Nutrition Interventions:   Food and/or Nutrient Delivery: Continue NPO, Start Parenteral Nutrition  Nutrition Education/Counseling: Education not indicated  Coordination of Nutrition Care: Continue to monitor while inpatient       Goals:     Goals: Initiate nutrition support, Tolerate nutrition support at goal rate       Nutrition Monitoring and Evaluation:   Behavioral-Environmental Outcomes: None Identified  Food/Nutrient Intake Outcomes: Diet Advancement/Tolerance, Parenteral Nutrition Intake/Tolerance  Physical Signs/Symptoms Outcomes: Biochemical Data, Skin, Weight, GI Status, Fluid Status or Edema    Discharge Planning:     Too soon to determine       Some areas of assessment may be incomplete due to COVID-19 precautions    Xiao England RD, LD  Office phone [phone]

## 2022-09-03 NOTE — FLOWSHEET NOTE
Stroke Alert called per [place] [employer]. [facility]HS was performed by ICU RN and NIHHS 13. Perfect Served Neurosurgeon on call awaiting call back, Anya Potter in nursing unit to accept call. Patient went to CT with [address] supervisor [address]
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
## 2022-09-04 ENCOUNTER — APPOINTMENT (OUTPATIENT)
Dept: GENERAL RADIOLOGY | Age: 67
DRG: 698 | End: 2022-09-04
Payer: COMMERCIAL

## 2022-09-04 PROBLEM — R13.19 OTHER DYSPHAGIA: Status: ACTIVE | Noted: 2022-09-04

## 2022-09-04 PROBLEM — R13.10 DYSPHAGIA: Status: ACTIVE | Noted: 2022-09-04

## 2022-09-04 LAB
ABSOLUTE EOS #: 0.11 K/UL (ref 0–0.4)
ABSOLUTE IMMATURE GRANULOCYTE: 0.11 K/UL (ref 0–0.3)
ABSOLUTE LYMPH #: 0.86 K/UL (ref 1–4.8)
ABSOLUTE MONO #: 0.54 K/UL (ref 0.2–0.8)
ALBUMIN SERPL-MCNC: 1.8 G/DL (ref 3.5–5.2)
ALP BLD-CCNC: 140 U/L (ref 35–104)
ALT SERPL-CCNC: 29 U/L (ref 5–33)
ANION GAP SERPL CALCULATED.3IONS-SCNC: 12 MMOL/L (ref 9–17)
AST SERPL-CCNC: 28 U/L
BASOPHILS # BLD: 1 %
BASOPHILS ABSOLUTE: 0.11 K/UL (ref 0–0.2)
BILIRUB SERPL-MCNC: 0.6 MG/DL (ref 0.3–1.2)
BUN BLDV-MCNC: 19 MG/DL (ref 8–23)
BUN/CREAT BLD: 23 (ref 9–20)
CALCIUM SERPL-MCNC: 8.2 MG/DL (ref 8.6–10.4)
CHLORIDE BLD-SCNC: 117 MMOL/L (ref 98–107)
CO2: 18 MMOL/L (ref 20–31)
CREAT SERPL-MCNC: 0.81 MG/DL (ref 0.5–0.9)
CULTURE: NORMAL
CULTURE: NORMAL
EOSINOPHILS RELATIVE PERCENT: 1 % (ref 1–4)
GFR AFRICAN AMERICAN: >60 ML/MIN
GFR NON-AFRICAN AMERICAN: >60 ML/MIN
GFR SERPL CREATININE-BSD FRML MDRD: ABNORMAL ML/MIN/{1.73_M2}
GLUCOSE BLD-MCNC: 198 MG/DL (ref 65–105)
GLUCOSE BLD-MCNC: 266 MG/DL (ref 65–105)
GLUCOSE BLD-MCNC: 325 MG/DL (ref 65–105)
GLUCOSE BLD-MCNC: 327 MG/DL (ref 65–105)
GLUCOSE BLD-MCNC: 338 MG/DL (ref 65–105)
GLUCOSE BLD-MCNC: 342 MG/DL (ref 70–99)
GLUCOSE BLD-MCNC: 346 MG/DL (ref 65–105)
GLUCOSE BLD-MCNC: 350 MG/DL (ref 65–105)
GLUCOSE BLD-MCNC: 426 MG/DL (ref 65–105)
HCT VFR BLD CALC: 29.9 % (ref 36.3–47.1)
HEMOGLOBIN: 9 G/DL (ref 11.9–15.1)
IMMATURE GRANULOCYTES: 1 %
LACTIC ACID, SEPSIS: 2.4 MMOL/L (ref 0.5–1.9)
LACTIC ACID, SEPSIS: 3.7 MMOL/L (ref 0.5–1.9)
LYMPHOCYTES # BLD: 8 % (ref 24–44)
Lab: NORMAL
Lab: NORMAL
MCH RBC QN AUTO: 25.6 PG (ref 25.2–33.5)
MCHC RBC AUTO-ENTMCNC: 30.1 G/DL (ref 28.4–34.8)
MCV RBC AUTO: 84.9 FL (ref 82.6–102.9)
MONOCYTES # BLD: 5 % (ref 1–7)
MORPHOLOGY: ABNORMAL
NRBC AUTOMATED: 0 PER 100 WBC
PDW BLD-RTO: 22 % (ref 11.8–14.4)
PHOSPHORUS: 0.9 MG/DL (ref 2.6–4.5)
PLATELET # BLD: 77 K/UL (ref 138–453)
PMV BLD AUTO: 8.2 FL (ref 8.1–13.5)
POTASSIUM SERPL-SCNC: 2.9 MMOL/L (ref 3.7–5.3)
POTASSIUM SERPL-SCNC: 3.3 MMOL/L (ref 3.7–5.3)
RBC # BLD: 3.52 M/UL (ref 3.95–5.11)
SEG NEUTROPHILS: 84 % (ref 36–66)
SEGMENTED NEUTROPHILS ABSOLUTE COUNT: 9.07 K/UL (ref 1.8–7.7)
SODIUM BLD-SCNC: 147 MMOL/L (ref 135–144)
SPECIMEN DESCRIPTION: NORMAL
SPECIMEN DESCRIPTION: NORMAL
TOTAL PROTEIN: 5.2 G/DL (ref 6.4–8.3)
TRIGL SERPL-MCNC: 127 MG/DL
WBC # BLD: 10.8 K/UL (ref 3.5–11.3)

## 2022-09-04 PROCEDURE — 6370000000 HC RX 637 (ALT 250 FOR IP): Performed by: FAMILY MEDICINE

## 2022-09-04 PROCEDURE — 2500000003 HC RX 250 WO HCPCS: Performed by: NURSE PRACTITIONER

## 2022-09-04 PROCEDURE — 6370000000 HC RX 637 (ALT 250 FOR IP): Performed by: STUDENT IN AN ORGANIZED HEALTH CARE EDUCATION/TRAINING PROGRAM

## 2022-09-04 PROCEDURE — 84478 ASSAY OF TRIGLYCERIDES: CPT

## 2022-09-04 PROCEDURE — 2580000003 HC RX 258: Performed by: STUDENT IN AN ORGANIZED HEALTH CARE EDUCATION/TRAINING PROGRAM

## 2022-09-04 PROCEDURE — 2500000003 HC RX 250 WO HCPCS: Performed by: FAMILY MEDICINE

## 2022-09-04 PROCEDURE — 6360000002 HC RX W HCPCS: Performed by: STUDENT IN AN ORGANIZED HEALTH CARE EDUCATION/TRAINING PROGRAM

## 2022-09-04 PROCEDURE — 71045 X-RAY EXAM CHEST 1 VIEW: CPT

## 2022-09-04 PROCEDURE — 2000000000 HC ICU R&B

## 2022-09-04 PROCEDURE — A4216 STERILE WATER/SALINE, 10 ML: HCPCS | Performed by: FAMILY MEDICINE

## 2022-09-04 PROCEDURE — 84132 ASSAY OF SERUM POTASSIUM: CPT

## 2022-09-04 PROCEDURE — 85025 COMPLETE CBC W/AUTO DIFF WBC: CPT

## 2022-09-04 PROCEDURE — 36415 COLL VENOUS BLD VENIPUNCTURE: CPT

## 2022-09-04 PROCEDURE — 2580000003 HC RX 258: Performed by: NURSE PRACTITIONER

## 2022-09-04 PROCEDURE — 6360000002 HC RX W HCPCS: Performed by: FAMILY MEDICINE

## 2022-09-04 PROCEDURE — 2580000003 HC RX 258: Performed by: FAMILY MEDICINE

## 2022-09-04 PROCEDURE — 74018 RADEX ABDOMEN 1 VIEW: CPT

## 2022-09-04 PROCEDURE — 80053 COMPREHEN METABOLIC PANEL: CPT

## 2022-09-04 PROCEDURE — 6370000000 HC RX 637 (ALT 250 FOR IP): Performed by: NURSE PRACTITIONER

## 2022-09-04 PROCEDURE — 82947 ASSAY GLUCOSE BLOOD QUANT: CPT

## 2022-09-04 PROCEDURE — 84100 ASSAY OF PHOSPHORUS: CPT

## 2022-09-04 PROCEDURE — 83605 ASSAY OF LACTIC ACID: CPT

## 2022-09-04 RX ORDER — SERTRALINE HYDROCHLORIDE 100 MG/1
100 TABLET, FILM COATED ORAL DAILY
Status: DISCONTINUED | OUTPATIENT
Start: 2022-09-05 | End: 2022-09-23

## 2022-09-04 RX ORDER — ENOXAPARIN SODIUM 100 MG/ML
1 INJECTION SUBCUTANEOUS 2 TIMES DAILY
Status: DISCONTINUED | OUTPATIENT
Start: 2022-09-04 | End: 2022-09-17

## 2022-09-04 RX ORDER — MIDODRINE HYDROCHLORIDE 10 MG/1
10 TABLET ORAL
Status: DISCONTINUED | OUTPATIENT
Start: 2022-09-04 | End: 2022-09-23

## 2022-09-04 RX ORDER — INSULIN GLARGINE 100 [IU]/ML
10 INJECTION, SOLUTION SUBCUTANEOUS ONCE
Status: COMPLETED | OUTPATIENT
Start: 2022-09-04 | End: 2022-09-04

## 2022-09-04 RX ORDER — INSULIN GLARGINE 100 [IU]/ML
15 INJECTION, SOLUTION SUBCUTANEOUS 2 TIMES DAILY
Status: DISCONTINUED | OUTPATIENT
Start: 2022-09-04 | End: 2022-09-06

## 2022-09-04 RX ORDER — INSULIN LISPRO 100 [IU]/ML
0-16 INJECTION, SOLUTION INTRAVENOUS; SUBCUTANEOUS EVERY 4 HOURS
Status: DISCONTINUED | OUTPATIENT
Start: 2022-09-04 | End: 2022-09-07

## 2022-09-04 RX ORDER — INSULIN LISPRO 100 [IU]/ML
7 INJECTION, SOLUTION INTRAVENOUS; SUBCUTANEOUS ONCE
Status: COMPLETED | OUTPATIENT
Start: 2022-09-04 | End: 2022-09-04

## 2022-09-04 RX ORDER — MIDODRINE HYDROCHLORIDE 10 MG/1
10 TABLET ORAL
Status: DISCONTINUED | OUTPATIENT
Start: 2022-09-04 | End: 2022-09-04

## 2022-09-04 RX ORDER — INSULIN LISPRO 100 [IU]/ML
0-4 INJECTION, SOLUTION INTRAVENOUS; SUBCUTANEOUS NIGHTLY
Status: DISCONTINUED | OUTPATIENT
Start: 2022-09-04 | End: 2022-09-07

## 2022-09-04 RX ADMIN — POTASSIUM CHLORIDE 20 MEQ: 29.8 INJECTION, SOLUTION INTRAVENOUS at 06:42

## 2022-09-04 RX ADMIN — CEFEPIME 2000 MG: 2 INJECTION, POWDER, FOR SOLUTION INTRAVENOUS at 21:04

## 2022-09-04 RX ADMIN — INSULIN LISPRO 8 UNITS: 100 INJECTION, SOLUTION INTRAVENOUS; SUBCUTANEOUS at 06:40

## 2022-09-04 RX ADMIN — POLYETHYLENE GLYCOL (3350) 17 G: 17 POWDER, FOR SOLUTION ORAL at 07:30

## 2022-09-04 RX ADMIN — INSULIN GLARGINE 10 UNITS: 100 INJECTION, SOLUTION SUBCUTANEOUS at 08:51

## 2022-09-04 RX ADMIN — INSULIN LISPRO 6 UNITS: 100 INJECTION, SOLUTION INTRAVENOUS; SUBCUTANEOUS at 03:18

## 2022-09-04 RX ADMIN — ENOXAPARIN SODIUM 100 MG: 100 INJECTION SUBCUTANEOUS at 21:07

## 2022-09-04 RX ADMIN — TRAZODONE HYDROCHLORIDE 100 MG: 100 TABLET ORAL at 21:07

## 2022-09-04 RX ADMIN — MORPHINE SULFATE 1 MG: 2 INJECTION, SOLUTION INTRAMUSCULAR; INTRAVENOUS at 12:06

## 2022-09-04 RX ADMIN — INSULIN LISPRO 8 UNITS: 100 INJECTION, SOLUTION INTRAVENOUS; SUBCUTANEOUS at 12:03

## 2022-09-04 RX ADMIN — POTASSIUM CHLORIDE 20 MEQ: 29.8 INJECTION, SOLUTION INTRAVENOUS at 15:14

## 2022-09-04 RX ADMIN — POTASSIUM CHLORIDE 20 MEQ: 29.8 INJECTION, SOLUTION INTRAVENOUS at 04:46

## 2022-09-04 RX ADMIN — INSULIN LISPRO 12 UNITS: 100 INJECTION, SOLUTION INTRAVENOUS; SUBCUTANEOUS at 18:29

## 2022-09-04 RX ADMIN — POTASSIUM CHLORIDE 20 MEQ: 29.8 INJECTION, SOLUTION INTRAVENOUS at 08:31

## 2022-09-04 RX ADMIN — CEFEPIME 2000 MG: 2 INJECTION, POWDER, FOR SOLUTION INTRAVENOUS at 09:34

## 2022-09-04 RX ADMIN — INSULIN GLARGINE 15 UNITS: 100 INJECTION, SOLUTION SUBCUTANEOUS at 21:24

## 2022-09-04 RX ADMIN — MORPHINE SULFATE 1 MG: 2 INJECTION, SOLUTION INTRAMUSCULAR; INTRAVENOUS at 23:49

## 2022-09-04 RX ADMIN — MIDODRINE HYDROCHLORIDE 10 MG: 10 TABLET ORAL at 14:38

## 2022-09-04 RX ADMIN — ENOXAPARIN SODIUM 30 MG: 100 INJECTION SUBCUTANEOUS at 08:51

## 2022-09-04 RX ADMIN — INSULIN LISPRO 7 UNITS: 100 INJECTION, SOLUTION INTRAVENOUS; SUBCUTANEOUS at 12:02

## 2022-09-04 RX ADMIN — SODIUM PHOSPHATE, MONOBASIC, MONOHYDRATE 31.2 MMOL: 276; 142 INJECTION, SOLUTION INTRAVENOUS at 05:09

## 2022-09-04 RX ADMIN — SODIUM CHLORIDE, PRESERVATIVE FREE 10 ML: 5 INJECTION INTRAVENOUS at 10:00

## 2022-09-04 RX ADMIN — MIDODRINE HYDROCHLORIDE 10 MG: 10 TABLET ORAL at 18:29

## 2022-09-04 RX ADMIN — SILVER SULFADIAZINE: 10 CREAM TOPICAL at 21:00

## 2022-09-04 RX ADMIN — SODIUM CHLORIDE, PRESERVATIVE FREE 20 MG: 5 INJECTION INTRAVENOUS at 12:02

## 2022-09-04 RX ADMIN — INSULIN GLARGINE 15 UNITS: 100 INJECTION, SOLUTION SUBCUTANEOUS at 12:27

## 2022-09-04 RX ADMIN — SODIUM CHLORIDE, PRESERVATIVE FREE 20 MG: 5 INJECTION INTRAVENOUS at 21:08

## 2022-09-04 ASSESSMENT — ENCOUNTER SYMPTOMS: ABDOMINAL PAIN: 1

## 2022-09-04 ASSESSMENT — PAIN SCALES - GENERAL
PAINLEVEL_OUTOF10: 0
PAINLEVEL_OUTOF10: 0

## 2022-09-04 NOTE — PLAN OF CARE
9/4/2022 0600)  Care Plan - Patient's Chronic Conditions and Co-Morbidity Symptoms are Monitored and Maintained or Improved:   Monitor and assess patient's chronic conditions and comorbid symptoms for stability, deterioration, or improvement   Collaborate with multidisciplinary team to address chronic and comorbid conditions and prevent exacerbation or deterioration   Update acute care plan with appropriate goals if chronic or comorbid symptoms are exacerbated and prevent overall improvement and discharge     Problem: Nutrition Deficit:  Goal: Optimize nutritional status  Outcome: Progressing  Flowsheets (Taken 9/3/2022 1628 by Suzette Figueroa, RD, LD)  Nutrient intake appropriate for improving, restoring, or maintaining nutritional needs:   Recommend, monitor, and adjust tube feedings and TPN/PPN based on assessed needs   Assess nutritional status and recommend course of action

## 2022-09-04 NOTE — PROGRESS NOTES
evidenced by NPO or clear liquid status due to medical condition, nutrition support - parenteral nutrition    Nutrition Interventions:   Food and/or Nutrient Delivery: Continue NPO, Discontinue Parenteral Nutrition, Start Tube Feeding  Nutrition Education/Counseling: Education not indicated  Coordination of Nutrition Care: Continue to monitor while inpatient       Goals:  Previous Goal Met: No Progress toward Goal(s)  Goals: Initiate nutrition support, Tolerate nutrition support at goal rate       Nutrition Monitoring and Evaluation:   Behavioral-Environmental Outcomes: None Identified  Food/Nutrient Intake Outcomes: Diet Advancement/Tolerance, Enteral Nutrition Intake/Tolerance  Physical Signs/Symptoms Outcomes: Biochemical Data, Skin, Weight, GI Status, Fluid Status or Edema    Discharge Planning:     Too soon to determine       Some areas of assessment may be incomplete due to COVID-19 precautions    Ward Valle RD, LD  Office phone [phone] Alert and oriented to person, place and time

## 2022-09-04 NOTE — PROGRESS NOTES
PULMONARY PROGRESS NOTE:    REASON FOR VISIT: UTI, hypotension, resp failure  Interval History:      Unreliable ROS    Events since last visit: remains on levophed at 6 mcg / min  TPN infusing  TF to be started today    PAST MEDICAL HISTORY:      Scheduled Meds:   midodrine  10 mg Per NG tube TID WC    famotidine (PEPCID) injection  20 mg IntraVENous BID    [START ON 9/5/2022] sertraline  100 mg Per NG tube Daily    insulin glargine  15 Units SubCUTAneous BID    insulin lispro  0-8 Units SubCUTAneous Q4H    fat emulsion  100 mL IntraVENous Daily    polyethylene glycol  17 g Oral Daily    potassium chloride  40 mEq Oral Once    cefepime  2,000 mg IntraVENous Q12H    traZODone  100 mg Oral Nightly    sodium chloride flush  5-40 mL IntraVENous 2 times per day    enoxaparin  30 mg SubCUTAneous BID    [Held by provider] potassium chloride  10 mEq Oral Daily    budesonide-formoterol  2 puff Inhalation BID    silver sulfADIAZINE   Topical Nightly    [Held by provider] sodium chloride  1,000 mL IntraVENous Once     Continuous Infusions:   norepinephrine 6 mcg/min (09/04/22 1128)    PN-Adult 2 in 1 Central(5/20 Standard Electrolytes) 41.7 mL/hr at 09/04/22 0721    sodium chloride Stopped (09/03/22 1458)    dextrose       PRN Meds:sodium phosphate IVPB **OR** sodium phosphate IVPB, sodium chloride flush, potassium chloride, morphine, sodium chloride flush, sodium chloride, magnesium sulfate, ondansetron **OR** ondansetron, acetaminophen **OR** acetaminophen, glucose, dextrose bolus **OR** dextrose bolus, glucagon (rDNA), dextrose, oxybutynin, albuterol sulfate HFA        PHYSICAL EXAMINATION:  BP (!) 123/49   Pulse (!) 101   Temp 97.8 °F (36.6 °C) (Temporal)   Resp 29   Ht 5' 2\" (1.575 m)   Wt 215 lb (97.5 kg)   SpO2 100%   BMI 39.32 kg/m²     General : sleepy, awakens to name - one word responses  Neck - supple, no lymphadenopathy, JVD not raised  Heart - regular rhythm, S1 and S2 normal; no additional sounds heard  Lungs - Air Entry- fair bilaterally; breath sounds : vesicular, 100% on 2 l / 97% on ra  Abdomen - soft, no tenderness  Upper Extremities  - no cyanosis, mottling; edema : absent  Lower Extremities: no cyanosis, mottling; edema : absent    Current Laboratory, Radiologic, Microbiologic, and Diagnostic studies reviewed  Data ReviewCBC:   Recent Labs     09/02/22  0345 09/03/22  0400 09/04/22  0355   WBC 11.0 10.2 10.8   RBC 3.67* 3.58* 3.52*   HGB 9.6* 9.2* 9.0*   HCT 31.3* 29.7* 29.9*   * 105* 77*       BMP:   Recent Labs     09/02/22  0345 09/02/22  1320 09/02/22  1835 09/03/22  0400 09/04/22  0355   GLUCOSE 277*  --   --  288* 342*     --   --  137 147*   K 2.7*   < > 4.1 3.8 2.9*   BUN 21  --   --  19 19   CREATININE 0.72  --   --  0.72 0.81   CALCIUM 8.3*  --   --  8.3* 8.2*    < > = values in this interval not displayed. ABGs: No results for input(s): PHART, PO2ART, UIJ0EZU, QKZ5STY, BEART, O0CNFKUS, SQW8LUE in the last 72 hours. PT/INR:  No results found for: PTINR      Impression:  Chronic hypoxic respiratory failure - improving  Hypotension/Septic Shock requiring vasopressors - wean levophed, keep AMP >65  UTI/Indwelling angeles  Juan Jose's syndrome   Obstructive sleep apnea/Obesity  [place]   COPD ? A. fib, CAD, CHF, DM, dementia, HTN, liver cirrhosis, esophageal varices    Recommendations:  Monitor off 02  Continue Levophed, wean as able, keep MAP 65  Increase ProAmatine to 10 mg TID  TPN  Continue cefepime   Continue Symbicort   Electrolyte replacement per sliding scale  Colorectal surgery following, s/p decompressive colonoscopy.    DVT prophylaxis with low molecular weight heparin  GI prophylaxis, Protonix    Plan of care discussed with Dr Jonathan Ramirez  Electronically signed by LEILA Barrow - CNP on 09/04/22 at 12:23 PM.

## 2022-09-04 NOTE — FLOWSHEET NOTE
09/04/22 1145   Treatment Team Notification   Reason for Communication Critical results   Type of Critical Result POC test   Critical POC Result Type Glucose   Team Member Name Dr Lawyer Nieves Team Role Attending Provider   Method of Communication Secure Message   Response See orders     MD notified of critical high blood glucose at 426. Noted to give 8 units per existing sliding scale. Per MD, give additional 7 units humalog. Will adjust lantus. See orders for details.

## 2022-09-04 NOTE — PROGRESS NOTES
Nasogastric tube 14fr placed in patient's right nare. External catheter length measures 55cm, secured with adhesive butterfly securement device. Patient tolerated procedure well.

## 2022-09-04 NOTE — PROGRESS NOTES
Critical K 2.9 & phos 0.9 resulted on AM BMP. [facility] NP notified immediately. New orders received.

## 2022-09-04 NOTE — FLOWSHEET NOTE
09/04/22 1045   Treatment Team Notification   Reason for Communication Review case   Team Member Name Dr Scar Villegas Team Role Attending Provider   Method of Communication Face to face   Response In department     MD on unit, rounds on patient at this time, updated by RN. Verbal orders given to drop NG so oral meds can be given. Will transition from TPN to TF.   See orders for addt'l details
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
## 2022-09-05 ENCOUNTER — APPOINTMENT (OUTPATIENT)
Dept: GENERAL RADIOLOGY | Age: 67
DRG: 698 | End: 2022-09-05
Payer: COMMERCIAL

## 2022-09-05 LAB
ABSOLUTE EOS #: 0.24 K/UL (ref 0–0.4)
ABSOLUTE IMMATURE GRANULOCYTE: 0.08 K/UL (ref 0–0.3)
ABSOLUTE LYMPH #: 1.12 K/UL (ref 1–4.8)
ABSOLUTE MONO #: 0.56 K/UL (ref 0.2–0.8)
ANION GAP SERPL CALCULATED.3IONS-SCNC: 8 MMOL/L (ref 9–17)
BASOPHILS # BLD: 0 %
BASOPHILS ABSOLUTE: 0 K/UL (ref 0–0.2)
BUN BLDV-MCNC: 22 MG/DL (ref 8–23)
BUN/CREAT BLD: 30 (ref 9–20)
CALCIUM SERPL-MCNC: 8 MG/DL (ref 8.6–10.4)
CHLORIDE BLD-SCNC: 122 MMOL/L (ref 98–107)
CO2: 19 MMOL/L (ref 20–31)
CREAT SERPL-MCNC: 0.73 MG/DL (ref 0.5–0.9)
EOSINOPHILS RELATIVE PERCENT: 3 % (ref 1–4)
GFR AFRICAN AMERICAN: >60 ML/MIN
GFR NON-AFRICAN AMERICAN: >60 ML/MIN
GFR SERPL CREATININE-BSD FRML MDRD: ABNORMAL ML/MIN/{1.73_M2}
GLUCOSE BLD-MCNC: 184 MG/DL (ref 65–105)
GLUCOSE BLD-MCNC: 193 MG/DL (ref 70–99)
GLUCOSE BLD-MCNC: 207 MG/DL (ref 65–105)
GLUCOSE BLD-MCNC: 212 MG/DL (ref 65–105)
GLUCOSE BLD-MCNC: 217 MG/DL (ref 65–105)
GLUCOSE BLD-MCNC: 219 MG/DL (ref 65–105)
GLUCOSE BLD-MCNC: 227 MG/DL (ref 65–105)
GLUCOSE BLD-MCNC: 229 MG/DL (ref 65–105)
GLUCOSE BLD-MCNC: 252 MG/DL (ref 65–105)
HCT VFR BLD CALC: 27.7 % (ref 36.3–47.1)
HEMOGLOBIN: 8.4 G/DL (ref 11.9–15.1)
IMMATURE GRANULOCYTES: 1 %
LACTIC ACID, SEPSIS: 2.2 MMOL/L (ref 0.5–1.9)
LACTIC ACID, SEPSIS: 2.6 MMOL/L (ref 0.5–1.9)
LYMPHOCYTES # BLD: 14 % (ref 24–44)
MAGNESIUM: 1.9 MG/DL (ref 1.6–2.6)
MCH RBC QN AUTO: 25.7 PG (ref 25.2–33.5)
MCHC RBC AUTO-ENTMCNC: 30.3 G/DL (ref 28.4–34.8)
MCV RBC AUTO: 84.7 FL (ref 82.6–102.9)
MONOCYTES # BLD: 7 % (ref 1–7)
MORPHOLOGY: ABNORMAL
NRBC AUTOMATED: 0.2 PER 100 WBC
PDW BLD-RTO: 22.2 % (ref 11.8–14.4)
PHOSPHORUS: 1.3 MG/DL (ref 2.6–4.5)
PLATELET # BLD: 58 K/UL (ref 138–453)
PMV BLD AUTO: 9.3 FL (ref 8.1–13.5)
POTASSIUM SERPL-SCNC: 2.9 MMOL/L (ref 3.7–5.3)
POTASSIUM SERPL-SCNC: 3.8 MMOL/L (ref 3.7–5.3)
RBC # BLD: 3.27 M/UL (ref 3.95–5.11)
SEG NEUTROPHILS: 75 % (ref 36–66)
SEGMENTED NEUTROPHILS ABSOLUTE COUNT: 6 K/UL (ref 1.8–7.7)
SODIUM BLD-SCNC: 149 MMOL/L (ref 135–144)
WBC # BLD: 8 K/UL (ref 3.5–11.3)

## 2022-09-05 PROCEDURE — 83605 ASSAY OF LACTIC ACID: CPT

## 2022-09-05 PROCEDURE — 85025 COMPLETE CBC W/AUTO DIFF WBC: CPT

## 2022-09-05 PROCEDURE — 6360000002 HC RX W HCPCS: Performed by: STUDENT IN AN ORGANIZED HEALTH CARE EDUCATION/TRAINING PROGRAM

## 2022-09-05 PROCEDURE — 2000000000 HC ICU R&B

## 2022-09-05 PROCEDURE — 6370000000 HC RX 637 (ALT 250 FOR IP): Performed by: STUDENT IN AN ORGANIZED HEALTH CARE EDUCATION/TRAINING PROGRAM

## 2022-09-05 PROCEDURE — 6370000000 HC RX 637 (ALT 250 FOR IP): Performed by: FAMILY MEDICINE

## 2022-09-05 PROCEDURE — 99223 1ST HOSP IP/OBS HIGH 75: CPT | Performed by: INTERNAL MEDICINE

## 2022-09-05 PROCEDURE — 71045 X-RAY EXAM CHEST 1 VIEW: CPT

## 2022-09-05 PROCEDURE — 83735 ASSAY OF MAGNESIUM: CPT

## 2022-09-05 PROCEDURE — 2580000003 HC RX 258: Performed by: STUDENT IN AN ORGANIZED HEALTH CARE EDUCATION/TRAINING PROGRAM

## 2022-09-05 PROCEDURE — 2500000003 HC RX 250 WO HCPCS: Performed by: FAMILY MEDICINE

## 2022-09-05 PROCEDURE — 82947 ASSAY GLUCOSE BLOOD QUANT: CPT

## 2022-09-05 PROCEDURE — 80048 BASIC METABOLIC PNL TOTAL CA: CPT

## 2022-09-05 PROCEDURE — 84100 ASSAY OF PHOSPHORUS: CPT

## 2022-09-05 PROCEDURE — 84132 ASSAY OF SERUM POTASSIUM: CPT

## 2022-09-05 PROCEDURE — 2580000003 HC RX 258: Performed by: FAMILY MEDICINE

## 2022-09-05 PROCEDURE — A4216 STERILE WATER/SALINE, 10 ML: HCPCS | Performed by: FAMILY MEDICINE

## 2022-09-05 RX ORDER — DEXTROSE MONOHYDRATE 50 MG/ML
INJECTION, SOLUTION INTRAVENOUS CONTINUOUS
Status: DISCONTINUED | OUTPATIENT
Start: 2022-09-05 | End: 2022-09-07

## 2022-09-05 RX ADMIN — SODIUM CHLORIDE, PRESERVATIVE FREE 10 ML: 5 INJECTION INTRAVENOUS at 21:43

## 2022-09-05 RX ADMIN — SODIUM CHLORIDE, PRESERVATIVE FREE 20 MG: 5 INJECTION INTRAVENOUS at 09:25

## 2022-09-05 RX ADMIN — INSULIN LISPRO 4 UNITS: 100 INJECTION, SOLUTION INTRAVENOUS; SUBCUTANEOUS at 09:33

## 2022-09-05 RX ADMIN — CEFEPIME 2000 MG: 2 INJECTION, POWDER, FOR SOLUTION INTRAVENOUS at 09:16

## 2022-09-05 RX ADMIN — MIDODRINE HYDROCHLORIDE 10 MG: 10 TABLET ORAL at 09:39

## 2022-09-05 RX ADMIN — INSULIN LISPRO 8 UNITS: 100 INJECTION, SOLUTION INTRAVENOUS; SUBCUTANEOUS at 21:30

## 2022-09-05 RX ADMIN — INSULIN LISPRO 4 UNITS: 100 INJECTION, SOLUTION INTRAVENOUS; SUBCUTANEOUS at 18:04

## 2022-09-05 RX ADMIN — MORPHINE SULFATE 1 MG: 2 INJECTION, SOLUTION INTRAMUSCULAR; INTRAVENOUS at 17:18

## 2022-09-05 RX ADMIN — SODIUM CHLORIDE, PRESERVATIVE FREE 20 MG: 5 INJECTION INTRAVENOUS at 21:29

## 2022-09-05 RX ADMIN — SILVER SULFADIAZINE: 10 CREAM TOPICAL at 21:29

## 2022-09-05 RX ADMIN — SODIUM CHLORIDE, PRESERVATIVE FREE 10 ML: 5 INJECTION INTRAVENOUS at 09:33

## 2022-09-05 RX ADMIN — INSULIN LISPRO 4 UNITS: 100 INJECTION, SOLUTION INTRAVENOUS; SUBCUTANEOUS at 02:05

## 2022-09-05 RX ADMIN — MIDODRINE HYDROCHLORIDE 10 MG: 10 TABLET ORAL at 16:59

## 2022-09-05 RX ADMIN — TRAZODONE HYDROCHLORIDE 100 MG: 100 TABLET ORAL at 21:42

## 2022-09-05 RX ADMIN — POLYETHYLENE GLYCOL (3350) 17 G: 17 POWDER, FOR SOLUTION ORAL at 10:13

## 2022-09-05 RX ADMIN — POTASSIUM CHLORIDE 20 MEQ: 29.8 INJECTION, SOLUTION INTRAVENOUS at 05:52

## 2022-09-05 RX ADMIN — INSULIN GLARGINE 15 UNITS: 100 INJECTION, SOLUTION SUBCUTANEOUS at 09:30

## 2022-09-05 RX ADMIN — INSULIN GLARGINE 15 UNITS: 100 INJECTION, SOLUTION SUBCUTANEOUS at 21:30

## 2022-09-05 RX ADMIN — POTASSIUM BICARBONATE 20 MEQ: 782 TABLET, EFFERVESCENT ORAL at 09:36

## 2022-09-05 RX ADMIN — POTASSIUM CHLORIDE 20 MEQ: 29.8 INJECTION, SOLUTION INTRAVENOUS at 07:42

## 2022-09-05 RX ADMIN — DEXTROSE MONOHYDRATE: 50 INJECTION, SOLUTION INTRAVENOUS at 09:14

## 2022-09-05 RX ADMIN — MIDODRINE HYDROCHLORIDE 10 MG: 10 TABLET ORAL at 11:59

## 2022-09-05 RX ADMIN — INSULIN LISPRO 4 UNITS: 100 INJECTION, SOLUTION INTRAVENOUS; SUBCUTANEOUS at 14:15

## 2022-09-05 RX ADMIN — POTASSIUM CHLORIDE 20 MEQ: 29.8 INJECTION, SOLUTION INTRAVENOUS at 09:09

## 2022-09-05 RX ADMIN — SERTRALINE HYDROCHLORIDE 100 MG: 100 TABLET ORAL at 09:39

## 2022-09-05 NOTE — CONSULTS
GI Consult Note:    Name: Zac Aguiar  MRN: [id number]     [place]: [de-identified]  Room: 2029/2029-01    Admit Date: 8/29/2022  PCP: Bonnie De La Cruz MD    Physician Requesting Consult: Ramy Mendes MD     Reason for Consult:    Anemia  Drop in hemoglobin  Malnutrition  Dementia   abdominal pain  Juan Jose syndrome        Chief Complaint:     Chief Complaint   Patient presents with    Abdominal Pain     N/V/D    Hematuria     X few months         History Obtained From:     Mainly EMR patient's not communicative    History of Present Illness:      Zac Aguiar is a  74 y.o.  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 had decompression colonoscopy    Patient has noticed to have some drop in hemoglobin apparently occult blood positive no overt bleeding    Currently being fed through the NG tube    She has history for dementia not able to communicate very well    Charts and records were reviewed discussed with nursing staff on the floor  Symptoms:  Onset:  Location:  abdomen  Duration:  day(s)  Severity:  moderate  Quality:  intermittent      Past Medical History:     Past Medical History:   Diagnosis Date    Arthritis     CAD (coronary artery disease)     CHF (congestive heart failure) (HCC)     COPD (chronic obstructive pulmonary disease) ([address]     Dementia ([facility]     Diabetes mellitus ([facility]     Fibromyalgia     Headache     Hypertension     Liver disease     LEONARDA (obstructive sleep apnea)     Panic attack     Paroxysmal atrial fibrillation ([facility]         Past Surgical History:     Past Surgical History:   Procedure Laterality Date    APPENDECTOMY      CARDIAC CATHETERIZATION      CHOLECYSTECTOMY      COLONOSCOPY N/A 9/1/2022    DECOMPRESSIVE COLONOSCOPY performed by Sheela Kendrick MD at [address] [place] [address] HYSTERECTOMY (CERVIX STATUS UNKNOWN)      LAMINECTOMY      OVARY REMOVAL          Medications Prior to Admission:       Prior to Admission medications    Medication Sig Start Date End Date Taking? Authorizing Provider   polyethylene glycol (GLYCOLAX) 17 GM/SCOOP powder Take 17 g by mouth daily 9/3/22 10/3/22 Yes Odette Sebastiane, DO   polyethylene glycol (GLYCOLAX) 17 g packet Take 17 g by mouth daily 9/3/22  Yes Odette Ferrell, DO   insulin glargine (LANTUS SOLOSTAR) 100 UNIT/ML injection pen Inject 40 Units into the skin Daily   Yes Historical Provider, MD   omeprazole (PRILOSEC) 20 MG delayed release capsule Take 20 mg by mouth 2 times daily   Yes Historical Provider, MD   potassium chloride (KLOR-CON M) 10 MEQ extended release tablet Take 10 mEq by mouth daily   Yes Historical Provider, MD   ferrous sulfate (FE TABS 325) 325 (65 Fe) MG EC tablet Take 325 mg by mouth daily (with breakfast)   Yes Historical Provider, MD   gabapentin (NEURONTIN) 300 MG capsule Take 300 mg by mouth 3 times daily.    Yes Historical Provider, MD   losartan (COZAAR) 25 MG tablet Take 25 mg by mouth daily   Yes Historical Provider, MD   senna (SENOKOT) 8.6 MG tablet Take 1 tablet by mouth daily   Yes Historical Provider, MD   lactulose (CHRONULAC) 10 GM/15ML solution Take 20 g by mouth 2 times daily 30 mL BID   Yes Historical Provider, MD   silver sulfADIAZINE (SILVADENE) 1 % cream Apply topically nightly Indications: to burns   Yes Historical Provider, MD   oxybutynin (DITROPAN) 5 MG tablet Take 5 mg by mouth 2 times daily as needed (bladder spasms)    Historical Provider, MD   Liraglutide (VICTOZA) 18 MG/3ML SOPN SC injection Inject 1.2 mg into the skin daily    Historical Provider, MD   pravastatin (PRAVACHOL) 10 MG tablet Take 10 mg by mouth nightly    Historical Provider, MD   fluticasone-vilanterol (BREO ELLIPTA) 100-25 MCG/INH AEPB inhaler Inhale 1 puff into the lungs daily    Historical Provider, MD   albuterol sulfate HFA (VENTOLIN HFA) 108 (90 Base) MCG/ACT inhaler Inhale 2 puffs into the lungs every 6 hours as needed for Wheezing or Shortness of Breath    Historical Provider, MD   apixaban (ELIQUIS) 5 MG TABS tablet Take 5 mg by mouth 2 times daily     Historical Provider, MD   furosemide (LASIX) 40 MG tablet Take 40 mg by mouth 2 times daily     Historical Provider, MD   sertraline (ZOLOFT) 100 MG tablet Take 100 mg by mouth daily     Historical Provider, MD   spironolactone (ALDACTONE) 100 MG tablet Take 100 mg by mouth daily 21   Historical Provider, MD   traZODone (DESYREL) 100 MG tablet Take 100 mg by mouth nightly 21   Historical Provider, MD   nadolol (CORGARD) 40 MG tablet Take 40 mg by mouth daily    Historical Provider, MD        Allergies:       Quetiapine, Venlafaxine, Aspirin, Fentanyl, Other, Quetiapine fumarate, and Venlafaxine hcl    Social History:     Tobacco:    reports that she has never smoked. She has never used smokeless tobacco.  Alcohol:      reports that she does not currently use alcohol. Drug Use:  reports no history of drug use.     Family History:     Family History   Problem Relation Age of Onset    Heart Failure Mother     Cancer Father     Cancer Sister     Cancer Brother        Review of Systems:     Positive and Negative as described in HPI    Review of system was not done    Code Status:  Full Code    Physical Exam:     Vitals:  BP (!) 111/52   Pulse (!) 107   Temp 98 °F (36.7 °C) (Temporal)   Resp 20   Ht 5' 2\" (1.575 m)   Wt 219 lb 11.2 oz (99.7 kg)   SpO2 99%   BMI 40.18 kg/m²   Temp (24hrs), Av.4 °F (36.9 °C), Min:98 °F (36.7 °C), Max:98.8 °F (37.1 °C)      General appearance -very drowsy weak lethargic sick elderly appearing   mental status - disoriented to person, place, and time with anxious affect  Head - normocephalic and atraumatic  Eyes - pupils equal and reactive, extraocular eye movements intact, conjunctiva clear  Ears - hearing appears to be intact  Nose - no drainage noted  Mouth - mucous membranes dry  Neck - supple, no carotid bruits, thyroid not palpable  Chest -bilateral Rales   Heart examination heart -performed at this time   Bloated abdomen - soft, mild diffuse s, nondistended, delia l sounds present all four quadrants, no masses, hepatomegaly or splenomegaly.  No hernias  Neurological -not done  Extremities -positive pedal edema or calf pain with palpation  Skin -positive gross lesions, rashes, or induration noted  Cranial Nerves : Not done at this time  Lymph nodes: not done at this time    Data:   CBC:   Lab Results   Component Value Date/Time    WBC 8.0 09/05/2022 05:02 AM    RBC 3.27 09/05/2022 05:02 AM    HGB 8.4 09/05/2022 05:02 AM    HCT 27.7 09/05/2022 05:02 AM    MCV 84.7 09/05/2022 05:02 AM    MCH 25.7 09/05/2022 05:02 AM    MCHC 30.3 09/05/2022 05:02 AM    RDW 22.2 09/05/2022 05:02 AM    PLT 58 09/05/2022 05:02 AM    MPV 9.3 09/05/2022 05:02 AM     CBC with Differential:    Lab Results   Component Value Date/Time    WBC 8.0 09/05/2022 05:02 AM    RBC 3.27 09/05/2022 05:02 AM    HGB 8.4 09/05/2022 05:02 AM    HCT 27.7 09/05/2022 05:02 AM    PLT 58 09/05/2022 05:02 AM    MCV 84.7 09/05/2022 05:02 AM    MCH 25.7 09/05/2022 05:02 AM    MCHC 30.3 09/05/2022 05:02 AM    RDW 22.2 09/05/2022 05:02 AM    LYMPHOPCT 14 09/05/2022 05:02 AM    MONOPCT 7 09/05/2022 05:02 AM    BASOPCT 0 09/05/2022 05:02 AM    MONOSABS 0.56 09/05/2022 05:02 AM    LYMPHSABS 1.12 09/05/2022 05:02 AM    EOSABS 0.24 09/05/2022 05:02 AM    BASOSABS 0.00 09/05/2022 05:02 AM    DIFFTYPE NOT REPORTED 02/09/2022 01:43 PM     Hemoglobin/Hematocrit:    Lab Results   Component Value Date/Time    HGB 8.4 09/05/2022 05:02 AM    HCT 27.7 09/05/2022 05:02 AM     CMP:    Lab Results   Component Value Date/Time     09/05/2022 05:02 AM    K 3.8 09/05/2022 11:55 AM     09/05/2022 05:02 AM    CO2 19 09/05/2022 05:02 AM    BUN 22 09/05/2022 05:02 AM    CREATININE 0.73 09/05/2022 05:02 AM GFRAA >60 09/05/2022 05:02 AM    LABGLOM >60 09/05/2022 05:02 AM    GLUCOSE 193 09/05/2022 05:02 AM    PROT 5.2 09/04/2022 03:55 AM    LABALBU 1.8 09/04/2022 03:55 AM    CALCIUM 8.0 09/05/2022 05:02 AM    BILITOT 0.6 09/04/2022 03:55 AM    ALKPHOS 140 09/04/2022 03:55 AM    AST 28 09/04/2022 03:55 AM    ALT 29 09/04/2022 03:55 AM     BMP:    Lab Results   Component Value Date/Time     09/05/2022 05:02 AM    K 3.8 09/05/2022 11:55 AM     09/05/2022 05:02 AM    CO2 19 09/05/2022 05:02 AM    BUN 22 09/05/2022 05:02 AM    LABALBU 1.8 09/04/2022 03:55 AM    CREATININE 0.73 09/05/2022 05:02 AM    CALCIUM 8.0 09/05/2022 05:02 AM    GFRAA >60 09/05/2022 05:02 AM    LABGLOM >60 09/05/2022 05:02 AM    GLUCOSE 193 09/05/2022 05:02 AM     PT/INR:    Lab Results   Component Value Date/Time    PROTIME 18.6 02/10/2022 06:18 AM    INR 1.6 02/10/2022 06:18 AM     PTT:    Lab Results   Component Value Date/Time    APTT 30.1 02/08/2022 09:15 PM   [APTT}    Assesment:     Primary Problem  Shock, septic [facility])    Active Hospital Problems    Diagnosis Date Noted    Dysphagia [R13.10] 09/04/2022     Priority: Medium    Encephalopathy acute [G93.40] 09/03/2022     Priority: Medium    Stroke-like symptoms [R29.90] 09/03/2022     Priority: Medium    Leukocytosis [D72.829] 09/03/2022     Priority: Medium    Hypotension [I95.9] 08/31/2022     Priority: Medium    Shock, septic ([facility] [A41.9, R65.21] 08/31/2022     Priority: Medium    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] 08/30/2022     Priority: Medium    Urinary tract infection associated with indwelling urethral catheter ([facility] [T83.511A, N39.0] 08/29/2022     Priority: Medium    Acute urinary retention [R33.8] 09/20/2020     Priority: Medium    Lymphedema [I89.0] 02/09/2022    COPD (chronic obstructive pulmonary disease) (HCC) [J44.9]     LEONARDA (obstructive sleep apnea) [G47.33]     Hypertension [I10]     Hepatic cirrhosis (HCC) [K74.60]      Anemia  Drop in hemoglobin  Malnutrition  Dementia abdominal pain  Juan Jose syndrome  Plan:     Supportive symptomatic care  PPI  Follow-up hemoglobin hematocrit  May require upper endoscopy plus minus colonoscopy  May also require PEG tube placement  Discussed with nursing staff in ICU  We will reevaluate in the morning        Thank you for allowing me to participate in the care of your patient. Please feel free to contact me with any questions or concerns.      Electronically signed by Lacey Mancuso MD on 9/5/2022 at 1:19 PM     Copy sent to Dr. Vernon Smith MD

## 2022-09-05 NOTE — PROGRESS NOTES
Pulmonary Critical Care Progress Note       Patient seen for the follow up of  Shock, septic ([address]     Subjective:  She has been on room air. She is still significantly. She does not recognize me and I prescribed her before. She has been following some commands. She tolerates NG feeds. She is not ambulating. She has rectal tube in place with diarrhea. Examination:  Vitals: BP (!) 111/52   Pulse (!) 107   Temp 98 °F (36.7 °C) (Temporal)   Resp 20   Ht 5' 2\" (1.575 m)   Wt 219 lb 11.2 oz (99.7 kg)   SpO2 99%   BMI 40.18 kg/m²   General appearance: Sleepy confused and cooperative with exam  Neck: No JVD  Lungs: Decreased breath sound no crackles or wheezes  Heart: regular rate and rhythm, S1, S2 normal, no gallop  Abdomen: Soft, non tender, + BS  Extremities: no cyanosis or clubbing. No significant edema, scratches bilateral lower legs     LABs:  CBC:   Recent Labs     09/04/22  0355 09/05/22  0502   WBC 10.8 8.0   HGB 9.0* 8.4*   HCT 29.9* 27.7*   PLT 77* 58*       BMP:   Recent Labs     09/04/22  0355 09/04/22  1425 09/05/22  0502 09/05/22  1155   *  --  149*  --    K 2.9*   < > 2.9* 3.8   CO2 18*  --  19*  --    BUN 19  --  22  --    CREATININE 0.81  --  0.73  --    LABGLOM >60  --  >60  --    GLUCOSE 342*  --  193*  --     < > = values in this interval not displayed.        LIVER PROFILE:  Recent Labs     09/04/22  0355   AST 28   ALT 29   LABALBU 1.8*       ABG:  Lab Results   Component Value Date/Time    FIO2 NOT REPORTED 02/08/2022 07:10 PM       Lab Results   Component Value Date/Time    FIO2 NOT REPORTED 02/08/2022 07:10 PM     Radiology:  9/3/22 CXR  Cardiomegaly with left mid and lower lung infiltrate    Impression:  Chronic hypoxic respiratory failure  Atelectasis  Hypotension/Septic Shock requiring vasopressors   UTI/Indwelling angeles  Deerfield's syndrome   Obstructive sleep apnea/Obesity  [place]   A. fib, CAD, CHF, DM, dementia, HTN, liver cirrhosis, esophageal varices  History of discitis    Recommendations:  Oxygen via nasal cannula  DuoNeb by nebulizer  Symbicort  NG feeds as tolerated  Monitor blood pressure off pressors  On midodrine   Monitor platelet count  Colorectal surgery following, s/p decompressive colonoscopy   X-ray chest in am  Discussed with RN  GI prophylaxis  DVT prophylaxis with ANDRAE Gregory MD, [facility]  Pulmonary Critical Care and Sleep Medicine,  [facility]: [phone]
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
## 2022-09-05 NOTE — FLOWSHEET NOTE
09/05/22 1045   Treatment Team Notification   Reason for Communication Review case   Team Member Name Dr Saroj Rojo Team Role Attending Provider   Method of Communication Secure Message   Response See orders   Notification Time [id number]     RN updated MD of low platelet level 58   MD held lovenox

## 2022-09-05 NOTE — VIRTUAL HEALTH
[id number] Stroke and Vascular Neurology Consult for  [address] Stroke Alert through [address] @ 8:59  9/3/2022 10:02 AM  Pt Name: Edi Godoy  MRN: [id number]  YOB: 1955  Date of evaluation: 9/3/2022  Primary Care Physician: Neel Rosenthal MD  Reason for Evaluation: Stroke Evaluation with Discussion with Ed or primary team with Telemedicine and stroke evaluation with Review of imaging and labs    Edi Godoy is a 74 y.o. female who presents with history of dementia, T2DM, A. fib on Eliquis, cirrhosis with recurrent ascites, admitted for UTI secondary to chronic indwelling Pedroza catheter with [place]riz[place] syndrome with decompressive colonoscopy 9-1-2022. She had worsened encephalopathy and neuro exam NIh 13 when Examined by Dr. Geovanny Rodriguez. Not really following commands and decreased speech output. CT head and CTA head and neck were obtained and reviewed demonstrating no evidence of large vessel occlusion. No hemorrhage. No IV tPA as last known well the night before. LKW: the night before  NIH:  12    Allergies  is allergic to quetiapine, venlafaxine, aspirin, fentanyl, other, quetiapine fumarate, and venlafaxine hcl. Medications  Prior to Admission medications    Medication Sig Start Date End Date Taking?  Authorizing Provider   polyethylene glycol (GLYCOLAX) 17 GM/SCOOP powder Take 17 g by mouth daily 9/3/22 10/3/22 Yes Odettesa Sebastiane, DO   polyethylene glycol (GLYCOLAX) 17 g packet Take 17 g by mouth daily 9/3/22  Yes Odette Sebastiane, DO   insulin glargine (LANTUS SOLOSTAR) 100 UNIT/ML injection pen Inject 40 Units into the skin Daily   Yes Historical Provider, MD   omeprazole (PRILOSEC) 20 MG delayed release capsule Take 20 mg by mouth 2 times daily   Yes Historical Provider, MD   potassium chloride (KLOR-CON M) 10 MEQ extended release tablet Take 10 mEq by mouth daily   Yes Historical Provider, MD   ferrous sulfate (FE TABS 325) 325 (65 Fe) MG EC tablet Take 325 mg sertraline  100 mg Per NG tube Daily    insulin glargine  15 Units SubCUTAneous BID    insulin lispro  0-16 Units SubCUTAneous Q4H    insulin lispro  0-4 Units SubCUTAneous Nightly    [START ON 9/5/2022] potassium bicarb-citric acid  20 mEq Per NG tube Daily    enoxaparin  1 mg/kg SubCUTAneous BID    fat emulsion  100 mL IntraVENous Daily    polyethylene glycol  17 g Oral Daily    potassium chloride  40 mEq Oral Once    cefepime  2,000 mg IntraVENous Q12H    traZODone  100 mg Oral Nightly    sodium chloride flush  5-40 mL IntraVENous 2 times per day    [Held by provider] potassium chloride  10 mEq Oral Daily    budesonide-formoterol  2 puff Inhalation BID    silver sulfADIAZINE   Topical Nightly    [Held by provider] sodium chloride  1,000 mL IntraVENous Once     Continuous Infusions:   norepinephrine Stopped (09/04/22 1506)    sodium chloride Stopped (09/03/22 1458)    dextrose       PRN Meds:.sodium phosphate IVPB **OR** sodium phosphate IVPB, sodium chloride flush, potassium chloride, morphine, sodium chloride flush, sodium chloride, magnesium sulfate, ondansetron **OR** ondansetron, acetaminophen **OR** acetaminophen, glucose, dextrose bolus **OR** dextrose bolus, glucagon (rDNA), dextrose, oxybutynin, albuterol sulfate HFA  Past Medical History   has a past medical history of Arthritis, CAD (coronary artery disease), CHF (congestive heart failure) ([facility] [address] COPD (chronic obstructive pulmonary disease) ([facility] [address] D[facility] Diabetes mellitus ([facility] Fibromyalgia, Headache, Hypertension, Liver disease, LEONARDA (obstructive sleep apnea), Panic attack, and Paroxysmal atrial fibrillation ([facility]   Social History  Social History     Socioeconomic History    Marital status:      Spouse name: Not on file    Number of children: Not on file    Years of education: Not on file    Highest education level: Not on file   Occupational History    Not on file   Tobacco Use    Smoking status: Never    Smokeless tobacco: Never Substance and Sexual Activity    Alcohol use: Not Currently     Comment: She states she used to be a heavy drinker but she quit about 13 years ago    Drug use: Never    Sexual activity: Not on file   Other Topics Concern    Not on file   Social History Narrative    Not on file     Social Determinants of Health     Financial Resource Strain: Not on file   Food Insecurity: Not on file   Transportation Needs: Not on file   Physical Activity: Not on file   Stress: Not on file   Social Connections: Not on file   Intimate Partner Violence: Not on file   Housing Stability: Not on file     Family History      Problem Relation Age of Onset    Heart Failure Mother     Cancer Father     Cancer Sister     Cancer Brother        OBJECTIVE  BP (!) 114/58   Pulse 99   Temp 98.5 °F (36.9 °C) (Temporal)   Resp 22   Ht 5' 2\" (1.575 m)   Wt 215 lb (97.5 kg)   SpO2 97%   BMI 39.32 kg/m²     NIH Stroke Scale  Interval: Reassessment  Level of Consciousness (1a): Alert  LOC Questions (1b): Answers neither question correctly  LOC Commands (1c): Performs neither task correctly  Best Gaze (2): Normal  Visual (3): No visual loss  Facial Palsy (4): Normal symmetrical movement  Motor Arm, Left (5a): Some effort against gravity  Motor Arm, Right (5b): Some effort against gravity  Motor Leg, Left (6a): No effort against gravity, limb falls  Motor Leg, Right (6b): No effort against gravity, limb falls  Limb Ataxia (7): Absent  Sensory (8): Normal  Best Language (9): No aphasia  Dysarthria (10): Mild to moderate, slurs some words  Extinction and Inattention (11): No abnormality  Total: 15  Pre-Morbid mRS: 4    Imaging:  Images were personally reviewed with VIZ. AI and PACS used to review images including:  CT brain without contrast: no hemorrhage  CTA imaging: no large vessel occlusion    Assessment    74 y.o. female who presents with history of dementia, T2DM, A. fib on Eliquis, cirrhosis with recurrent ascites, admitted for UTI secondary to chronic indwelling Pedroza catheter with Juan Jose syndrome with decompressive colonoscopy 9-1-2022  Differential DDx:  Metabolic encephalopathy vs cva      Recommendations:  NIH 12  Recommend Inpatient Neurology Consult for further assessment and evaluation   No iv tpa as out of window  Consider mri brain without angie if symptoms persist.    Cont metabolic workup  Resume anticoagulation once cleared for afib        Discussed with Attending Physician    At least 50 min of Telemedicine and time in conversation directly with ED staff and physician for the patient who is in imminent and life threatening deterioration without further treatment and evaluation. This Virtual Visit was conducted with patient's (and/or legal guardian's) consent, to provide telestroke consultation and necessary medical care. Time spent examining patient, reviewing the images personally, reviewing the chart, perform high complexity decision making and speaking with the nursing staff regarding recommendations      Shannan Rea MD, MD   Stroke, Neurocritical Care And/or [address] [place] [address] Stroke [address] [place] [address]   Electronically signed 9/4/2022 at 11:02 PM    Patient Location:  [address] [place] [address] CVICU    Provider Location ([facility]/State): [place], [place]    This virtual visit was conducted via interactive/real-time audio/video.

## 2022-09-06 ENCOUNTER — APPOINTMENT (OUTPATIENT)
Dept: GENERAL RADIOLOGY | Age: 67
DRG: 698 | End: 2022-09-06
Payer: COMMERCIAL

## 2022-09-06 ENCOUNTER — APPOINTMENT (OUTPATIENT)
Dept: MRI IMAGING | Age: 67
DRG: 698 | End: 2022-09-06
Payer: COMMERCIAL

## 2022-09-06 LAB
ABSOLUTE EOS #: 0.27 K/UL (ref 0–0.44)
ABSOLUTE IMMATURE GRANULOCYTE: 0.18 K/UL (ref 0–0.3)
ABSOLUTE LYMPH #: 0.9 K/UL (ref 1.1–3.7)
ABSOLUTE MONO #: 0.54 K/UL (ref 0.1–1.2)
AMMONIA: 71 UMOL/L (ref 11–51)
ANION GAP SERPL CALCULATED.3IONS-SCNC: 6 MMOL/L (ref 9–17)
BASOPHILS # BLD: 0 % (ref 0–2)
BASOPHILS ABSOLUTE: 0 K/UL (ref 0–0.2)
BUN BLDV-MCNC: 26 MG/DL (ref 8–23)
BUN/CREAT BLD: 36 (ref 9–20)
CALCIUM SERPL-MCNC: 7.9 MG/DL (ref 8.6–10.4)
CHLORIDE BLD-SCNC: 121 MMOL/L (ref 98–107)
CO2: 19 MMOL/L (ref 20–31)
CREAT SERPL-MCNC: 0.72 MG/DL (ref 0.5–0.9)
EOSINOPHILS RELATIVE PERCENT: 3 % (ref 1–4)
GFR AFRICAN AMERICAN: >60 ML/MIN
GFR NON-AFRICAN AMERICAN: >60 ML/MIN
GFR SERPL CREATININE-BSD FRML MDRD: ABNORMAL ML/MIN/{1.73_M2}
GLUCOSE BLD-MCNC: 213 MG/DL (ref 65–105)
GLUCOSE BLD-MCNC: 217 MG/DL (ref 65–105)
GLUCOSE BLD-MCNC: 232 MG/DL (ref 65–105)
GLUCOSE BLD-MCNC: 236 MG/DL (ref 65–105)
GLUCOSE BLD-MCNC: 247 MG/DL (ref 65–105)
GLUCOSE BLD-MCNC: 261 MG/DL (ref 70–99)
GLUCOSE BLD-MCNC: 273 MG/DL (ref 65–105)
HCT VFR BLD CALC: 27.1 % (ref 36.3–47.1)
HEMOGLOBIN: 8.3 G/DL (ref 11.9–15.1)
IMMATURE GRANULOCYTES: 2 %
LYMPHOCYTES # BLD: 10 % (ref 24–43)
MCH RBC QN AUTO: 26.2 PG (ref 25.2–33.5)
MCHC RBC AUTO-ENTMCNC: 30.6 G/DL (ref 28.4–34.8)
MCV RBC AUTO: 85.5 FL (ref 82.6–102.9)
MONOCYTES # BLD: 6 % (ref 3–12)
MORPHOLOGY: ABNORMAL
NRBC AUTOMATED: 0.6 PER 100 WBC
PDW BLD-RTO: 22.8 % (ref 11.8–14.4)
PHOSPHORUS: 0.6 MG/DL (ref 2.6–4.5)
PHOSPHORUS: 2.3 MG/DL (ref 2.6–4.5)
PLATELET # BLD: 62 K/UL (ref 138–453)
PMV BLD AUTO: 9.4 FL (ref 8.1–13.5)
POTASSIUM SERPL-SCNC: 3.1 MMOL/L (ref 3.7–5.3)
POTASSIUM SERPL-SCNC: 3.6 MMOL/L (ref 3.7–5.3)
RBC # BLD: 3.17 M/UL (ref 3.95–5.11)
SARS-COV-2, RAPID: NOT DETECTED
SEG NEUTROPHILS: 79 % (ref 36–65)
SEGMENTED NEUTROPHILS ABSOLUTE COUNT: 7.11 K/UL (ref 1.5–8.1)
SODIUM BLD-SCNC: 146 MMOL/L (ref 135–144)
SPECIMEN DESCRIPTION: NORMAL
WBC # BLD: 9 K/UL (ref 3.5–11.3)

## 2022-09-06 PROCEDURE — 2700000000 HC OXYGEN THERAPY PER DAY

## 2022-09-06 PROCEDURE — 94761 N-INVAS EAR/PLS OXIMETRY MLT: CPT

## 2022-09-06 PROCEDURE — 85025 COMPLETE CBC W/AUTO DIFF WBC: CPT

## 2022-09-06 PROCEDURE — 2000000000 HC ICU R&B

## 2022-09-06 PROCEDURE — 70551 MRI BRAIN STEM W/O DYE: CPT

## 2022-09-06 PROCEDURE — 6360000002 HC RX W HCPCS: Performed by: STUDENT IN AN ORGANIZED HEALTH CARE EDUCATION/TRAINING PROGRAM

## 2022-09-06 PROCEDURE — 99233 SBSQ HOSP IP/OBS HIGH 50: CPT | Performed by: NURSE PRACTITIONER

## 2022-09-06 PROCEDURE — 82140 ASSAY OF AMMONIA: CPT

## 2022-09-06 PROCEDURE — 6370000000 HC RX 637 (ALT 250 FOR IP): Performed by: NURSE PRACTITIONER

## 2022-09-06 PROCEDURE — 6370000000 HC RX 637 (ALT 250 FOR IP): Performed by: FAMILY MEDICINE

## 2022-09-06 PROCEDURE — 99232 SBSQ HOSP IP/OBS MODERATE 35: CPT | Performed by: INTERNAL MEDICINE

## 2022-09-06 PROCEDURE — A4216 STERILE WATER/SALINE, 10 ML: HCPCS | Performed by: FAMILY MEDICINE

## 2022-09-06 PROCEDURE — 6370000000 HC RX 637 (ALT 250 FOR IP): Performed by: STUDENT IN AN ORGANIZED HEALTH CARE EDUCATION/TRAINING PROGRAM

## 2022-09-06 PROCEDURE — 2500000003 HC RX 250 WO HCPCS: Performed by: NURSE PRACTITIONER

## 2022-09-06 PROCEDURE — 87635 SARS-COV-2 COVID-19 AMP PRB: CPT

## 2022-09-06 PROCEDURE — 2500000003 HC RX 250 WO HCPCS: Performed by: FAMILY MEDICINE

## 2022-09-06 PROCEDURE — 2580000003 HC RX 258: Performed by: NURSE PRACTITIONER

## 2022-09-06 PROCEDURE — 84132 ASSAY OF SERUM POTASSIUM: CPT

## 2022-09-06 PROCEDURE — 84100 ASSAY OF PHOSPHORUS: CPT

## 2022-09-06 PROCEDURE — 2580000003 HC RX 258: Performed by: FAMILY MEDICINE

## 2022-09-06 PROCEDURE — 2580000003 HC RX 258: Performed by: STUDENT IN AN ORGANIZED HEALTH CARE EDUCATION/TRAINING PROGRAM

## 2022-09-06 PROCEDURE — 80048 BASIC METABOLIC PNL TOTAL CA: CPT

## 2022-09-06 PROCEDURE — 82947 ASSAY GLUCOSE BLOOD QUANT: CPT

## 2022-09-06 PROCEDURE — 6360000002 HC RX W HCPCS: Performed by: NURSE PRACTITIONER

## 2022-09-06 RX ORDER — BUDESONIDE 0.5 MG/2ML
0.5 INHALANT ORAL 2 TIMES DAILY
Status: DISCONTINUED | OUTPATIENT
Start: 2022-09-06 | End: 2022-09-23 | Stop reason: HOSPADM

## 2022-09-06 RX ORDER — LORAZEPAM 0.5 MG/1
0.5 TABLET ORAL ONCE
Status: DISCONTINUED | OUTPATIENT
Start: 2022-09-06 | End: 2022-09-06

## 2022-09-06 RX ORDER — POTASSIUM CHLORIDE 20 MEQ/1
40 TABLET, EXTENDED RELEASE ORAL PRN
Status: DISCONTINUED | OUTPATIENT
Start: 2022-09-06 | End: 2022-09-23 | Stop reason: HOSPADM

## 2022-09-06 RX ORDER — LORAZEPAM 2 MG/ML
0.5 INJECTION INTRAMUSCULAR
Status: COMPLETED | OUTPATIENT
Start: 2022-09-06 | End: 2022-09-06

## 2022-09-06 RX ORDER — LACTULOSE 10 G/15ML
20 SOLUTION ORAL ONCE
Status: COMPLETED | OUTPATIENT
Start: 2022-09-06 | End: 2022-09-06

## 2022-09-06 RX ORDER — INSULIN GLARGINE 100 [IU]/ML
20 INJECTION, SOLUTION SUBCUTANEOUS 2 TIMES DAILY
Status: DISCONTINUED | OUTPATIENT
Start: 2022-09-06 | End: 2022-09-23 | Stop reason: HOSPADM

## 2022-09-06 RX ADMIN — INSULIN LISPRO 4 UNITS: 100 INJECTION, SOLUTION INTRAVENOUS; SUBCUTANEOUS at 08:28

## 2022-09-06 RX ADMIN — INSULIN LISPRO 4 UNITS: 100 INJECTION, SOLUTION INTRAVENOUS; SUBCUTANEOUS at 14:16

## 2022-09-06 RX ADMIN — DEXTROSE MONOHYDRATE: 50 INJECTION, SOLUTION INTRAVENOUS at 05:45

## 2022-09-06 RX ADMIN — INSULIN LISPRO 8 UNITS: 100 INJECTION, SOLUTION INTRAVENOUS; SUBCUTANEOUS at 06:45

## 2022-09-06 RX ADMIN — LACTULOSE 20 G: 20 SOLUTION ORAL at 23:53

## 2022-09-06 RX ADMIN — SODIUM CHLORIDE, PRESERVATIVE FREE 10 ML: 5 INJECTION INTRAVENOUS at 20:02

## 2022-09-06 RX ADMIN — SILVER SULFADIAZINE: 10 CREAM TOPICAL at 20:00

## 2022-09-06 RX ADMIN — POTASSIUM CHLORIDE 20 MEQ: 29.8 INJECTION, SOLUTION INTRAVENOUS at 09:38

## 2022-09-06 RX ADMIN — ACETAMINOPHEN 650 MG: 325 TABLET, FILM COATED ORAL at 20:22

## 2022-09-06 RX ADMIN — TRAZODONE HYDROCHLORIDE 100 MG: 100 TABLET ORAL at 20:25

## 2022-09-06 RX ADMIN — SODIUM CHLORIDE, PRESERVATIVE FREE 10 ML: 5 INJECTION INTRAVENOUS at 08:29

## 2022-09-06 RX ADMIN — INSULIN LISPRO 4 UNITS: 100 INJECTION, SOLUTION INTRAVENOUS; SUBCUTANEOUS at 22:31

## 2022-09-06 RX ADMIN — LORAZEPAM 0.5 MG: 2 INJECTION INTRAMUSCULAR; INTRAVENOUS at 11:51

## 2022-09-06 RX ADMIN — SODIUM CHLORIDE, PRESERVATIVE FREE 20 MG: 5 INJECTION INTRAVENOUS at 08:27

## 2022-09-06 RX ADMIN — MIDODRINE HYDROCHLORIDE 10 MG: 10 TABLET ORAL at 08:26

## 2022-09-06 RX ADMIN — SODIUM CHLORIDE, PRESERVATIVE FREE 10 ML: 5 INJECTION INTRAVENOUS at 08:28

## 2022-09-06 RX ADMIN — POTASSIUM CHLORIDE 20 MEQ: 29.8 INJECTION, SOLUTION INTRAVENOUS at 06:49

## 2022-09-06 RX ADMIN — INSULIN GLARGINE 15 UNITS: 100 INJECTION, SOLUTION SUBCUTANEOUS at 08:27

## 2022-09-06 RX ADMIN — SERTRALINE HYDROCHLORIDE 100 MG: 100 TABLET ORAL at 08:27

## 2022-09-06 RX ADMIN — INSULIN LISPRO 4 UNITS: 100 INJECTION, SOLUTION INTRAVENOUS; SUBCUTANEOUS at 03:01

## 2022-09-06 RX ADMIN — POTASSIUM BICARBONATE 20 MEQ: 782 TABLET, EFFERVESCENT ORAL at 08:26

## 2022-09-06 RX ADMIN — POLYETHYLENE GLYCOL (3350) 17 G: 17 POWDER, FOR SOLUTION ORAL at 08:27

## 2022-09-06 RX ADMIN — MIDODRINE HYDROCHLORIDE 10 MG: 10 TABLET ORAL at 16:19

## 2022-09-06 RX ADMIN — SODIUM PHOSPHATE, MONOBASIC, MONOHYDRATE 31.2 MMOL: 276; 142 INJECTION, SOLUTION INTRAVENOUS at 08:26

## 2022-09-06 RX ADMIN — SODIUM CHLORIDE, PRESERVATIVE FREE 20 MG: 5 INJECTION INTRAVENOUS at 20:21

## 2022-09-06 RX ADMIN — MIDODRINE HYDROCHLORIDE 10 MG: 10 TABLET ORAL at 12:30

## 2022-09-06 RX ADMIN — INSULIN LISPRO 4 UNITS: 100 INJECTION, SOLUTION INTRAVENOUS; SUBCUTANEOUS at 18:13

## 2022-09-06 RX ADMIN — INSULIN GLARGINE 20 UNITS: 100 INJECTION, SOLUTION SUBCUTANEOUS at 20:21

## 2022-09-06 ASSESSMENT — PAIN SCALES - GENERAL
PAINLEVEL_OUTOF10: 0

## 2022-09-06 NOTE — PLAN OF CARE
Problem: Nutrition Deficit:  Goal: Optimize nutritional status  Outcome: Progressing    Tolerating current tube feeding at 48 ml/hr and water flushes. No residuals. Problem: ABCDS Injury Assessment  Goal: Absence of physical injury  Outcome: Progressing     Problem: Safety - Adult  Goal: Free from fall injury  Outcome: Progressing    Patient remains safe from falls this shift. Problem: Skin/Tissue Integrity  Goal: Absence of new skin breakdown  Description: 1. Monitor for areas of redness and/or skin breakdown  2. Assess vascular access sites hourly  3. Every 4-6 hours minimum:  Change oxygen saturation probe site  4. Every 4-6 hours:  If on nasal continuous positive airway pressure, respiratory therapy assess nares and determine need for appliance change or resting period. Outcome: Progressing    No new areas of skin breakdown noted this shift. Problem: Pain    Goal: Verbalizes/displays adequate comfort level or baseline comfort level  Outcome: Progressing    Patient does not demonstrate any non-verbal demonstration of pain.

## 2022-09-06 NOTE — PLAN OF CARE
Spoke with Osmany Barth RN today and this patient isn't appropriate to have a video swallow study today. Osmany Barth states to call back 9/7 to see if patient is appropriate.

## 2022-09-06 NOTE — ACP (ADVANCE CARE PLANNING)
regarding differences between Advance Directives and portable DNR orders.     Length of ACP Conversation in minutes:      Conversation Outcomes:  [] ACP discussion completed  [] Existing advance directive reviewed with patient; no changes to patient's previously recorded wishes  [] New Advance Directive completed  [] Portable Do Not Rescitate prepared for Provider review and signature  [] POLST/POST/MOLST/MOST prepared for Provider review and signature      Follow-up plan:    [x] Schedule follow-up conversation to continue planning  [] Referred individual to Provider for additional questions/concerns   [] Advised patient/agent/surrogate to review completed ACP document and update if needed with changes in condition, patient preferences or care setting    [] This note routed to one or more involved healthcare providers

## 2022-09-06 NOTE — PLAN OF CARE
Problem: Discharge Planning  Goal: Discharge to home or other facility with appropriate resources  9/6/2022 1443 by Jeyson Rivers RN  Outcome: Progressing  Flowsheets (Taken 9/6/2022 0800)  Discharge to home or other facility with appropriate resources: Identify barriers to discharge with patient and caregiver  9/6/2022 0315 by Miracle Rodriguez RN  Outcome: Progressing     Problem: Pain  Goal: Verbalizes/displays adequate comfort level or baseline comfort level  9/6/2022 1443 by Jeyson Rivers RN  Outcome: Progressing  9/6/2022 0315 by Miracle Rodriguez RN  Outcome: Progressing     Problem: Skin/Tissue Integrity  Goal: Absence of new skin breakdown  Description: 1. Monitor for areas of redness and/or skin breakdown  2. Assess vascular access sites hourly  3. Every 4-6 hours minimum:  Change oxygen saturation probe site  4. Every 4-6 hours:  If on nasal continuous positive airway pressure, respiratory therapy assess nares and determine need for appliance change or resting period.   9/6/2022 1443 by Jeyson Rivers RN  Outcome: Progressing  9/6/2022 0315 by Miracle Rodriguez RN  Outcome: Progressing     Problem: Safety - Adult  Goal: Free from fall injury  9/6/2022 1443 by Jeyson Rivers RN  Outcome: Progressing  Flowsheets (Taken 9/6/2022 1441)  Free From Fall Injury: Instruct family/caregiver on patient safety  9/6/2022 0315 by Miracle Rodriguez RN  Outcome: Progressing     Problem: ABCDS Injury Assessment  Goal: Absence of physical injury  9/6/2022 1443 by Jeyson Rivers RN  Outcome: Progressing  9/6/2022 0315 by Miracle Rodriguez RN  Outcome: Progressing     Problem: Chronic Conditions and Co-morbidities  Goal: Patient's chronic conditions and co-morbidity symptoms are monitored and maintained or improved  9/6/2022 1443 by Jeyson Rivers RN  Outcome: Progressing  Flowsheets (Taken 9/6/2022 0800)  Care Plan - Patient's Chronic Conditions and Co-Morbidity Symptoms are Monitored and Maintained or Improved:   Monitor and assess patient's chronic conditions and comorbid symptoms for stability, deterioration, or improvement   Collaborate with multidisciplinary team to address chronic and comorbid conditions and prevent exacerbation or deterioration   Update acute care plan with appropriate goals if chronic or comorbid symptoms are exacerbated and prevent overall improvement and discharge  9/6/2022 0315 by Jorge L Ruffin RN  Outcome: Progressing     Problem: Nutrition Deficit:  Goal: Optimize nutritional status  9/6/2022 1443 by Cheryl Paez RN  Outcome: Progressing  9/6/2022 0315 by Jorge L Ruffin RN  Outcome: Progressing

## 2022-09-06 NOTE — PROGRESS NOTES
GI Progress notes    9/6/2022   1:53 PM    Name:  Guzman Do  MRN:    [id number]     Kalinide:     [de-identified]   Room:  2029/2029-01  IP Day: 8     Admit Date: 8/29/2022  8:35 PM  PCP: Mi Rawls MD    Subjective:     C/C:   Chief Complaint   Patient presents with    Abdominal Pain     N/V/D    Hematuria     X few months         Interval History: Status: not changed. Patient seen and examined. No acute events overnight. Patient continues on NG tube feeds. Tolerating well. Has FMS in place. History of [place] syndrome. Status post decompressive colonoscopy 9/1/2022  Hemoglobin 8.3. No signs of overt GI bleeding    ROS:  Unable to assess due to clinical status    Medications: Allergies:    Allergies   Allergen Reactions    Quetiapine      Other reaction(s): Unknown  Other reaction(s): Hallucinations  seroquel    Venlafaxine      Other reaction(s): Hallucinations, Unknown  effexor      Aspirin Other (See Comments)     Bleeding disorder  Other reaction(s): Unknown  Bleeding disorder      Fentanyl      Other reaction(s): Unknown    Other Other (See Comments)     \"NO SUPPOSED TO HAVE ASPIRIN\"    Quetiapine Fumarate      Other reaction(s): Unknown    Venlafaxine Hcl      Other reaction(s): Unknown       Current Meds: insulin glargine (LANTUS) injection vial 20 Units, BID  dextrose 5 % solution, Continuous  sodium phosphate 15.6 mmol in sodium chloride 0.9 % 250 mL IVPB, PRN   Or  sodium phosphate 31.2 mmol in sodium chloride 0.9 % 250 mL IVPB, PRN  midodrine (PROAMATINE) tablet 10 mg, TID WC  famotidine (PEPCID) 20 mg in sodium chloride (PF) 10 mL injection, BID  sertraline (ZOLOFT) tablet 100 mg, Daily  insulin lispro (HUMALOG) injection vial 0-16 Units, Q4H  insulin lispro (HUMALOG) injection vial 0-4 Units, Nightly  potassium bicarb-citric acid (EFFER-K) effervescent tablet 20 mEq, Daily  [Held by provider] enoxaparin (LOVENOX) injection 100 mg, BID  sodium chloride flush 0.9 % injection 10 mL, PRN  norepinephrine (LEVOPHED) 16 mg in dextrose 5% 250 mL infusion, Continuous  polyethylene glycol (GLYCOLAX) packet 17 g, Daily  potassium chloride (KLOR-CON M) extended release tablet 40 mEq, Once  potassium chloride 20 mEq/50 mL IVPB (Central Line), PRN  morphine (PF) injection 1 mg, Q4H PRN  traZODone (DESYREL) tablet 100 mg, Nightly  sodium chloride flush 0.9 % injection 5-40 mL, 2 times per day  sodium chloride flush 0.9 % injection 10 mL, PRN  0.9 % sodium chloride infusion, PRN  magnesium sulfate 1000 mg in dextrose 5% 100 mL IVPB, PRN  ondansetron (ZOFRAN-ODT) disintegrating tablet 4 mg, Q8H PRN   Or  ondansetron (ZOFRAN) injection 4 mg, Q6H PRN  acetaminophen (TYLENOL) tablet 650 mg, Q6H PRN   Or  acetaminophen (TYLENOL) suppository 650 mg, Q6H PRN  glucose chewable tablet 16 g, PRN  dextrose bolus 10% 125 mL, PRN   Or  dextrose bolus 10% 250 mL, PRN  glucagon (rDNA) injection 1 mg, PRN  dextrose 10 % infusion, Continuous PRN  [Held by provider] potassium chloride (KLOR-CON M) extended release tablet 10 mEq, Daily  oxybutynin (DITROPAN) tablet 5 mg, BID PRN  budesonide-formoterol (SYMBICORT) 80-4.5 MCG/ACT inhaler 2 puff, BID  albuterol sulfate HFA (PROVENTIL;VENTOLIN;PROAIR) 108 (90 Base) MCG/ACT inhaler 2 puff, Q6H PRN  silver sulfADIAZINE (SILVADENE) 1 % cream, Nightly  [Held by provider] 0.9 % sodium chloride bolus, Once        Data:     Code Status:  Full Code    Family History   Problem Relation Age of Onset    Heart Failure Mother     Cancer Father     Cancer Sister     Cancer Brother        Social History     Socioeconomic History    Marital status:      Spouse name: Not on file    Number of children: Not on file    Years of education: Not on file    Highest education level: Not on file   Occupational History    Not on file   Tobacco Use    Smoking status: Never    Smokeless tobacco: Never   Substance and Sexual Activity    Alcohol use: Not Currently     Comment: She states she used to be EOSABS 0.27 09/06/2022 09:40 AM    BASOSABS 0.00 09/06/2022 09:40 AM    DIFFTYPE NOT REPORTED 02/09/2022 01:43 PM     Hemoglobin/Hematocrit:    Lab Results   Component Value Date/Time    HGB 8.3 09/06/2022 09:40 AM    HCT 27.1 09/06/2022 09:40 AM     CMP:    Lab Results   Component Value Date/Time     09/06/2022 05:30 AM    K 3.1 09/06/2022 05:30 AM     09/06/2022 05:30 AM    CO2 19 09/06/2022 05:30 AM    BUN 26 09/06/2022 05:30 AM    CREATININE 0.72 09/06/2022 05:30 AM    GFRAA >60 09/06/2022 05:30 AM    LABGLOM >60 09/06/2022 05:30 AM    GLUCOSE 261 09/06/2022 05:30 AM    PROT 5.2 09/04/2022 03:55 AM    LABALBU 1.8 09/04/2022 03:55 AM    CALCIUM 7.9 09/06/2022 05:30 AM    BILITOT 0.6 09/04/2022 03:55 AM    ALKPHOS 140 09/04/2022 03:55 AM    AST 28 09/04/2022 03:55 AM    ALT 29 09/04/2022 03:55 AM     BMP:    Lab Results   Component Value Date/Time     09/06/2022 05:30 AM    K 3.1 09/06/2022 05:30 AM     09/06/2022 05:30 AM    CO2 19 09/06/2022 05:30 AM    BUN 26 09/06/2022 05:30 AM    LABALBU 1.8 09/04/2022 03:55 AM    CREATININE 0.72 09/06/2022 05:30 AM    CALCIUM 7.9 09/06/2022 05:30 AM    GFRAA >60 09/06/2022 05:30 AM    LABGLOM >60 09/06/2022 05:30 AM    GLUCOSE 261 09/06/2022 05:30 AM     PT/INR:    Lab Results   Component Value Date/Time    PROTIME 18.6 02/10/2022 06:18 AM    INR 1.6 02/10/2022 06:18 AM     PTT:    Lab Results   Component Value Date/Time    APTT 30.1 02/08/2022 09:15 PM   [APTT}    Physical Examination:        General appearance: alert, cooperative and no distress  Mental Status: oriented to person, place and time and normal affect  Abdomen: soft, nontender, nondistended, bowel sounds present   Extremities: no edema, redness or tenderness in the calves  Skin: no gross lesions, rashes, or induration    Assessment:        Primary Problem  Shock, septic [facility])     Active Hospital Problems    Diagnosis Date Noted    Dysphagia [R13.10] 09/04/2022     Priority: Medium Encephalopathy acute [G93.40] 09/03/2022     Priority: Medium    Stroke-like symptoms [R29.90] 09/03/2022     Priority: Medium    Leukocytosis [D72.829] 09/03/2022     Priority: Medium    Hypotension [I95.9] 08/31/2022     Priority: Medium    Shock, septic ([facility] [A41.9, R65.21] 08/31/2022     Priority: Medium    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] 08/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     Priority: Medium    Urinary tract infection associated with indwelling urethral catheter ([facility] [Z07.326P, N39.0] 08/29/2022     Priority: Medium    Acute urinary retention [R33.8] 09/20/2020     Priority: Medium    Lymphedema [I89.0] 02/09/2022    COPD (chronic obstructive pulmonary disease) ([facility] [J44.9]     LEONARDA (obstructive sleep apnea) [G47.33]     Hypertension [I10]     Hepatic cirrhosis ([facility] [K74.60]      Past Medical History:   Diagnosis Date    Arthritis     CAD (coronary artery disease)     CHF (congestive heart failure) (HCC)     COPD (chronic obstructive pulmonary disease) (HCC)     Dementia (HCC)     Diabetes mellitus (HCC)     Fibromyalgia     Headache     Hypertension     Liver disease     LEONARDA (obstructive sleep apnea)     Panic attack     Paroxysmal atrial fibrillation ([facility]         Plan:        Anemia  FOBT positive on admission  No overt GI bleeding  Monitor for bleeding  Trend H&H  Transfuse for hgb < 7  Juan Jose syndrome s/p decompressive colonoscopy 9/1/22  FMS in place  Dysphagia  MBSS once able  Currently has NG, tolerating TF  May require PEG tube placement    Time spent reviewing the chart, seeing the patient, and discussing with the attending MD around 30 minutes. Explained to the patient and d/W Nursing Staff  Will F/U with you  Please call or Page for any issues or change in status  Thanks    Electronically signed by LEILA Howell NP on 9/6/2022 at 1:53 PM     GI attending physician note. Patient seen with LEILA    I independently performed an evaluation on [facility].   I have reviewed the above documentation completed by the Nurse Practitioner and agree with the history, examination, and management plan. Recommendations discussed. Patient seen along with APRN, examined, discussed with the nursing staff. Basing on the swallow evaluation, will plan further management. At present patient appears stable.
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
## 2022-09-06 NOTE — PROGRESS NOTES
[facility]  Speech Language Pathology    Date: 9/6/2022  Patient Name: Ray Frost  YOB: 1955   AGE: 74 y.o. MRN: [id number]        Patient Not Available for Speech Therapy     Due to:  [] Testing  [] Hemodialysis  [] Cancelled by RN  [] Surgery   [] Intubation/Sedation/Pain Medication  [] Medical instability  [x] Other:  Not appropriate for MBSS on this date    Next scheduled treatment: as appropriate  Completed by:  AYSHA Lozoya, M.S. [id number] [facility]

## 2022-09-06 NOTE — PROGRESS NOTES
Pulmonary Critical Care Progress Note       Patient seen for the follow up of  Shock, septic ([address]     Subjective:    She has been on  2 L oxygen after she received Ativan for MRI of the brain. She had MRI brain done. She is off tube feeds. .  She is not ambulating. She has rectal tube in place with diarrhea. Examination:  Vitals: BP (!) 111/55   Pulse 94   Temp 97.9 °F (36.6 °C) (Temporal)   Resp 18   Ht 5' 2\" (1.575 m)   Wt 219 lb 11.2 oz (99.7 kg)   SpO2 100%   BMI 40.18 kg/m²   General appearance: Sleepy confused and cooperative with exam  Neck: No JVD  Lungs: Decreased breath sound no crackles or wheezes  Heart: regular rate and rhythm, S1, S2 normal, no gallop  Abdomen: Soft, non tender, + BS  Extremities: no cyanosis or clubbing. No significant edema, scratches bilateral lower legs     LABs:  CBC:   Recent Labs     09/05/22  0502 09/06/22  0940   WBC 8.0 9.0   HGB 8.4* 8.3*   HCT 27.7* 27.1*   PLT 58* 62*       BMP:   Recent Labs     09/05/22  0502 09/05/22  1155 09/06/22  0530   *  --  146*   K 2.9* 3.8 3.1*   CO2 19*  --  19*   BUN 22  --  26*   CREATININE 0.73  --  0.72   LABGLOM >60  --  >60   GLUCOSE 193*  --  261*       LIVER PROFILE:  Recent Labs     09/04/22  0355   AST 28   ALT 29   LABALBU 1.8*       ABG:  Lab Results   Component Value Date/Time    FIO2 NOT REPORTED 02/08/2022 07:10 PM       Lab Results   Component Value Date/Time    FIO2 NOT REPORTED 02/08/2022 07:10 PM     Radiology:  9/5/22 CXR  Cardiomegaly with left basilar infiltrate. Support tubes as described above.        Impression:  Chronic hypoxic respiratory failure  Atelectasis  Hypotension/Septic Shock requiring vasopressors   UTI/Indwelling angeles  Juan Jose's syndrome   Obstructive sleep apnea/Obesity  BROOKLYN   A. fib, CAD, CHF, DM, dementia, HTN, liver cirrhosis, esophageal varices  History of discitis    Recommendations:  Oxygen via nasal cannula  DuoNeb by nebulizer   Discontinue Symbicort   Pulmicort 0.5 b.I.d.   NG feeds as tolerated   Consider PEG tube placement  Monitor blood pressure off pressors  On midodrine   Monitor platelet count   Monitor sodium/D5W at 50 mL an hour   Consider neurology evaluation  /Check MRI brain results  Colorectal surgery following, s/p decompressive colonoscopy    Physical therapy  Discussed with RN  GI prophylaxis  DVT prophylaxis with ANDRAE Paulino MD, [facility]  Pulmonary Critical Care and Sleep Medicine,  [facility]: [phone]

## 2022-09-06 NOTE — CARE COORDINATION
Discharge planning    Referral made to [facility]. [facility] will check to see if the patient meets criteria. Spoke with patient's spouse, Michaela Deleon, and he is agreeable to [facility].

## 2022-09-07 ENCOUNTER — APPOINTMENT (OUTPATIENT)
Dept: GENERAL RADIOLOGY | Age: 67
DRG: 698 | End: 2022-09-07
Payer: COMMERCIAL

## 2022-09-07 LAB
ABSOLUTE EOS #: 0.19 K/UL (ref 0–0.44)
ABSOLUTE IMMATURE GRANULOCYTE: 0.09 K/UL (ref 0–0.3)
ABSOLUTE LYMPH #: 0.93 K/UL (ref 1.1–3.7)
ABSOLUTE MONO #: 0.65 K/UL (ref 0.1–1.2)
ALBUMIN SERPL-MCNC: 1.9 G/DL (ref 3.5–5.2)
ALP BLD-CCNC: 171 U/L (ref 35–104)
ALT SERPL-CCNC: 37 U/L (ref 5–33)
AMMONIA: 81 UMOL/L (ref 11–51)
ANION GAP SERPL CALCULATED.3IONS-SCNC: 8 MMOL/L (ref 9–17)
AST SERPL-CCNC: 45 U/L
BASOPHILS # BLD: 0 % (ref 0–2)
BASOPHILS ABSOLUTE: 0 K/UL (ref 0–0.2)
BILIRUB SERPL-MCNC: 0.5 MG/DL (ref 0.3–1.2)
BUN BLDV-MCNC: 28 MG/DL (ref 8–23)
BUN/CREAT BLD: 37 (ref 9–20)
CALCIUM SERPL-MCNC: 7.7 MG/DL (ref 8.6–10.4)
CHLORIDE BLD-SCNC: 119 MMOL/L (ref 98–107)
CO2: 20 MMOL/L (ref 20–31)
CREAT SERPL-MCNC: 0.75 MG/DL (ref 0.5–0.9)
EOSINOPHILS RELATIVE PERCENT: 2 % (ref 1–4)
GFR AFRICAN AMERICAN: >60 ML/MIN
GFR NON-AFRICAN AMERICAN: >60 ML/MIN
GFR SERPL CREATININE-BSD FRML MDRD: ABNORMAL ML/MIN/{1.73_M2}
GLUCOSE BLD-MCNC: 228 MG/DL (ref 65–105)
GLUCOSE BLD-MCNC: 237 MG/DL (ref 65–105)
GLUCOSE BLD-MCNC: 239 MG/DL (ref 65–105)
GLUCOSE BLD-MCNC: 240 MG/DL (ref 65–105)
GLUCOSE BLD-MCNC: 248 MG/DL (ref 65–105)
GLUCOSE BLD-MCNC: 263 MG/DL (ref 65–105)
GLUCOSE BLD-MCNC: 269 MG/DL (ref 65–105)
GLUCOSE BLD-MCNC: 273 MG/DL (ref 65–105)
GLUCOSE BLD-MCNC: 273 MG/DL (ref 70–99)
HCT VFR BLD CALC: 27 % (ref 36.3–47.1)
HEMOGLOBIN: 8.2 G/DL (ref 11.9–15.1)
IMMATURE GRANULOCYTES: 1 %
LYMPHOCYTES # BLD: 10 % (ref 24–43)
MAGNESIUM: 2.1 MG/DL (ref 1.6–2.6)
MCH RBC QN AUTO: 25.9 PG (ref 25.2–33.5)
MCHC RBC AUTO-ENTMCNC: 30.4 G/DL (ref 28.4–34.8)
MCV RBC AUTO: 85.2 FL (ref 82.6–102.9)
MONOCYTES # BLD: 7 % (ref 3–12)
MORPHOLOGY: ABNORMAL
NRBC AUTOMATED: 0.6 PER 100 WBC
PDW BLD-RTO: 23.3 % (ref 11.8–14.4)
PHOSPHORUS: 1.1 MG/DL (ref 2.6–4.5)
PLATELET # BLD: 60 K/UL (ref 138–453)
PMV BLD AUTO: 9.5 FL (ref 8.1–13.5)
POTASSIUM SERPL-SCNC: 3.1 MMOL/L (ref 3.7–5.3)
RBC # BLD: 3.17 M/UL (ref 3.95–5.11)
SEG NEUTROPHILS: 80 % (ref 36–65)
SEGMENTED NEUTROPHILS ABSOLUTE COUNT: 7.44 K/UL (ref 1.5–8.1)
SODIUM BLD-SCNC: 147 MMOL/L (ref 135–144)
TOTAL PROTEIN: 5.2 G/DL (ref 6.4–8.3)
WBC # BLD: 9.3 K/UL (ref 3.5–11.3)

## 2022-09-07 PROCEDURE — 6360000002 HC RX W HCPCS: Performed by: INTERNAL MEDICINE

## 2022-09-07 PROCEDURE — 85025 COMPLETE CBC W/AUTO DIFF WBC: CPT

## 2022-09-07 PROCEDURE — 97110 THERAPEUTIC EXERCISES: CPT

## 2022-09-07 PROCEDURE — 2580000003 HC RX 258: Performed by: NURSE PRACTITIONER

## 2022-09-07 PROCEDURE — 6370000000 HC RX 637 (ALT 250 FOR IP): Performed by: NURSE PRACTITIONER

## 2022-09-07 PROCEDURE — 97530 THERAPEUTIC ACTIVITIES: CPT

## 2022-09-07 PROCEDURE — 6370000000 HC RX 637 (ALT 250 FOR IP): Performed by: FAMILY MEDICINE

## 2022-09-07 PROCEDURE — 2500000003 HC RX 250 WO HCPCS: Performed by: NURSE PRACTITIONER

## 2022-09-07 PROCEDURE — 97129 THER IVNTJ 1ST 15 MIN: CPT

## 2022-09-07 PROCEDURE — 82140 ASSAY OF AMMONIA: CPT

## 2022-09-07 PROCEDURE — 2000000000 HC ICU R&B

## 2022-09-07 PROCEDURE — 97167 OT EVAL HIGH COMPLEX 60 MIN: CPT

## 2022-09-07 PROCEDURE — 6370000000 HC RX 637 (ALT 250 FOR IP): Performed by: STUDENT IN AN ORGANIZED HEALTH CARE EDUCATION/TRAINING PROGRAM

## 2022-09-07 PROCEDURE — 82947 ASSAY GLUCOSE BLOOD QUANT: CPT

## 2022-09-07 PROCEDURE — 80053 COMPREHEN METABOLIC PANEL: CPT

## 2022-09-07 PROCEDURE — 2700000000 HC OXYGEN THERAPY PER DAY

## 2022-09-07 PROCEDURE — 84100 ASSAY OF PHOSPHORUS: CPT

## 2022-09-07 PROCEDURE — 83735 ASSAY OF MAGNESIUM: CPT

## 2022-09-07 PROCEDURE — 94640 AIRWAY INHALATION TREATMENT: CPT

## 2022-09-07 PROCEDURE — A4216 STERILE WATER/SALINE, 10 ML: HCPCS | Performed by: FAMILY MEDICINE

## 2022-09-07 PROCEDURE — 92610 EVALUATE SWALLOWING FUNCTION: CPT

## 2022-09-07 PROCEDURE — 97163 PT EVAL HIGH COMPLEX 45 MIN: CPT

## 2022-09-07 PROCEDURE — 2580000003 HC RX 258: Performed by: STUDENT IN AN ORGANIZED HEALTH CARE EDUCATION/TRAINING PROGRAM

## 2022-09-07 PROCEDURE — 2500000003 HC RX 250 WO HCPCS: Performed by: FAMILY MEDICINE

## 2022-09-07 PROCEDURE — 2580000003 HC RX 258: Performed by: FAMILY MEDICINE

## 2022-09-07 PROCEDURE — 99232 SBSQ HOSP IP/OBS MODERATE 35: CPT | Performed by: INTERNAL MEDICINE

## 2022-09-07 PROCEDURE — 99233 SBSQ HOSP IP/OBS HIGH 50: CPT | Performed by: NURSE PRACTITIONER

## 2022-09-07 PROCEDURE — 94761 N-INVAS EAR/PLS OXIMETRY MLT: CPT

## 2022-09-07 PROCEDURE — 97535 SELF CARE MNGMENT TRAINING: CPT

## 2022-09-07 RX ORDER — INSULIN LISPRO 100 [IU]/ML
0-16 INJECTION, SOLUTION INTRAVENOUS; SUBCUTANEOUS EVERY 4 HOURS
Status: DISCONTINUED | OUTPATIENT
Start: 2022-09-07 | End: 2022-09-13

## 2022-09-07 RX ORDER — LACTULOSE 10 G/15ML
20 SOLUTION ORAL 3 TIMES DAILY
Status: DISCONTINUED | OUTPATIENT
Start: 2022-09-07 | End: 2022-09-11

## 2022-09-07 RX ADMIN — INSULIN LISPRO 8 UNITS: 100 INJECTION, SOLUTION INTRAVENOUS; SUBCUTANEOUS at 13:13

## 2022-09-07 RX ADMIN — POLYETHYLENE GLYCOL (3350) 17 G: 17 POWDER, FOR SOLUTION ORAL at 09:38

## 2022-09-07 RX ADMIN — INSULIN LISPRO 4 UNITS: 100 INJECTION, SOLUTION INTRAVENOUS; SUBCUTANEOUS at 20:46

## 2022-09-07 RX ADMIN — INSULIN LISPRO 4 UNITS: 100 INJECTION, SOLUTION INTRAVENOUS; SUBCUTANEOUS at 09:13

## 2022-09-07 RX ADMIN — TRAZODONE HYDROCHLORIDE 100 MG: 100 TABLET ORAL at 20:46

## 2022-09-07 RX ADMIN — LACTULOSE 20 G: 10 SOLUTION ORAL at 09:38

## 2022-09-07 RX ADMIN — INSULIN LISPRO 4 UNITS: 100 INJECTION, SOLUTION INTRAVENOUS; SUBCUTANEOUS at 16:58

## 2022-09-07 RX ADMIN — POTASSIUM BICARBONATE 40 MEQ: 782 TABLET, EFFERVESCENT ORAL at 06:30

## 2022-09-07 RX ADMIN — BUDESONIDE 500 MCG: 0.5 SUSPENSION RESPIRATORY (INHALATION) at 19:28

## 2022-09-07 RX ADMIN — POTASSIUM BICARBONATE 20 MEQ: 782 TABLET, EFFERVESCENT ORAL at 09:02

## 2022-09-07 RX ADMIN — SODIUM PHOSPHATE, MONOBASIC, MONOHYDRATE 31.2 MMOL: 276; 142 INJECTION, SOLUTION INTRAVENOUS at 07:11

## 2022-09-07 RX ADMIN — LACTULOSE 20 G: 10 SOLUTION ORAL at 13:13

## 2022-09-07 RX ADMIN — BUDESONIDE 500 MCG: 0.5 SUSPENSION RESPIRATORY (INHALATION) at 07:55

## 2022-09-07 RX ADMIN — INSULIN LISPRO 8 UNITS: 100 INJECTION, SOLUTION INTRAVENOUS; SUBCUTANEOUS at 02:33

## 2022-09-07 RX ADMIN — MIDODRINE HYDROCHLORIDE 10 MG: 10 TABLET ORAL at 16:58

## 2022-09-07 RX ADMIN — SILVER SULFADIAZINE: 10 CREAM TOPICAL at 20:50

## 2022-09-07 RX ADMIN — SODIUM CHLORIDE, PRESERVATIVE FREE 20 MG: 5 INJECTION INTRAVENOUS at 20:46

## 2022-09-07 RX ADMIN — INSULIN LISPRO 8 UNITS: 100 INJECTION, SOLUTION INTRAVENOUS; SUBCUTANEOUS at 05:55

## 2022-09-07 RX ADMIN — MIDODRINE HYDROCHLORIDE 10 MG: 10 TABLET ORAL at 09:28

## 2022-09-07 RX ADMIN — MIDODRINE HYDROCHLORIDE 10 MG: 10 TABLET ORAL at 11:42

## 2022-09-07 RX ADMIN — SODIUM CHLORIDE, PRESERVATIVE FREE 10 ML: 5 INJECTION INTRAVENOUS at 09:05

## 2022-09-07 RX ADMIN — SODIUM CHLORIDE, PRESERVATIVE FREE 10 ML: 5 INJECTION INTRAVENOUS at 20:52

## 2022-09-07 RX ADMIN — SERTRALINE HYDROCHLORIDE 100 MG: 100 TABLET ORAL at 09:37

## 2022-09-07 RX ADMIN — INSULIN GLARGINE 20 UNITS: 100 INJECTION, SOLUTION SUBCUTANEOUS at 09:12

## 2022-09-07 RX ADMIN — LACTULOSE 20 G: 10 SOLUTION ORAL at 20:46

## 2022-09-07 RX ADMIN — INSULIN GLARGINE 20 UNITS: 100 INJECTION, SOLUTION SUBCUTANEOUS at 20:46

## 2022-09-07 RX ADMIN — SODIUM CHLORIDE, PRESERVATIVE FREE 20 MG: 5 INJECTION INTRAVENOUS at 09:03

## 2022-09-07 ASSESSMENT — PAIN SCALES - GENERAL
PAINLEVEL_OUTOF10: 0
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
## 2022-09-07 NOTE — PROGRESS NOTES
Spoke with pt's daughter, ([phone]). Provided update regarding pt's current mental status as well as the recommendation of PEG placement. Pt's daughter requested that pt be placed closer to where she resides [facility] Slim Meza believes her father would be agreeable to this transfer to the [facility].

## 2022-09-07 NOTE — PROGRESS NOTES
[facility])   Gastroenterology Progress Note    Zac Aguiar is a 74 y.o. female patient. Hospitalization Day:9      Chief consult reason:   Abdominal pain, nausea, vomiting diarrhea    Subjective:  Patient seen and examined in ICU. Bowel sounds hypoactive. Tube feeding going to 48 mL/h. No residuals per staff. FMS in place with brown stool  Patient very drowsy and not able to participate in conversation  Abdomen is slightly distended but soft      VITALS:  /70   Pulse (!) 113   Temp 97.7 °F (36.5 °C) (Temporal)   Resp 26   Ht 5' 2\" (1.575 m)   Wt 219 lb (99.3 kg)   SpO2 95%   BMI 40.06 kg/m²   TEMPERATURE:  Current - Temp: 97.7 °F (36.5 °C); Max - Temp  Av.5 °F (36.4 °C)  Min: 96.9 °F (36.1 °C)  Max: 97.7 °F (36.5 °C)    Physical Assessment:  General appearance: Sleepy  Mental Status: RANDOLPH  Lungs: DMM bases  Heart:  regular rate and rhythm, no murmur  Abdomen:  soft, nontender, distended, hypoactive bowel sounds, no masses, hepatomegaly, splenomegaly  Extremities:  no edema, redness, tenderness in the calves  Skin:  no gross lesions, rashes, induration    Data Review:    Labs and Imaging:     CBC:  Recent Labs     22  0502 22  0940 22  0533   WBC 8.0 9.0 9.3   HGB 8.4* 8.3* 8.2*   MCV 84.7 85.5 85.2   RDW 22.2* 22.8* 23.3*   PLT 58* 62* 60*       ANEMIA STUDIES:  No results for input(s): LABIRON, TIBC, FERRITIN, [id number], FOLATE, OCCULTBLD in the last 72 hours. BMP:  Recent Labs     22  0502 22  1155 22  0530 22  1550 22  0533   *  --  146*  --  147*   K 2.9*   < > 3.1* 3.6* 3.1*   *  --  121*  --  119*   CO2 19*  --  19*  --  20   BUN 22  --  26*  --  28*   CREATININE 0.73  --  0.72  --  0.75   GLUCOSE 193*  --  261*  --  273*   CALCIUM 8.0*  --  7.9*  --  7.7*    < > = values in this interval not displayed.        LFTS:  Recent Labs     22  0533   ALKPHOS 171*   ALT 37*   AST 45*   BILITOT 0.5   LABALBU 1.9* Amylase/Lipase and Ammonia:  Recent Labs     09/06/22 2045 09/07/22  0533   AMMONIA 71* 81*       Acute Hepatitis Panel:  No results found for: HEPBSAG, HEPCAB, HEPBIGM, HEPAIGM    HCV Genotype:  No results found for: HEPATITISCGENOTYPE    HCV Quantitative:  No results found for: HCVQNT    LIVER WORK UP:    AFP  No results found for: AFP    Alpha 1 antitrypsin   No results found for: A1A    JOVANI  Lab Results   Component Value Date/Time    JOVANI NEGATIVE 11/18/2020 10:00 AM       AMA  No results found for: MITOAB    ASMA  No results found for: SMOOTHMUSCAB    PT/INR  No results for input(s): PROTIME, INR in the last 72 hours. Cancer Markers:  CEA:  No results for input(s): CEA in the last 72 hours. Ca 125:  No results for input(s):  in the last 72 hours. Ca 19-9:   Invalid input(s):   AFP: No results for input(s): AFP in the last 72 hours. Lactic acid:Invalid input(s): LACTIC ACID    Radiology Review:    No results found. Principal Problem:    Shock, septic ([facility]  Active Problems:    Urinary tract infection associated with indwelling urethral catheter (HCC)    [place]'s syndrome    Hypotension    Acute urinary retention    Encephalopathy acute    Stroke-like symptoms    Leukocytosis    Dysphagia    Hypertension    LEONARDA (obstructive sleep apnea)    Lymphedema    COPD (chronic obstructive pulmonary disease) (HCC)    Hepatic cirrhosis (HCC)  Resolved Problems:    BROOKLYN (acute kidney injury) ([address]       GI Impression:    Anemia-no overt bleeding noted. [place] stool in [facility]  [place] syndrome s/p decompressive colonoscopy 9/1/2022  Dysphagia-tube feeds via NG tube    Plan and Recommendations:    Unfortunately at this time patient is not able to participate for swallow eval and due to abdominal distention with hypoactive bowel sounds PEG tube is not the best option at this time.    Recommend continuing tube feeds via NG tube, patient to be discharged to rehab/[facility] and have swallow mechanism tested in a few weeks once her condition has stabilized and her strength is improved. Will follow          This plan was formulated in collaboration with Dr. Harry Malik MD    GI attending physician note. Patient seen with APRN     I independently performed an evaluation on Italia Jung. I have reviewed the above documentation completed by the Nurse Practitioner and agree with the history, examination, and management plan. Recommendations discussed. Patient seen, labs reviewed, examined, discussed with GI APRN. Patient has abdominal distention. She is having liquid stools. 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 PEG tube placement may be risky with this patient. To continue nasogastric readings for few weeks and see whether her abdominal distention gets better. At that time reconsider swallow evaluation and if needed PEG tube placement. Thank you for allowing me to participate in the care of your patient. Please feel free to contact me with any questions or concerns. [facility] Gastroenter[facility]   LEILA Godwin   [phone]  9/7/2022  2:17 PM    This note was created with the assistance of a speech-recognition program.  Although the intention is to generate a document that actually reflects the content of the visit, no guarantees can be provided that every mistake has been identified and corrected by editing.

## 2022-09-07 NOTE — FLOWSHEET NOTE
Patient's daughter Saintclair Pigeon expressed her wishes for patient to be transferred [facility] to continue her care. Daughter [facility] area and states that there is more family that can help to take care of the patient there as opposed to [place]. Writer tells daughter that she will pass along her wishes to oncoming nurse. Will continue to monitor.
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
## 2022-09-07 NOTE — PROGRESS NOTES
Pulmonary Critical Care Progress Note       Patient seen for the follow up of  Shock, septic ([address]     Subjective:    She has been off oxygen. She is still confused. She is tolerating tube feeds. She has rectal tube in place with diarrhea. Examination:  Vitals: BP (!) 119/54   Pulse (!) 110   Temp 97.7 °F (36.5 °C) (Temporal)   Resp 25   Ht 5' 2\" (1.575 m)   Wt 219 lb (99.3 kg)   SpO2 96%   BMI 40.06 kg/m²   General appearance: Sleepy confused and cooperative with exam  Neck: No JVD  Lungs: Decreased breath sound no crackles or wheezes  Heart: regular rate and rhythm, S1, S2 normal, no gallop  Abdomen: Soft, non tender, + BS  Extremities: no cyanosis or clubbing. No significant edema, scratches bilateral lower legs     LABs:  CBC:   Recent Labs     09/06/22  0940 09/07/22  0533   WBC 9.0 9.3   HGB 8.3* 8.2*   HCT 27.1* 27.0*   PLT 62* 60*       BMP:   Recent Labs     09/06/22  0530 09/06/22  1550 09/07/22  0533   *  --  147*   K 3.1* 3.6* 3.1*   CO2 19*  --  20   BUN 26*  --  28*   CREATININE 0.72  --  0.75   LABGLOM >60  --  >60   GLUCOSE 261*  --  273*       LIVER PROFILE:  Recent Labs     09/07/22  0533   AST 45*   ALT 37*   LABALBU 1.9*       ABG:  Lab Results   Component Value Date/Time    FIO2 NOT REPORTED 02/08/2022 07:10 PM       Lab Results   Component Value Date/Time    FIO2 NOT REPORTED 02/08/2022 07:10 PM     Radiology:  9/5/22 CXR  Cardiomegaly with left basilar infiltrate. Support tubes as described above. MRI brain 9/6  Chronic microvascular disease without acute intracranial abnormality.          Impression:  Chronic hypoxic respiratory failure  Atelectasis  Hypotension/Septic Shock requiring vasopressors   UTI/Indwelling angeles  Institute's syndrome   Obstructive sleep apnea/Obesity  [place]   A. fib, CAD, CHF, DM, dementia, HTN, liver cirrhosis, esophageal varices  History of discitis    Recommendations:  Oxygen via nasal cannula  DuoNeb by nebulizer   Discontinue Symbicort Pulmicort 0.5 b.I.d.  NG feeds as tolerated   Consider PEG tube placement  Monitor blood pressure off pressors  On midodrine   Monitor platelet count   Monitor sodium   Consider neurology evaluation   Colorectal surgery following, s/p decompressive colonoscopy    Physical therapy  Discussed with RN  GI prophylaxis  DVT prophylaxis with ANDRAE Jean MD, [facility]  Pulmonary Critical Care and Sleep Medicine,  [facility]: [phone]

## 2022-09-07 NOTE — FLOWSHEET NOTE
09/07/22 0841   Treatment Team Notification   Reason for Communication Evaluate; Review case   Team Member Name Danella Books  (need swallow done by Speech)   Treatment Team Role Advanced Practice Nurse   Method of Communication Face to face   Response In department   Notification Time [id number]    Need to get a swallow done by speech bedside or barium.
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
## 2022-09-07 NOTE — FLOWSHEET NOTE
09/07/22 1155   Treatment Team Notification   Reason for Communication Evaluate; Review case   Team Member Name [address]   Treatment Team Role Consulting Provider   Method of Communication Face to face   Response In department   Notification Time 1700
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
## 2022-09-07 NOTE — PROGRESS NOTES
Interventions:   Food and/or Nutrient Delivery: Continue NPO, Continue Current Tube Feeding  Nutrition Education/Counseling: Education not indicated  Coordination of Nutrition Care: Continue to monitor while inpatient       Goals:  Previous Goal Met: No Progress toward Goal(s)  Goals:  Tolerate nutrition support at goal rate, Meet at least 75% of estimated needs       Nutrition Monitoring and Evaluation:   Behavioral-Environmental Outcomes: None Identified  Food/Nutrient Intake Outcomes: Enteral Nutrition Intake/Tolerance  Physical Signs/Symptoms Outcomes: Biochemical Data, Fluid Status or Edema, Weight, Skin, Chewing or Swallowing    Discharge Planning:    Enteral Nutrition           Domenico BAILEYN, RDN, LDN  Lead Clinical Dietitian  RD Office Phone [phone]

## 2022-09-07 NOTE — FLOWSHEET NOTE
09/07/22 9624   Treatment Team Notification   Reason for Communication Evaluate; Review case   Team Member Name [address]Angela [facility]   Treatment Team Role Consulting Provider   Method of Communication Face to face   Response At bedside   Notification Time 7667

## 2022-09-07 NOTE — PROGRESS NOTES
Spoke with pt's , Yaquelin Husain. Provided update regarding findings of speech therapy today and recommendation of placing a PEG tube for a more permanent form of nutritional support for the patient. Yaquelin Husain states that \"if that needs to be done, sure\" when asked about his feelings regarding placement of PEG.

## 2022-09-07 NOTE — FLOWSHEET NOTE
Writer spoke with daughter Austin Shaikh who stated that patient has been this sick before where she was unresponsive like currently is at this time. She was being treated for a urinary tract infection. She was being followed by infectious disease and was a patient at [address] [place] [address] states that she will relay this information to oncoming nurse in the morning. Will continue to monitor.

## 2022-09-07 NOTE — PROGRESS NOTES
Patient was sleeping as writer entered the room (no family members present). Writer provided a silent prayer of healing, comfort, and rest. Writer also left a prayer card as additional support. Spiritual care will maintain daily follow ups and visits.      09/07/22 1340   Encounter Summary   Service Provided For: Patient   Referral/Consult From: Palliative Care   Last Encounter  09/07/22  (9/7/2022 Sleeping)   Complexity of Encounter Low   Begin Time 0900   End Time  0905   Total Time Calculated 5 min   Encounter    Type Follow up   Palliative Care   Type Palliative Care, Follow-up   Assessment/Intervention/Outcome   Assessment Other (Comment)  (Sleeping)   Intervention Prayer (assurance of)/[place];Read/Provided Scripture;Sustaining Presence/Ministry of presence   Plan and Referrals   Plan/Referrals Continue to visit, (comment)

## 2022-09-08 ENCOUNTER — APPOINTMENT (OUTPATIENT)
Dept: GENERAL RADIOLOGY | Age: 67
DRG: 698 | End: 2022-09-08
Payer: COMMERCIAL

## 2022-09-08 LAB
ABSOLUTE EOS #: 0.1 K/UL (ref 0–0.44)
ABSOLUTE IMMATURE GRANULOCYTE: 0.1 K/UL (ref 0–0.3)
ABSOLUTE LYMPH #: 1 K/UL (ref 1.1–3.7)
ABSOLUTE MONO #: 0.8 K/UL (ref 0.1–1.2)
ALBUMIN SERPL-MCNC: 1.8 G/DL (ref 3.5–5.2)
ALP BLD-CCNC: 173 U/L (ref 35–104)
ALT SERPL-CCNC: 38 U/L (ref 5–33)
AMMONIA: 40 UMOL/L (ref 11–51)
ANION GAP SERPL CALCULATED.3IONS-SCNC: 7 MMOL/L (ref 9–17)
ANION GAP SERPL CALCULATED.3IONS-SCNC: 7 MMOL/L (ref 9–17)
AST SERPL-CCNC: 43 U/L
BASOPHILS # BLD: 0 % (ref 0–2)
BASOPHILS ABSOLUTE: 0 K/UL (ref 0–0.2)
BILIRUB SERPL-MCNC: 0.6 MG/DL (ref 0.3–1.2)
BUN BLDV-MCNC: 26 MG/DL (ref 8–23)
BUN BLDV-MCNC: 26 MG/DL (ref 8–23)
BUN/CREAT BLD: 44 (ref 9–20)
BUN/CREAT BLD: 45 (ref 9–20)
CALCIUM SERPL-MCNC: 7.9 MG/DL (ref 8.6–10.4)
CALCIUM SERPL-MCNC: 8 MG/DL (ref 8.6–10.4)
CHLORIDE BLD-SCNC: 119 MMOL/L (ref 98–107)
CHLORIDE BLD-SCNC: 120 MMOL/L (ref 98–107)
CO2: 22 MMOL/L (ref 20–31)
CO2: 22 MMOL/L (ref 20–31)
CREAT SERPL-MCNC: 0.58 MG/DL (ref 0.5–0.9)
CREAT SERPL-MCNC: 0.59 MG/DL (ref 0.5–0.9)
EOSINOPHILS RELATIVE PERCENT: 1 % (ref 1–4)
GFR AFRICAN AMERICAN: >60 ML/MIN
GFR AFRICAN AMERICAN: >60 ML/MIN
GFR NON-AFRICAN AMERICAN: >60 ML/MIN
GFR NON-AFRICAN AMERICAN: >60 ML/MIN
GFR SERPL CREATININE-BSD FRML MDRD: ABNORMAL ML/MIN/{1.73_M2}
GFR SERPL CREATININE-BSD FRML MDRD: ABNORMAL ML/MIN/{1.73_M2}
GLUCOSE BLD-MCNC: 176 MG/DL (ref 65–105)
GLUCOSE BLD-MCNC: 189 MG/DL (ref 65–105)
GLUCOSE BLD-MCNC: 196 MG/DL (ref 65–105)
GLUCOSE BLD-MCNC: 199 MG/DL (ref 65–105)
GLUCOSE BLD-MCNC: 199 MG/DL (ref 65–105)
GLUCOSE BLD-MCNC: 202 MG/DL (ref 65–105)
GLUCOSE BLD-MCNC: 211 MG/DL (ref 65–105)
GLUCOSE BLD-MCNC: 222 MG/DL (ref 70–99)
GLUCOSE BLD-MCNC: 222 MG/DL (ref 70–99)
HCT VFR BLD CALC: 26.5 % (ref 36.3–47.1)
HEMOGLOBIN: 8.1 G/DL (ref 11.9–15.1)
IMMATURE GRANULOCYTES: 1 %
LYMPHOCYTES # BLD: 10 % (ref 24–43)
MCH RBC QN AUTO: 26 PG (ref 25.2–33.5)
MCHC RBC AUTO-ENTMCNC: 30.6 G/DL (ref 28.4–34.8)
MCV RBC AUTO: 84.9 FL (ref 82.6–102.9)
MONOCYTES # BLD: 8 % (ref 3–12)
MORPHOLOGY: ABNORMAL
NRBC AUTOMATED: 0.7 PER 100 WBC
PDW BLD-RTO: 23.5 % (ref 11.8–14.4)
PHOSPHORUS: 1.7 MG/DL (ref 2.6–4.5)
PLATELET # BLD: 70 K/UL (ref 138–453)
PMV BLD AUTO: 10.8 FL (ref 8.1–13.5)
POTASSIUM SERPL-SCNC: 2.8 MMOL/L (ref 3.7–5.3)
POTASSIUM SERPL-SCNC: 3.9 MMOL/L (ref 3.7–5.3)
RBC # BLD: 3.12 M/UL (ref 3.95–5.11)
SEG NEUTROPHILS: 80 % (ref 36–65)
SEGMENTED NEUTROPHILS ABSOLUTE COUNT: 8 K/UL (ref 1.5–8.1)
SODIUM BLD-SCNC: 148 MMOL/L (ref 135–144)
SODIUM BLD-SCNC: 149 MMOL/L (ref 135–144)
TOTAL PROTEIN: 5.3 G/DL (ref 6.4–8.3)
WBC # BLD: 10 K/UL (ref 3.5–11.3)

## 2022-09-08 PROCEDURE — 2580000003 HC RX 258: Performed by: STUDENT IN AN ORGANIZED HEALTH CARE EDUCATION/TRAINING PROGRAM

## 2022-09-08 PROCEDURE — 2500000003 HC RX 250 WO HCPCS: Performed by: FAMILY MEDICINE

## 2022-09-08 PROCEDURE — 2000000000 HC ICU R&B

## 2022-09-08 PROCEDURE — 80053 COMPREHEN METABOLIC PANEL: CPT

## 2022-09-08 PROCEDURE — 6370000000 HC RX 637 (ALT 250 FOR IP): Performed by: STUDENT IN AN ORGANIZED HEALTH CARE EDUCATION/TRAINING PROGRAM

## 2022-09-08 PROCEDURE — 84100 ASSAY OF PHOSPHORUS: CPT

## 2022-09-08 PROCEDURE — 82947 ASSAY GLUCOSE BLOOD QUANT: CPT

## 2022-09-08 PROCEDURE — 99233 SBSQ HOSP IP/OBS HIGH 50: CPT | Performed by: NURSE PRACTITIONER

## 2022-09-08 PROCEDURE — 6370000000 HC RX 637 (ALT 250 FOR IP): Performed by: NURSE PRACTITIONER

## 2022-09-08 PROCEDURE — 74018 RADEX ABDOMEN 1 VIEW: CPT

## 2022-09-08 PROCEDURE — 80048 BASIC METABOLIC PNL TOTAL CA: CPT

## 2022-09-08 PROCEDURE — 2580000003 HC RX 258: Performed by: NURSE PRACTITIONER

## 2022-09-08 PROCEDURE — 6370000000 HC RX 637 (ALT 250 FOR IP): Performed by: FAMILY MEDICINE

## 2022-09-08 PROCEDURE — 2580000003 HC RX 258: Performed by: FAMILY MEDICINE

## 2022-09-08 PROCEDURE — 94640 AIRWAY INHALATION TREATMENT: CPT

## 2022-09-08 PROCEDURE — 6360000002 HC RX W HCPCS: Performed by: STUDENT IN AN ORGANIZED HEALTH CARE EDUCATION/TRAINING PROGRAM

## 2022-09-08 PROCEDURE — 36415 COLL VENOUS BLD VENIPUNCTURE: CPT

## 2022-09-08 PROCEDURE — 82140 ASSAY OF AMMONIA: CPT

## 2022-09-08 PROCEDURE — 94761 N-INVAS EAR/PLS OXIMETRY MLT: CPT

## 2022-09-08 PROCEDURE — 99232 SBSQ HOSP IP/OBS MODERATE 35: CPT | Performed by: INTERNAL MEDICINE

## 2022-09-08 PROCEDURE — 6360000002 HC RX W HCPCS: Performed by: INTERNAL MEDICINE

## 2022-09-08 PROCEDURE — 2500000003 HC RX 250 WO HCPCS: Performed by: NURSE PRACTITIONER

## 2022-09-08 PROCEDURE — 6360000002 HC RX W HCPCS: Performed by: NURSE PRACTITIONER

## 2022-09-08 PROCEDURE — 85025 COMPLETE CBC W/AUTO DIFF WBC: CPT

## 2022-09-08 PROCEDURE — A4216 STERILE WATER/SALINE, 10 ML: HCPCS | Performed by: FAMILY MEDICINE

## 2022-09-08 RX ORDER — LEVOFLOXACIN 5 MG/ML
750 INJECTION, SOLUTION INTRAVENOUS EVERY 24 HOURS
Status: COMPLETED | OUTPATIENT
Start: 2022-09-08 | End: 2022-09-12

## 2022-09-08 RX ADMIN — INSULIN LISPRO 4 UNITS: 100 INJECTION, SOLUTION INTRAVENOUS; SUBCUTANEOUS at 04:30

## 2022-09-08 RX ADMIN — POTASSIUM BICARBONATE 20 MEQ: 782 TABLET, EFFERVESCENT ORAL at 08:21

## 2022-09-08 RX ADMIN — SODIUM PHOSPHATE, MONOBASIC, MONOHYDRATE 15.6 MMOL: 276; 142 INJECTION, SOLUTION INTRAVENOUS at 18:33

## 2022-09-08 RX ADMIN — INSULIN LISPRO 4 UNITS: 100 INJECTION, SOLUTION INTRAVENOUS; SUBCUTANEOUS at 13:20

## 2022-09-08 RX ADMIN — ONDANSETRON 4 MG: 2 INJECTION INTRAMUSCULAR; INTRAVENOUS at 20:38

## 2022-09-08 RX ADMIN — MIDODRINE HYDROCHLORIDE 10 MG: 10 TABLET ORAL at 08:20

## 2022-09-08 RX ADMIN — SERTRALINE HYDROCHLORIDE 100 MG: 100 TABLET ORAL at 08:20

## 2022-09-08 RX ADMIN — INSULIN LISPRO 4 UNITS: 100 INJECTION, SOLUTION INTRAVENOUS; SUBCUTANEOUS at 00:06

## 2022-09-08 RX ADMIN — INSULIN GLARGINE 20 UNITS: 100 INJECTION, SOLUTION SUBCUTANEOUS at 20:40

## 2022-09-08 RX ADMIN — LACTULOSE 20 G: 10 SOLUTION ORAL at 20:38

## 2022-09-08 RX ADMIN — SODIUM CHLORIDE, PRESERVATIVE FREE 10 ML: 5 INJECTION INTRAVENOUS at 20:39

## 2022-09-08 RX ADMIN — SODIUM CHLORIDE, PRESERVATIVE FREE 10 ML: 5 INJECTION INTRAVENOUS at 08:28

## 2022-09-08 RX ADMIN — POTASSIUM CHLORIDE 20 MEQ: 29.8 INJECTION, SOLUTION INTRAVENOUS at 08:55

## 2022-09-08 RX ADMIN — POTASSIUM CHLORIDE 20 MEQ: 29.8 INJECTION, SOLUTION INTRAVENOUS at 10:08

## 2022-09-08 RX ADMIN — POTASSIUM CHLORIDE 20 MEQ: 29.8 INJECTION, SOLUTION INTRAVENOUS at 07:29

## 2022-09-08 RX ADMIN — INSULIN GLARGINE 20 UNITS: 100 INJECTION, SOLUTION SUBCUTANEOUS at 08:15

## 2022-09-08 RX ADMIN — POLYETHYLENE GLYCOL (3350) 17 G: 17 POWDER, FOR SOLUTION ORAL at 08:27

## 2022-09-08 RX ADMIN — SODIUM CHLORIDE, PRESERVATIVE FREE 20 MG: 5 INJECTION INTRAVENOUS at 20:38

## 2022-09-08 RX ADMIN — BUDESONIDE 500 MCG: 0.5 SUSPENSION RESPIRATORY (INHALATION) at 19:49

## 2022-09-08 RX ADMIN — SILVER SULFADIAZINE: 10 CREAM TOPICAL at 20:52

## 2022-09-08 RX ADMIN — SODIUM CHLORIDE, PRESERVATIVE FREE 20 MG: 5 INJECTION INTRAVENOUS at 08:16

## 2022-09-08 RX ADMIN — TRAZODONE HYDROCHLORIDE 100 MG: 100 TABLET ORAL at 21:14

## 2022-09-08 RX ADMIN — ERYTHROMYCIN LACTOBIONATE 250 MG: 500 INJECTION, POWDER, LYOPHILIZED, FOR SOLUTION INTRAVENOUS at 21:15

## 2022-09-08 RX ADMIN — ERYTHROMYCIN LACTOBIONATE 250 MG: 500 INJECTION, POWDER, LYOPHILIZED, FOR SOLUTION INTRAVENOUS at 13:16

## 2022-09-08 RX ADMIN — MIDODRINE HYDROCHLORIDE 10 MG: 10 TABLET ORAL at 12:00

## 2022-09-08 RX ADMIN — MIDODRINE HYDROCHLORIDE 10 MG: 10 TABLET ORAL at 17:10

## 2022-09-08 RX ADMIN — BUDESONIDE 500 MCG: 0.5 SUSPENSION RESPIRATORY (INHALATION) at 08:08

## 2022-09-08 RX ADMIN — LEVOFLOXACIN 750 MG: 5 INJECTION, SOLUTION INTRAVENOUS at 15:58

## 2022-09-08 RX ADMIN — LACTULOSE 20 G: 10 SOLUTION ORAL at 08:27

## 2022-09-08 RX ADMIN — LACTULOSE 20 G: 10 SOLUTION ORAL at 14:05

## 2022-09-08 ASSESSMENT — PAIN SCALES - GENERAL: PAINLEVEL_OUTOF10: 5
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
## 2022-09-08 NOTE — PROGRESS NOTES
[facility] NOTE    Room # 2029/2029-01   Name: Soy Arredondo            Church: No [employer]     Reason for visit: Follow up    I visited the patient. Admit Date & Time: 8/29/2022  8:35 PM    Assessment:  Soy Arredondo is a 74 y.o. female in the hospital because she has sepsis. Upon entering the room pt. Is found resting in bed. She is confused and disoriented. Intervention:  I introduced myself and my title as  I offered space for pt.  to express feelings, needs, and concerns and provided a ministry presence.  asked pt. Is she could offer prayer and pt. Did not understand question.  offered blessing and asked RN if pt. Had visitors today. RN reports that spouse comes earlier in the day and that since he is on oxygen, can only stay for about an hour.  offers emotional support and companionship for pt. Outcome:  Pt. Calm and coping. Plan:  Chaplains will remain available to offer spiritual and emotional support as needed.     Electronically signed by China Reich on 9/8/2022 at 7:45 PM.  Joseph

## 2022-09-08 NOTE — PROGRESS NOTES
Pulmonary Critical Care Progress Note       Patient seen for the follow up of  Shock, septic ([address]     Subjective:    She has been off oxygen. She is  more awake but still confused. She is tolerating tube feeds. She has rectal tube in place with  improved diarrhea. GI did not feel that PEG is  Feasible due to distension. Examination:  Vitals: BP (!) 109/42   Pulse (!) 102   Temp 98.6 °F (37 °C) (Temporal)   Resp 18   Ht 5' 2\" (1.575 m)   Wt 219 lb (99.3 kg)   SpO2 97%   BMI 40.06 kg/m²   General appearance: Sleepy confused and cooperative with exam  Neck: No JVD  Lungs: Decreased breath sound no crackles or wheezes  Heart: regular rate and rhythm, S1, S2 normal, no gallop  Abdomen: Soft, non tender, + BS  Extremities: no cyanosis or clubbing. No significant edema, scratches bilateral lower legs     LABs:  CBC:   Recent Labs     09/07/22  0533 09/08/22  0438   WBC 9.3 10.0   HGB 8.2* 8.1*   HCT 27.0* 26.5*   PLT 60* 70*       BMP:   Recent Labs     09/08/22  0438 09/08/22  1205   * 149*   K 2.8* 3.9   CO2 22 22   BUN 26* 26*   CREATININE 0.58 0.59   LABGLOM >60 >60   GLUCOSE 222* 222*       LIVER PROFILE:  Recent Labs     09/07/22  0533 09/08/22  0438   AST 45* 43*   ALT 37* 38*   LABALBU 1.9* 1.8*       ABG:  Lab Results   Component Value Date/Time    FIO2 NOT REPORTED 02/08/2022 07:10 PM       Lab Results   Component Value Date/Time    FIO2 NOT REPORTED 02/08/2022 07:10 PM     Radiology:  9/5/22 CXR  Cardiomegaly with left basilar infiltrate. Support tubes as described above. MRI brain 9/6  Chronic microvascular disease without acute intracranial abnormality. X-ray abdomen 9/8  Dilated colon again demonstrated with findings suggestive of at least   moderate rectal stool burden.   This may be due to a functional or mechanical   distal colonic obstruction       Impression:  Chronic hypoxic respiratory failure  Atelectasis  Hypotension/Septic Shock requiring vasopressors UTI/Indwelling angeles /stenotrophomonas maltophilia resistant to Bactrim  on urine culture status post cefepime  [place]ad[place]'s syndrome   Obstructive sleep apnea/Obesity  BROOKLYN   A. fib, CAD, CHF, DM, dementia, HTN, liver cirrhosis, esophageal varices  History of discitis    Recommendations:  Oxygen via nasal cannula  DuoNeb by nebulizer   Pulmicort 0.5 b.I.d.  NG feeds as tolerated   GI on consult do not feel PEG is feasible at this point  given abdominal distension/ erythromycin started /follow-up x-ray abdomen  Monitor blood pressure off pressors  On midodrine    Levaquin started per primary  Monitor platelet count   Monitor sodium   Check ammonia level/ Consider neurology evaluation   Colorectal surgery following, s/p decompressive colonoscopy    Physical therapy  Discussed with RN  GI prophylaxis  DVT prophylaxis with EPC      Glo Atkins MD, [facility]  Pulmonary Critical Care and Sleep Medicine,  [facility]: [phone]

## 2022-09-08 NOTE — CARE COORDINATION
Discharge planning    Peer to peer needs to be completed. Writer called the peer to peer number at [phone], and left a VM with information in regards to the patient, with Dr. Luna Castellanos number as well as mine. Writer then faxed Dr. Alexander Boehringer the information. It needs to be done by 9/9 at 0900.    Ref # [id number]

## 2022-09-08 NOTE — PROGRESS NOTES
End of shift summary: Patient more alert and appropriate as the night goes on. Resp even regular on room air. Vitals remained stable , afebrile. Appears to be tolerating TF at goal, cm 57 right nares with zero residual. Abdomin slightly more distended, yet soft. TF held pending xray for placement confirmation.

## 2022-09-08 NOTE — FLOWSHEET NOTE
09/08/22 1700   Treatment Team Notification   Reason for Communication Evaluate   Team Member Name Dr Sommer Hernandez Team Role Consulting Provider   Method of Communication Face to face   Response At bedside     Colorectal surgery resident rounding on this patient, updated by RN that [address] collected a lot of air with repositioning. D/t this and KUB suggesting moderate rectal stool burden, Dr Dean Tijerina suggested removing FMS to allow for stool release. FMS removed with no issues, repeat KUB ordered Friday AM for re-evaluation.

## 2022-09-08 NOTE — RT PROTOCOL NOTE
RT Nebulizer Bronchodilator Protocol Note    There is a bronchodilator order in the chart from a provider indicating to follow the RT Bronchodilator Protocol and there is an Initiate RT Bronchodilator Protocol order as well (see protocol at bottom of note). CXR Findings:  No results found. The findings from the last RT Protocol Assessment were as follows:  Smoking: Chronic pulmonary disease  Respiratory Pattern: Dyspnea on exertion or RR 21-25 bpm  Breath Sounds: Slightly diminished and/or crackles  Cough: Strong, spontaneous, non-productive  Indication for Bronchodilator Therapy: Decreased or absent breath sounds  Bronchodilator Assessment Score: 6    Aerosolized bronchodilator medication orders have been revised according to the RT Nebulizer Bronchodilator Protocol below. Respiratory Therapist to perform RT Therapy Protocol Assessment initially then follow the protocol. Repeat RT Therapy Protocol Assessment PRN for score 0-3 or on second treatment, BID, and PRN for scores above 3. No Indications - adjust the frequency to every 6 hours PRN wheezing or bronchospasm, if no treatments needed after 48 hours then discontinue using Per Protocol order mode. If indication present, adjust the RT bronchodilator orders based on the Bronchodilator Assessment Score as indicated below. If a patient is on this medication at home then do not decrease Frequency below that used at home. 0-3 - enter or revise RT bronchodilator order(s) to equivalent RT Bronchodilator order with Frequency of every 4 hours PRN for wheezing or increased work of breathing using Per Protocol order mode. 4-6 - enter or revise RT Bronchodilator order(s) to two equivalent RT bronchodilator orders with one order with BID Frequency and one order with Frequency of every 4 hours PRN wheezing or increased work of breathing using Per Protocol order mode.          7-10 - enter or revise RT Bronchodilator order(s) to two equivalent RT bronchodilator orders with one order with TID Frequency and one order with Frequency of every 4 hours PRN wheezing or increased work of breathing using Per Protocol order mode. 11-13 - enter or revise RT Bronchodilator order(s) to one equivalent RT bronchodilator order with QID Frequency and an Albuterol order with Frequency of every 4 hours PRN wheezing or increased work of breathing using Per Protocol order mode. Greater than 13 - enter or revise RT Bronchodilator order(s) to one equivalent RT bronchodilator order with every 4 hours Frequency and an Albuterol order with Frequency of every 2 hours PRN wheezing or increased work of breathing using Per Protocol order mode. RT to enter RT Home Evaluation for COPD & MDI Assessment order using Per Protocol order mode.     Electronically signed by Jabier Anderson RCP on 9/8/2022 at 7:52 PM

## 2022-09-08 NOTE — PROGRESS NOTES
[facility])   Gastroenterology Progress Note    Iesha Alicea is a 74 y.o. female patient. Hospitalization Day:10      Chief consult reason:   Abdominal pain, nausea, vomiting diarrhea    Subjective:  Patient seen and examined in ICU. No significant change overnight. Pt is slightly more awake, attempting to talk. Abdomen is still quite distended. VITALS:  BP (!) 114/48   Pulse (!) 108   Temp 97.9 °F (36.6 °C) (Temporal)   Resp 18   Ht 5' 2\" (1.575 m)   Wt 219 lb (99.3 kg)   SpO2 96%   BMI 40.06 kg/m²   TEMPERATURE:  Current - Temp: 97.9 °F (36.6 °C); Max - Temp  Av.7 °F (36.5 °C)  Min: 97 °F (36.1 °C)  Max: 98.6 °F (37 °C)    Physical Assessment:  General appearance: Sleepy  Mental Status: RANDOLPH  Lungs: DMM bases  Heart:  regular rate and rhythm, no murmur  Abdomen:  soft, nontender, distended, hypoactive bowel sounds, no masses, hepatomegaly, splenomegaly  Extremities:  no edema, redness, tenderness in the calves  Skin:  no gross lesions, rashes, induration    Data Review:    Labs and Imaging:     CBC:  Recent Labs     22  0940 22  0533 22  0438   WBC 9.0 9.3 10.0   HGB 8.3* 8.2* 8.1*   MCV 85.5 85.2 84.9   RDW 22.8* 23.3* 23.5*   PLT 62* 60* 70*         ANEMIA STUDIES:  No results for input(s): LABIRON, TIBC, FERRITIN, [id number], FOLATE, OCCULTBLD in the last 72 hours.     BMP:  Recent Labs     22  0530 22  1550 22  0533 22  0438   *  --  147* 148*   K 3.1* 3.6* 3.1* 2.8*   *  --  119* 119*   CO2 19*  --  20 22   BUN 26*  --  28* 26*   CREATININE 0.72  --  0.75 0.58   GLUCOSE 261*  --  273* 222*   CALCIUM 7.9*  --  7.7* 7.9*         LFTS:  Recent Labs     22  0533 22  0438   ALKPHOS 171* 173*   ALT 37* 38*   AST 45* 43*   BILITOT 0.5 0.6   LABALBU 1.9* 1.8*         Amylase/Lipase and Ammonia:  Recent Labs     225 22  0533   AMMONIA 71* 81*         Acute Hepatitis Panel:  No results found for: HEPBSAG, HEPCAB, HEPBIGM, HEPAIGM    HCV Genotype:  No results found for: HEPATITISCGENOTYPE    HCV Quantitative:  No results found for: HCVQNT    LIVER WORK UP:    AFP  No results found for: AFP    Alpha 1 antitrypsin   No results found for: A1A    JOVANI  Lab Results   Component Value Date/Time    JOVANI NEGATIVE 11/18/2020 10:00 AM       AMA  No results found for: MITOAB    ASMA  No results found for: SMOOTHMUSCAB    PT/INR  No results for input(s): PROTIME, INR in the last 72 hours. Cancer Markers:  CEA:  No results for input(s): CEA in the last 72 hours. Ca 125:  No results for input(s):  in the last 72 hours. Ca 19-9:   Invalid input(s):   AFP: No results for input(s): AFP in the last 72 hours. Lactic acid:Invalid input(s): LACTIC ACID    Radiology Review:    No results found. Principal Problem:    Shock, septic ([address]  Active Problems:    Urinary tract infection associated with indwelling urethral catheter (HCC)    Juan Jose's syndrome    Hypotension    Acute urinary retention    Encephalopathy acute    Stroke-like symptoms    Leukocytosis    Dysphagia    Hypertension    LEONARDA (obstructive sleep apnea)    Lymphedema    COPD (chronic obstructive pulmonary disease) (HCC)    Hepatic cirrhosis (HCC)  Resolved Problems:    BROOKLYN (acute kidney injury) ([address]       GI Impression:    Anemia-no overt bleeding noted. Idalia Johnson stool in [address] [facility]  [place] syndrome s/p decompressive colonoscopy 9/1/2022  Dysphagia-tube feeds via NG tube    Plan and Recommendations:    Unfortunately at this time patient is not able to participate for swallow eval and due to abdominal distention with hypoactive bowel sounds PEG tube is not the best option at this time. Recommend continuing tube feeds via NG tube, patient to be discharged to rehab/[facility] and have swallow mechanism tested in a few weeks once her condition has stabilized and her strength is improved. Hold TF today.  We will reassess abdomen in am. OK to use for meds  Will give trial of Erythromycin 250 mg IV Q 8 hrs x 3 doses to increase gut motility   Will follow    This plan was formulated in collaboration with Dr. Jes Ledesma MD      [facility]   Angelo Alvarez, LEILA - [address] [facility]   [phone]  9/8/2022  11:53 AM    GI attending physician note. Patient seen with APRN at 8:30 AM    I independently performed an evaluation on Kartik Lucy. I have reviewed the above documentation completed by the Nurse Practitioner and agree with the history, examination, and management plan. Recommendations discussed. Patient abdomen is more distended. Bowel sounds tympanic. Appears to have pseudoobstruction. Having brown-colored liquid stool through FMS system. We will try erythromycin for 24 to 48 hours.

## 2022-09-08 NOTE — FLOWSHEET NOTE
09/08/22 1225   Treatment Team Notification   Reason for Communication Review case   Team Member Name Dr Sorensen Aus Provider   Method of Communication Call     RN paged Dr Rubens Sanchez per internal med request due to KUB results of dilated colon again.  MD stated a repeat colon decompression may be required

## 2022-09-08 NOTE — PROGRESS NOTES
Complete bed and bath change done. Patient more alert and appropriate with more than single word responses. Skin care completed. SCD cuffes padded to prevent skin breakdown. See assessment flowsheet for updated assessment.

## 2022-09-08 NOTE — PROGRESS NOTES
Assumed care of patient s/p handoff. Awake, min verbalization. Vitals stable. See assessment flowsheet.

## 2022-09-08 NOTE — PROGRESS NOTES
Physical Therapy  DATE: 2022    NAME: Marilou Delgadillo  MRN: [id number]   : 1955    Patient not seen this date for Physical Therapy due to:      [x] Cancel by RN or physician due to: RN Danii Carrillo stated patient was not appropriate at this time and had just been repositioned into comfort and for skin integrity. [] Hemodialysis    [] Critical Lab Value Level     [] Blood transfusion in progress    [] Acute or unstable cardiovascular status   _MAP < 55 or more than >115  _HR < 40 or > 130    [] Acute or unstable pulmonary status   -FiO2 > 60%   _RR < 5 or >40    _O2 sats < 85%    [] Strict Bedrest    [] Off Unit for surgery or procedure    [] Off Unit for testing       [] Pending imaging to R/O fracture    [] Refusal by Patient      [] Other      [] PT being discontinued at this time. Patient independent. No further needs. [] PT being discontinued at this time as the patient has been transferred to hospice care. No further needs.       Duane Arriaga, PTA

## 2022-09-09 ENCOUNTER — APPOINTMENT (OUTPATIENT)
Dept: GENERAL RADIOLOGY | Age: 67
DRG: 698 | End: 2022-09-09
Payer: COMMERCIAL

## 2022-09-09 LAB
ABSOLUTE EOS #: 0.12 K/UL (ref 0–0.4)
ABSOLUTE IMMATURE GRANULOCYTE: 0.06 K/UL (ref 0–0.3)
ABSOLUTE LYMPH #: 0.78 K/UL (ref 1–4.8)
ABSOLUTE MONO #: 0.48 K/UL (ref 0.2–0.8)
ALBUMIN SERPL-MCNC: 1.7 G/DL (ref 3.5–5.2)
ALP BLD-CCNC: 139 U/L (ref 35–104)
ALT SERPL-CCNC: 34 U/L (ref 5–33)
AMMONIA: 38 UMOL/L (ref 11–51)
ANION GAP SERPL CALCULATED.3IONS-SCNC: 7 MMOL/L (ref 9–17)
AST SERPL-CCNC: 34 U/L
BASOPHILS # BLD: 0 %
BASOPHILS ABSOLUTE: 0 K/UL (ref 0–0.2)
BILIRUB SERPL-MCNC: 0.6 MG/DL (ref 0.3–1.2)
BUN BLDV-MCNC: 22 MG/DL (ref 8–23)
BUN/CREAT BLD: 39 (ref 9–20)
CALCIUM SERPL-MCNC: 7.8 MG/DL (ref 8.6–10.4)
CHLORIDE BLD-SCNC: 122 MMOL/L (ref 98–107)
CO2: 21 MMOL/L (ref 20–31)
CREAT SERPL-MCNC: 0.57 MG/DL (ref 0.5–0.9)
EOSINOPHILS RELATIVE PERCENT: 2 % (ref 1–4)
GFR AFRICAN AMERICAN: >60 ML/MIN
GFR NON-AFRICAN AMERICAN: >60 ML/MIN
GFR SERPL CREATININE-BSD FRML MDRD: ABNORMAL ML/MIN/{1.73_M2}
GLUCOSE BLD-MCNC: 124 MG/DL (ref 65–105)
GLUCOSE BLD-MCNC: 134 MG/DL (ref 65–105)
GLUCOSE BLD-MCNC: 137 MG/DL (ref 65–105)
GLUCOSE BLD-MCNC: 139 MG/DL (ref 65–105)
GLUCOSE BLD-MCNC: 142 MG/DL (ref 65–105)
GLUCOSE BLD-MCNC: 143 MG/DL (ref 65–105)
GLUCOSE BLD-MCNC: 166 MG/DL (ref 65–105)
GLUCOSE BLD-MCNC: 166 MG/DL (ref 70–99)
GLUCOSE BLD-MCNC: 171 MG/DL (ref 65–105)
GLUCOSE BLD-MCNC: 182 MG/DL (ref 65–105)
HCT VFR BLD CALC: 24.1 % (ref 36.3–47.1)
HEMOGLOBIN: 7.2 G/DL (ref 11.9–15.1)
IMMATURE GRANULOCYTES: 1 %
LYMPHOCYTES # BLD: 13 % (ref 24–44)
MAGNESIUM: 2.3 MG/DL (ref 1.6–2.6)
MCH RBC QN AUTO: 26 PG (ref 25.2–33.5)
MCHC RBC AUTO-ENTMCNC: 29.9 G/DL (ref 28.4–34.8)
MCV RBC AUTO: 87 FL (ref 82.6–102.9)
MONOCYTES # BLD: 8 % (ref 1–7)
MORPHOLOGY: ABNORMAL
NRBC AUTOMATED: 0.5 PER 100 WBC
PDW BLD-RTO: 23.9 % (ref 11.8–14.4)
PHOSPHORUS: 2.4 MG/DL (ref 2.6–4.5)
PLATELET # BLD: 56 K/UL (ref 138–453)
PMV BLD AUTO: 10.6 FL (ref 8.1–13.5)
POTASSIUM SERPL-SCNC: 2.8 MMOL/L (ref 3.7–5.3)
RBC # BLD: 2.77 M/UL (ref 3.95–5.11)
SEG NEUTROPHILS: 76 % (ref 36–66)
SEGMENTED NEUTROPHILS ABSOLUTE COUNT: 4.56 K/UL (ref 1.8–7.7)
SODIUM BLD-SCNC: 150 MMOL/L (ref 135–144)
TOTAL PROTEIN: 4.8 G/DL (ref 6.4–8.3)
WBC # BLD: 6 K/UL (ref 3.5–11.3)

## 2022-09-09 PROCEDURE — 74018 RADEX ABDOMEN 1 VIEW: CPT

## 2022-09-09 PROCEDURE — 83735 ASSAY OF MAGNESIUM: CPT

## 2022-09-09 PROCEDURE — 85025 COMPLETE CBC W/AUTO DIFF WBC: CPT

## 2022-09-09 PROCEDURE — 2580000003 HC RX 258: Performed by: NURSE PRACTITIONER

## 2022-09-09 PROCEDURE — 82947 ASSAY GLUCOSE BLOOD QUANT: CPT

## 2022-09-09 PROCEDURE — 97530 THERAPEUTIC ACTIVITIES: CPT

## 2022-09-09 PROCEDURE — 6370000000 HC RX 637 (ALT 250 FOR IP): Performed by: STUDENT IN AN ORGANIZED HEALTH CARE EDUCATION/TRAINING PROGRAM

## 2022-09-09 PROCEDURE — 2500000003 HC RX 250 WO HCPCS: Performed by: INTERNAL MEDICINE

## 2022-09-09 PROCEDURE — 6360000002 HC RX W HCPCS: Performed by: NURSE PRACTITIONER

## 2022-09-09 PROCEDURE — 94640 AIRWAY INHALATION TREATMENT: CPT

## 2022-09-09 PROCEDURE — 2580000003 HC RX 258: Performed by: STUDENT IN AN ORGANIZED HEALTH CARE EDUCATION/TRAINING PROGRAM

## 2022-09-09 PROCEDURE — 80053 COMPREHEN METABOLIC PANEL: CPT

## 2022-09-09 PROCEDURE — 6360000002 HC RX W HCPCS: Performed by: INTERNAL MEDICINE

## 2022-09-09 PROCEDURE — 2500000003 HC RX 250 WO HCPCS: Performed by: FAMILY MEDICINE

## 2022-09-09 PROCEDURE — 6360000002 HC RX W HCPCS: Performed by: STUDENT IN AN ORGANIZED HEALTH CARE EDUCATION/TRAINING PROGRAM

## 2022-09-09 PROCEDURE — 99232 SBSQ HOSP IP/OBS MODERATE 35: CPT | Performed by: INTERNAL MEDICINE

## 2022-09-09 PROCEDURE — 6370000000 HC RX 637 (ALT 250 FOR IP): Performed by: NURSE PRACTITIONER

## 2022-09-09 PROCEDURE — 6370000000 HC RX 637 (ALT 250 FOR IP): Performed by: FAMILY MEDICINE

## 2022-09-09 PROCEDURE — 82140 ASSAY OF AMMONIA: CPT

## 2022-09-09 PROCEDURE — 84100 ASSAY OF PHOSPHORUS: CPT

## 2022-09-09 PROCEDURE — 2580000003 HC RX 258: Performed by: FAMILY MEDICINE

## 2022-09-09 PROCEDURE — A4216 STERILE WATER/SALINE, 10 ML: HCPCS | Performed by: FAMILY MEDICINE

## 2022-09-09 PROCEDURE — 1200000000 HC SEMI PRIVATE

## 2022-09-09 PROCEDURE — 2500000003 HC RX 250 WO HCPCS: Performed by: STUDENT IN AN ORGANIZED HEALTH CARE EDUCATION/TRAINING PROGRAM

## 2022-09-09 PROCEDURE — 94761 N-INVAS EAR/PLS OXIMETRY MLT: CPT

## 2022-09-09 RX ORDER — DEXTROSE MONOHYDRATE 50 MG/ML
INJECTION, SOLUTION INTRAVENOUS CONTINUOUS
Status: DISCONTINUED | OUTPATIENT
Start: 2022-09-09 | End: 2022-09-09 | Stop reason: CLARIF

## 2022-09-09 RX ORDER — BUDESONIDE 0.5 MG/2ML
0.5 INHALANT ORAL 2 TIMES DAILY
Qty: 60 EACH | Refills: 3 | Status: SHIPPED | OUTPATIENT
Start: 2022-09-09 | End: 2022-09-18 | Stop reason: HOSPADM

## 2022-09-09 RX ORDER — POLYETHYLENE GLYCOL 3350 17 G/17G
17 POWDER, FOR SOLUTION ORAL DAILY
Qty: 527 G | Refills: 1 | Status: SHIPPED | OUTPATIENT
Start: 2022-09-09 | End: 2022-10-09

## 2022-09-09 RX ORDER — POTASSIUM CHLORIDE 750 MG/1
10 TABLET, EXTENDED RELEASE ORAL DAILY
Qty: 60 TABLET | Refills: 3 | Status: SHIPPED | OUTPATIENT
Start: 2022-09-09 | End: 2022-09-18 | Stop reason: HOSPADM

## 2022-09-09 RX ORDER — SERTRALINE HYDROCHLORIDE 100 MG/1
100 TABLET, FILM COATED ORAL DAILY
Qty: 30 TABLET | Refills: 3 | Status: SHIPPED | OUTPATIENT
Start: 2022-09-09

## 2022-09-09 RX ORDER — INSULIN GLARGINE 100 [IU]/ML
20 INJECTION, SOLUTION SUBCUTANEOUS 2 TIMES DAILY
Qty: 10 ML | Refills: 3 | Status: SHIPPED | OUTPATIENT
Start: 2022-09-09 | End: 2022-09-18 | Stop reason: HOSPADM

## 2022-09-09 RX ORDER — POTASSIUM CHLORIDE 20 MEQ/1
40 TABLET, EXTENDED RELEASE ORAL PRN
Qty: 60 TABLET | Refills: 3 | Status: SHIPPED | OUTPATIENT
Start: 2022-09-09 | End: 2022-09-18 | Stop reason: HOSPADM

## 2022-09-09 RX ORDER — LACTULOSE 10 G/15ML
20 SOLUTION ORAL 3 TIMES DAILY
Qty: 1 EACH | Refills: 5 | Status: SHIPPED | OUTPATIENT
Start: 2022-09-09

## 2022-09-09 RX ORDER — MIDODRINE HYDROCHLORIDE 10 MG/1
10 TABLET ORAL 3 TIMES DAILY
Qty: 90 TABLET | Refills: 0 | Status: SHIPPED | OUTPATIENT
Start: 2022-09-09

## 2022-09-09 RX ORDER — POTASSIUM CHLORIDE 20 MEQ/1
40 TABLET, EXTENDED RELEASE ORAL ONCE
Qty: 60 TABLET | Refills: 3 | Status: SHIPPED | OUTPATIENT
Start: 2022-09-09 | End: 2022-09-18 | Stop reason: HOSPADM

## 2022-09-09 RX ORDER — LEVOFLOXACIN 5 MG/ML
750 INJECTION, SOLUTION INTRAVENOUS EVERY 24 HOURS
Qty: 1000 ML | Refills: 0 | Status: SHIPPED | OUTPATIENT
Start: 2022-09-09 | End: 2022-09-18 | Stop reason: HOSPADM

## 2022-09-09 RX ADMIN — ERYTHROMYCIN LACTOBIONATE 250 MG: 500 INJECTION, POWDER, LYOPHILIZED, FOR SOLUTION INTRAVENOUS at 14:15

## 2022-09-09 RX ADMIN — SODIUM CHLORIDE, PRESERVATIVE FREE 20 MG: 5 INJECTION INTRAVENOUS at 20:36

## 2022-09-09 RX ADMIN — ERYTHROMYCIN LACTOBIONATE 250 MG: 500 INJECTION, POWDER, LYOPHILIZED, FOR SOLUTION INTRAVENOUS at 21:56

## 2022-09-09 RX ADMIN — MIDODRINE HYDROCHLORIDE 10 MG: 10 TABLET ORAL at 07:58

## 2022-09-09 RX ADMIN — POTASSIUM CHLORIDE 20 MEQ: 29.8 INJECTION, SOLUTION INTRAVENOUS at 06:02

## 2022-09-09 RX ADMIN — LACTULOSE 20 G: 10 SOLUTION ORAL at 08:04

## 2022-09-09 RX ADMIN — BUDESONIDE 500 MCG: 0.5 SUSPENSION RESPIRATORY (INHALATION) at 08:44

## 2022-09-09 RX ADMIN — WATER: 1000 INJECTION, SOLUTION INTRAVENOUS at 15:04

## 2022-09-09 RX ADMIN — MIDODRINE HYDROCHLORIDE 10 MG: 10 TABLET ORAL at 16:39

## 2022-09-09 RX ADMIN — POLYETHYLENE GLYCOL (3350) 17 G: 17 POWDER, FOR SOLUTION ORAL at 08:04

## 2022-09-09 RX ADMIN — LACTULOSE 20 G: 10 SOLUTION ORAL at 13:55

## 2022-09-09 RX ADMIN — SODIUM CHLORIDE, PRESERVATIVE FREE 10 ML: 5 INJECTION INTRAVENOUS at 08:10

## 2022-09-09 RX ADMIN — LACTULOSE 20 G: 10 SOLUTION ORAL at 20:30

## 2022-09-09 RX ADMIN — POTASSIUM CHLORIDE 20 MEQ: 29.8 INJECTION, SOLUTION INTRAVENOUS at 05:01

## 2022-09-09 RX ADMIN — INSULIN GLARGINE 20 UNITS: 100 INJECTION, SOLUTION SUBCUTANEOUS at 20:42

## 2022-09-09 RX ADMIN — MIDODRINE HYDROCHLORIDE 10 MG: 10 TABLET ORAL at 11:53

## 2022-09-09 RX ADMIN — INSULIN GLARGINE 20 UNITS: 100 INJECTION, SOLUTION SUBCUTANEOUS at 08:08

## 2022-09-09 RX ADMIN — SILVER SULFADIAZINE: 10 CREAM TOPICAL at 21:37

## 2022-09-09 RX ADMIN — SODIUM CHLORIDE, PRESERVATIVE FREE 10 ML: 5 INJECTION INTRAVENOUS at 20:43

## 2022-09-09 RX ADMIN — TRAZODONE HYDROCHLORIDE 100 MG: 100 TABLET ORAL at 20:23

## 2022-09-09 RX ADMIN — LEVOFLOXACIN 750 MG: 5 INJECTION, SOLUTION INTRAVENOUS at 16:31

## 2022-09-09 RX ADMIN — ERYTHROMYCIN LACTOBIONATE 250 MG: 500 INJECTION, POWDER, LYOPHILIZED, FOR SOLUTION INTRAVENOUS at 04:26

## 2022-09-09 RX ADMIN — SERTRALINE HYDROCHLORIDE 100 MG: 100 TABLET ORAL at 07:58

## 2022-09-09 RX ADMIN — POTASSIUM CHLORIDE 20 MEQ: 29.8 INJECTION, SOLUTION INTRAVENOUS at 07:07

## 2022-09-09 RX ADMIN — BUDESONIDE 500 MCG: 0.5 SUSPENSION RESPIRATORY (INHALATION) at 19:26

## 2022-09-09 RX ADMIN — SODIUM CHLORIDE, PRESERVATIVE FREE 20 MG: 5 INJECTION INTRAVENOUS at 08:09

## 2022-09-09 RX ADMIN — POTASSIUM BICARBONATE 20 MEQ: 782 TABLET, EFFERVESCENT ORAL at 07:59

## 2022-09-09 RX ADMIN — MORPHINE SULFATE 1 MG: 2 INJECTION, SOLUTION INTRAMUSCULAR; INTRAVENOUS at 08:18

## 2022-09-09 ASSESSMENT — PAIN SCALES - GENERAL: PAINLEVEL_OUTOF10: 7
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
## 2022-09-09 NOTE — PROGRESS NOTES
[facility])   Gastroenterology Progress Note    Halima Anaya is a 74 y.o. female patient. Hospitalization Day:11      Chief consult reason:   Abdominal pain, nausea, vomiting diarrhea    Subjective:  Patient seen and examined in ICU. Patient slightly more awake today  Patient seen for colorectal cancer who has no plans at this point for surgical intervention. FMS was removed overnight. Abdomen soft    VITALS:  BP (!) 121/49   Pulse (!) 103   Temp 98.3 °F (36.8 °C) (Temporal)   Resp 14   Ht 5' 2\" (1.575 m)   Wt 219 lb (99.3 kg)   SpO2 99%   BMI 40.06 kg/m²   TEMPERATURE:  Current - Temp: 98.3 °F (36.8 °C); Max - Temp  Av.8 °F (36.6 °C)  Min: 96.5 °F (35.8 °C)  Max: 98.6 °F (37 °C)    Physical Assessment:  General appearance: Sleepy  Mental Status: RANDOLPH  Lungs: DMM bases  Heart:  regular rate and rhythm, no murmur  Abdomen:  soft, nontender, distended, hypoactive bowel sounds, no masses, hepatomegaly, splenomegaly  Extremities:  no edema, redness, tenderness in the calves  Skin:  no gross lesions, rashes, induration    Data Review:    Labs and Imaging:     CBC:  Recent Labs     22  0533 22  0438 22  0400   WBC 9.3 10.0 6.0   HGB 8.2* 8.1* 7.2*   MCV 85.2 84.9 87.0   RDW 23.3* 23.5* 23.9*   PLT 60* 70* 56*         ANEMIA STUDIES:  No results for input(s): LABIRON, TIBC, FERRITIN, [id number], FOLATE, OCCULTBLD in the last 72 hours.     BMP:  Recent Labs     22  0438 22  1205 22  0400   * 149* 150*   K 2.8* 3.9 2.8*   * 120* 122*   CO2 22 22 21   BUN 26* 26* 22   CREATININE 0.58 0.59 0.57   GLUCOSE 222* 222* 166*   CALCIUM 7.9* 8.0* 7.8*         LFTS:  Recent Labs     22  0533 22  0438 22  0400   ALKPHOS 171* 173* 139*   ALT 37* 38* 34*   AST 45* 43* 34*   BILITOT 0.5 0.6 0.6   LABALBU 1.9* 1.8* 1.7*         Amylase/Lipase and Ammonia:  Recent Labs     22  0533 22  1605 22  0400   AMMONIA 81* 40 38         Acute Hepatitis Panel:  No results found for: HEPBSAG, HEPCAB, HEPBIGM, HEPAIGM    HCV Genotype:  No results found for: HEPATITISCGENOTYPE    HCV Quantitative:  No results found for: HCVQNT    LIVER WORK UP:    AFP  No results found for: AFP    Alpha 1 antitrypsin   No results found for: A1A    JOVANI  Lab Results   Component Value Date/Time    JOVANI NEGATIVE 11/18/2020 10:00 AM       AMA  No results found for: MITOAB    ASMA  No results found for: SMOOTHMUSCAB    PT/INR  No results for input(s): PROTIME, INR in the last 72 hours. Cancer Markers:  CEA:  No results for input(s): CEA in the last 72 hours. Ca 125:  No results for input(s):  in the last 72 hours. Ca 19-9:   Invalid input(s):   AFP: No results for input(s): AFP in the last 72 hours. Lactic acid:Invalid input(s): LACTIC ACID    Radiology Review:    No results found. Principal Problem:    Shock, septic ([address]  Active Problems:    Urinary tract infection associated with indwelling urethral catheter (HCC)    Juan Jose's syndrome    Hypotension    Acute urinary retention    Encephalopathy acute    Stroke-like symptoms    Leukocytosis    Dysphagia    Hypertension    LEONARDA (obstructive sleep apnea)    Lymphedema    COPD (chronic obstructive pulmonary disease) (HCC)    Hepatic cirrhosis (HCC)  Resolved Problems:    BROOKLYN (acute kidney injury) ([address]       GI Impression:    Anemia-no overt bleeding noted. Natanael Fraction stool in [facility]  [place] syndrome s/p decompressive colonoscopy 9/1/2022  Dysphagia-tube feeds via NG tube    Plan and Recommendations:    Agree with removal of FMS. Recommend turning patient hourly to mobilize gaseous distention  Okay to resume tube feeds. Okay for meds via NG tube  Recommend continuing tube feeds via NG tube, patient to be discharged to rehab/[facility] and have swallow mechanism tested in a few weeks once her condition has stabilized and her strength is improved.    Will continue with trial of Erythromycin 250 mg IV Q 8 hrs x 3 doses again today to increase gut motility. This is only a short-term solution and cannot be used for long-term treatment  May need to consider cecostomy tube  Will follow    This plan was formulated in collaboration with Dr. Jes Ledesma MD      [address] [place] [address] Floor Gastroenterology   Angelo Alvarez, [address] [place] [address]   [phone]  9/9/2022  11:48 AM    GI attending physician note. Patient seen with APRN     I independently performed an evaluation on Kartik Ayala. I have reviewed the above documentation completed by the Nurse Practitioner and agree with the history, examination, and management plan. Recommendations discussed. Discussed with the nursing staff regarding the care. May restart nasogastric feedings. If patient is having recurrent pseudoobstruction needing prolonged hospital stay, she may need either ileostomy or cecostomy. Need to discuss with general surgery service.

## 2022-09-09 NOTE — FLOWSHEET NOTE
09/09/22 0900   Treatment Team Notification   Reason for Communication Review case   Team Member Name Dr Maty Dowling Provider   Method of Communication Face to face   Response Other (Comment)  (resume TF)     Dr Rose Marie Fuentes and GI NP rounding with RN at beside  Okay to resume TF

## 2022-09-09 NOTE — RT PROTOCOL NOTE
bronchodilator orders with one order with TID Frequency and one order with Frequency of every 4 hours PRN wheezing or increased work of breathing using Per Protocol order mode. 11-13 - enter or revise RT Bronchodilator order(s) to one equivalent RT bronchodilator order with QID Frequency and an Albuterol order with Frequency of every 4 hours PRN wheezing or increased work of breathing using Per Protocol order mode. Greater than 13 - enter or revise RT Bronchodilator order(s) to one equivalent RT bronchodilator order with every 4 hours Frequency and an Albuterol order with Frequency of every 2 hours PRN wheezing or increased work of breathing using Per Protocol order mode. RT to enter RT Home Evaluation for COPD & MDI Assessment order using Per Protocol order mode.     Electronically signed by Janae Bailey RCP on 9/9/2022 at 7:29 PM

## 2022-09-09 NOTE — PROGRESS NOTES
General Surgery:  Daily Progress Note                    PATIENT NAME: Ana Silver     TODAY'S DATE: 9/9/2022, 9:18 AM  CC:  Feeling better    SUBJECTIVE:     Pt seen and examined at bedside. Pt has had episodes of liquid stool over night and is passing gas. She is able to voice that she feels \"better\". Her abdomen is less distended than yesterday. Tube feeds continue.     OBJECTIVE:   VITALS:  BP (!) 111/41   Pulse (!) 101   Temp 97.8 °F (36.6 °C) (Temporal)   Resp 15   Ht 5' 2\" (1.575 m)   Wt 219 lb (99.3 kg)   SpO2 100%   BMI 40.06 kg/m²      INTAKE/OUTPUT:      Intake/Output Summary (Last 24 hours) at 9/9/2022 0918  Last data filed at 9/9/2022 0400  Gross per 24 hour   Intake 832.37 ml   Output 1435 ml   Net -602.63 ml       PHYSICAL EXAM:     General appearance: Drowsy, chronically ill-appearing, able to answer questions with one-word answers  Mental Status:  at baseline, slow in response time  Lungs: diminished to auscultation bilaterally, normal effort  Heart:  regular rate and rhythm  Abdomen: distended, minimal tenderness to palpation  Extremities:+1 ble edema, no redness, tenderness in the calves  Skin:  scratch marks to anterior shins, no gross lesions, induration      Data:  CBC with Differential:    Lab Results   Component Value Date/Time    WBC 6.0 09/09/2022 04:00 AM    RBC 2.77 09/09/2022 04:00 AM    HGB 7.2 09/09/2022 04:00 AM    HCT 24.1 09/09/2022 04:00 AM    PLT 56 09/09/2022 04:00 AM    MCV 87.0 09/09/2022 04:00 AM    MCH 26.0 09/09/2022 04:00 AM    MCHC 29.9 09/09/2022 04:00 AM    RDW 23.9 09/09/2022 04:00 AM    LYMPHOPCT 13 09/09/2022 04:00 AM    MONOPCT 8 09/09/2022 04:00 AM    BASOPCT 0 09/09/2022 04:00 AM    MONOSABS 0.48 09/09/2022 04:00 AM    LYMPHSABS 0.78 09/09/2022 04:00 AM    EOSABS 0.12 09/09/2022 04:00 AM    BASOSABS 0.00 09/09/2022 04:00 AM    DIFFTYPE NOT REPORTED 02/09/2022 01:43 PM     BMP:    Lab Results   Component Value Date/Time     09/09/2022 04:00 AM    K 2.8 09/09/2022 04:00 AM     09/09/2022 04:00 AM    CO2 21 09/09/2022 04:00 AM    BUN 22 09/09/2022 04:00 AM    LABALBU 1.7 09/09/2022 04:00 AM    CREATININE 0.57 09/09/2022 04:00 AM    CALCIUM 7.8 09/09/2022 04:00 AM    GFRAA >60 09/09/2022 04:00 AM    LABGLOM >60 09/09/2022 04:00 AM    GLUCOSE 166 09/09/2022 04:00 AM       Radiology Review:    XR ABDOMEN (KUB) (SINGLE AP VIEW)   Final Result   Gas distended colon with moderate stool distally, similar in appearance to   prior radiograph. XR ABDOMEN (KUB) (SINGLE AP VIEW)   Final Result   Dilated colon again demonstrated with findings suggestive of at least   moderate rectal stool burden. This may be due to a functional or mechanical   distal colonic obstruction. XR ABDOMEN FOR NG/OG/NE TUBE PLACEMENT   Final Result   Enteric tube terminates at the level of the body of the stomach. MRI BRAIN WO CONTRAST   Final Result   Chronic microvascular disease without acute intracranial abnormality. XR CHEST PORTABLE   Final Result   Cardiomegaly with left basilar infiltrate. Support tubes as described above. XR ABDOMEN FOR NG/OG/NE TUBE PLACEMENT   Final Result   Nasogastric tube tip is in the stomach         XR CHEST PORTABLE   Final Result   Cardiomegaly with left mid and lower lung infiltrate. XR CHEST PORTABLE   Final Result   Left basilar effusion with left mid and lower lung infiltrate. CTA HEAD NECK W CONTRAST   Final Result   No evidence for significant stenosis or occlusion. Left-sided effusion with adjacent scattered left-sided infiltrates. CT HEAD WO CONTRAST   Final Result   No acute intracranial abnormality. Findings were discussed with Jovany Solders at 9:14 am on 9/3/2022. XR CHEST PORTABLE   Final Result   Subtle infiltrate in the left lower lobe which has improved from the prior   study.          XR ABDOMEN (KUB) (SINGLE AP VIEW)   Final Result   Scattered gas distention of the large bowel, improved when compared   8/31/2022. No abnormal distention of the cecum visible on the current   radiograph. XR CHEST PORTABLE   Final Result   Left upper lobe opacity may represent pneumonia. Left PICC tip remains near   the midline. XR CHEST PORTABLE   Final Result   Tip of right upper extremity PICC is to the left of midline. Recommend   retracting the line and repositioning. XR ABDOMEN (KUB) (SINGLE AP VIEW)   Final Result   No pneumoperitoneum. CT ABDOMEN PELVIS WO CONTRAST Additional Contrast? None   Final Result   Severe dilatation of the colon without evidence of obstruction, with   air-fluid levels within the colon, concerning for [place] syndrome, however   anal/rectal stricture cannot be ruled out. Colonic dilatation was visualized   on prior exams. Sequelae of cirrhosis with portal hypertension, including paraesophageal   varices, similar to prior.          XR ABDOMEN (KUB) (SINGLE AP VIEW)    (Results Pending)         ASSESSMENT:  Active Hospital Problems    Diagnosis Date Noted    Dysphagia [R13.10] 09/04/2022     Priority: Medium    Encephalopathy acute [G93.40] 09/03/2022     Priority: Medium    Stroke-like symptoms [R29.90] 09/03/2022     Priority: Medium    Leukocytosis [D72.829] 09/03/2022     Priority: Medium    Hypotension [I95.9] 08/31/2022     Priority: Medium    Shock, septic ([facility] [address] [A41.9, R65.21] 08/31/2022     Priority: Medium    [place] Marks's syndrome [K59.81] 08/30/2022     Priority: Medium    Urinary tract infection associated with indwelling urethral catheter ([facility] [address] [T83.511A, N39.0] 08/29/2022     Priority: Medium    Acute urinary retention [R33.8] 09/20/2020     Priority: Medium    Lymphedema [I89.0] 02/09/2022    COPD (chronic obstructive pulmonary disease) ([facility] [address] [J44.9]     LEONARDA (obstructive sleep apnea) [G47.33]     Hypertension [I10]     Hepatic cirrhosis ([address] [K74.60]        74 y.o. female with increasing abdominal distention and concern for continued pseudoobstruction. Pt is s/p decompressive colonoscopy on 9/1/2022. Plan: At this point patient does not need another decompressive colonoscopy as she has passed some liquid bowel movements and reports clinically feeling better. Her abdominal distention is still present however decreased from yesterday. Recommend continuing bowel regimen of miralax and keeping electrolytes within normal limits. If she does become more distended again, pt may need surgical management rather than decompressive colonoscopy for definitive treatment. Electronically signed by Chery Zuniga DO  on 9/9/2022 at 9:18 AM   I Dr. Bassam Cruz saw and examined the patient. I have edited the above and agree with the above. Alvin Parrish  Colorectal Surgery

## 2022-09-09 NOTE — PROGRESS NOTES
Pulmonary Critical Care Progress Note       Patient seen for the follow up of  Shock, septic ([address]     Subjective:    She has been off oxygen. She is  more awake but still confused. She is tolerating tube feeds. She has rectal tube in place with  improved diarrhea. Examination:  Vitals: BP (!) 86/44   Pulse 95   Temp 98.3 °F (36.8 °C) (Temporal)   Resp 14   Ht 5' 2\" (1.575 m)   Wt 219 lb (99.3 kg)   SpO2 95%   BMI 40.06 kg/m²   General appearance: Sleepy confused and cooperative with exam  Neck: No JVD  Lungs: Decreased breath sound no crackles or wheezes  Heart: regular rate and rhythm, S1, S2 normal, no gallop  Abdomen: Soft, non tender, + BS  Extremities: no cyanosis or clubbing. No significant edema, scratches bilateral lower legs     LABs:  CBC:   Recent Labs     09/08/22  0438 09/09/22  0400   WBC 10.0 6.0   HGB 8.1* 7.2*   HCT 26.5* 24.1*   PLT 70* 56*       BMP:   Recent Labs     09/08/22  1205 09/09/22  0400   * 150*   K 3.9 2.8*   CO2 22 21   BUN 26* 22   CREATININE 0.59 0.57   LABGLOM >60 >60   GLUCOSE 222* 166*       LIVER PROFILE:  Recent Labs     09/08/22  0438 09/09/22  0400   AST 43* 34*   ALT 38* 34*   LABALBU 1.8* 1.7*       ABG:  Lab Results   Component Value Date/Time    FIO2 NOT REPORTED 02/08/2022 07:10 PM       Lab Results   Component Value Date/Time    FIO2 NOT REPORTED 02/08/2022 07:10 PM     Radiology:  9/5/22 CXR  Cardiomegaly with left basilar infiltrate. Support tubes as described above. MRI brain 9/6  Chronic microvascular disease without acute intracranial abnormality. X-ray abdomen 9/8  Dilated colon again demonstrated with findings suggestive of at least   moderate rectal stool burden.   This may be due to a functional or mechanical   distal colonic obstruction       Impression:  Chronic hypoxic respiratory failure  Atelectasis  Hypotension/Septic Shock requiring vasopressors   UTI/Indwelling angeles /stenotrophomonas maltophilia resistant to Bactrim  on urine culture status post cefepime  [place]'s syndrome   Obstructive sleep apnea/Obesity  [place]   A. fib, CAD, CHF, DM, dementia, HTN, liver cirrhosis, esophageal varices  History of discitis    Recommendations:  Oxygen via nasal cannula  DuoNeb by nebulizer   Pulmicort 0.5 b.I.d.  NG feeds as tolerated   GI on consult do not feel PEG is feasible at this point  given abdominal distension/ erythromycin started   Replace K  Monitor blood pressure off pressors  On midodrine    Levaquin for UTI   Monitor platelet count   Monitor sodium   Consider neurology evaluation   Colorectal surgery following, s/p decompressive colonoscopy    Physical therapy  Discussed with RN  GI prophylaxis  DVT prophylaxis with EPC      Veronique Baez MD, [facility]  Pulmonary Critical Care and Sleep Medicine,  [facility] AT [place]: [phone]
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
## 2022-09-09 NOTE — FLOWSHEET NOTE
09/09/22 0842   Treatment Team Notification   Reason for Communication Review case   Team Member Name Dr Oumou Rawls Provider   Method of Communication Face to face   Response No new orders   Notification Time 0840     MD rounded on patient at this time, updated on BMs overnight by RN. Per Dr Kike Harris, as KUB has improved overnight no immediate intervention/decompression is required. May need surgical intervention in the future if symptoms worsen.

## 2022-09-09 NOTE — FLOWSHEET NOTE
09/09/22 1155   Treatment Team Notification   Reason for Communication Review case   Team Member Name Dr Elma Segundo Team Role Consulting Provider   Method of Communication Face to face   Response No new orders      MD rounding at this time. RN inquired about PICC line removal, MD stated he will look into it. RN updated Na level of 150 and decreased urine output of 25mL/hr.  New order for Dextrose 2.5% W

## 2022-09-09 NOTE — PROGRESS NOTES
Nutrition Assessment     Type and Reason for Visit: Reassess    Nutrition Recommendations/Plan:   Continue NPO Exceptions are: Ice Chips  Continue Glucerna 1.5 Saturnino goal rate 48 ml/hr (1152 mL total volume). Water flush 200 mL every 6 hours  Monitor tube feeding rate, tolerance, diet advancement, bowel status and labs     Malnutrition Assessment:  Malnutrition Status: At risk for malnutrition (Comment)    Nutrition Assessment:  Patient started having liquid stools a few days ago and fecal management system was placed. Patient is improving and feeling better today so FMS was discontinued. Tube feedings resumed. Continue Jevity 1.5 Saturnino at 48 mL/hr (1152 mL total volume). Will monitor tube feeding rate, tolerance, bowel status and labs. Estimated Daily Nutrient Needs:  Energy (kcal):  8124-0361 kcal (16-18 kcal/kg) Weight Used for Energy Requirements: Other (Comment) (214 lb. 9/2/22)     Protein (g):   gm protein (1.8-2.0 g/kg) Weight Used for Protein Requirements: Ideal        Fluid (ml/day):  1728 mL Method Used for Fluid Requirements: 1 ml/kcal    Nutrition Related Findings:   Edema: +1 pitting BLE. Hypoactive bowel sounds. Diarrhea. FMS discontinued.  NGT feedings Wound Type: None    Current Nutrition Therapies:    Diet NPO Exceptions are: Ice Chips  ADULT TUBE FEEDING; Nasogastric; Diabetic; Continuous; 15; Yes; 10; Q 6 hours; 48; 200; Q 6 hours    Anthropometric Measures:  Height: 5' 2\" (157.5 cm)  Current Body Wt: 219 lb (99.3 kg)   BMI: 40    Nutrition Diagnosis:   Inadequate protein-energy intake related to inadequate protein-energy intake as evidenced by nutrition support - enteral nutrition, NPO or clear liquid status due to medical condition  Swallowing difficulty related to swallowing difficulty as evidenced by NPO or clear liquid status due to medical condition, swallow study results (failed swallowing evaluation)    Nutrition Interventions:   Food and/or Nutrient Delivery: Continue NPO, Continue

## 2022-09-09 NOTE — PLAN OF CARE
Problem: Discharge Planning  Goal: Discharge to home or other facility with appropriate resources  Outcome: Progressing     Problem: Pain  Goal: Verbalizes/displays adequate comfort level or baseline comfort level  Outcome: Progressing  Flowsheets (Taken 9/8/2022 2000)  Verbalizes/displays adequate comfort level or baseline comfort level:   Encourage patient to monitor pain and request assistance   Assess pain using appropriate pain scale   Implement non-pharmacological measures as appropriate and evaluate response   Notify Licensed Independent Practitioner if interventions unsuccessful or patient reports new pain     Problem: Skin/Tissue Integrity  Goal: Absence of new skin breakdown  Description: 1. Monitor for areas of redness and/or skin breakdown  2. Assess vascular access sites hourly  3. Every 4-6 hours minimum:  Change oxygen saturation probe site  4. Every 4-6 hours:  If on nasal continuous positive airway pressure, respiratory therapy assess nares and determine need for appliance change or resting period.   Outcome: Progressing     Problem: Safety - Adult  Goal: Free from fall injury  Outcome: Progressing     Problem: ABCDS Injury Assessment  Goal: Absence of physical injury  Outcome: Progressing     Problem: Chronic Conditions and Co-morbidities  Goal: Patient's chronic conditions and co-morbidity symptoms are monitored and maintained or improved  Outcome: Progressing  Flowsheets (Taken 9/8/2022 2219)  Care Plan - Patient's Chronic Conditions and Co-Morbidity Symptoms are Monitored and Maintained or Improved:   Monitor and assess patient's chronic conditions and comorbid symptoms for stability, deterioration, or improvement   Collaborate with multidisciplinary team to address chronic and comorbid conditions and prevent exacerbation or deterioration   Update acute care plan with appropriate goals if chronic or comorbid symptoms are exacerbated and prevent overall improvement and discharge  Note:

## 2022-09-09 NOTE — CARE COORDINATION
Discharge planning    [facility] peer denied. Patient's FMS was discontinued and there is no surgical intervention at this time. [facility] still denied. Natasha Bustamanter at [place] is going to start the expedited appeal process.

## 2022-09-10 ENCOUNTER — APPOINTMENT (OUTPATIENT)
Dept: GENERAL RADIOLOGY | Age: 67
DRG: 698 | End: 2022-09-10
Payer: COMMERCIAL

## 2022-09-10 LAB
ABSOLUTE EOS #: 0.1 K/UL (ref 0–0.4)
ABSOLUTE IMMATURE GRANULOCYTE: 0.05 K/UL (ref 0–0.3)
ABSOLUTE LYMPH #: 0.73 K/UL (ref 1–4.8)
ABSOLUTE MONO #: 0.52 K/UL (ref 0.2–0.8)
ALBUMIN SERPL-MCNC: 1.8 G/DL (ref 3.5–5.2)
ALP BLD-CCNC: 151 U/L (ref 35–104)
ALT SERPL-CCNC: 34 U/L (ref 5–33)
AMMONIA: 42 UMOL/L (ref 11–51)
ANION GAP SERPL CALCULATED.3IONS-SCNC: 8 MMOL/L (ref 9–17)
AST SERPL-CCNC: 41 U/L
BASOPHILS # BLD: 0 %
BASOPHILS ABSOLUTE: 0 K/UL (ref 0–0.2)
BILIRUB SERPL-MCNC: 0.5 MG/DL (ref 0.3–1.2)
BUN BLDV-MCNC: 19 MG/DL (ref 8–23)
BUN/CREAT BLD: 37 (ref 9–20)
CALCIUM SERPL-MCNC: 7.6 MG/DL (ref 8.6–10.4)
CHLORIDE BLD-SCNC: 122 MMOL/L (ref 98–107)
CO2: 20 MMOL/L (ref 20–31)
CREAT SERPL-MCNC: 0.51 MG/DL (ref 0.5–0.9)
EOSINOPHILS RELATIVE PERCENT: 2 % (ref 1–4)
GFR AFRICAN AMERICAN: >60 ML/MIN
GFR NON-AFRICAN AMERICAN: >60 ML/MIN
GFR SERPL CREATININE-BSD FRML MDRD: ABNORMAL ML/MIN/{1.73_M2}
GLUCOSE BLD-MCNC: 176 MG/DL (ref 65–105)
GLUCOSE BLD-MCNC: 187 MG/DL (ref 65–105)
GLUCOSE BLD-MCNC: 194 MG/DL (ref 65–105)
GLUCOSE BLD-MCNC: 197 MG/DL (ref 70–99)
GLUCOSE BLD-MCNC: 226 MG/DL (ref 65–105)
GLUCOSE BLD-MCNC: 265 MG/DL (ref 65–105)
HCT VFR BLD CALC: 24.2 % (ref 36.3–47.1)
HEMOGLOBIN: 7.3 G/DL (ref 11.9–15.1)
IMMATURE GRANULOCYTES: 1 %
LYMPHOCYTES # BLD: 14 % (ref 24–44)
MAGNESIUM: 2.3 MG/DL (ref 1.6–2.6)
MCH RBC QN AUTO: 26.5 PG (ref 25.2–33.5)
MCHC RBC AUTO-ENTMCNC: 30.2 G/DL (ref 28.4–34.8)
MCV RBC AUTO: 88 FL (ref 82.6–102.9)
MONOCYTES # BLD: 10 % (ref 1–7)
MORPHOLOGY: ABNORMAL
NRBC AUTOMATED: 0.4 PER 100 WBC
PDW BLD-RTO: 24.4 % (ref 11.8–14.4)
PHOSPHORUS: 2.3 MG/DL (ref 2.6–4.5)
PHOSPHORUS: 2.7 MG/DL (ref 2.6–4.5)
PLATELET # BLD: 67 K/UL (ref 138–453)
PMV BLD AUTO: 10.1 FL (ref 8.1–13.5)
POTASSIUM SERPL-SCNC: 2.7 MMOL/L (ref 3.7–5.3)
POTASSIUM SERPL-SCNC: 3.6 MMOL/L (ref 3.7–5.3)
RBC # BLD: 2.75 M/UL (ref 3.95–5.11)
SEG NEUTROPHILS: 73 % (ref 36–66)
SEGMENTED NEUTROPHILS ABSOLUTE COUNT: 3.8 K/UL (ref 1.8–7.7)
SODIUM BLD-SCNC: 150 MMOL/L (ref 135–144)
TOTAL PROTEIN: 4.9 G/DL (ref 6.4–8.3)
WBC # BLD: 5.2 K/UL (ref 3.5–11.3)

## 2022-09-10 PROCEDURE — 6370000000 HC RX 637 (ALT 250 FOR IP): Performed by: NURSE PRACTITIONER

## 2022-09-10 PROCEDURE — 2580000003 HC RX 258: Performed by: STUDENT IN AN ORGANIZED HEALTH CARE EDUCATION/TRAINING PROGRAM

## 2022-09-10 PROCEDURE — 1200000000 HC SEMI PRIVATE

## 2022-09-10 PROCEDURE — 80053 COMPREHEN METABOLIC PANEL: CPT

## 2022-09-10 PROCEDURE — 6370000000 HC RX 637 (ALT 250 FOR IP): Performed by: STUDENT IN AN ORGANIZED HEALTH CARE EDUCATION/TRAINING PROGRAM

## 2022-09-10 PROCEDURE — 6360000002 HC RX W HCPCS: Performed by: STUDENT IN AN ORGANIZED HEALTH CARE EDUCATION/TRAINING PROGRAM

## 2022-09-10 PROCEDURE — 2500000003 HC RX 250 WO HCPCS: Performed by: STUDENT IN AN ORGANIZED HEALTH CARE EDUCATION/TRAINING PROGRAM

## 2022-09-10 PROCEDURE — 2580000003 HC RX 258: Performed by: FAMILY MEDICINE

## 2022-09-10 PROCEDURE — 2500000003 HC RX 250 WO HCPCS: Performed by: INTERNAL MEDICINE

## 2022-09-10 PROCEDURE — 84132 ASSAY OF SERUM POTASSIUM: CPT

## 2022-09-10 PROCEDURE — 74018 RADEX ABDOMEN 1 VIEW: CPT

## 2022-09-10 PROCEDURE — 94761 N-INVAS EAR/PLS OXIMETRY MLT: CPT

## 2022-09-10 PROCEDURE — APPSS30 APP SPLIT SHARED TIME 16-30 MINUTES: Performed by: NURSE PRACTITIONER

## 2022-09-10 PROCEDURE — 84100 ASSAY OF PHOSPHORUS: CPT

## 2022-09-10 PROCEDURE — 6360000002 HC RX W HCPCS: Performed by: NURSE PRACTITIONER

## 2022-09-10 PROCEDURE — 6370000000 HC RX 637 (ALT 250 FOR IP): Performed by: FAMILY MEDICINE

## 2022-09-10 PROCEDURE — 2500000003 HC RX 250 WO HCPCS: Performed by: FAMILY MEDICINE

## 2022-09-10 PROCEDURE — 94640 AIRWAY INHALATION TREATMENT: CPT

## 2022-09-10 PROCEDURE — 83735 ASSAY OF MAGNESIUM: CPT

## 2022-09-10 PROCEDURE — 85025 COMPLETE CBC W/AUTO DIFF WBC: CPT

## 2022-09-10 PROCEDURE — 82140 ASSAY OF AMMONIA: CPT

## 2022-09-10 PROCEDURE — 6360000002 HC RX W HCPCS: Performed by: INTERNAL MEDICINE

## 2022-09-10 PROCEDURE — A4216 STERILE WATER/SALINE, 10 ML: HCPCS | Performed by: FAMILY MEDICINE

## 2022-09-10 PROCEDURE — 82947 ASSAY GLUCOSE BLOOD QUANT: CPT

## 2022-09-10 PROCEDURE — 2580000003 HC RX 258: Performed by: NURSE PRACTITIONER

## 2022-09-10 RX ORDER — MAGNESIUM SULFATE 1 G/100ML
1000 INJECTION INTRAVENOUS PRN
Status: DISCONTINUED | OUTPATIENT
Start: 2022-09-10 | End: 2022-09-23 | Stop reason: HOSPADM

## 2022-09-10 RX ADMIN — ACETAMINOPHEN 650 MG: 325 TABLET, FILM COATED ORAL at 17:02

## 2022-09-10 RX ADMIN — INSULIN LISPRO 8 UNITS: 100 INJECTION, SOLUTION INTRAVENOUS; SUBCUTANEOUS at 17:23

## 2022-09-10 RX ADMIN — WATER: 1000 INJECTION, SOLUTION INTRAVENOUS at 05:26

## 2022-09-10 RX ADMIN — LACTULOSE 20 G: 10 SOLUTION ORAL at 20:45

## 2022-09-10 RX ADMIN — POTASSIUM PHOSPHATE, MONOBASIC AND POTASSIUM PHOSPHATE, DIBASIC 30 MMOL: 224; 236 INJECTION, SOLUTION, CONCENTRATE INTRAVENOUS at 11:49

## 2022-09-10 RX ADMIN — SILVER SULFADIAZINE: 10 CREAM TOPICAL at 20:45

## 2022-09-10 RX ADMIN — MIDODRINE HYDROCHLORIDE 10 MG: 10 TABLET ORAL at 08:44

## 2022-09-10 RX ADMIN — POTASSIUM CHLORIDE 20 MEQ: 29.8 INJECTION, SOLUTION INTRAVENOUS at 06:42

## 2022-09-10 RX ADMIN — SERTRALINE HYDROCHLORIDE 100 MG: 100 TABLET ORAL at 08:40

## 2022-09-10 RX ADMIN — SODIUM CHLORIDE, PRESERVATIVE FREE 20 MG: 5 INJECTION INTRAVENOUS at 08:44

## 2022-09-10 RX ADMIN — BUDESONIDE 500 MCG: 0.5 SUSPENSION RESPIRATORY (INHALATION) at 07:37

## 2022-09-10 RX ADMIN — BUDESONIDE 500 MCG: 0.5 SUSPENSION RESPIRATORY (INHALATION) at 23:12

## 2022-09-10 RX ADMIN — LACTULOSE 20 G: 10 SOLUTION ORAL at 08:42

## 2022-09-10 RX ADMIN — TRAZODONE HYDROCHLORIDE 100 MG: 100 TABLET ORAL at 20:46

## 2022-09-10 RX ADMIN — POTASSIUM CHLORIDE 20 MEQ: 29.8 INJECTION, SOLUTION INTRAVENOUS at 05:38

## 2022-09-10 RX ADMIN — INSULIN GLARGINE 20 UNITS: 100 INJECTION, SOLUTION SUBCUTANEOUS at 08:44

## 2022-09-10 RX ADMIN — POLYETHYLENE GLYCOL (3350) 17 G: 17 POWDER, FOR SOLUTION ORAL at 08:42

## 2022-09-10 RX ADMIN — MIDODRINE HYDROCHLORIDE 10 MG: 10 TABLET ORAL at 17:01

## 2022-09-10 RX ADMIN — LACTULOSE 20 G: 10 SOLUTION ORAL at 13:15

## 2022-09-10 RX ADMIN — ERYTHROMYCIN LACTOBIONATE 250 MG: 500 INJECTION, POWDER, LYOPHILIZED, FOR SOLUTION INTRAVENOUS at 10:00

## 2022-09-10 RX ADMIN — POTASSIUM BICARBONATE 20 MEQ: 782 TABLET, EFFERVESCENT ORAL at 08:43

## 2022-09-10 RX ADMIN — MIDODRINE HYDROCHLORIDE 10 MG: 10 TABLET ORAL at 13:15

## 2022-09-10 RX ADMIN — SODIUM CHLORIDE, PRESERVATIVE FREE 10 ML: 5 INJECTION INTRAVENOUS at 21:26

## 2022-09-10 RX ADMIN — INSULIN GLARGINE 20 UNITS: 100 INJECTION, SOLUTION SUBCUTANEOUS at 21:06

## 2022-09-10 RX ADMIN — INSULIN LISPRO 4 UNITS: 100 INJECTION, SOLUTION INTRAVENOUS; SUBCUTANEOUS at 21:06

## 2022-09-10 RX ADMIN — POTASSIUM BICARBONATE 40 MEQ: 782 TABLET, EFFERVESCENT ORAL at 20:45

## 2022-09-10 RX ADMIN — WATER: 1000 INJECTION, SOLUTION INTRAVENOUS at 20:19

## 2022-09-10 RX ADMIN — POTASSIUM CHLORIDE 20 MEQ: 29.8 INJECTION, SOLUTION INTRAVENOUS at 08:30

## 2022-09-10 RX ADMIN — LEVOFLOXACIN 750 MG: 5 INJECTION, SOLUTION INTRAVENOUS at 16:18

## 2022-09-10 RX ADMIN — SODIUM CHLORIDE, PRESERVATIVE FREE 20 MG: 5 INJECTION INTRAVENOUS at 20:45

## 2022-09-10 ASSESSMENT — PAIN DESCRIPTION - LOCATION: LOCATION: BACK

## 2022-09-10 ASSESSMENT — PAIN SCALES - GENERAL: PAINLEVEL_OUTOF10: 4
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
## 2022-09-10 NOTE — PROGRESS NOTES
[place] [facility]    Gastroenterology Daily Progress Note      Patient:   Kartik Ayala   :    1955   Facility[facility]   [facility]  Date:     9/10/2022  Consultant:   LEILA Dumont CNP, CNP      SUBJECTIVE  74 y.o. female admitted 2022 with Leukocytosis [D72.829]  Generalized abdominal pain [R10.84]  Leukocytosis, unspecified type [D72.829] and seen for abdominal pain, hx colon cancer and romero syndrome. The pt was seen and examined. No c/o abdominal pain, has been started on tube feeding and has had no residuals per rn. Has had several non bloody loose stools.          OBJECTIVE  Scheduled Meds:   potassium bicarb-citric acid  40 mEq Per NG tube BID    potassium phosphate IVPB  30 mmol IntraVENous Once    erythromycin  250 mg IntraVENous Q8H    levofloxacin  750 mg IntraVENous Q24H    lactulose  20 g Oral TID    insulin lispro  0-16 Units SubCUTAneous Q4H    insulin glargine  20 Units SubCUTAneous BID    budesonide  0.5 mg Nebulization BID    midodrine  10 mg Per NG tube TID WC    famotidine (PEPCID) injection  20 mg IntraVENous BID    sertraline  100 mg Per NG tube Daily    potassium bicarb-citric acid  20 mEq Per NG tube Daily    [Held by provider] enoxaparin  1 mg/kg SubCUTAneous BID    polyethylene glycol  17 g Oral Daily    potassium chloride  40 mEq Oral Once    traZODone  100 mg Oral Nightly    sodium chloride flush  5-40 mL IntraVENous 2 times per day    silver sulfADIAZINE   Topical Nightly    [Held by provider] sodium chloride  1,000 mL IntraVENous Once       Vital Signs:  BP (!) 113/54   Pulse (!) 102   Temp 98.5 °F (36.9 °C) (Temporal)   Resp 16   Ht 5' 2\" (1.575 m)   Wt 219 lb (99.3 kg)   SpO2 96%   BMI 40.06 kg/m²      Physical Exam:     General Appearance: ill appearing alert and oriented to person, place and time, well-developed and well-nourished, in no acute distress  Skin: warm and dry, no rash or erythema  Head: normocephalic and atraumatic  Eyes: pupils equal, round, and reactive to light, extraocular eye movements intact, conjunctivae normal  ENT: hearing grossly normal bilaterally  Neck: neck supple and non tender without mass, no thyromegaly or thyroid nodules, no cervical lymphadenopathy   Pulmonary/Chest: clear to auscultation bilaterally- no wheezes, rales or rhonchi, normal air movement, no respiratory distress  Cardiovascular: tachycardic rate, regular rhythm, normal S1 and S2, no murmurs, rubs, clicks or gallops, distal pulses intact, no carotid bruits  Abdomen: soft,obese ng in place for tube feeding  non-tender, non-distended, normal bowel sounds, no masses or organomegaly  Extremities: no cyanosis, clubbing or edema  Musculoskeletal: normal range of motion, no joint swelling, deformity or tenderness  Neurologic: no cranial nerve deficit and muscle strength normal    Lab and Imaging Review     CBC  Recent Labs     09/08/22  0438 09/09/22  0400 09/10/22  0426   WBC 10.0 6.0 5.2   HGB 8.1* 7.2* 7.3*   HCT 26.5* 24.1* 24.2*   MCV 84.9 87.0 88.0   PLT 70* 56* 67*       BMP  Recent Labs     09/08/22  1205 09/09/22  0400 09/10/22  0426   * 150* 150*   K 3.9 2.8* 2.7*   * 122* 122*   CO2 22 21 20   BUN 26* 22 19   CREATININE 0.59 0.57 0.51   GLUCOSE 222* 166* 197*   CALCIUM 8.0* 7.8* 7.6*       LFTS  Recent Labs     09/08/22  0438 09/09/22  0400 09/10/22  0426   ALKPHOS 173* 139* 151*   ALT 38* 34* 34*   AST 43* 34* 41*   PROT 5.3* 4.8* 4.9*   BILITOT 0.6 0.6 0.5   LABALBU 1.8* 1.7* 1.8*       AMYLASE/LIPASE/AMMONIA  Recent Labs     09/08/22  1605 09/09/22  0400 09/10/22  0446   AMMONIA 40 38 42   DECOMPRESSIVE COLONOSCOPY Alvin Parrish MD 9/2/22     The patient's rectal tube balloon was desufflated and the tube moved with a large amount of liquid stool and air released. The tube was then removed. A rectal exam was performed and no abnormalities noted. The scope was then inserted and advanced through the rectum and sigmoid colon.  The sigmoid colon was tortuous but the scope was able to be safely advanced. Areas of the sigmoid were dilated with some mucosal hyperenhancement indicative of stretching/dilation. The scope was advanced to the mid transverse colon. No masses seen. The transverse colon was also dilated. The colon was decompressed from the scope location in the mid transverse colon. The colon was able to be decompressed and the scope was carefully withdrawn. No immediate complications. The patient's abdomen was soft and much less distended at the conclusion of the procedure. Prep: Fair. Liquid stool did have to be suctioned throughout the colon. FINDINGS:9/10/22 kub   There are prominent air-filled loops of colon that are similar compared to   prior. There is no free air. There are no suspicious calcifications. There   is no acute osseous abnormality. The surrounding soft tissues are   unremarkable. Impression   Prominent air-filled loops of colon that are similar compared to prior that   may correlate with patient's history of Juan Jose syndrome. FINDINGS:ct abd 8/29/22   Lie of contrast limits evaluation. Lower Chest: Partially imaged left pleural effusion with atelectasis. Organs: Nodular liver contour. Absent gallbladder. No intrahepatic or   extrahepatic biliary ductal dilatation. Bilateral nephrolithiasis, cluster   of stones in the left kidney measures approximately 9 mm. Mild right   hydroureteronephrosis, unchanged, no ureteral or bladder stones. Unremarkable pancreas, adrenal glands. Borderline splenomegaly. GI/Bowel: Severe dilatation of the colon without evidence of obstruction,   with air-fluid levels within the colon. Colonic dilatation was visualized on   prior exams. Small hiatal hernia with paraesophageal varices and upper   abdominal varices, unchanged. Unremarkable small bowel. Appendix is not   clearly visualized. Pelvis: Uterus is not visualized. Pedroza.   No bladder wall thickening. Peritoneum/Retroperitoneum: No free fluid. No free air. No adenopathy. Bones/Soft Tissues: No acute fracture. Degenerative changes of the   thoracolumbar spine. Prior laminectomy at L4. Thinning of the abdominal   wall, unchanged. Small lipoma posterior to left sacrum, unchanged (2:134). 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 Colonic dilatation was visualized   on prior exams. Sequelae of cirrhosis with portal hypertension, including paraesophageal   varices, similar to prior. 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 Uti with sepsis    5.elevated lft's, hx previous alcohol abuse, imaging showing cirrhosis no overt liver lesions afp normal 5/30/22  Avoid narcotics and sedatives    Time spent reviewing chart assessing pt and d/w attending md around 30 minutes    This plan was formulated in collaboration with Dr. Samm Hernandez .     Electronically signed by: LEILA Cueva CNP on 9/10/2022 at 9:33 AM

## 2022-09-10 NOTE — PROGRESS NOTES
General Surgery:  Daily Progress Note                    PATIENT NAME: Cheyenne Bui     TODAY'S DATE: 9/10/2022, 7:44 AM  CC:  Feeling better    SUBJECTIVE:     Pt seen and examined at bedside. Continues to have liquid stool. Passing flatus per nursing. Some left sided abdominal pain. Afebrile, low tachycardia.      OBJECTIVE:   VITALS:  BP (!) 113/54   Pulse (!) 102   Temp 98 °F (36.7 °C) (Temporal)   Resp 14   Ht 5' 2\" (1.575 m)   Wt 219 lb (99.3 kg)   SpO2 96%   BMI 40.06 kg/m²      INTAKE/OUTPUT:      Intake/Output Summary (Last 24 hours) at 9/10/2022 0744  Last data filed at 9/10/2022 0604  Gross per 24 hour   Intake 2430.53 ml   Output 700 ml   Net 1730.53 ml       PHYSICAL EXAM:     General appearance: Drowsy, chronically ill-appearing, able to answer questions with one-word answers or moans  Mental Status:  at baseline, slow in response time  Lungs: normal effort with symmetric rise and fall of chest wall  Heart:  regular rate and rhythm  Abdomen: softly distended, minimal tenderness to palpation in left abdomen  Extremities:+1 ble edema, no redness, tenderness in the calves  Skin:  scratch marks to anterior shins, no gross lesions, induration      Data:  CBC with Differential:    Lab Results   Component Value Date/Time    WBC 5.2 09/10/2022 04:26 AM    RBC 2.75 09/10/2022 04:26 AM    HGB 7.3 09/10/2022 04:26 AM    HCT 24.2 09/10/2022 04:26 AM    PLT 67 09/10/2022 04:26 AM    MCV 88.0 09/10/2022 04:26 AM    MCH 26.5 09/10/2022 04:26 AM    MCHC 30.2 09/10/2022 04:26 AM    RDW 24.4 09/10/2022 04:26 AM    LYMPHOPCT PENDING 09/10/2022 04:26 AM    MONOPCT PENDING 09/10/2022 04:26 AM    BASOPCT PENDING 09/10/2022 04:26 AM    MONOSABS PENDING 09/10/2022 04:26 AM    LYMPHSABS PENDING 09/10/2022 04:26 AM    EOSABS PENDING 09/10/2022 04:26 AM    BASOSABS PENDING 09/10/2022 04:26 AM    DIFFTYPE NOT REPORTED 02/09/2022 01:43 PM     BMP:    Lab Results   Component Value Date/Time     09/10/2022 04:26 AM K 2.7 09/10/2022 04:26 AM     09/10/2022 04:26 AM    CO2 20 09/10/2022 04:26 AM    BUN 19 09/10/2022 04:26 AM    LABALBU 1.8 09/10/2022 04:26 AM    CREATININE 0.51 09/10/2022 04:26 AM    CALCIUM 7.6 09/10/2022 04:26 AM    GFRAA >60 09/10/2022 04:26 AM    LABGLOM >60 09/10/2022 04:26 AM    GLUCOSE 197 09/10/2022 04:26 AM       Radiology Review:  KUB pending    ASSESSMENT:  Active Hospital Problems    Diagnosis Date Noted    Dysphagia [R13.10] 09/04/2022     Priority: Medium    Encephalopathy acute [G93.40] 09/03/2022     Priority: Medium    Stroke-like symptoms [R29.90] 09/03/2022     Priority: Medium    Leukocytosis [D72.829] 09/03/2022     Priority: Medium    Hypotension [I95.9] 08/31/2022     Priority: Medium    Shock, septic ([facility] [A41.9, R65.21] 08/31/2022     Priority: Medium    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 female with increasing abdominal distention and concern for continued pseudoobstruction. Pt is s/p decompressive colonoscopy on 9/1/2022. Plan:  - aggressively replace electrolytes to keep K >4, Mg >2, Phos >3  - continue bowel regimen  - f/u KUB this AM  - need aggressive medical mgmt for ogilvies including correction of electrolytes, strict blood glucose management. - no urgent or emergent surgical intervention. Patient is a poor surgical candidate and if anything else needs to be performed for continued ogilvies, would recommend IR guided cecostomy tube placement.      Electronically signed by Eddie Escamilla DO  on 9/10/2022 at 7:44 AM   I Dr. Abilio Tijerina saw and examined the patient. I have edited the above and agree with the above. Alvin Parrish  Colorectal Surgery

## 2022-09-11 ENCOUNTER — APPOINTMENT (OUTPATIENT)
Dept: GENERAL RADIOLOGY | Age: 67
DRG: 698 | End: 2022-09-11
Payer: COMMERCIAL

## 2022-09-11 LAB
ABSOLUTE EOS #: 0.14 K/UL (ref 0–0.4)
ABSOLUTE IMMATURE GRANULOCYTE: 0.05 K/UL (ref 0–0.3)
ABSOLUTE LYMPH #: 0.8 K/UL (ref 1–4.8)
ABSOLUTE MONO #: 0.47 K/UL (ref 0.2–0.8)
ALBUMIN SERPL-MCNC: 1.6 G/DL (ref 3.5–5.2)
ALP BLD-CCNC: 132 U/L (ref 35–104)
ALT SERPL-CCNC: 30 U/L (ref 5–33)
AMMONIA: 37 UMOL/L (ref 11–51)
ANION GAP SERPL CALCULATED.3IONS-SCNC: 7 MMOL/L (ref 9–17)
AST SERPL-CCNC: 33 U/L
BASOPHILS # BLD: 0 %
BASOPHILS ABSOLUTE: 0 K/UL (ref 0–0.2)
BILIRUB SERPL-MCNC: 0.4 MG/DL (ref 0.3–1.2)
BUN BLDV-MCNC: 16 MG/DL (ref 8–23)
BUN/CREAT BLD: 31 (ref 9–20)
CALCIUM SERPL-MCNC: 7.2 MG/DL (ref 8.6–10.4)
CHLORIDE BLD-SCNC: 119 MMOL/L (ref 98–107)
CO2: 19 MMOL/L (ref 20–31)
CREAT SERPL-MCNC: 0.51 MG/DL (ref 0.5–0.9)
EOSINOPHILS RELATIVE PERCENT: 3 % (ref 1–4)
GFR AFRICAN AMERICAN: >60 ML/MIN
GFR NON-AFRICAN AMERICAN: >60 ML/MIN
GFR SERPL CREATININE-BSD FRML MDRD: ABNORMAL ML/MIN/{1.73_M2}
GLUCOSE BLD-MCNC: 100 MG/DL (ref 65–105)
GLUCOSE BLD-MCNC: 118 MG/DL (ref 65–105)
GLUCOSE BLD-MCNC: 142 MG/DL (ref 65–105)
GLUCOSE BLD-MCNC: 168 MG/DL (ref 65–105)
GLUCOSE BLD-MCNC: 178 MG/DL (ref 70–99)
GLUCOSE BLD-MCNC: 221 MG/DL (ref 65–105)
GLUCOSE BLD-MCNC: 67 MG/DL (ref 65–105)
GLUCOSE BLD-MCNC: 76 MG/DL (ref 65–105)
GLUCOSE BLD-MCNC: 82 MG/DL (ref 65–105)
HCT VFR BLD CALC: 23.5 % (ref 36.3–47.1)
HEMOGLOBIN: 7 G/DL (ref 11.9–15.1)
IMMATURE GRANULOCYTES: 1 %
LYMPHOCYTES # BLD: 17 % (ref 24–44)
MAGNESIUM: 2.2 MG/DL (ref 1.6–2.6)
MCH RBC QN AUTO: 26.1 PG (ref 25.2–33.5)
MCHC RBC AUTO-ENTMCNC: 29.8 G/DL (ref 28.4–34.8)
MCV RBC AUTO: 87.7 FL (ref 82.6–102.9)
MONOCYTES # BLD: 10 % (ref 1–7)
MORPHOLOGY: ABNORMAL
NRBC AUTOMATED: 0 PER 100 WBC
PDW BLD-RTO: 24.8 % (ref 11.8–14.4)
PHOSPHORUS: 2.2 MG/DL (ref 2.6–4.5)
PLATELET # BLD: 73 K/UL (ref 138–453)
PMV BLD AUTO: 10.1 FL (ref 8.1–13.5)
POTASSIUM SERPL-SCNC: 3.1 MMOL/L (ref 3.7–5.3)
RBC # BLD: 2.68 M/UL (ref 3.95–5.11)
SEG NEUTROPHILS: 69 % (ref 36–66)
SEGMENTED NEUTROPHILS ABSOLUTE COUNT: 3.24 K/UL (ref 1.8–7.7)
SODIUM BLD-SCNC: 145 MMOL/L (ref 135–144)
TOTAL PROTEIN: 4.8 G/DL (ref 6.4–8.3)
WBC # BLD: 4.7 K/UL (ref 3.5–11.3)

## 2022-09-11 PROCEDURE — 83735 ASSAY OF MAGNESIUM: CPT

## 2022-09-11 PROCEDURE — 6360000002 HC RX W HCPCS: Performed by: NURSE PRACTITIONER

## 2022-09-11 PROCEDURE — 6370000000 HC RX 637 (ALT 250 FOR IP): Performed by: STUDENT IN AN ORGANIZED HEALTH CARE EDUCATION/TRAINING PROGRAM

## 2022-09-11 PROCEDURE — 80053 COMPREHEN METABOLIC PANEL: CPT

## 2022-09-11 PROCEDURE — 84100 ASSAY OF PHOSPHORUS: CPT

## 2022-09-11 PROCEDURE — 2500000003 HC RX 250 WO HCPCS: Performed by: FAMILY MEDICINE

## 2022-09-11 PROCEDURE — 6360000002 HC RX W HCPCS: Performed by: STUDENT IN AN ORGANIZED HEALTH CARE EDUCATION/TRAINING PROGRAM

## 2022-09-11 PROCEDURE — 2580000003 HC RX 258: Performed by: STUDENT IN AN ORGANIZED HEALTH CARE EDUCATION/TRAINING PROGRAM

## 2022-09-11 PROCEDURE — 82947 ASSAY GLUCOSE BLOOD QUANT: CPT

## 2022-09-11 PROCEDURE — 2580000003 HC RX 258: Performed by: FAMILY MEDICINE

## 2022-09-11 PROCEDURE — 6370000000 HC RX 637 (ALT 250 FOR IP): Performed by: FAMILY MEDICINE

## 2022-09-11 PROCEDURE — 85025 COMPLETE CBC W/AUTO DIFF WBC: CPT

## 2022-09-11 PROCEDURE — A4216 STERILE WATER/SALINE, 10 ML: HCPCS | Performed by: FAMILY MEDICINE

## 2022-09-11 PROCEDURE — 1200000000 HC SEMI PRIVATE

## 2022-09-11 PROCEDURE — 74018 RADEX ABDOMEN 1 VIEW: CPT

## 2022-09-11 PROCEDURE — APPSS30 APP SPLIT SHARED TIME 16-30 MINUTES: Performed by: NURSE PRACTITIONER

## 2022-09-11 PROCEDURE — 6370000000 HC RX 637 (ALT 250 FOR IP): Performed by: NURSE PRACTITIONER

## 2022-09-11 PROCEDURE — 94640 AIRWAY INHALATION TREATMENT: CPT

## 2022-09-11 PROCEDURE — 6360000002 HC RX W HCPCS: Performed by: INTERNAL MEDICINE

## 2022-09-11 PROCEDURE — 94761 N-INVAS EAR/PLS OXIMETRY MLT: CPT

## 2022-09-11 PROCEDURE — 82140 ASSAY OF AMMONIA: CPT

## 2022-09-11 PROCEDURE — 2500000003 HC RX 250 WO HCPCS: Performed by: INTERNAL MEDICINE

## 2022-09-11 RX ORDER — DOCUSATE SODIUM 100 MG/1
100 CAPSULE, LIQUID FILLED ORAL DAILY
Status: DISCONTINUED | OUTPATIENT
Start: 2022-09-11 | End: 2022-09-16 | Stop reason: CLARIF

## 2022-09-11 RX ADMIN — POTASSIUM BICARBONATE 40 MEQ: 782 TABLET, EFFERVESCENT ORAL at 21:43

## 2022-09-11 RX ADMIN — SERTRALINE HYDROCHLORIDE 100 MG: 100 TABLET ORAL at 08:53

## 2022-09-11 RX ADMIN — ACETAMINOPHEN 650 MG: 325 TABLET, FILM COATED ORAL at 16:15

## 2022-09-11 RX ADMIN — INSULIN LISPRO 4 UNITS: 100 INJECTION, SOLUTION INTRAVENOUS; SUBCUTANEOUS at 00:58

## 2022-09-11 RX ADMIN — SODIUM CHLORIDE: 9 INJECTION, SOLUTION INTRAVENOUS at 16:18

## 2022-09-11 RX ADMIN — INSULIN GLARGINE 20 UNITS: 100 INJECTION, SOLUTION SUBCUTANEOUS at 08:54

## 2022-09-11 RX ADMIN — SODIUM CHLORIDE, PRESERVATIVE FREE 10 ML: 5 INJECTION INTRAVENOUS at 09:01

## 2022-09-11 RX ADMIN — SILVER SULFADIAZINE: 10 CREAM TOPICAL at 21:43

## 2022-09-11 RX ADMIN — BUDESONIDE 500 MCG: 0.5 SUSPENSION RESPIRATORY (INHALATION) at 19:53

## 2022-09-11 RX ADMIN — ACETAMINOPHEN 650 MG: 325 TABLET, FILM COATED ORAL at 04:36

## 2022-09-11 RX ADMIN — TRAZODONE HYDROCHLORIDE 100 MG: 100 TABLET ORAL at 21:43

## 2022-09-11 RX ADMIN — MIDODRINE HYDROCHLORIDE 10 MG: 10 TABLET ORAL at 12:02

## 2022-09-11 RX ADMIN — SODIUM CHLORIDE, PRESERVATIVE FREE 10 ML: 5 INJECTION INTRAVENOUS at 21:44

## 2022-09-11 RX ADMIN — POTASSIUM BICARBONATE 40 MEQ: 782 TABLET, EFFERVESCENT ORAL at 09:19

## 2022-09-11 RX ADMIN — WATER: 1000 INJECTION, SOLUTION INTRAVENOUS at 16:59

## 2022-09-11 RX ADMIN — SODIUM CHLORIDE, PRESERVATIVE FREE 20 MG: 5 INJECTION INTRAVENOUS at 08:54

## 2022-09-11 RX ADMIN — BUDESONIDE 500 MCG: 0.5 SUSPENSION RESPIRATORY (INHALATION) at 07:22

## 2022-09-11 RX ADMIN — DEXTROSE MONOHYDRATE 125 ML: 100 INJECTION, SOLUTION INTRAVENOUS at 21:01

## 2022-09-11 RX ADMIN — LEVOFLOXACIN 750 MG: 5 INJECTION, SOLUTION INTRAVENOUS at 16:19

## 2022-09-11 RX ADMIN — ALTEPLASE 2 MG: 2.2 INJECTION, POWDER, LYOPHILIZED, FOR SOLUTION INTRAVENOUS at 15:22

## 2022-09-11 RX ADMIN — SODIUM CHLORIDE, PRESERVATIVE FREE 20 MG: 5 INJECTION INTRAVENOUS at 21:43

## 2022-09-11 RX ADMIN — POTASSIUM BICARBONATE 40 MEQ: 782 TABLET, EFFERVESCENT ORAL at 12:02

## 2022-09-11 RX ADMIN — MIDODRINE HYDROCHLORIDE 10 MG: 10 TABLET ORAL at 16:08

## 2022-09-11 RX ADMIN — MIDODRINE HYDROCHLORIDE 10 MG: 10 TABLET ORAL at 08:53

## 2022-09-11 ASSESSMENT — PAIN SCALES - GENERAL
PAINLEVEL_OUTOF10: 0
PAINLEVEL_OUTOF10: 0
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
## 2022-09-11 NOTE — PROGRESS NOTES
General Surgery:  Daily Progress Note                    PATIENT NAME: Sarah Gay     TODAY'S DATE: 9/11/2022, 7:39 AM  CC:  Feeling better    SUBJECTIVE:     Pt seen and examined at bedside. Afebrile, low tachycardia. Continues to pass lots of flatus during turning, passed several soft BMs that are less liquid than before. Tube feeds stopped overnight due to abdominal distention. Patient has no nausea or vomiting and has been tolerating tube feeds.      OBJECTIVE:   VITALS:  BP (!) 117/52   Pulse 95   Temp 97.8 °F (36.6 °C) (Temporal)   Resp 19   Ht 5' 2\" (1.575 m)   Wt 219 lb (99.3 kg)   SpO2 97%   BMI 40.06 kg/m²      INTAKE/OUTPUT:      Intake/Output Summary (Last 24 hours) at 9/11/2022 0739  Last data filed at 9/11/2022 6937  Gross per 24 hour   Intake 2352 ml   Output 700 ml   Net 1652 ml       PHYSICAL EXAM:     General appearance: Drowsy, chronically ill-appearing, able to answer questions with one-word answers or moans  Mental Status:  at baseline, slow in response time  Lungs: normal effort with symmetric rise and fall of chest wall  Heart:  regular rate and rhythm  Abdomen: softly distended, minimal tenderness to palpation in left abdomen  Extremities:+1 ble edema, no redness, tenderness in the calves  Skin:  scratch marks to anterior shins, no gross lesions, induration      Data:  CBC with Differential:    Lab Results   Component Value Date/Time    WBC 4.7 09/11/2022 05:20 AM    RBC 2.68 09/11/2022 05:20 AM    HGB 7.0 09/11/2022 05:20 AM    HCT 23.5 09/11/2022 05:20 AM    PLT 73 09/11/2022 05:20 AM    MCV 87.7 09/11/2022 05:20 AM    MCH 26.1 09/11/2022 05:20 AM    MCHC 29.8 09/11/2022 05:20 AM    RDW 24.8 09/11/2022 05:20 AM    LYMPHOPCT PENDING 09/11/2022 05:20 AM    MONOPCT PENDING 09/11/2022 05:20 AM    BASOPCT PENDING 09/11/2022 05:20 AM    MONOSABS PENDING 09/11/2022 05:20 AM    LYMPHSABS PENDING 09/11/2022 05:20 AM    EOSABS PENDING 09/11/2022 05:20 AM    BASOSABS PENDING 09/11/2022 05:20 AM    DIFFTYPE NOT REPORTED 02/09/2022 01:43 PM     BMP:    Lab Results   Component Value Date/Time     09/11/2022 05:20 AM    K 3.1 09/11/2022 05:20 AM     09/11/2022 05:20 AM    CO2 19 09/11/2022 05:20 AM    BUN 16 09/11/2022 05:20 AM    LABALBU 1.6 09/11/2022 05:20 AM    CREATININE 0.51 09/11/2022 05:20 AM    CALCIUM 7.2 09/11/2022 05:20 AM    GFRAA >60 09/11/2022 05:20 AM    LABGLOM >60 09/11/2022 05:20 AM    GLUCOSE 178 09/11/2022 05:20 AM       Radiology Review:    XR ABDOMEN (KUB) (SINGLE AP VIEW)    Result Date: 9/11/2022  EXAMINATION: ONE SUPINE XRAY VIEW(S) OF THE ABDOMEN 9/11/2022 5:51 am COMPARISON: Abdominal radiographs performed 09/10/2022. HISTORY: ORDERING SYSTEM PROVIDED HISTORY: check for [place] TECHNOLOGIST PROVIDED HISTORY: check for Juan Jose Reason for Exam: Distension FINDINGS: There is redemonstration of prominent air-filled loop colon and this appears stable. There is no definite free air. There are no suspicious calcifications. There is a gastric tube the tip in the proximal aspect of the stomach. Dilated air-filled loop colon that is stable compared to prior exam.     XR ABDOMEN (KUB) (SINGLE AP VIEW)    Result Date: 9/10/2022  EXAMINATION: ONE SUPINE XRAY VIEW(S) OF THE ABDOMEN 9/10/2022 8:12 am COMPARISON: Abdominal radiograph performed 09/09/2022. HISTORY: ORDERING SYSTEM PROVIDED HISTORY: Juan Jose's TECHNOLOGIST PROVIDED HISTORY: Bernida Mooring Reason for Exam: Juan Jose FINDINGS: There are prominent air-filled loops of colon that are similar compared to prior. There is no free air. There are no suspicious calcifications. There is no acute osseous abnormality. The surrounding soft tissues are unremarkable. Prominent air-filled loops of colon that are similar compared to prior that may correlate with patient's history of Juan Jose syndrome.         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 female with increasing abdominal distention and concern for continued pseudoobstruction. Pt is s/p decompressive colonoscopy on 9/1/2022. Plan:  - aggressively replace electrolytes to keep K >4, Mg >2, Phos >3; recommend PO replacement with K  - recommend continuing tube feeds for nutrition - has a competent ileocecal valve that NGT suctioning will not help with much  - continue bowel regimen - d/c lactulose and start colace BID  - KUB this AM - continued colonic distention  - need aggressive medical mgmt for ogilvies including correction of electrolytes, strict blood glucose management. - consider neostigmine - will need telemetry monitoring and atropine at bedside if going to use  - recommend placing rectal tube for 24 hours for continued decompression; continue turning patient q2hrs as patient passes lots of flatus with this maneuver  - no urgent or emergent surgical intervention. Patient is a poor surgical candidate and if anything else needs to be performed for continued ogilvies, would recommend neostigmine and/or IR guided cecostomy tube placement.      Electronically signed by Anna Iniguez DO  on 9/11/2022 at 7:39 AM   YORDAN bingham and examined the patient. I have edited the above and agree with the above. Alvin Parrish  Colorectal Surgery

## 2022-09-11 NOTE — PROGRESS NOTES
Pulmonary Critical Care Progress Note       Patient seen for the follow up of  Shock, septic ([address]     Subjective:    She has been off oxygen. She is  more awake but still confused. She is tolerating tube feeds. She    Does not verbalize much. Examination:  Vitals: BP (!) 124/58   Pulse (!) 101   Temp 98.5 °F (36.9 °C) (Temporal)   Resp 20   Ht 5' 2\" (1.575 m)   Wt 219 lb (99.3 kg)   SpO2 97%   BMI 40.06 kg/m²   General appearance:  awake confused and cooperative with exam  Neck: No JVD  Lungs: Decreased breath sound no crackles or wheezes  Heart: regular rate and rhythm, S1, S2 normal, no gallop  Abdomen: Soft, non tender, + BS  Extremities: no cyanosis or clubbing. No significant edema, scratches bilateral lower legs     LABs:  CBC:   Recent Labs     09/09/22  0400 09/10/22  0426   WBC 6.0 5.2   HGB 7.2* 7.3*   HCT 24.1* 24.2*   PLT 56* 67*       BMP:   Recent Labs     09/09/22  0400 09/10/22  0426 09/10/22  1055   * 150*  --    K 2.8* 2.7* 3.6*   CO2 21 20  --    BUN 22 19  --    CREATININE 0.57 0.51  --    LABGLOM >60 >60  --    GLUCOSE 166* 197*  --        LIVER PROFILE:  Recent Labs     09/09/22 0400 09/10/22  0426   AST 34* 41*   ALT 34* 34*   LABALBU 1.7* 1.8*       ABG:  Lab Results   Component Value Date/Time    FIO2 NOT REPORTED 02/08/2022 07:10 PM       Lab Results   Component Value Date/Time    FIO2 NOT REPORTED 02/08/2022 07:10 PM     Radiology:  9/5/22 CXR  Cardiomegaly with left basilar infiltrate. Support tubes as described above. MRI brain 9/6  Chronic microvascular disease without acute intracranial abnormality. X-ray abdomen 9/8  Dilated colon again demonstrated with findings suggestive of at least   moderate rectal stool burden.   This may be due to a functional or mechanical   distal colonic obstruction       Impression:  Chronic hypoxic respiratory failure  Atelectasis  Hypotension/Septic Shock requiring vasopressors     Hypernatremia  UTI/Indwelling angeles /stenotrophomonas maltophilia resistant to Bactrim  on urine culture status post cefepime  [place]'s syndrome   Obstructive sleep apnea/Obesity  [place]   A. fib, CAD, CHF, DM, dementia, HTN, liver cirrhosis, esophageal varices  History of discitis    Recommendations:  Oxygen via nasal cannula  DuoNeb by nebulizer   Pulmicort 0.5 b.I.d.  NG feeds as tolerated   GI on consult ;  recommending another decompressive colonoscopy/ erythromycin started    Monitor sodium  / D 2.5 at 65 an hour  Monitor blood pressure off pressors  On midodrine    Levaquin for UTI   Monitor platelet count   Monitor sodium   Consider neurology evaluation   Colorectal surgery following, s/p decompressive colonoscopy  not recommending colonoscopy at this point.   Possible surgical management eventually   PT OT  GI prophylaxis  DVT prophylaxis with EPC      Muna Smith MD, [facility]  Pulmonary Critical Care and Sleep Medicine,  [facility]: [phone]

## 2022-09-11 NOTE — PROGRESS NOTES
Abdominal distention    Patient's abdomen very distended and taut. Bowel sounds active in all four quadrants. X-Ray tech mentioned patient appears to have a bowel obstruction. Patient has had 4 large bowel movements over night. Tube feed stopped. Dr. Yamilet Cowan and Dr. Dwayne Canchola notified and aware; both do not believe patient has a bowel obstruction. Dr. Dwayne Canchola stated to await KUB results and ordered low intermittent suction. Writer to follow orders. Continue to monitor. 56 Dr. Ruvalcaba Para at bed side and stated to stop low intermittent suction and clamp NGT for now. Replace electrolytes. Give PO 40 mEq potassium through NGT. Writer to follow orders. Oncnatasha montoya RN informed. Continue to monitor.

## 2022-09-11 NOTE — PROGRESS NOTES
Pulmonary Critical Care Progress Note       Patient seen for the follow up of  Shock, septic ([address]     Subjective:    She has been off oxygen. She is  more awake but still confused. She is tolerating tube feeds. She    Does not verbalize much. Examination:  Vitals: BP (!) 117/52   Pulse 95   Temp 98.2 °F (36.8 °C) (Temporal)   Resp 19   Ht 5' 2\" (1.575 m)   Wt 219 lb (99.3 kg)   SpO2 97%   BMI 40.06 kg/m²   General appearance:  awake confused and cooperative with exam  Neck: No JVD  Lungs: Decreased breath sound no crackles or wheezes  Heart: regular rate and rhythm, S1, S2 normal, no gallop  Abdomen: Soft, non tender, + BS  Extremities: no cyanosis or clubbing. No significant edema, scratches bilateral lower legs     LABs:  CBC:   Recent Labs     09/10/22  0426 09/11/22  0520   WBC 5.2 4.7   HGB 7.3* 7.0*   HCT 24.2* 23.5*   PLT 67* 73*       BMP:   Recent Labs     09/10/22  0426 09/10/22  1055 09/11/22  0520   *  --  145*   K 2.7* 3.6* 3.1*   CO2 20  --  19*   BUN 19  --  16   CREATININE 0.51  --  0.51   LABGLOM >60  --  >60   GLUCOSE 197*  --  178*       LIVER PROFILE:  Recent Labs     09/10/22  0426 09/11/22  0520   AST 41* 33*   ALT 34* 30   LABALBU 1.8* 1.6*       ABG:  Lab Results   Component Value Date/Time    FIO2 NOT REPORTED 02/08/2022 07:10 PM       Lab Results   Component Value Date/Time    FIO2 NOT REPORTED 02/08/2022 07:10 PM     Radiology:  9/5/22 CXR  Cardiomegaly with left basilar infiltrate. Support tubes as described above. MRI brain 9/6  Chronic microvascular disease without acute intracranial abnormality. X-ray abdomen 9/8  Dilated colon again demonstrated with findings suggestive of at least   moderate rectal stool burden.   This may be due to a functional or mechanical   distal colonic obstruction       Impression:  Chronic hypoxic respiratory failure  Atelectasis  Hypotension/Septic Shock requiring vasopressors     Hypernatremia  UTI/Indwelling angeles /stenotrophomonas maltophilia resistant to Bactrim  on urine culture status post cefepime  Juan Jose's syndrome   Obstructive sleep apnea/Obesity  [place]   A. fib, CAD, CHF, DM, dementia, HTN, liver cirrhosis, esophageal varices  History of discitis    Recommendations:  Oxygen via nasal cannula  DuoNeb by nebulizer   Pulmicort 0.5 b.I.d.  NG feeds as tolerated   GI on consult ;  recommending another decompressive colonoscopy/ erythromycin started    Monitor sodium  / D 2.5 at 65 an hour  Monitor blood pressure off pressors  On midodrine    Levaquin for UTI   Monitor platelet count   Monitor sodium   Consider neurology evaluation   Colorectal surgery following, s/p decompressive colonoscopy  not recommending colonoscopy at this point. Rectal tube advised. Consider neostigmine.   Possible surgical management eventually   PT OT  GI prophylaxis  DVT prophylaxis with EPC      Claudia Juarez MD, [facility]  Pulmonary Critical Care and Sleep Medicine,  [facility]: [phone]

## 2022-09-11 NOTE — PROGRESS NOTES
[facility]    Gastroenterology Daily Progress Note      Patient:   Armani Parish   :    1955   Facility:   [facility]  Date:     2022  Consultant:   LEILA Walter CNP, CNP      SUBJECTIVE  74 y.o. female admitted 2022 with Leukocytosis [D72.829]  Generalized abdominal pain [R10.84]  Leukocytosis, unspecified type [D72.829] and seen for abdominal pain with romero syndrome. The pt was seen and examined. Continues to have abdominal distention and diffuse abdominal pain. Had several loose non bloody stools overnight. Tube feeding was stopped last night for increased abdominal distention, per the RN she did not have any residual. KUB this am shows dilation with air filled loop of colon.  Hgb 7..        OBJECTIVE  Scheduled Meds:   docusate sodium  100 mg Oral Daily    potassium bicarb-citric acid  40 mEq Per NG tube BID    levofloxacin  750 mg IntraVENous Q24H    insulin lispro  0-16 Units SubCUTAneous Q4H    insulin glargine  20 Units SubCUTAneous BID    budesonide  0.5 mg Nebulization BID    midodrine  10 mg Per NG tube TID WC    famotidine (PEPCID) injection  20 mg IntraVENous BID    sertraline  100 mg Per NG tube Daily    potassium bicarb-citric acid  20 mEq Per NG tube Daily    [Held by provider] enoxaparin  1 mg/kg SubCUTAneous BID    polyethylene glycol  17 g Oral Daily    potassium chloride  40 mEq Oral Once    traZODone  100 mg Oral Nightly    sodium chloride flush  5-40 mL IntraVENous 2 times per day    silver sulfADIAZINE   Topical Nightly    [Held by provider] sodium chloride  1,000 mL IntraVENous Once       Vital Signs:  BP (!) 117/52   Pulse 95   Temp 98.3 °F (36.8 °C) (Temporal)   Resp 19   Ht 5' 2\" (1.575 m)   Wt 219 lb (99.3 kg)   SpO2 97%   BMI 40.06 kg/m²      Physical Exam:       General Appearance: ill appearing alert and oriented to person, place and time, well-developed and well-nourished, in no acute distress  Skin: warm and dry, no rash or erythema  Head: normocephalic and atraumatic  Eyes: pupils equal, round, and reactive to light, extraocular eye movements intact, conjunctivae normal  ENT: hearing grossly normal bilaterally  Neck: neck supple and non tender without mass, no thyromegaly or thyroid nodules, no cervical lymphadenopathy   Pulmonary/Chest: clear to auscultation bilaterally- no wheezes, rales or rhonchi, normal air movement, no respiratory distress  Cardiovascular: normal rate, regular rhythm, normal S1 and S2, no murmurs, rubs, clicks or gallops, distal pulses intact, no carotid bruits  Abdomen: soft, mild diffuse tenderness, ng clamped distended, normal bowel sounds, no masses or organomegaly  Extremities: no cyanosis, clubbing or edema  Musculoskeletal: normal range of motion, no joint swelling, deformity or tenderness  Neurologic: no cranial nerve deficit and muscle strength normal    Lab and Imaging Review     CBC  Recent Labs     09/09/22  0400 09/10/22  0426 09/11/22  0520   WBC 6.0 5.2 4.7   HGB 7.2* 7.3* 7.0*   HCT 24.1* 24.2* 23.5*   MCV 87.0 88.0 87.7   PLT 56* 67* 73*       BMP  Recent Labs     09/09/22  0400 09/10/22  0426 09/10/22  1055 09/11/22  0520   * 150*  --  145*   K 2.8* 2.7* 3.6* 3.1*   * 122*  --  119*   CO2 21 20  --  19*   BUN 22 19  --  16   CREATININE 0.57 0.51  --  0.51   GLUCOSE 166* 197*  --  178*   CALCIUM 7.8* 7.6*  --  7.2*       LFTS  Recent Labs     09/09/22  0400 09/10/22  0426 09/11/22  0520   ALKPHOS 139* 151* 132*   ALT 34* 34* 30   AST 34* 41* 33*   PROT 4.8* 4.9* 4.8*   BILITOT 0.6 0.5 0.4   LABALBU 1.7* 1.8* 1.6*       AMYLASE/LIPASE/AMMONIA  Recent Labs     09/09/22  0400 09/10/22  0446 09/11/22  0520   AMMONIA 38 42 37     DECOMPRESSIVE COLONOSCOPY Alvin Parrish MD 9/2/22     The patient's rectal tube balloon was desufflated and the tube moved with a large amount of liquid stool and air released. The tube was then removed.  A rectal exam was performed and no abnormalities noted. The scope was then inserted and advanced through the rectum and sigmoid colon. The sigmoid colon was tortuous but the scope was able to be safely advanced. Areas of the sigmoid were dilated with some mucosal hyperenhancement indicative of stretching/dilation. The scope was advanced to the mid transverse colon. No masses seen. The transverse colon was also dilated. The colon was decompressed from the scope location in the mid transverse colon. The colon was able to be decompressed and the scope was carefully withdrawn. No immediate complications. The patient's abdomen was soft and much less distended at the conclusion of the procedure. Prep: Fair. Liquid stool did have to be suctioned throughout the colon. FINDINGS:9/10/22 kub   There are prominent air-filled loops of colon that are similar compared to   prior. There is no free air. There are no suspicious calcifications. There   is no acute osseous abnormality. The surrounding soft tissues are   unremarkable. Impression   Prominent air-filled loops of colon that are similar compared to prior that   may correlate with patient's history of Juan Jose syndrome. FINDINGS:ct abd 8/29/22   Lie of contrast limits evaluation. Lower Chest: Partially imaged left pleural effusion with atelectasis. Organs: Nodular liver contour. Absent gallbladder. No intrahepatic or   extrahepatic biliary ductal dilatation. Bilateral nephrolithiasis, cluster   of stones in the left kidney measures approximately 9 mm. Mild right   hydroureteronephrosis, unchanged, no ureteral or bladder stones. Unremarkable pancreas, adrenal glands. Borderline splenomegaly. GI/Bowel: Severe dilatation of the colon without evidence of obstruction,   with air-fluid levels within the colon. Colonic dilatation was visualized on   prior exams. Small hiatal hernia with paraesophageal varices and upper   abdominal varices, unchanged.   Unremarkable small bowel. Appendix is not   clearly visualized. Pelvis: Uterus is not visualized. Pedroza. No bladder wall thickening. Peritoneum/Retroperitoneum: No free fluid. No free air. No adenopathy. Bones/Soft Tissues: No acute fracture. Degenerative changes of the   thoracolumbar spine. Prior laminectomy at L4. Thinning of the abdominal   wall, unchanged. Small lipoma posterior to left sacrum, unchanged (2:134). Impression   Severe dilatation of the colon without evidence of obstruction, with   air-fluid levels within the colon, concerning for [place] syndrome, however   anal/rectal stricture cannot be ruled out. Colonic dilatation was visualized   on prior exams. Sequelae of cirrhosis with portal hypertension, including paraesophageal   varices, similar to prior. FINDINGS:kub 9/11/22   There is redemonstration of prominent air-filled loop colon and this appears   stable. There is no definite free air. There are no suspicious   calcifications. There is a gastric tube the tip in the proximal aspect of   the stomach. Impression   Dilated air-filled loop colon that is stable compared to prior exam.           ASSESSMENT/plan  [place] syndrome s/p decompression colonoscopy 9/2/22  On colace  Ok for rectal tube from a gi standpoint    2 dysphagia; tube feeding currently on hold, restart when ok with surgery      3.anemia no overt bleeding  Trend hh     4. Uti with sepsis     5.elevated lft's, hx previous alcohol abuse, imaging showing cirrhosis no overt liver lesions afp normal 5/30/22  Avoid narcotics and sedatives    Time spent reviewing chart assessing pt and d/w attending md around 30 minutes    This plan was formulated in collaboration with Dr. Christianne Camarena .     Electronically signed by: LEILA Reyes CNP on 9/11/2022 at 10:11 AM

## 2022-09-12 ENCOUNTER — APPOINTMENT (OUTPATIENT)
Dept: GENERAL RADIOLOGY | Age: 67
DRG: 698 | End: 2022-09-12
Payer: COMMERCIAL

## 2022-09-12 LAB
ABSOLUTE EOS #: 0.05 K/UL (ref 0–0.4)
ABSOLUTE IMMATURE GRANULOCYTE: 0.05 K/UL (ref 0–0.3)
ABSOLUTE LYMPH #: 0.99 K/UL (ref 1–4.8)
ABSOLUTE MONO #: 0.47 K/UL (ref 0.2–0.8)
ANION GAP SERPL CALCULATED.3IONS-SCNC: 5 MMOL/L (ref 9–17)
BASOPHILS # BLD: 1 %
BASOPHILS ABSOLUTE: 0.05 K/UL (ref 0–0.2)
BUN BLDV-MCNC: 12 MG/DL (ref 8–23)
BUN/CREAT BLD: 27 (ref 9–20)
CALCIUM SERPL-MCNC: 7.3 MG/DL (ref 8.6–10.4)
CHLORIDE BLD-SCNC: 113 MMOL/L (ref 98–107)
CO2: 21 MMOL/L (ref 20–31)
CREAT SERPL-MCNC: 0.45 MG/DL (ref 0.5–0.9)
EOSINOPHILS RELATIVE PERCENT: 1 % (ref 1–4)
GFR AFRICAN AMERICAN: >60 ML/MIN
GFR NON-AFRICAN AMERICAN: >60 ML/MIN
GFR SERPL CREATININE-BSD FRML MDRD: ABNORMAL ML/MIN/{1.73_M2}
GLUCOSE BLD-MCNC: 185 MG/DL (ref 65–105)
GLUCOSE BLD-MCNC: 67 MG/DL (ref 65–105)
GLUCOSE BLD-MCNC: 75 MG/DL (ref 65–105)
GLUCOSE BLD-MCNC: 77 MG/DL (ref 65–105)
GLUCOSE BLD-MCNC: 78 MG/DL (ref 65–105)
GLUCOSE BLD-MCNC: 78 MG/DL (ref 65–105)
GLUCOSE BLD-MCNC: 79 MG/DL (ref 70–99)
GLUCOSE BLD-MCNC: 96 MG/DL (ref 65–105)
HCT VFR BLD CALC: 26.6 % (ref 36.3–47.1)
HEMOGLOBIN: 7.7 G/DL (ref 11.9–15.1)
IMMATURE GRANULOCYTES: 1 %
LYMPHOCYTES # BLD: 19 % (ref 24–44)
MCH RBC QN AUTO: 25.7 PG (ref 25.2–33.5)
MCHC RBC AUTO-ENTMCNC: 28.9 G/DL (ref 28.4–34.8)
MCV RBC AUTO: 88.7 FL (ref 82.6–102.9)
MONOCYTES # BLD: 9 % (ref 1–7)
MORPHOLOGY: ABNORMAL
NRBC AUTOMATED: 0.4 PER 100 WBC
PDW BLD-RTO: 25 % (ref 11.8–14.4)
PLATELET # BLD: 81 K/UL (ref 138–453)
PMV BLD AUTO: 9.5 FL (ref 8.1–13.5)
POTASSIUM SERPL-SCNC: 3.6 MMOL/L (ref 3.7–5.3)
RBC # BLD: 3 M/UL (ref 3.95–5.11)
SEG NEUTROPHILS: 69 % (ref 36–66)
SEGMENTED NEUTROPHILS ABSOLUTE COUNT: 3.59 K/UL (ref 1.8–7.7)
SODIUM BLD-SCNC: 139 MMOL/L (ref 135–144)
WBC # BLD: 5.2 K/UL (ref 3.5–11.3)

## 2022-09-12 PROCEDURE — 6370000000 HC RX 637 (ALT 250 FOR IP): Performed by: STUDENT IN AN ORGANIZED HEALTH CARE EDUCATION/TRAINING PROGRAM

## 2022-09-12 PROCEDURE — 2500000003 HC RX 250 WO HCPCS: Performed by: FAMILY MEDICINE

## 2022-09-12 PROCEDURE — 2580000003 HC RX 258: Performed by: STUDENT IN AN ORGANIZED HEALTH CARE EDUCATION/TRAINING PROGRAM

## 2022-09-12 PROCEDURE — APPSS30 APP SPLIT SHARED TIME 16-30 MINUTES: Performed by: NURSE PRACTITIONER

## 2022-09-12 PROCEDURE — 2580000003 HC RX 258: Performed by: FAMILY MEDICINE

## 2022-09-12 PROCEDURE — 6360000002 HC RX W HCPCS: Performed by: INTERNAL MEDICINE

## 2022-09-12 PROCEDURE — A4216 STERILE WATER/SALINE, 10 ML: HCPCS | Performed by: FAMILY MEDICINE

## 2022-09-12 PROCEDURE — 85025 COMPLETE CBC W/AUTO DIFF WBC: CPT

## 2022-09-12 PROCEDURE — 74018 RADEX ABDOMEN 1 VIEW: CPT

## 2022-09-12 PROCEDURE — 80048 BASIC METABOLIC PNL TOTAL CA: CPT

## 2022-09-12 PROCEDURE — 80076 HEPATIC FUNCTION PANEL: CPT

## 2022-09-12 PROCEDURE — 83735 ASSAY OF MAGNESIUM: CPT

## 2022-09-12 PROCEDURE — 36415 COLL VENOUS BLD VENIPUNCTURE: CPT

## 2022-09-12 PROCEDURE — 6360000002 HC RX W HCPCS: Performed by: NURSE PRACTITIONER

## 2022-09-12 PROCEDURE — 1200000000 HC SEMI PRIVATE

## 2022-09-12 PROCEDURE — 6370000000 HC RX 637 (ALT 250 FOR IP): Performed by: FAMILY MEDICINE

## 2022-09-12 PROCEDURE — 94640 AIRWAY INHALATION TREATMENT: CPT

## 2022-09-12 PROCEDURE — 94761 N-INVAS EAR/PLS OXIMETRY MLT: CPT

## 2022-09-12 PROCEDURE — 2500000003 HC RX 250 WO HCPCS: Performed by: INTERNAL MEDICINE

## 2022-09-12 PROCEDURE — 84100 ASSAY OF PHOSPHORUS: CPT

## 2022-09-12 PROCEDURE — 82947 ASSAY GLUCOSE BLOOD QUANT: CPT

## 2022-09-12 RX ORDER — NYSTATIN 100000 U/G
CREAM TOPICAL 2 TIMES DAILY
Status: DISCONTINUED | OUTPATIENT
Start: 2022-09-12 | End: 2022-09-23 | Stop reason: HOSPADM

## 2022-09-12 RX ADMIN — BUDESONIDE 500 MCG: 0.5 SUSPENSION RESPIRATORY (INHALATION) at 08:17

## 2022-09-12 RX ADMIN — SODIUM CHLORIDE: 9 INJECTION, SOLUTION INTRAVENOUS at 16:53

## 2022-09-12 RX ADMIN — DEXTROSE MONOHYDRATE 125 ML: 100 INJECTION, SOLUTION INTRAVENOUS at 03:53

## 2022-09-12 RX ADMIN — DOCUSATE SODIUM 100 MG: 100 CAPSULE, LIQUID FILLED ORAL at 08:54

## 2022-09-12 RX ADMIN — MIDODRINE HYDROCHLORIDE 10 MG: 10 TABLET ORAL at 16:51

## 2022-09-12 RX ADMIN — POLYETHYLENE GLYCOL (3350) 17 G: 17 POWDER, FOR SOLUTION ORAL at 08:53

## 2022-09-12 RX ADMIN — SERTRALINE HYDROCHLORIDE 100 MG: 100 TABLET ORAL at 08:55

## 2022-09-12 RX ADMIN — SODIUM CHLORIDE, PRESERVATIVE FREE 20 MG: 5 INJECTION INTRAVENOUS at 08:55

## 2022-09-12 RX ADMIN — MIDODRINE HYDROCHLORIDE 10 MG: 10 TABLET ORAL at 11:38

## 2022-09-12 RX ADMIN — SODIUM CHLORIDE, PRESERVATIVE FREE 20 MG: 5 INJECTION INTRAVENOUS at 21:23

## 2022-09-12 RX ADMIN — NYSTATIN: 100000 CREAM TOPICAL at 21:22

## 2022-09-12 RX ADMIN — POTASSIUM BICARBONATE 40 MEQ: 782 TABLET, EFFERVESCENT ORAL at 21:23

## 2022-09-12 RX ADMIN — SILVER SULFADIAZINE: 10 CREAM TOPICAL at 21:22

## 2022-09-12 RX ADMIN — WATER: 1000 INJECTION, SOLUTION INTRAVENOUS at 08:56

## 2022-09-12 RX ADMIN — BUDESONIDE 500 MCG: 0.5 SUSPENSION RESPIRATORY (INHALATION) at 19:13

## 2022-09-12 RX ADMIN — POTASSIUM BICARBONATE 20 MEQ: 782 TABLET, EFFERVESCENT ORAL at 08:54

## 2022-09-12 RX ADMIN — LEVOFLOXACIN 750 MG: 5 INJECTION, SOLUTION INTRAVENOUS at 17:00

## 2022-09-12 RX ADMIN — POTASSIUM BICARBONATE 40 MEQ: 782 TABLET, EFFERVESCENT ORAL at 08:56

## 2022-09-12 RX ADMIN — TRAZODONE HYDROCHLORIDE 100 MG: 100 TABLET ORAL at 21:23

## 2022-09-12 RX ADMIN — MIDODRINE HYDROCHLORIDE 10 MG: 10 TABLET ORAL at 08:55

## 2022-09-12 NOTE — PROGRESS NOTES
[facility]    Gastroenterology Daily Progress Note      Patient:   Guzman Do   :    1955   Facility:   [facility]  Date:     2022  Consultant:   LEILA Rivas CNP, CNP      SUBJECTIVE  74 y.o. female admitted 2022 with Leukocytosis [D72.829]  Generalized abdominal pain [R10.84]  Leukocytosis, unspecified type [D72.829] and seen for .romero syndrome. The pt was seen and examined. Had around 450ml NG output overnight and has minimal liquid brown stool in the FMS. Abd distention appears less today. Reports decreased abdominal pain.          OBJECTIVE  Scheduled Meds:   docusate sodium  100 mg Oral Daily    potassium bicarb-citric acid  40 mEq Per NG tube BID    levofloxacin  750 mg IntraVENous Q24H    insulin lispro  0-16 Units SubCUTAneous Q4H    insulin glargine  20 Units SubCUTAneous BID    budesonide  0.5 mg Nebulization BID    midodrine  10 mg Per NG tube TID WC    famotidine (PEPCID) injection  20 mg IntraVENous BID    sertraline  100 mg Per NG tube Daily    potassium bicarb-citric acid  20 mEq Per NG tube Daily    [Held by provider] enoxaparin  1 mg/kg SubCUTAneous BID    polyethylene glycol  17 g Oral Daily    potassium chloride  40 mEq Oral Once    traZODone  100 mg Oral Nightly    sodium chloride flush  5-40 mL IntraVENous 2 times per day    silver sulfADIAZINE   Topical Nightly    [Held by provider] sodium chloride  1,000 mL IntraVENous Once       Vital Signs:  BP (!) 144/58   Pulse 98   Temp 97.5 °F (36.4 °C) (Temporal)   Resp 15   Ht 5' 2\" (1.575 m)   Wt 219 lb (99.3 kg)   SpO2 96%   BMI 40.06 kg/m²      Physical Exam:     General Appearance: ill appearing alert and oriented to person, place and time, well-developed and well-nourished, in no acute distress  Skin: warm and dry, no rash or erythema  Head: normocephalic and atraumatic  Eyes: pupils equal, round, and reactive to light, extraocular eye movements intact, conjunctivae normal  ENT: hearing grossly normal bilaterally  Neck: neck supple and non tender without mass, no thyromegaly or thyroid nodules, no cervical lymphadenopathy   Pulmonary/Chest: clear to auscultation bilaterally- no wheezes, rales or rhonchi, normal air movement, no respiratory distress  Cardiovascular: normal rate, regular rhythm, normal S1 and S2, no murmurs, rubs, clicks or gallops, distal pulses intact, no carotid bruits  Abdomen: soft, non-tender, distended, hypoactive bowel sounds, no masses or organomegaly  Extremities: no cyanosis, clubbing or edema NG to liws fms with liquid brown stool   Musculoskeletal: normal range of motion, no joint swelling, deformity or tenderness  Neurologic: no cranial nerve deficit and muscle strength normal    Lab and Imaging Review     CBC  Recent Labs     09/10/22  0426 09/11/22  0520 09/12/22  0300   WBC 5.2 4.7 5.2   HGB 7.3* 7.0* 7.7*   HCT 24.2* 23.5* 26.6*   MCV 88.0 87.7 88.7   PLT 67* 73* 81*       BMP  Recent Labs     09/10/22  0426 09/10/22  1055 09/11/22  0520 09/12/22  0721   *  --  145* 139   K 2.7* 3.6* 3.1* 3.6*   *  --  119* 113*   CO2 20  --  19* 21   BUN 19  --  16 12   CREATININE 0.51  --  0.51 0.45*   GLUCOSE 197*  --  178* 79   CALCIUM 7.6*  --  7.2* 7.3*       LFTS  Recent Labs     09/10/22  0426 09/11/22  0520   ALKPHOS 151* 132*   ALT 34* 30   AST 41* 33*   PROT 4.9* 4.8*   BILITOT 0.5 0.4   LABALBU 1.8* 1.6*       AMYLASE/LIPASE/AMMONIA  Recent Labs     09/10/22  0446 09/11/22  0520   AMMONIA 42 37     DECOMPRESSIVE COLONOSCOPY Alvin Parrish MD 9/2/22     The patient's rectal tube balloon was desufflated and the tube moved with a large amount of liquid stool and air released. The tube was then removed. A rectal exam was performed and no abnormalities noted. The scope was then inserted and advanced through the rectum and sigmoid colon. The sigmoid colon was tortuous but the scope was able to be safely advanced.  Areas of the sigmoid were dilated with some mucosal hyperenhancement indicative of stretching/dilation. The scope was advanced to the mid transverse colon. No masses seen. The transverse colon was also dilated. The colon was decompressed from the scope location in the mid transverse colon. The colon was able to be decompressed and the scope was carefully withdrawn. No immediate complications. The patient's abdomen was soft and much less distended at the conclusion of the procedure. Prep: Fair. Liquid stool did have to be suctioned throughout the colon. FINDINGS:9/10/22 kub   There are prominent air-filled loops of colon that are similar compared to   prior. There is no free air. There are no suspicious calcifications. There   is no acute osseous abnormality. The surrounding soft tissues are   unremarkable. Impression   Prominent air-filled loops of colon that are similar compared to prior that   may correlate with patient's history of Juan Jose syndrome. FINDINGS:ct abd 8/29/22   Lie of contrast limits evaluation. Lower Chest: Partially imaged left pleural effusion with atelectasis. Organs: Nodular liver contour. Absent gallbladder. No intrahepatic or   extrahepatic biliary ductal dilatation. Bilateral nephrolithiasis, cluster   of stones in the left kidney measures approximately 9 mm. Mild right   hydroureteronephrosis, unchanged, no ureteral or bladder stones. Unremarkable pancreas, adrenal glands. Borderline splenomegaly. GI/Bowel: Severe dilatation of the colon without evidence of obstruction,   with air-fluid levels within the colon. Colonic dilatation was visualized on   prior exams. Small hiatal hernia with paraesophageal varices and upper   abdominal varices, unchanged. Unremarkable small bowel. Appendix is not   clearly visualized. Pelvis: Uterus is not visualized. Pedroza. No bladder wall thickening. Peritoneum/Retroperitoneum: No free fluid. No free air. No adenopathy. Bones/Soft Tissues: No acute fracture. Degenerative changes of the   thoracolumbar spine. Prior laminectomy at L4. Thinning of the abdominal   wall, unchanged. Small lipoma posterior to left sacrum, unchanged (2:134). Impression   Severe dilatation of the colon without evidence of obstruction, with   air-fluid levels within the colon, concerning for [place] syndrome, however   anal/rectal stricture cannot be ruled out. Colonic dilatation was visualized   on prior exams. Sequelae of cirrhosis with portal hypertension, including paraesophageal   varices, similar to prior. FINDINGS:kub 9/11/22   There is redemonstration of prominent air-filled loop colon and this appears   stable. There is no definite free air. There are no suspicious   calcifications. There is a gastric tube the tip in the proximal aspect of   the stomach. Impression   Dilated air-filled loop colon that is stable compared to prior exam.               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 IR cecostomy in the future    2 dysphagia; tube feeding currently on hold, restart when ok with surgery        3.anemia no overt bleeding  Trend hh     4. Uti with sepsis     5.elevated lft's, hx previous alcohol abuse, imaging showing cirrhosis no overt liver lesions afp normal 5/30/22  Avoid narcotics and sedatives     Time spent reviewing chart assessing pt and d/w attending md around 30 minutes        This plan was formulated in collaboration with Dr. Melody Soto .     Electronically signed by: LEILA Blake - CNP on 9/12/2022 at 8:00 AM

## 2022-09-12 NOTE — PROGRESS NOTES
Patient was in bed resting as writer entered the room (no family members present). Patient attempted to engage in conversation, but voice was very low and hard to understand. Writer stated he will pray for continued healing, comfort, and rest. Writer was able to leave a prayer card as additional support.     Spiritual care will maintain daily follow ups and visits      09/12/22 1206   Encounter Summary   Service Provided For: Patient   Referral/Consult From: Palliative Care   Last Encounter  09/12/22   Complexity of Encounter Low   Begin Time 1115   End Time  1125   Total Time Calculated 10 min   Encounter    Type Follow up   Palliative Care   Type Palliative Care, Follow-up   Assessment/Intervention/Outcome   Assessment Calm   Intervention Nurtured Hope;Read/Provided Scripture;Sustaining Presence/Ministry of presence   Plan and Referrals   Plan/Referrals Continue to visit, (comment)

## 2022-09-12 NOTE — CARE COORDINATION
Discharge planning    Plan is to go to [facility]/ Waiting on expedited appeal. If for some reason it does not go through, patient's  Robert Talamantes, would like [place] [facility] [phone]. Continue to follow.

## 2022-09-12 NOTE — FLOWSHEET NOTE
Cecostomy tube placement by IR procedure discussed with IR physician who states this is not a procedure performed by IR pt's nurse notified.

## 2022-09-12 NOTE — PROGRESS NOTES
Colorectal Surgery:  Daily Progress Note                    PATIENT NAME: Laci Menendez     TODAY'S DATE: 9/12/2022, 6:26 AM  CC:  Feeling better    SUBJECTIVE:     Pt seen and examined at bedside. Afebrile, low tachycardia continuing. FMS replaced last night with minimal output. NG tube with 450 out overnight. Pt mentation stable from over the weekend. Abdomen distended, mildly tender to deep palpation.     OBJECTIVE:   VITALS:  BP (!) 144/58   Pulse 98   Temp 97.5 °F (36.4 °C) (Temporal)   Resp 15   Ht 5' 2\" (1.575 m)   Wt 219 lb (99.3 kg)   SpO2 96%   BMI 40.06 kg/m²      INTAKE/OUTPUT:      Intake/Output Summary (Last 24 hours) at 9/12/2022 4924  Last data filed at 9/11/2022 2000  Gross per 24 hour   Intake 678.54 ml   Output 300 ml   Net 378.54 ml         PHYSICAL EXAM:     General appearance: Drowsy, chronically ill-appearing, able to answer questions with one-word answers or moans  Mental Status:  at baseline, slow in response time  Lungs: normal effort with symmetric rise and fall of chest wall  Heart:  regular rate and rhythm  Abdomen: softly distended, mild tenderness to palpation in left abdomen, FMS in place  Extremities:+1 ble edema, no redness, tenderness in the calves  Skin:  scratch marks to anterior shins, no gross lesions, induration      Data:  CBC with Differential:    Lab Results   Component Value Date/Time    WBC 5.2 09/12/2022 03:00 AM    RBC 3.00 09/12/2022 03:00 AM    HGB 7.7 09/12/2022 03:00 AM    HCT 26.6 09/12/2022 03:00 AM    PLT 81 09/12/2022 03:00 AM    MCV 88.7 09/12/2022 03:00 AM    MCH 25.7 09/12/2022 03:00 AM    MCHC 28.9 09/12/2022 03:00 AM    RDW 25.0 09/12/2022 03:00 AM    LYMPHOPCT 19 09/12/2022 03:00 AM    MONOPCT 9 09/12/2022 03:00 AM    BASOPCT 1 09/12/2022 03:00 AM    MONOSABS 0.47 09/12/2022 03:00 AM    LYMPHSABS 0.99 09/12/2022 03:00 AM    EOSABS 0.05 09/12/2022 03:00 AM    BASOSABS 0.05 09/12/2022 03:00 AM    DIFFTYPE NOT REPORTED 02/09/2022 01:43 PM     BMP: Lab Results   Component Value Date/Time     09/11/2022 05:20 AM    K 3.1 09/11/2022 05:20 AM     09/11/2022 05:20 AM    CO2 19 09/11/2022 05:20 AM    BUN 16 09/11/2022 05:20 AM    LABALBU 1.6 09/11/2022 05:20 AM    CREATININE 0.51 09/11/2022 05:20 AM    CALCIUM 7.2 09/11/2022 05:20 AM    GFRAA >60 09/11/2022 05:20 AM    LABGLOM >60 09/11/2022 05:20 AM    GLUCOSE 178 09/11/2022 05:20 AM       Radiology Review:    XR ABDOMEN (KUB) (SINGLE AP VIEW)    Result Date: 9/11/2022  EXAMINATION: ONE SUPINE XRAY VIEW(S) OF THE ABDOMEN 9/11/2022 5:51 am COMPARISON: Abdominal radiographs performed 09/10/2022. HISTORY: ORDERING SYSTEM PROVIDED HISTORY: check for Juan Jose TECHNOLOGIST PROVIDED HISTORY: check for [place] Reason for Exam: Distension FINDINGS: There is redemonstration of prominent air-filled loop colon and this appears stable. There is no definite free air. There are no suspicious calcifications. There is a gastric tube the tip in the proximal aspect of the stomach. Dilated air-filled loop colon that is stable compared to prior exam.     XR ABDOMEN (KUB) (SINGLE AP VIEW)    Result Date: 9/10/2022  EXAMINATION: ONE SUPINE XRAY VIEW(S) OF THE ABDOMEN 9/10/2022 8:12 am COMPARISON: Abdominal radiograph performed 09/09/2022. HISTORY: ORDERING SYSTEM PROVIDED HISTORY: Juan Jose's TECHNOLOGIST PROVIDED HISTORY: Adriel Jo Reason for Exam: [place] FINDINGS: There are prominent air-filled loops of colon that are similar compared to prior. There is no free air. There are no suspicious calcifications. There is no acute osseous abnormality. The surrounding soft tissues are unremarkable. Prominent air-filled loops of colon that are similar compared to prior that may correlate with patient's history of Juan Jose syndrome.         ASSESSMENT:  Active Hospital Problems    Diagnosis Date Noted    Dysphagia [R13.10] 09/04/2022     Priority: Medium    Encephalopathy acute [G93.40] 09/03/2022     Priority: Medium Stroke-like symptoms [R29.90] 09/03/2022     Priority: Medium    Leukocytosis [D72.829] 09/03/2022     Priority: Medium    Hypotension [I95.9] 08/31/2022     Priority: Medium    Shock, septic ([facility] [address] [A41.9, R65.21] 08/31/2022     Priority: Medium    [place]'s syndrome [K59.81] 08/30/2022     Priority: Medium    Urinary tract infection associated with indwelling urethral catheter ([facility] [address] [T83.511A, N39.0] 08/29/2022     Priority: Medium    Acute urinary retention [R33.8] 09/20/2020     Priority: Medium    Lymphedema [I89.0] 02/09/2022    COPD (chronic obstructive pulmonary disease) ([facility] [address] [J44.9]     LEONARDA (obstructive sleep apnea) [G47.33]     Hypertension [I10]     Hepatic cirrhosis ([facility] [address] [K74.60]        74 y.o. female with increasing abdominal distention and concern for continued pseudoobstruction. Pt is s/p decompressive colonoscopy on 9/1/2022. Plan:  - Aggressively replace electrolytes to keep K >4, Mg >2, Phos >3; recommend PO replacement with K  - Recommend continuing tube feeds for nutrition - has a competent ileocecal valve that NGT suctioning will not help with much  - Continue bowel regimen - d/c lactulose and start colace BID  - Need aggressive medical mgmt for ogilvies including correction of electrolytes, strict blood glucose management. - Consider neostigmine - will need telemetry monitoring and atropine at bedside if going to use  - Continue turning patient q2hrs as patient passes lots of flatus with this maneuver  -Recommend removing FMS after 24-48 hours  - No urgent or emergent surgical intervention. Patient is a poor surgical candidate and if anything else needs to be performed for continued ogilvies, would recommend neostigmine and/or IR guided cecostomy tube placement. Electronically signed by Stevan May DO  on 9/12/2022 at 6:26 AM   I Dr. Lisa Elkins saw and examined the patient. I have edited the above and agree with the above. Alvin Parrish  Colorectal Surgery
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
## 2022-09-12 NOTE — PLAN OF CARE
Problem: Pain  Goal: Verbalizes/displays adequate comfort level or baseline comfort level  Outcome: Progressing     Problem: Skin/Tissue Integrity  Goal: Absence of new skin breakdown  Description: 1. Monitor for areas of redness and/or skin breakdown  2. Assess vascular access sites hourly  3. Every 4-6 hours minimum:  Change oxygen saturation probe site  4. Every 4-6 hours:  If on nasal continuous positive airway pressure, respiratory therapy assess nares and determine need for appliance change or resting period.   Outcome: Progressing     Problem: Safety - Adult  Goal: Free from fall injury  9/11/2022 2238 by Christin Sorto RN  Outcome: Progressing  9/11/2022 1724 by Shelia Potts RN  Outcome: Progressing     Problem: ABCDS Injury Assessment  Goal: Absence of physical injury  Outcome: Progressing     Problem: Chronic Conditions and Co-morbidities  Goal: Patient's chronic conditions and co-morbidity symptoms are monitored and maintained or improved  Outcome: Progressing     Problem: Nutrition Deficit:  Goal: Optimize nutritional status  Outcome: Progressing

## 2022-09-13 LAB
ALBUMIN SERPL-MCNC: 1.4 G/DL (ref 3.5–5.2)
ALBUMIN SERPL-MCNC: 1.7 G/DL (ref 3.5–5.2)
ALP BLD-CCNC: 121 U/L (ref 35–104)
ALP BLD-CCNC: 131 U/L (ref 35–104)
ALT SERPL-CCNC: 30 U/L (ref 5–33)
ALT SERPL-CCNC: 30 U/L (ref 5–33)
ANION GAP SERPL CALCULATED.3IONS-SCNC: 7 MMOL/L (ref 9–17)
AST SERPL-CCNC: 45 U/L
AST SERPL-CCNC: 48 U/L
BILIRUB SERPL-MCNC: 0.6 MG/DL (ref 0.3–1.2)
BILIRUB SERPL-MCNC: 0.8 MG/DL (ref 0.3–1.2)
BILIRUBIN DIRECT: 0.3 MG/DL
BILIRUBIN, INDIRECT: 0.3 MG/DL (ref 0–1)
BUN BLDV-MCNC: 10 MG/DL (ref 8–23)
BUN/CREAT BLD: 21 (ref 9–20)
CALCIUM SERPL-MCNC: 7.8 MG/DL (ref 8.6–10.4)
CHLORIDE BLD-SCNC: 112 MMOL/L (ref 98–107)
CO2: 22 MMOL/L (ref 20–31)
CREAT SERPL-MCNC: 0.47 MG/DL (ref 0.5–0.9)
GFR AFRICAN AMERICAN: >60 ML/MIN
GFR NON-AFRICAN AMERICAN: >60 ML/MIN
GFR SERPL CREATININE-BSD FRML MDRD: ABNORMAL ML/MIN/{1.73_M2}
GLUCOSE BLD-MCNC: 106 MG/DL (ref 65–105)
GLUCOSE BLD-MCNC: 126 MG/DL (ref 65–105)
GLUCOSE BLD-MCNC: 67 MG/DL (ref 65–105)
GLUCOSE BLD-MCNC: 73 MG/DL (ref 70–99)
GLUCOSE BLD-MCNC: 76 MG/DL (ref 65–105)
GLUCOSE BLD-MCNC: 84 MG/DL (ref 65–105)
GLUCOSE BLD-MCNC: 90 MG/DL (ref 65–105)
GLUCOSE BLD-MCNC: 94 MG/DL (ref 65–105)
GLUCOSE BLD-MCNC: 97 MG/DL (ref 65–105)
GLUCOSE BLD-MCNC: 97 MG/DL (ref 65–105)
MAGNESIUM: 1.9 MG/DL (ref 1.6–2.6)
MAGNESIUM: 2 MG/DL (ref 1.6–2.6)
PHOSPHORUS: 2.1 MG/DL (ref 2.6–4.5)
PHOSPHORUS: 2.5 MG/DL (ref 2.6–4.5)
POTASSIUM SERPL-SCNC: 3.7 MMOL/L (ref 3.7–5.3)
SODIUM BLD-SCNC: 141 MMOL/L (ref 135–144)
TOTAL PROTEIN: 4.6 G/DL (ref 6.4–8.3)
TOTAL PROTEIN: 4.9 G/DL (ref 6.4–8.3)

## 2022-09-13 PROCEDURE — 2000000000 HC ICU R&B

## 2022-09-13 PROCEDURE — 6370000000 HC RX 637 (ALT 250 FOR IP): Performed by: STUDENT IN AN ORGANIZED HEALTH CARE EDUCATION/TRAINING PROGRAM

## 2022-09-13 PROCEDURE — 6360000002 HC RX W HCPCS: Performed by: INTERNAL MEDICINE

## 2022-09-13 PROCEDURE — 6360000002 HC RX W HCPCS: Performed by: STUDENT IN AN ORGANIZED HEALTH CARE EDUCATION/TRAINING PROGRAM

## 2022-09-13 PROCEDURE — 6370000000 HC RX 637 (ALT 250 FOR IP): Performed by: INTERNAL MEDICINE

## 2022-09-13 PROCEDURE — 2580000003 HC RX 258: Performed by: FAMILY MEDICINE

## 2022-09-13 PROCEDURE — 83735 ASSAY OF MAGNESIUM: CPT

## 2022-09-13 PROCEDURE — 84100 ASSAY OF PHOSPHORUS: CPT

## 2022-09-13 PROCEDURE — APPSS30 APP SPLIT SHARED TIME 16-30 MINUTES: Performed by: NURSE PRACTITIONER

## 2022-09-13 PROCEDURE — 6370000000 HC RX 637 (ALT 250 FOR IP): Performed by: FAMILY MEDICINE

## 2022-09-13 PROCEDURE — 80053 COMPREHEN METABOLIC PANEL: CPT

## 2022-09-13 PROCEDURE — 82947 ASSAY GLUCOSE BLOOD QUANT: CPT

## 2022-09-13 PROCEDURE — 36415 COLL VENOUS BLD VENIPUNCTURE: CPT

## 2022-09-13 PROCEDURE — 94640 AIRWAY INHALATION TREATMENT: CPT

## 2022-09-13 PROCEDURE — 2580000003 HC RX 258: Performed by: STUDENT IN AN ORGANIZED HEALTH CARE EDUCATION/TRAINING PROGRAM

## 2022-09-13 PROCEDURE — 94761 N-INVAS EAR/PLS OXIMETRY MLT: CPT

## 2022-09-13 PROCEDURE — A4216 STERILE WATER/SALINE, 10 ML: HCPCS | Performed by: FAMILY MEDICINE

## 2022-09-13 PROCEDURE — 2500000003 HC RX 250 WO HCPCS: Performed by: FAMILY MEDICINE

## 2022-09-13 RX ORDER — INSULIN LISPRO 100 [IU]/ML
0-16 INJECTION, SOLUTION INTRAVENOUS; SUBCUTANEOUS EVERY 6 HOURS
Status: DISCONTINUED | OUTPATIENT
Start: 2022-09-13 | End: 2022-09-18

## 2022-09-13 RX ORDER — FAMOTIDINE 20 MG/1
20 TABLET, FILM COATED ORAL 2 TIMES DAILY
Status: DISCONTINUED | OUTPATIENT
Start: 2022-09-13 | End: 2022-09-23

## 2022-09-13 RX ORDER — MAGNESIUM SULFATE IN WATER 40 MG/ML
2000 INJECTION, SOLUTION INTRAVENOUS ONCE
Status: COMPLETED | OUTPATIENT
Start: 2022-09-13 | End: 2022-09-13

## 2022-09-13 RX ADMIN — SILVER SULFADIAZINE: 10 CREAM TOPICAL at 22:36

## 2022-09-13 RX ADMIN — BUDESONIDE 500 MCG: 0.5 SUSPENSION RESPIRATORY (INHALATION) at 08:45

## 2022-09-13 RX ADMIN — SERTRALINE HYDROCHLORIDE 100 MG: 100 TABLET ORAL at 09:37

## 2022-09-13 RX ADMIN — MIDODRINE HYDROCHLORIDE 10 MG: 10 TABLET ORAL at 12:57

## 2022-09-13 RX ADMIN — NYSTATIN: 100000 CREAM TOPICAL at 11:14

## 2022-09-13 RX ADMIN — NYSTATIN: 100000 CREAM TOPICAL at 22:35

## 2022-09-13 RX ADMIN — POTASSIUM BICARBONATE 20 MEQ: 782 TABLET, EFFERVESCENT ORAL at 09:38

## 2022-09-13 RX ADMIN — SODIUM CHLORIDE, PRESERVATIVE FREE 20 MG: 5 INJECTION INTRAVENOUS at 09:40

## 2022-09-13 RX ADMIN — TRAZODONE HYDROCHLORIDE 100 MG: 100 TABLET ORAL at 22:36

## 2022-09-13 RX ADMIN — BUDESONIDE 500 MCG: 0.5 SUSPENSION RESPIRATORY (INHALATION) at 19:42

## 2022-09-13 RX ADMIN — MIDODRINE HYDROCHLORIDE 10 MG: 10 TABLET ORAL at 17:09

## 2022-09-13 RX ADMIN — POLYETHYLENE GLYCOL (3350) 17 G: 17 POWDER, FOR SOLUTION ORAL at 09:38

## 2022-09-13 RX ADMIN — DEXTROSE MONOHYDRATE 125 ML: 100 INJECTION, SOLUTION INTRAVENOUS at 04:30

## 2022-09-13 RX ADMIN — POTASSIUM BICARBONATE 40 MEQ: 782 TABLET, EFFERVESCENT ORAL at 22:35

## 2022-09-13 RX ADMIN — SODIUM CHLORIDE, PRESERVATIVE FREE 10 ML: 5 INJECTION INTRAVENOUS at 11:13

## 2022-09-13 RX ADMIN — MIDODRINE HYDROCHLORIDE 10 MG: 10 TABLET ORAL at 09:24

## 2022-09-13 RX ADMIN — MAGNESIUM SULFATE HEPTAHYDRATE 2000 MG: 40 INJECTION, SOLUTION INTRAVENOUS at 09:08

## 2022-09-13 RX ADMIN — POTASSIUM BICARBONATE 40 MEQ: 782 TABLET, EFFERVESCENT ORAL at 09:39

## 2022-09-13 RX ADMIN — FAMOTIDINE 20 MG: 20 TABLET, FILM COATED ORAL at 22:35

## 2022-09-13 RX ADMIN — SODIUM CHLORIDE, PRESERVATIVE FREE 10 ML: 5 INJECTION INTRAVENOUS at 22:36

## 2022-09-13 ASSESSMENT — PAIN SCALES - GENERAL
PAINLEVEL_OUTOF10: 8
PAINLEVEL_OUTOF10: 9

## 2022-09-13 ASSESSMENT — PAIN DESCRIPTION - LOCATION: LOCATION: ABDOMEN;SACRUM

## 2022-09-13 NOTE — PROGRESS NOTES
Nutrition Assessment     Type and Reason for Visit: Reassess    Nutrition Recommendations/Plan:   Diet NPO Exceptions are: Ice Chips  Restart tube feeding- Glucerna 1.5 Saturnino goal rate 48 mL/hr (1152 mL total volume). Water flush 200 mL every 6 hours. Monitor tube feeding rate/ tolerance, GI function and labs     Malnutrition Assessment:  Malnutrition Status: At risk for malnutrition (Comment)    Nutrition Assessment:  Patient has no nausea this morning and fecal management system was removed yesterday (9/12). RN called to restart tube feedings this moring. Will restart Glucerna 1.5 Saturnino and advance to goal rate per order direction. Will monitor tube feeding rate/ tolerance, GI function and labs. Estimated Daily Nutrient Needs:  Energy (kcal):  9254-3990 kcal (16-18 kcal/kg) Weight Used for Energy Requirements: Other (Comment) (214 lb. 9/2/22)     Protein (g):   gm protein (1.8-2.0 g/kg) Weight Used for Protein Requirements: Ideal        Fluid (ml/day):  1728 mL Method Used for Fluid Requirements: 1 ml/kcal    Nutrition Related Findings:   Edema: +1 generalized edema, +1 pitting BLE. Hypoactive bowel sounds. Diarrhea. No edema.  FMS discontinued (9/12) Wound Type: None    Current Nutrition Therapies:    Diet NPO Exceptions are: Ice Chips  ADULT TUBE FEEDING; Nasogastric; Diabetic; Continuous; 15; Yes; 10; Q 6 hours; 48; 200; Q 6 hours    Anthropometric Measures:  Height: 5' 2\" (157.5 cm)  Current Body Wt: 220 lb (99.8 kg)   BMI: 40.2    Nutrition Diagnosis:   Inadequate protein-energy intake related to inadequate protein-energy intake as evidenced by nutrition support - enteral nutrition, NPO or clear liquid status due to medical condition    Nutrition Interventions:   Food and/or Nutrient Delivery: Continue NPO, Continue Current Tube Feeding (Re-start)  Nutrition Education/Counseling: Education not indicated  Coordination of Nutrition Care: Continue to monitor while inpatient       Goals:  Previous Goal Met: Progressing toward Goal(s)  Goals:  Tolerate nutrition support at goal rate, Meet at least 75% of estimated needs       Nutrition Monitoring and Evaluation:   Behavioral-Environmental Outcomes: None Identified  Food/Nutrient Intake Outcomes: Enteral Nutrition Intake/Tolerance  Physical Signs/Symptoms Outcomes: Biochemical Data, Fluid Status or Edema, Skin, Weight, GI Status    Discharge Planning:    Enteral Nutrition         Lynder Heart  MFN, RDN, LDN  Lead Clinical Dietitian  RD Office Phone [phone]

## 2022-09-13 NOTE — PROGRESS NOTES
Pulmonary Critical Care Progress Note       Patient seen for the follow up of  Shock, septic ([address]     Subjective:    She has been off oxygen. She is  more awake but still confused. She is tolerating tube feeds. She    Does not verbalize much. Examination:  Vitals: BP (!) 124/51   Pulse 98   Temp 97.5 °F (36.4 °C) (Tympanic)   Resp 21   Ht 5' 2\" (1.575 m)   Wt 220 lb (99.8 kg)   SpO2 93%   BMI 40.24 kg/m²   General appearance:  awake confused and cooperative with exam  Neck: No JVD  Lungs: Decreased breath sound no crackles or wheezes  Heart: regular rate and rhythm, S1, S2 normal, no gallop  Abdomen: Soft, non tender, + BS  Extremities: no cyanosis or clubbing. No significant edema, scratches bilateral lower legs     LABs:  CBC:   Recent Labs     09/11/22  0520 09/12/22  0300   WBC 4.7 5.2   HGB 7.0* 7.7*   HCT 23.5* 26.6*   PLT 73* 81*       BMP:   Recent Labs     09/12/22  0721 09/13/22  0317    141   K 3.6* 3.7   CO2 21 22   BUN 12 10   CREATININE 0.45* 0.47*   LABGLOM >60 >60   GLUCOSE 79 73       LIVER PROFILE:  Recent Labs     09/12/22  0721 09/13/22  0317   AST 45* 48*   ALT 30 30   LABALBU 1.4* 1.7*       ABG:  Lab Results   Component Value Date/Time    FIO2 NOT REPORTED 02/08/2022 07:10 PM       Lab Results   Component Value Date/Time    FIO2 NOT REPORTED 02/08/2022 07:10 PM     Radiology:  9/5/22 CXR  Cardiomegaly with left basilar infiltrate. Support tubes as described above. MRI brain 9/6  Chronic microvascular disease without acute intracranial abnormality. X-ray abdomen 9/12  Enteric device extends to the stomach. Partially visualized distended distal   large bowel redemonstrated.          Impression:  Chronic hypoxic respiratory failure  Atelectasis  Hypotension/Septic Shock requiring vasopressors     Hypernatremia  UTI/Indwelling angeles /stenotrophomonas maltophilia resistant to Bactrim  on urine culture status post cefepime  Juan Jose's syndrome   Obstructive sleep apnea/Obesity  [place]   A. fib, CAD, CHF, DM, dementia, HTN, liver cirrhosis, esophageal varices  History of discitis    Recommendations:  Oxygen via nasal cannula  DuoNeb by nebulizer   Pulmicort 0.5 b.I.d.  NG feeds as tolerated/video swallow ordered patient may not be awake enough for test   GI on consult   Monitor blood pressure off pressors  On midodrine   Finished Levaquin for UTI   Monitor platelet count   Monitor sodium   Consider neurology evaluation   Colorectal surgery following, s/p decompressive colonoscopy  not recommending colonoscopy at this point. Rectal tube advised. Consider neostigmine.   Possible surgical management eventually  Physical therapy  Discussed with RN  GI prophylaxis  DVT prophylaxis with ANDRAE Paulino MD, [facility]  Pulmonary Critical Care and Sleep Medicine,  [facility] [place]: [phone]

## 2022-09-13 NOTE — PROGRESS NOTES
Physical Therapy  DATE: 2022    NAME: Davi Medrano  MRN: [id number]   : 1955    Patient not seen this date for Physical Therapy due to:      [x] Cancel by RN - check back in a couple days - pt still on paralytics. [] Hemodialysis    [] Critical Lab Value Level     [] Blood transfusion in progress    [] Acute or unstable cardiovascular status   _MAP < 55 or more than >115  _HR < 40 or > 130    [] Acute or unstable pulmonary status   -FiO2 > 60%   _RR < 5 or >40    _O2 sats < 85%    [] Strict Bedrest    [] Off Unit for surgery or procedure    [] Off Unit for testing       [] Pending imaging to R/O fracture    [] Refusal by Patient      [] Other      [] PT being discontinued at this time. Patient independent. No further needs. [] PT being discontinued at this time as the patient has been transferred to hospice care. No further needs.       You Glynn, PT

## 2022-09-13 NOTE — PROGRESS NOTES
Pharmacy Communication- Medication Route Change    Famotidine 20 mg BID changed from IV to PO per [facility] approved policy. Basic Criteria (please refer to hospital policy for details):  1. functioning GI tract  2. tolerating PO/NG routine medications      Thank you.   Leena Macias, [facility] 9/13/2022 11:58 AM

## 2022-09-13 NOTE — PLAN OF CARE
Problem: Respiratory - Adult  Goal: Achieves optimal ventilation and oxygenation  9/13/2022 0747 by Anna Selby RCP  Outcome: Progressing

## 2022-09-13 NOTE — PROGRESS NOTES
Priority: Medium    Encephalopathy acute [G93.40] 09/03/2022     Priority: Medium    Stroke-like symptoms [R29.90] 09/03/2022     Priority: Medium    Leukocytosis [D72.829] 09/03/2022     Priority: Medium    Hypotension [I95.9] 08/31/2022     Priority: Medium    Shock, septic ([facility] [A41.9, R65.21] 08/31/2022     Priority: Medium    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] 08/30/2022     Priority: Medium    Urinary tract infection associated with indwelling urethral catheter ([facility] [T83.511A, N39.0] 08/29/2022     Priority: Medium    Acute urinary retention [R33.8] 09/20/2020     Priority: Medium    Lymphedema [I89.0] 02/09/2022    COPD (chronic obstructive pulmonary disease) ([facility] [J44.9]     LEONARDA (obstructive sleep apnea) [G47.33]     Hypertension [I10]     Hepatic cirrhosis ([facility] [K74.60]        74 y.o. female with increasing abdominal distention and concern for continued/chronic pseudo-obstruction. Pt is s/p decompressive colonoscopy on 9/1/2022. Plan:  - Goal K >4, Mg >2, Phos >3, replace PRN  - Ok for tube feeds/diet from a surgical perspective. Recommend initiating tube feeds for nutrition. NGT is not as beneficial with the issue being large bowel > small bowel   - Continue bowel regimen - colace, miralax daily   - Monitor for bowel fxn. FMS removed yesterday. - Continue turning patient q2hrs, OOB to chair as able   - No plans for acute surgical intervention. Abdomen is soft and nontender on exam this morning. Additionally, large bowel appears less distended on KUB yesterday for Ngt placement compared to prior KUB. Would continue to monitor for now and monitor bowel fxn and diet tolerance. Patient is a poor surgical candidate and if anything else needs to be performed for continued [place]'s, would recommend neostigmine and/or IR guided cecostomy tube placement. IR unable to perform cecostomy - discussions with them yesterday. Manuelito Holbrook, DO  General Surgery PGY-4   I Dr. Bassam Cruz saw and examined the patient.  I have edited the above and agree with the above. Alvin Parrish  Colorectal Surgery

## 2022-09-13 NOTE — PLAN OF CARE
Problem: Discharge Planning  Goal: Discharge to home or other facility with appropriate resources  Outcome: Progressing  Flowsheets (Taken 9/12/2022 2000)  Discharge to home or other facility with appropriate resources: Identify barriers to discharge with patient and caregiver       Problem: Pain  Goal: Verbalizes/displays adequate comfort level or baseline comfort level  Outcome: Progressing       Problem: Skin/Tissue Integrity  Goal: Absence of new skin breakdown  Description: 1. Monitor for areas of redness and/or skin breakdown  2. Assess vascular access sites hourly  3. Every 4-6 hours minimum:  Change oxygen saturation probe site  4. Every 4-6 hours:  If on nasal continuous positive airway pressure, respiratory therapy assess nares and determine need for appliance change or resting period.   Outcome: Progressing       Problem: Safety - Adult  Goal: Free from fall injury  Outcome: Progressing      Problem: ABCDS Injury Assessment  Goal: Absence of physical injury  Outcome: Progressing       Problem: Chronic Conditions and Co-morbidities  Goal: Patient's chronic conditions and co-morbidity symptoms are monitored and maintained or improved  Outcome: Progressing  Flowsheets (Taken 9/12/2022 2000)  Care Plan - Patient's Chronic Conditions and Co-Morbidity Symptoms are Monitored and Maintained or Improved: Monitor and assess patient's chronic conditions and comorbid symptoms for stability, deterioration, or improvement

## 2022-09-13 NOTE — PROGRESS NOTES
[facility]  Office: [address], DO, Crista Honeycutt, DO, Harley Soler, DO, Colin Obdulia Blood, DO, Binta Kruegre MD, Maureen Ma MD, Harley Gudino MD, Lang Singer MD,  Dat Salcido MD, Carina Foley MD, Helga Marie, DO, Beth Castaneda MD,  Chema Irving MD, Gloria Medina MD, Kamille Abbott, DO, Zoey Brand MD, Jelena Peck MD, Gilberto Qureshi MD, Madelyn Varela MD, Kiki Rome MD, Shawna Guerrero MD, Mani Santamaria DO, Amanda Cuellar MD, Nica Flores MD, Birtha Members, CNP,  Saúl Palacios, CNP, Jorge L Zapata, CNP, Elise Bearden, CNP,  Yash Pereira, [facility], Renata Yadav, CNP, Poly Nunes, CNP, Ana Silvestre, CNP, Frida Guy, CNP, Fadi Mera, CNP, Pratik Lopez, PA-C, Leticia Jacinto, CNS, Jaime Moe, [facility], Wayne Lama, CNP, Danielle Jo, CNP, Guillaume Flies, [facility]    Progress Note    9/13/2022    8:27 AM    Name:   Altagracia Gutierrez  MRN:     [id number]     Acct:      [de-identified]   Room:   2029/2029-01  IP Day:  15  Admit Date:  8/29/2022  8:35 PM    PCP:   Bryson Lennox, MD  Code Status:  Full Code    Subjective:     C/C:   Chief Complaint   Patient presents with    Abdominal Pain     N/V/D    Hematuria     X few months       Interval History Status: not changed. Patient more alert today, follows some commands. Denies any chest pain or shortness of breath. Complains of sore throat with NG tube in place. Brief History: This is a 62-year-old female with a history of dementia and type 2 diabetes that presents with abdominal pain, nausea and vomiting. 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 She underwent decompressive colonoscopy with colorectal surgery and was placed on stool softeners and laxative. She is transatrial with TPN due to prolonged bowel rest also has been started on tube feeding. Due to her cognitive status she has been unable to undergo swallow study. Currently awaiting LTAC placement. Review of Systems:     Constitutional:  negative for chills, fevers, sweats  Respiratory:  negative for cough, dyspnea on exertion, shortness of breath, wheezing  Cardiovascular:  negative for chest pain, chest pressure/discomfort, lower extremity edema, palpitations  Gastrointestinal:  negative for abdominal pain, constipation, diarrhea, nausea, vomiting  Neurological:  negative for dizziness, headache    Medications: Allergies:     Allergies   Allergen Reactions    Quetiapine      Other reaction(s): Unknown  Other reaction(s): Hallucinations  seroquel    Venlafaxine      Other reaction(s): Hallucinations, Unknown  effexor      Aspirin Other (See Comments)     Bleeding disorder  Other reaction(s): Unknown  Bleeding disorder      Fentanyl      Other reaction(s): Unknown    Other Other (See Comments)     \"NO SUPPOSED TO HAVE ASPIRIN\"    Quetiapine Fumarate      Other reaction(s): Unknown    Venlafaxine Hcl      Other reaction(s): Unknown       Current Meds:   Scheduled Meds:    magnesium sulfate  2,000 mg IntraVENous Once    nystatin   Topical BID    docusate sodium  100 mg Oral Daily    potassium bicarb-citric acid  40 mEq Per NG tube BID    insulin lispro  0-16 Units SubCUTAneous Q4H    insulin glargine  20 Units SubCUTAneous BID    budesonide  0.5 mg Nebulization BID    midodrine  10 mg Per NG tube TID WC    famotidine (PEPCID) injection  20 mg IntraVENous BID    sertraline  100 mg Per NG tube Daily    potassium bicarb-citric acid  20 mEq Per NG tube Daily    [Held by provider] enoxaparin  1 mg/kg SubCUTAneous BID    polyethylene glycol  17 g Oral Daily    traZODone  100 mg Oral Nightly    sodium chloride flush  5-40 mL IntraVENous 2 times per day    silver sulfADIAZINE   Topical Nightly    [Held by provider] sodium chloride  1,000 mL IntraVENous Once     Continuous Infusions:    sodium chloride 10 mL/hr at 22 1653    dextrose       PRN Meds: magnesium sulfate, potassium chloride **OR** potassium alternative oral replacement, sodium phosphate IVPB **OR** sodium phosphate IVPB, sodium chloride flush, potassium chloride, morphine, sodium chloride flush, sodium chloride, magnesium sulfate, ondansetron **OR** ondansetron, acetaminophen **OR** acetaminophen, glucose, dextrose bolus **OR** dextrose bolus, glucagon (rDNA), dextrose, oxybutynin, albuterol sulfate HFA    Data:     Past Medical History:   has a past medical history of Arthritis, CAD (coronary artery disease), CHF (congestive heart failure) ([facility] [address] COPD (chronic obstructive pulmonary disease) ([facility] [address] [facility] Diabetes mellitus ([facility] [facility] Fibromyalgia, Headache, Hypertension, Liver disease, LEONARDA (obstructive sleep apnea), Panic attack, and Paroxysmal atrial fibrillation ([facility] [facility] Social History:   reports that she has never smoked. She has never used smokeless tobacco. She reports that she does not currently use alcohol. She reports that she does not use drugs. Family History:   Family History   Problem Relation Age of Onset    Heart Failure Mother     Cancer Father     Cancer Sister     Cancer Brother        Vitals:  BP (!) 132/46   Pulse 95   Temp 97.4 °F (36.3 °C) (Temporal)   Resp 18   Ht 5' 2\" (1.575 m)   Wt 220 lb (99.8 kg)   SpO2 99%   BMI 40.24 kg/m²   Temp (24hrs), Av.8 °F (36.6 °C), Min:97.4 °F (36.3 °C), Max:98.4 °F (36.9 °C)    Recent Labs     22  0016 22  0413 22  0457 22  0757   POCGLU 76 67 97 90       I/O (24Hr):     Intake/Output Summary (Last 24 hours) at 2022 0827  Last data filed at 2022 0500  Gross per 24 hour   Intake 339.47 ml   Output 1275 ml   Net -935.53 ml       Labs:  Hematology:  Recent Labs     22  0520 22  0300   WBC 4.7 5.2   RBC 2.68* 3.00*   HGB 7.0* 7.7*   HCT 23.5* 26.6*   MCV 87.7 88.7   MCH 26.1 25.7   MCHC 29.8 28.9   RDW 24.8* 25.0*   PLT 73* 81* MPV 10.1 9.5     Chemistry:  Recent Labs     09/10/22  2120 09/11/22  0520 09/12/22  0721 09/13/22 0317   NA  --  145* 139 141   K  --  3.1* 3.6* 3.7   CL  --  119* 113* 112*   CO2  --  19* 21 22   GLUCOSE  --  178* 79 73   BUN  --  16 12 10   CREATININE  --  0.51 0.45* 0.47*   MG  --  2.2  --  1.9   ANIONGAP  --  7* 5* 7*   LABGLOM  --  >60 >60 >60   GFRAA  --  >60 >60 >60   CALCIUM  --  7.2* 7.3* 7.8*   PHOS 2.7 2.2*  --  2.5*     Recent Labs     09/11/22 0520 09/11/22 0813 09/12/22  1701 09/12/22 2009 09/13/22  0016 09/13/22 0317 09/13/22  0413 09/13/22 0457 09/13/22  0757   PROT 4.8*  --   --   --   --  4.9*  --   --   --    LABALBU 1.6*  --   --   --   --  1.7*  --   --   --    AST 33*  --   --   --   --  48*  --   --   --    ALT 30  --   --   --   --  30  --   --   --    ALKPHOS 132*  --   --   --   --  131*  --   --   --    BILITOT 0.4  --   --   --   --  0.8  --   --   --    AMMONIA 37  --   --   --   --   --   --   --   --    POCGLU  --    < > 75 77 76  --  67 97 90    < > = values in this interval not displayed. ABG:  Lab Results   Component Value Date/Time    FIO2 NOT REPORTED 02/08/2022 07:10 PM     Lab Results   Component Value Date/Time    [facility] 08/30/2022 12:57 AM     Lab Results   Component Value Date/Time    CULTURE NO GROWTH 5 DAYS 08/30/2022 12:57 AM       Radiology:  XR ABDOMEN (KUB) (SINGLE AP VIEW)    Result Date: 9/11/2022  Dilated air-filled loop colon that is stable compared to prior exam.     XR ABDOMEN (KUB) (SINGLE AP VIEW)    Result Date: 9/10/2022  Prominent air-filled loops of colon that are similar compared to prior that may correlate with patient's history of [place] syndrome. XR ABDOMEN (KUB) (SINGLE AP VIEW)    Result Date: 9/9/2022  Enteric tube remains at the level of the body of the stomach. Redemonstration of colonic distension.      XR ABDOMEN (KUB) (SINGLE AP VIEW)    Result Date: 9/8/2022  Gas distended colon with moderate stool distally, similar in appearance to prior radiograph. XR ABDOMEN (KUB) (SINGLE AP VIEW)    Result Date: 9/8/2022  Dilated colon again demonstrated with findings suggestive of at least moderate rectal stool burden. This may be due to a functional or mechanical distal colonic obstruction. XR ABDOMEN FOR NG/OG/NE TUBE PLACEMENT    Result Date: 9/12/2022  Enteric device terminating in the stomach     XR ABDOMEN FOR NG/OG/NE TUBE PLACEMENT    Result Date: 9/8/2022  Enteric tube terminates at the level of the body of the stomach. MRI BRAIN WO CONTRAST    Result Date: 9/6/2022  Chronic microvascular disease without acute intracranial abnormality.        Physical Examination:        General appearance:  alert, cooperative and no distress  Mental Status:  oriented to person, place and time and normal affect  Lungs:  clear to auscultation bilaterally, normal effort  Heart:  regular rate and rhythm, no murmur  Abdomen:  soft, nontender, moderately distended, tinkling bowel sounds, no masses, hepatomegaly, splenomegaly  Extremities:  no edema, redness, tenderness in the calves  Skin:  no gross lesions, rashes, induration    Assessment:        Hospital Problems             Last Modified POA    * (Principal) Shock, septic ([address] 9/4/2022 Yes    Urinary tract infection associated with indwelling urethral catheter ([address] 9/4/2022 Yes    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    Hypotension 9/4/2022 Yes    Acute urinary retention 9/4/2022 Yes    Encephalopathy acute 9/4/2022 Yes    Stroke-like symptoms 9/3/2022 Yes    Leukocytosis 9/3/2022 Yes    Dysphagia 9/4/2022 Yes    Hypertension (Chronic) 8/30/2022 Yes    LEONARDA (obstructive sleep apnea) (Chronic) 8/30/2022 Yes    Lymphedema (Chronic) 8/30/2022 Yes    COPD (chronic obstructive pulmonary disease) (HCC) (Chronic) 8/30/2022 Yes    Hepatic cirrhosis ([address] 8/31/2022 Yes       Plan:        Continue tube feeding  Await expedited appeal for LTAC  Nightly CPAP  Antibiotics as ordered  GI DVT prophylaxis  Oxygen and aerosols as ordered  Monitoring control blood pressure  GI DVT prophylaxis  Discharge planning    Unique Jay DO  9/13/2022  8:27 AM Stage 2: Additional Anesthesia Type: 1% lidocaine with epinephrine

## 2022-09-13 NOTE — PLAN OF CARE
Problem: Respiratory - Adult  Goal: Achieves optimal ventilation and oxygenation  9/13/2022 0849 by Ziggy Batres RCP  Outcome: Progressing

## 2022-09-13 NOTE — CARE COORDINATION
Discharge planning    Writer mariah served Andre Vizcarra, GI NP, to see if she knows if there is a plan for PEG placement. Patient's expedited appeal denied for [facility]. Will need a plan for a more permanent way to feed the patient. Waiting on return call. NP states to [facility] Colorectal surgery. Notified Dr. Norman Alejandre. He states that the patient can eat and to clamp the NG and try to feed her. Spoke with the RN and he states that she hasn't had the barium swallow study and is to lethargic to eat at this time. Patient is supposed to have a barium swallow study when alert enough, per RN. RN states that Dr. Nichole Agarwal does not want to feed patient or do the swallow study, until patient is more alert. Mariah served Dr. Nichole Agarwal for a palliative consult.

## 2022-09-13 NOTE — PROGRESS NOTES
[facility]    Gastroenterology Daily Progress Note      Patient:   Collette Breech   :    1955   Facility:   [facility]  Date:     2022  Consultant:   Carlene Lefort, APRN - CNP, CNP      SUBJECTIVE  74 y.o. female admitted 2022 with Leukocytosis [D72.829]  Generalized abdominal pain [R10.84]  Leukocytosis, unspecified type [D72.829] and seen for romero syndrome. The pt was seen and examined. Had large non bloody bm after removal of FMS yesterday, less distended today with no c/o abdominal pain.  500ml output from NG.        OBJECTIVE  Scheduled Meds:   magnesium sulfate  2,000 mg IntraVENous Once    nystatin   Topical BID    docusate sodium  100 mg Oral Daily    potassium bicarb-citric acid  40 mEq Per NG tube BID    insulin lispro  0-16 Units SubCUTAneous Q4H    insulin glargine  20 Units SubCUTAneous BID    budesonide  0.5 mg Nebulization BID    midodrine  10 mg Per NG tube TID WC    famotidine (PEPCID) injection  20 mg IntraVENous BID    sertraline  100 mg Per NG tube Daily    potassium bicarb-citric acid  20 mEq Per NG tube Daily    [Held by provider] enoxaparin  1 mg/kg SubCUTAneous BID    polyethylene glycol  17 g Oral Daily    traZODone  100 mg Oral Nightly    sodium chloride flush  5-40 mL IntraVENous 2 times per day    silver sulfADIAZINE   Topical Nightly    [Held by provider] sodium chloride  1,000 mL IntraVENous Once       Vital Signs:  BP (!) 101/26   Pulse 97   Temp 97.4 °F (36.3 °C) (Temporal)   Resp 17   Ht 5' 2\" (1.575 m)   Wt 220 lb (99.8 kg)   SpO2 97%   BMI 40.24 kg/m²      Physical Exam:   General Appearance: ill appearing alert and oriented to person, place and time, well-developed and well-nourished, in no acute distress  Skin: warm and dry, no rash or erythema  Head: normocephalic and atraumatic  Eyes: pupils equal, round, and reactive to light, extraocular eye movements intact, conjunctivae normal  ENT: hearing grossly normal bilaterally  Neck: neck supple and non tender without mass, no thyromegaly or thyroid nodules, no cervical lymphadenopathy   Pulmonary/Chest: clear to auscultation bilaterally- no wheezes, rales or rhonchi, normal air movement, no respiratory distress  Cardiovascular: normal rate, regular rhythm, normal S1 and S2, no murmurs, rubs, clicks or gallops, distal pulses intact, no carotid bruits  Abdomen: soft, non-tender, distended, hypoactive bowel sounds, no masses or organomegaly  Extremities: no cyanosis, clubbing or edema NG to liws  Musculoskeletal: normal range of motion, no joint swelling, deformity or tenderness  Neurologic: no cranial nerve deficit and muscle strength normal    Lab and Imaging Review     CBC  Recent Labs     09/11/22  0520 09/12/22  0300   WBC 4.7 5.2   HGB 7.0* 7.7*   HCT 23.5* 26.6*   MCV 87.7 88.7   PLT 73* 81*       BMP  Recent Labs     09/11/22  0520 09/12/22  0721 09/13/22  0317   * 139 141   K 3.1* 3.6* 3.7   * 113* 112*   CO2 19* 21 22   BUN 16 12 10   CREATININE 0.51 0.45* 0.47*   GLUCOSE 178* 79 73   CALCIUM 7.2* 7.3* 7.8*       LFTS  Recent Labs     09/11/22  0520 09/13/22  0317   ALKPHOS 132* 131*   ALT 30 30   AST 33* 48*   PROT 4.8* 4.9*   BILITOT 0.4 0.8   LABALBU 1.6* 1.7*       AMYLASE/LIPASE/AMMONIA  Recent Labs     09/11/22  0520   AMMONIA 37     DECOMPRESSIVE COLONOSCOPY Alvin Parrish MD 9/2/22     The patient's rectal tube balloon was desufflated and the tube moved with a large amount of liquid stool and air released. The tube was then removed. A rectal exam was performed and no abnormalities noted. The scope was then inserted and advanced through the rectum and sigmoid colon. The sigmoid colon was tortuous but the scope was able to be safely advanced. Areas of the sigmoid were dilated with some mucosal hyperenhancement indicative of stretching/dilation. The scope was advanced to the mid transverse colon. No masses seen. The transverse colon was also dilated.  The colon was decompressed from the scope location in the mid transverse colon. The colon was able to be decompressed and the scope was carefully withdrawn. No immediate complications. The patient's abdomen was soft and much less distended at the conclusion of the procedure. Prep: Fair. Liquid stool did have to be suctioned throughout the colon. FINDINGS:9/10/22 kub   There are prominent air-filled loops of colon that are similar compared to   prior. There is no free air. There are no suspicious calcifications. There   is no acute osseous abnormality. The surrounding soft tissues are   unremarkable. Impression   Prominent air-filled loops of colon that are similar compared to prior that   may correlate with patient's history of Juan Jose syndrome. FINDINGS:ct abd 8/29/22   Lie of contrast limits evaluation. Lower Chest: Partially imaged left pleural effusion with atelectasis. Organs: Nodular liver contour. Absent gallbladder. No intrahepatic or   extrahepatic biliary ductal dilatation. Bilateral nephrolithiasis, cluster   of stones in the left kidney measures approximately 9 mm. Mild right   hydroureteronephrosis, unchanged, no ureteral or bladder stones. Unremarkable pancreas, adrenal glands. Borderline splenomegaly. GI/Bowel: Severe dilatation of the colon without evidence of obstruction,   with air-fluid levels within the colon. Colonic dilatation was visualized on   prior exams. Small hiatal hernia with paraesophageal varices and upper   abdominal varices, unchanged. Unremarkable small bowel. Appendix is not   clearly visualized. Pelvis: Uterus is not visualized. Pedroza. No bladder wall thickening. Peritoneum/Retroperitoneum: No free fluid. No free air. No adenopathy. Bones/Soft Tissues: No acute fracture. Degenerative changes of the   thoracolumbar spine. Prior laminectomy at L4. Thinning of the abdominal   wall, unchanged.   Small lipoma posterior to left sacrum, unchanged (2:134). 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 Colonic dilatation was visualized   on prior exams. Sequelae of cirrhosis with portal hypertension, including paraesophageal   varices, similar to prior. FINDINGS:kub 9/11/22   There is redemonstration of prominent air-filled loop colon and this appears   stable. There is no definite free air. There are no suspicious   calcifications. There is a gastric tube the tip in the proximal aspect of   the stomach. Impression   Dilated air-filled loop colon that is stable compared to prior exam.             ASSESSMENT/plan  Juan Jose syndrome s/p decompression colonoscopy 9/2/22, appears less distended compared to yesterday  on colace  Ng to liws  Encourage ambulation  Avoid narcotics  Will defer neostigmine at this time    2 dysphagia; tube feeding to be restarted and titrate to goal -ok per surgery     3.anemia no overt bleeding  Trend hh     4. Uti with sepsis     5.elevated lft's, hx previous alcohol abuse, imaging showing cirrhosis no overt liver lesions afp normal 5/30/22  Avoid narcotics and sedatives     Time spent reviewing chart assessing pt and d/w attending md around 30 minutes          This plan was formulated in collaboration with Dr. Teagan Stephens .     Electronically signed by: Carlene Lefort, APRN - CNP on 9/13/2022 at 8:08 AM
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
## 2022-09-13 NOTE — CARE COORDINATION
Social work: Writer informed appeal denied for [facility]; they need 5 snf denials then could resubmit for precert. The issue at [facility] will be plan for nutrition. Some can accommodate ng tube as long as there is plan for it's removal.   Referrals faxed to the following facilities for review: [facility] (1st choice), [address] [facility], [facility], [facility], [facility]. Spouse updated.

## 2022-09-13 NOTE — PROGRESS NOTES
End Of Shift Note  [address] CVICU  Summary of shift:  Waiting for swallow study to be done by speech therapy, if pt can not do swallow she will qualify for peg tube    Vitals:    Vitals:    09/13/22 0845 09/13/22 1200 09/13/22 1348 09/13/22 1543   BP:    (!) 124/51   Pulse: 98      Resp: 21      Temp:  (!) 96.1 °F (35.6 °C)  97.5 °F (36.4 °C)   TempSrc:  Tympanic  Tympanic   SpO2: 93%      Weight:       Height:   5' 2\" (1.575 m)         I&O:   Intake/Output Summary (Last 24 hours) at 9/13/2022 1834  Last data filed at 9/13/2022 1709  Gross per 24 hour   Intake 839.38 ml   Output 1150 ml   Net -310.62 ml       Resp Status: RA    Ventilator Settings:     / / /FiO2 : 100 %    Critical Care IV infusions:   sodium chloride 10 mL/hr at 09/12/22 1653    dextrose          LDA:   PICC Double Lumen 32/74/77 Right Cephalic (Active)   Number of days: 13       NG/OG/NJ/NE Tube Nasogastric 14 fr Right nostril (Active)   Number of days: 9       Urinary Catheter 08/30/22 2 Way (Active)   Number of days: 14

## 2022-09-14 ENCOUNTER — APPOINTMENT (OUTPATIENT)
Dept: GENERAL RADIOLOGY | Age: 67
DRG: 698 | End: 2022-09-14
Payer: COMMERCIAL

## 2022-09-14 LAB
GLUCOSE BLD-MCNC: 123 MG/DL (ref 65–105)
GLUCOSE BLD-MCNC: 188 MG/DL (ref 65–105)
GLUCOSE BLD-MCNC: 199 MG/DL (ref 65–105)
GLUCOSE BLD-MCNC: 208 MG/DL (ref 65–105)
GLUCOSE BLD-MCNC: 216 MG/DL (ref 65–105)
GLUCOSE BLD-MCNC: 242 MG/DL (ref 65–105)
MAGNESIUM: 2.2 MG/DL (ref 1.6–2.6)

## 2022-09-14 PROCEDURE — 74230 X-RAY XM SWLNG FUNCJ C+: CPT

## 2022-09-14 PROCEDURE — 97110 THERAPEUTIC EXERCISES: CPT

## 2022-09-14 PROCEDURE — 6370000000 HC RX 637 (ALT 250 FOR IP): Performed by: STUDENT IN AN ORGANIZED HEALTH CARE EDUCATION/TRAINING PROGRAM

## 2022-09-14 PROCEDURE — 6370000000 HC RX 637 (ALT 250 FOR IP): Performed by: NURSE PRACTITIONER

## 2022-09-14 PROCEDURE — 83735 ASSAY OF MAGNESIUM: CPT

## 2022-09-14 PROCEDURE — 94761 N-INVAS EAR/PLS OXIMETRY MLT: CPT

## 2022-09-14 PROCEDURE — 6370000000 HC RX 637 (ALT 250 FOR IP): Performed by: FAMILY MEDICINE

## 2022-09-14 PROCEDURE — 82947 ASSAY GLUCOSE BLOOD QUANT: CPT

## 2022-09-14 PROCEDURE — 2060000000 HC ICU INTERMEDIATE R&B

## 2022-09-14 PROCEDURE — 6370000000 HC RX 637 (ALT 250 FOR IP): Performed by: INTERNAL MEDICINE

## 2022-09-14 PROCEDURE — 92611 MOTION FLUOROSCOPY/SWALLOW: CPT

## 2022-09-14 PROCEDURE — 94640 AIRWAY INHALATION TREATMENT: CPT

## 2022-09-14 PROCEDURE — 6360000002 HC RX W HCPCS: Performed by: INTERNAL MEDICINE

## 2022-09-14 PROCEDURE — 36415 COLL VENOUS BLD VENIPUNCTURE: CPT

## 2022-09-14 PROCEDURE — 2580000003 HC RX 258: Performed by: STUDENT IN AN ORGANIZED HEALTH CARE EDUCATION/TRAINING PROGRAM

## 2022-09-14 RX ADMIN — SERTRALINE HYDROCHLORIDE 100 MG: 100 TABLET ORAL at 08:10

## 2022-09-14 RX ADMIN — POTASSIUM BICARBONATE 40 MEQ: 782 TABLET, EFFERVESCENT ORAL at 08:10

## 2022-09-14 RX ADMIN — FAMOTIDINE 20 MG: 20 TABLET, FILM COATED ORAL at 08:09

## 2022-09-14 RX ADMIN — MIDODRINE HYDROCHLORIDE 10 MG: 10 TABLET ORAL at 12:32

## 2022-09-14 RX ADMIN — DOCUSATE SODIUM 100 MG: 100 CAPSULE, LIQUID FILLED ORAL at 08:09

## 2022-09-14 RX ADMIN — SODIUM CHLORIDE, PRESERVATIVE FREE 10 ML: 5 INJECTION INTRAVENOUS at 08:10

## 2022-09-14 RX ADMIN — BUDESONIDE 500 MCG: 0.5 SUSPENSION RESPIRATORY (INHALATION) at 20:16

## 2022-09-14 RX ADMIN — POLYETHYLENE GLYCOL (3350) 17 G: 17 POWDER, FOR SOLUTION ORAL at 08:10

## 2022-09-14 RX ADMIN — MIDODRINE HYDROCHLORIDE 10 MG: 10 TABLET ORAL at 17:10

## 2022-09-14 RX ADMIN — MIDODRINE HYDROCHLORIDE 10 MG: 10 TABLET ORAL at 08:09

## 2022-09-14 RX ADMIN — POTASSIUM BICARBONATE 20 MEQ: 782 TABLET, EFFERVESCENT ORAL at 08:09

## 2022-09-14 RX ADMIN — NYSTATIN: 100000 CREAM TOPICAL at 08:22

## 2022-09-14 RX ADMIN — BUDESONIDE 500 MCG: 0.5 SUSPENSION RESPIRATORY (INHALATION) at 08:09

## 2022-09-14 RX ADMIN — INSULIN LISPRO 4 UNITS: 100 INJECTION, SOLUTION INTRAVENOUS; SUBCUTANEOUS at 17:05

## 2022-09-14 ASSESSMENT — PAIN SCALES - GENERAL: PAINLEVEL_OUTOF10: 0

## 2022-09-14 NOTE — PROGRESS NOTES
The patient was sleeping as the writer entered the room (no family members present). Writer provided a silent prayer of healing, comfort, and rest.    Spiritual care will maintain daily follow ups and visits.      09/14/22 1126   Encounter Summary   Service Provided For: Patient   Referral/Consult From: Palliative Care   Last Encounter  09/14/22   Complexity of Encounter Low   Begin Time 1105   End Time  1110   Total Time Calculated 5 min   Encounter    Type Follow up   Palliative Care   Type Palliative Care, Follow-up   Assessment/Intervention/Outcome   Assessment Other (Comment)  (Sleeping)   Intervention Prayer (assurance of)/[place];Sustaining Presence/Ministry of presence   Outcome Did not respond   Plan and Referrals   Plan/Referrals Continue to visit, (comment)

## 2022-09-14 NOTE — PROGRESS NOTES
Colorectal Surgery:  Daily Progress Note                  PATIENT NAME: Liberty Pfeiffer     TODAY'S DATE: 9/14/2022, 6:49 AM  CC:  no concerns this AM    SUBJECTIVE:     Pt seen and examined at bedside. Afebrile, HR 90's-low 100's, normotensive. Denies abdominal pain this morning at rest and with palpation, softly distended. +BM this AM. Tube feeds running at 35cc/h, no emesis.  Patient denies nausea this AM.     OBJECTIVE:   VITALS:  BP (!) 120/94   Pulse 98   Temp 97.6 °F (36.4 °C) (Temporal)   Resp 16   Ht 5' 2\" (1.575 m)   Wt 220 lb (99.8 kg)   SpO2 95%   BMI 40.24 kg/m²      INTAKE/OUTPUT:      Intake/Output Summary (Last 24 hours) at 9/14/2022 0649  Last data filed at 9/14/2022 0535  Gross per 24 hour   Intake 1132.38 ml   Output 300 ml   Net 832.38 ml       PHYSICAL EXAM:     General appearance: Alert, no acute distress  Lungs: normal effort with symmetric rise and fall of chest wall, no accessory muscle use   Heart:  regular rate and rhythm  Abdomen: softly distended, nontender to palpation in all quadrants, no guarding or peritoneal signs, scabs in multiple locations without underlying fluctuance, erythema, or drainage  Extremities: no cyanosis, clubbing, or edema  Skin:  scratch marks to anterior shins, no gross lesions, induration      Data:  CBC with Differential:    Lab Results   Component Value Date/Time    WBC 5.2 09/12/2022 03:00 AM    RBC 3.00 09/12/2022 03:00 AM    HGB 7.7 09/12/2022 03:00 AM    HCT 26.6 09/12/2022 03:00 AM    PLT 81 09/12/2022 03:00 AM    MCV 88.7 09/12/2022 03:00 AM    MCH 25.7 09/12/2022 03:00 AM    MCHC 28.9 09/12/2022 03:00 AM    RDW 25.0 09/12/2022 03:00 AM    LYMPHOPCT 19 09/12/2022 03:00 AM    MONOPCT 9 09/12/2022 03:00 AM    BASOPCT 1 09/12/2022 03:00 AM    MONOSABS 0.47 09/12/2022 03:00 AM    LYMPHSABS 0.99 09/12/2022 03:00 AM    EOSABS 0.05 09/12/2022 03:00 AM    BASOSABS 0.05 09/12/2022 03:00 AM    DIFFTYPE NOT REPORTED 02/09/2022 01:43 PM     BMP:    Lab Results Component Value Date/Time     09/13/2022 03:17 AM    K 3.7 09/13/2022 03:17 AM     09/13/2022 03:17 AM    CO2 22 09/13/2022 03:17 AM    BUN 10 09/13/2022 03:17 AM    LABALBU 1.7 09/13/2022 03:17 AM    CREATININE 0.47 09/13/2022 03:17 AM    CALCIUM 7.8 09/13/2022 03:17 AM    GFRAA >60 09/13/2022 03:17 AM    LABGLOM >60 09/13/2022 03:17 AM    GLUCOSE 73 09/13/2022 03:17 AM       Radiology Review:    XR ABDOMEN (KUB) (SINGLE AP VIEW)    Result Date: 9/11/2022  EXAMINATION: ONE SUPINE XRAY VIEW(S) OF THE ABDOMEN 9/11/2022 5:51 am COMPARISON: Abdominal radiographs performed 09/10/2022. HISTORY: ORDERING SYSTEM PROVIDED HISTORY: check for Juan Jose TECHNOLOGIST PROVIDED HISTORY: check for Juan Jose Reason for Exam: Distension FINDINGS: There is redemonstration of prominent air-filled loop colon and this appears stable. There is no definite free air. There are no suspicious calcifications. There is a gastric tube the tip in the proximal aspect of the stomach. Dilated air-filled loop colon that is stable compared to prior exam.     XR ABDOMEN (KUB) (SINGLE AP VIEW)    Result Date: 9/10/2022  EXAMINATION: ONE SUPINE XRAY VIEW(S) OF THE ABDOMEN 9/10/2022 8:12 am COMPARISON: Abdominal radiograph performed 09/09/2022. HISTORY: ORDERING SYSTEM PROVIDED HISTORY: Juan Jose's TECHNOLOGIST PROVIDED HISTORY: Jeremiah Blanton Reason for Exam: Dumont FINDINGS: There are prominent air-filled loops of colon that are similar compared to prior. There is no free air. There are no suspicious calcifications. There is no acute osseous abnormality. The surrounding soft tissues are unremarkable. 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 female with concern for chronic pseudo-obstruction. Pt is s/p decompressive colonoscopy on 9/1/2022. Plan:  - Goal K >4, Mg >2, replace PRN  - Continue tube feeds for nutrition  - Continue bowel regimen - colace, miralax daily.   - Monitor for bowel fxn. +BM this morning  - Continue turning patient q2hrs, OOB to chair as able   - No plans for acute surgical intervention. Abdomen is soft and nontender on exam this morning. Continue to monitor tube feed tolerance. Patient is a poor surgical candidate and if anything else needs to be performed for continued Harper's, would recommend neostigmine and/or IR guided cecostomy tube placement. IR unable to perform cecostomy. Leonela Johnson, DO  General Surgery PGY-4    I Dr. Ashley Vaughn saw and examined the patient. I have edited the above and agree with the above. Alvin Parrish  Colorectal Surgery

## 2022-09-14 NOTE — PROCEDURES
INSTRUMENTAL SWALLOW REPORT  MODIFIED BARIUM SWALLOW    NAME: Horace Mcduffie   : 1955  MRN: [id number]       Date of Eval: 2022        Radiologist: Dr. Laura Mike    Past Medical History:  has a past medical history of Arthritis, CAD (coronary artery disease), CHF (congestive heart failure) ([facility] COPD (chronic obstructive pulmonary disease) ([address] Dementia ([facility] Diabetes mellitus ([facility] Fibromyalgia, Headache, Hypertension, Liver disease, LEONARDA (obstructive sleep apnea), Panic attack, and Paroxysmal atrial fibrillation ([facility] Past Surgical History:  has a past surgical history that includes Cardiac catheterization; Cholecystectomy; Hysterectomy; laminectomy; Ovary removal; Appendectomy; and Colonoscopy (N/A, 2022). Type of Study: Initial MBS    Patient Complaints/Reason for Referral:  Horace Mcduffie was referred for a MBS to assess the efficiency of her swallow function, assess for aspiration, and to make recommendations regarding safe dietary consistencies, effective compensatory strategies, and safe eating environment. Behavior/Cognition/Vision/Hearing:  Behavior/Cognition: Alert; Cooperative  Vision: Within Functional Limits  Hearing: Within functional limits    Impressions:  Pt. Presents with no initial penetration or aspiration with puree bolus. Moderate vallecula residual noted. Pt. With eventual penetration and aspiration with throat clear with additional trial.  Min vallecula residual noted. Pt. Given trial of nectar thick liquid. + penetration, + aspiration with no cough. Residual noted in laryngeal vestibule. Min vallecula residual noted. + premature spill noted with both puree and nectar thick consistencies. Pt. Not appropriate for PO at this time. ST to recommend NPO. Results and recommendations reported to RN.      Consistencies Administered: Pureed;Mildly Thick teaspoon    Recommended Diet:  Solid consistency: NPO  Liquid consistency: NPO  Medication administration: Via NG/PEG    Recommendations/Treatment  Requires SLP Intervention: Yes  D/C Recommendations: Ongoing speech therapy is recommended during this hospitalization  Frequency: 3-5 x week    Recommended Exercises:    Therapeutic Interventions: Oral motor exercises; Patient/Family education;Vital Stim/NMES    Education: Images and recommendations were reviewed with pt. And RN following this exam.   Patient Education: yes  Patient Education Response: Verbalizes understanding    Goals:    Long Term: To Maximize safety with intake, optimize nutrition/hydration and minimize risk for aspiration. Short Term:     Goal 1: Pt. will complete OMEX for dysphagia 10-20 x per session. Oral Preparation / Oral Phase  Oral Phase   + premature spill noted with both puree and nectar thick consistencies. Pharyngeal Phase  Pharyngeal Phase   Pt. Presents with no initial penetration or aspiration with puree bolus. Moderate vallecula residual noted. Pt. With eventual penetration and aspiration with throat clear with additional trial.  Min vallecula residual noted. Pt. Given trial of nectar thick liquid. + penetration, + aspiration with no cough. Residual noted in laryngeal vestibule. Min vallecula residual noted. Esophageal Phase  Esophageal Screen: WFL    Pain      Pain Level: 8  Pain Location: Abdomen, Sacrum      Therapy Time:   Individual Concurrent Group Co-treatment   Time In  935         Time Out  945         Minutes  Jae Kincaid M.A. CCC-SLP, 9/14/2022, 1:24 PM

## 2022-09-14 NOTE — PROGRESS NOTES
Pulmonary Critical Care Progress Note  Brand Cooler, CNP/Arnoldo Dalton MD     Patient seen for the follow up of  Shock, septic ([address]     Subjective:  She is awake and alert but confused. She is on room air saturating well. She does not have any complaints. She had repeat swallow study today which she did not pass. Speech therapy is recommending alternate means of nutrition. She continues tube feedings per NG at this time. Examination:  Vitals: /87   Pulse (!) 104   Temp 97.9 °F (36.6 °C) (Temporal)   Resp 16   Ht 5' 2\" (1.575 m)   Wt 220 lb (99.8 kg)   SpO2 96%   BMI 40.24 kg/m²   General appearance:  awake, confused and cooperative with exam  Neck: No JVD  Lungs: Decreased breath sound no crackles or wheezes  Heart: regular rate and rhythm, S1, S2 normal, no gallop  Abdomen: Soft, non tender, + BS  Extremities: no cyanosis or clubbing. No significant edema, scratches bilateral lower legs     LABs:  CBC:   Recent Labs     09/12/22  0300   WBC 5.2   HGB 7.7*   HCT 26.6*   PLT 81*     BMP:   Recent Labs     09/12/22  0721 09/13/22  0317    141   K 3.6* 3.7   CO2 21 22   BUN 12 10   CREATININE 0.45* 0.47*   LABGLOM >60 >60   GLUCOSE 79 73     LIVER PROFILE:  Recent Labs     09/12/22  0721 09/13/22  0317   AST 45* 48*   ALT 30 30   LABALBU 1.4* 1.7*     ABG:  Lab Results   Component Value Date/Time    FIO2 NOT REPORTED 02/08/2022 07:10 PM       Lab Results   Component Value Date/Time    FIO2 NOT REPORTED 02/08/2022 07:10 PM     Radiology:  9/5/22 CXR  Cardiomegaly with left basilar infiltrate. Support tubes as described above. MRI brain 9/6/22  Chronic microvascular disease without acute intracranial abnormality. X-ray abdomen 9/12/22  Enteric device extends to the stomach. Partially visualized distended distal   large bowel redemonstrated.          Impression:  Chronic hypoxic respiratory failure  Atelectasis  Hypotension/Septic Shock requiring vasopressors [place]/hypernatremia  UTI/Indwelling angeles /stenotrophomonas maltophilia resistant to Bactrim  on urine culture status post cefepime  Linwood's syndrome   Obstructive sleep apnea/Obesity  A. fib, CAD, CHF, DM, dementia, HTN, liver cirrhosis, esophageal varices, discitis    Recommendations:  Monitor off of oxygen   Pulmicort 0.5 b.i.d. Albuterol HFA as needed  Continue tube feeds as tolerated per NG tube  Colorectal surgery following, s/p decompressive colonoscopy, per RN patient not a surgical candidate for PEG tube  GI signed off    Continue midodrine/monitor BP off of pressors  Finished Levaquin for UTI   Consider neurology evaluation   Colorectal surgery following, s/p decompressive colonoscopy, possible surgical management eventually  PT/OT  Discussed with RN  GI prophylaxis, Pepcid  DVT prophylaxis with EPC, Lovenox on hold, monitor platelets  PT/OT  Rehab discharge planning  Patient is being seen in collaboration with Dr. Blue Clancy. Final/further recommendations and plan to follow once evaluated by collaborating physician.   Will follow with you       Electronically signed by   LEILA Sánchez-CNP  Patient seen under the supervision of Blue Clancy MD, [facility]
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
## 2022-09-14 NOTE — PROGRESS NOTES
[facility]    Gastroenterology Daily Progress Note      Patient:   Behzad Bone   :    1955   Facility:   [facility]  Date:     2022  Consultant:   LEILA Campbell CNP, CNP      SUBJECTIVE  74 y.o. female admitted 2022 with Leukocytosis [D72.829]  Generalized abdominal pain [R10.84]  Leukocytosis, unspecified type [D72.829] and seen for romero syndrom, cirrhosis. The pt was seen and examined. Had another large non bloody BM today, no c/o abdominal pain or nausea, tolerating tube feeding.  .        OBJECTIVE  Scheduled Meds:   famotidine  20 mg Per NG tube BID    insulin lispro  0-16 Units SubCUTAneous Q6H    nystatin   Topical BID    docusate sodium  100 mg Oral Daily    potassium bicarb-citric acid  40 mEq Per NG tube BID    insulin glargine  20 Units SubCUTAneous BID    budesonide  0.5 mg Nebulization BID    midodrine  10 mg Per NG tube TID WC    sertraline  100 mg Per NG tube Daily    potassium bicarb-citric acid  20 mEq Per NG tube Daily    [Held by provider] enoxaparin  1 mg/kg SubCUTAneous BID    polyethylene glycol  17 g Oral Daily    traZODone  100 mg Oral Nightly    sodium chloride flush  5-40 mL IntraVENous 2 times per day    silver sulfADIAZINE   Topical Nightly    [Held by provider] sodium chloride  1,000 mL IntraVENous Once       Vital Signs:  BP (!) 120/94   Pulse 98   Temp 97.6 °F (36.4 °C) (Temporal)   Resp 16   Ht 5' 2\" (1.575 m)   Wt 220 lb (99.8 kg)   SpO2 95%   BMI 40.24 kg/m²      Physical Exam:   General Appearance: ill appearing alert and oriented to person, place and time, well-developed and well-nourished, in no acute distress  Skin: warm and dry, no rash or erythema  Head: normocephalic and atraumatic  Eyes: pupils equal, round, and reactive to light, extraocular eye movements intact, conjunctivae normal  ENT: hearing grossly normal bilaterally  Neck: neck supple and non tender without mass, no thyromegaly or thyroid nodules, no cervical lymphadenopathy   Pulmonary/Chest: clear to auscultation bilaterally- no wheezes, rales or rhonchi, normal air movement, no respiratory distress  Cardiovascular: normal rate, regular rhythm, normal S1 and S2, no murmurs, rubs, clicks or gallops, distal pulses intact, no carotid bruits  Abdomen: soft, non-tender, distended, hypoactive bowel sounds, no masses or organomegaly  Extremities: no cyanosis, clubbing or edema NG for tube feeding   Musculoskeletal: normal range of motion, no joint swelling, deformity or tenderness  Neurologic: no cranial nerve deficit and muscle strength normal    Lab and Imaging Review     CBC  Recent Labs     09/12/22  0300   WBC 5.2   HGB 7.7*   HCT 26.6*   MCV 88.7   PLT 81*       BMP  Recent Labs     09/12/22  0721 09/13/22  0317    141   K 3.6* 3.7   * 112*   CO2 21 22   BUN 12 10   CREATININE 0.45* 0.47*   GLUCOSE 79 73   CALCIUM 7.3* 7.8*       LFTS  Recent Labs     09/12/22  0721 09/13/22  0317   ALKPHOS 121* 131*   ALT 30 30   AST 45* 48*   PROT 4.6* 4.9*   BILITOT 0.6 0.8   BILIDIR 0.3  --    LABALBU 1.4* 1.7*       DECOMPRESSIVE COLONOSCOPY Alvin Parrish MD 9/2/22     The patient's rectal tube balloon was desufflated and the tube moved with a large amount of liquid stool and air released. The tube was then removed. A rectal exam was performed and no abnormalities noted. The scope was then inserted and advanced through the rectum and sigmoid colon. The sigmoid colon was tortuous but the scope was able to be safely advanced. Areas of the sigmoid were dilated with some mucosal hyperenhancement indicative of stretching/dilation. The scope was advanced to the mid transverse colon. No masses seen. The transverse colon was also dilated. The colon was decompressed from the scope location in the mid transverse colon. The colon was able to be decompressed and the scope was carefully withdrawn. No immediate complications.  The patient's abdomen was soft and much less distended at the conclusion of the procedure. Prep: Fair. Liquid stool did have to be suctioned throughout the colon. FINDINGS:9/10/22 kub   There are prominent air-filled loops of colon that are similar compared to   prior. There is no free air. There are no suspicious calcifications. There   is no acute osseous abnormality. The surrounding soft tissues are   unremarkable. Impression   Prominent air-filled loops of colon that are similar compared to prior that   may correlate with patient's history of Juan Jose syndrome. FINDINGS:ct abd 8/29/22   Lie of contrast limits evaluation. Lower Chest: Partially imaged left pleural effusion with atelectasis. Organs: Nodular liver contour. Absent gallbladder. No intrahepatic or   extrahepatic biliary ductal dilatation. Bilateral nephrolithiasis, cluster   of stones in the left kidney measures approximately 9 mm. Mild right   hydroureteronephrosis, unchanged, no ureteral or bladder stones. Unremarkable pancreas, adrenal glands. Borderline splenomegaly. GI/Bowel: Severe dilatation of the colon without evidence of obstruction,   with air-fluid levels within the colon. Colonic dilatation was visualized on   prior exams. Small hiatal hernia with paraesophageal varices and upper   abdominal varices, unchanged. Unremarkable small bowel. Appendix is not   clearly visualized. Pelvis: Uterus is not visualized. Pedroza. No bladder wall thickening. Peritoneum/Retroperitoneum: No free fluid. No free air. No adenopathy. Bones/Soft Tissues: No acute fracture. Degenerative changes of the   thoracolumbar spine. Prior laminectomy at L4. Thinning of the abdominal   wall, unchanged. Small lipoma posterior to left sacrum, unchanged (2:134).            Impression   Severe dilatation of the colon without evidence of obstruction, with   air-fluid levels within the colon, concerning for [place] syndrome, however anal/rectal stricture cannot be ruled out. Colonic dilatation was visualized   on prior exams. Sequelae of cirrhosis with portal hypertension, including paraesophageal   varices, similar to prior. FINDINGS:kub 9/11/22   There is redemonstration of prominent air-filled loop colon and this appears   stable. There is no definite free air. There are no suspicious   calcifications. There is a gastric tube the tip in the proximal aspect of   the stomach. Impression   Dilated air-filled loop colon that is stable compared to prior exam.           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 Uti with sepsis     4.elevated lft's, hx previous alcohol abuse, cirrhosis no overt liver lesions afp normal 5/30/22, did have negative liver autoimmune w/u and negative acute hepatitis panel in 2021      Time spent reviewing chart assessing pt and d/w attending md around 30 minutes     Gi signing off f/u as outpt with dr Jasmin Beck      This plan was formulated in collaboration with Dr. Ru Painter .     Electronically signed by: LEILA Philip CNP on 9/14/2022 at 7:47 AM

## 2022-09-14 NOTE — PLAN OF CARE
Problem: Discharge Planning  Goal: Discharge to home or other facility with appropriate resources  Outcome: Progressing     Problem: Pain  Goal: Verbalizes/displays adequate comfort level or baseline comfort level  Outcome: Progressing     Problem: Skin/Tissue Integrity  Goal: Absence of new skin breakdown  Description: 1. Monitor for areas of redness and/or skin breakdown  2. Assess vascular access sites hourly  3. Every 4-6 hours minimum:  Change oxygen saturation probe site  4. Every 4-6 hours:  If on nasal continuous positive airway pressure, respiratory therapy assess nares and determine need for appliance change or resting period.   Outcome: Progressing     Problem: Safety - Adult  Goal: Free from fall injury  Outcome: Progressing     Problem: ABCDS Injury Assessment  Goal: Absence of physical injury  Outcome: Progressing     Problem: Chronic Conditions and Co-morbidities  Goal: Patient's chronic conditions and co-morbidity symptoms are monitored and maintained or improved  Outcome: Progressing     Problem: Nutrition Deficit:  Goal: Optimize nutritional status  Outcome: Progressing  Flowsheets (Taken 9/13/2022 1348 by Reyes Zhang, RD, LD)  Nutrient intake appropriate for improving, restoring, or maintaining nutritional needs:   Recommend, monitor, and adjust tube feedings and TPN/PPN based on assessed needs   Assess nutritional status and recommend course of action     Problem: Respiratory - Adult  Goal: Achieves optimal ventilation and oxygenation  9/13/2022 2242 by Daniella Perea RN  Outcome: Progressing  9/13/2022 0849 by Osito Ferrari RCP  Outcome: Progressing

## 2022-09-14 NOTE — PROGRESS NOTES
.. PALLIATIVE CARE TEAM    Patient: Roro Roman  Room: 2029/2029-01    Reason For Consult   Goals of care evaluation  Distress management  Symptom Management  Guidance and support  Facilitate communications  Assistance in coordinating care  Recommendations for the above    Impression: Roro Roman is a 74y.o. year old female with  has a past medical history of Arthritis, CAD (coronary artery disease), CHF (congestive heart failure) ([address] COPD (chronic obstructive pulmonary disease) ([address] Dementia ([facility] Diabetes mellitus ([address] Fibromyalgia, Headache, Hypertension, Liver disease, LEONARDA (obstructive sleep apnea), Panic attack, and Paroxysmal atrial fibrillation ([facility] .  Currently hospitalized for the management of. The Palliative Care Team is following to assist with goals of care and family support.      Code Status  Full Code  PLAN:  - the patient is alert to person and place  - per the surgery notes, the patient is a poor surgical candidate and they made recommendations   - patient to have a swallow study  - patient to go to [facility]  - patient remains a full code and I talked to  Mario Ventura about her wishes and he states\" yes she would want to have CPR and resuscitation   - I perfect serve Dr. Sabas Boast to call  Mario Ventura with a medical update as he has not talked to any doctor for 12 days  - will follow for goals of care and support    Vital Signs:  /87   Pulse (!) 104   Temp 97.9 °F (36.6 °C) (Temporal)   Resp 16   Ht 5' 2\" (1.575 m)   Wt 220 lb (99.8 kg)   SpO2 96%   BMI 40.24 kg/m²     Patient Active Problem List   Diagnosis    Hypokalemia    CHF (congestive heart failure) (HCC)    Atrial fibrillation ([address]    Hypertension    LEONARDA (obstructive sleep apnea)    Hyperglycemia due to diabetes mellitus ([address]    Lymphedema    Noncompliance    CAD (coronary artery disease)    COPD (chronic obstructive pulmonary disease) ([address]    Hyperammonemia (HCC)    Thrombocytopenia (HCC)    Chronic anemia Hepatic cirrhosis (HCC)    Fecal impaction (HCC)    Pancytopenia (HCC)    Urinary tract infection associated with indwelling urethral catheter (HCC)    [place]'s syndrome    Dementia (HCC)    Diabetes mellitus ([facility]    Fibromyalgia    Liver disease    Panic attack    Hypotension    Chronic diastolic CHF (congestive heart failure), NYHA class 3 (HCC)    Acute urinary retention    Acute on chronic respiratory failure with hypoxemia (HCC)    Alcoholic cirrhosis (HCC)    Anasarca    Asthma without status asthmaticus    Bacterial pneumonia    Cauda equina syndrome (HCC)    Sepsis (HCC)    Hydronephrosis    Hyperlipidemia    Head contusion    Gastroesophageal reflux disease    Chronic fatigue syndrome    Fatigue    Essential tremor    Esophageal varices (HCC)    Epidural abscess    Displacement of lumbar intervertebral disc without myelopathy    Degenerative joint disease involving multiple joints    Diarrhea    Disorders of both mitral and tricuspid valves    Cervical spondylosis without myelopathy    Compression fracture of lumbar vertebra with routine healing    Lumbar radiculopathy    Shock, septic (HCC)    Encephalopathy acute    Stroke-like symptoms    Leukocytosis    Dysphagia       Palliative Interaction:Patient is in bed and she is alert to person and place, and she was unaware of time and circumstance. She was fidgeting with papers with phone numbers on them. I ask about her  Kayley Wilson and she states\" yes I have seen him. \" She has an NG tube placed for tube feedings. Per the GI notes, patient is tolerating tube feeds and will have a swallow study today. Per colorectal surgeon the patient is a poor surgical candidate, and they have stated their recommendations. Per the [facility] the patient does not want to go home with her  and that she wants to go to [place] with to be with her daughter Beto Sandoval.    I call the patient's  Kayley Wilson to ask about his understanding of her medical condition and goals of care, as well to talk to him again about her code status and wishes for CPR and resuscitation. Alcira Fuentes called me back. I ask Alcira Fuentes about a medical update from any doctor,  and he states\" no that he had not talked to a doctor since she has been here. \"  I suggest to Alcira Fuentes that he have a medical update from a physician, and he is agreeable. I update him that I will connect him with a physician that he can talk with. I again ask if he has thought about her wishes for CPR as she is a full code and I explain it in detail, CPR, shocking and the ventilator. I ask what her wishes are and if she would want to live her life on a machine, and Alcira Fuentes says \" well yes probably. \"  I tell him that after he talks with the medical team, we can talk again and her wishes, he is agreeable. I talk about discharge plans and he states that his daughter had talked about taking her to [facility], and he states\" I told her that I could not afford to drive to see her there. \" Alcira Fuentes states that the goal is to have her come home again. Alcira Fuentes had told me that she had been bed bound for several years, and that he had cash lift for her, though he could not use it himself to life her. He states that he had very little help to care for her. I offer Alcira Fuentes much emotional support and he is appreciative. Will follow for goals of care and support. I perfect serve Dr. Marck Ponce to call  Alcira Fuentes for a medical update.              Electronically signed by   Daniel Wu RN  Palliative Care Team  on 9/14/2022 at 3:09 PM

## 2022-09-14 NOTE — PROGRESS NOTES
[facility]  Office: [address], DO, Odalis Din, DO, Kelby Acron, DO, Mook Jenkins Blood, DO, Kady Bustillo MD, Jagdish Varela MD, Katina Tsai MD, Abiodun Barajas MD,  Blayne Robertson MD, Kyleigh Holman MD, Feroz Carrillo, DO, Honey Fry MD,  Clifton Ro MD, Kevin Lee MD, Bria Wood, DO, Kim Sanchez MD, Arabella Neff MD, Kaylee Dozier MD, Steff Lipscomb MD, Monae Briceño MD, Lilli Dyer MD, Niko Diaz DO, Adarsh Renee MD, Orlin Ovalle MD, Franci Carson, CNP,  Adelia Alpers, CNP, Jenny Hernandez, CNP, Margot Turner, CNP,  Alicia Harper, DNP, Neville Fletcher, CNP, Jessica Haddad, CNP, Lien Delgadillo, CNP, Nikko Cuadra, CNP, Rochelle Esparza, CNP, DILAN ObrienC, Amaya Ponce, CNS, Canelo Paredes, DNP, Zhanna Bejarano, CNP, Pancho Cunningham, CNP, Amanda Williamson, [id number]    Progress Note    9/14/2022    7:51 AM    Name:   Soy Arredondo  MRN:     [id number]     Jyotsna Jacobol:      [de-identified]   Room:   2037/203701   Day:  16  Admit Date:  8/29/2022  8:35 PM    PCP:   Sudheer Gatica MD  Code Status:  Full Code    Subjective:     C/C:   Chief Complaint   Patient presents with    Abdominal Pain     N/V/D    Hematuria     X few months       Interval History Status: improved. Patient more alert. Denies any chest pain, shortness of breath, nausea or vomiting, fevers chills or complaints. When I ask with her related NG tube. Brief History: This is a 80-year-old female with a history of dementia and type 2 diabetes that presents with abdominal pain, nausea and vomiting. 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 She underwent decompressive colonoscopy with colorectal surgery and was placed on stool softeners and laxative.   She is transatrial with TPN due to prolonged bowel rest also has been started on tube feeding. Due to her cognitive status she has been unable to undergo swallow study. Currently awaiting LTAC placement. Review of Systems:     Constitutional:  negative for chills, fevers, sweats  Respiratory:  negative for cough, dyspnea on exertion, shortness of breath, wheezing  Cardiovascular:  negative for chest pain, chest pressure/discomfort, lower extremity edema, palpitations  Gastrointestinal:  negative for abdominal pain, constipation, diarrhea, nausea, vomiting  Neurological:  negative for dizziness, headache    Medications: Allergies:     Allergies   Allergen Reactions    Quetiapine      Other reaction(s): Unknown  Other reaction(s): Hallucinations  seroquel    Venlafaxine      Other reaction(s): Hallucinations, Unknown  effexor      Aspirin Other (See Comments)     Bleeding disorder  Other reaction(s): Unknown  Bleeding disorder      Fentanyl      Other reaction(s): Unknown    Other Other (See Comments)     \"NO SUPPOSED TO HAVE ASPIRIN\"    Quetiapine Fumarate      Other reaction(s): Unknown    Venlafaxine Hcl      Other reaction(s): Unknown       Current Meds:   Scheduled Meds:    famotidine  20 mg Per NG tube BID    insulin lispro  0-16 Units SubCUTAneous Q6H    nystatin   Topical BID    docusate sodium  100 mg Oral Daily    potassium bicarb-citric acid  40 mEq Per NG tube BID    insulin glargine  20 Units SubCUTAneous BID    budesonide  0.5 mg Nebulization BID    midodrine  10 mg Per NG tube TID WC    sertraline  100 mg Per NG tube Daily    potassium bicarb-citric acid  20 mEq Per NG tube Daily    [Held by provider] enoxaparin  1 mg/kg SubCUTAneous BID    polyethylene glycol  17 g Oral Daily    traZODone  100 mg Oral Nightly    sodium chloride flush  5-40 mL IntraVENous 2 times per day    silver sulfADIAZINE   Topical Nightly    [Held by provider] sodium chloride  1,000 mL IntraVENous Once     Continuous Infusions:    sodium chloride 10 mL/hr at 09/12/22 1653    dextrose       PRN Meds: magnesium sulfate, potassium chloride **OR** potassium alternative oral replacement, sodium phosphate IVPB **OR** sodium phosphate IVPB, sodium chloride flush, potassium chloride, morphine, sodium chloride flush, sodium chloride, magnesium sulfate, ondansetron **OR** ondansetron, acetaminophen **OR** acetaminophen, glucose, dextrose bolus **OR** dextrose bolus, glucagon (rDNA), dextrose, oxybutynin, albuterol sulfate HFA    Data:     Past Medical History:   has a past medical history of Arthritis, CAD (coronary artery disease), CHF (congestive heart failure) ([facility] COPD (chronic obstructive pulmonary disease) [address] [facility] Diabetes mellitus ([facility] Fibromyalgia, Headache, Hypertension, Liver disease, LEONARDA (obstructive sleep apnea), Panic attack, and Paroxysmal atrial fibrillation ([facility] Social History:   reports that she has never smoked. She has never used smokeless tobacco. She reports that she does not currently use alcohol. She reports that she does not use drugs. Family History:   Family History   Problem Relation Age of Onset    Heart Failure Mother     Cancer Father     Cancer Sister     Cancer Brother        Vitals:  BP (!) 120/94   Pulse 98   Temp 97.6 °F (36.4 °C) (Temporal)   Resp 16   Ht 5' 2\" (1.575 m)   Wt 220 lb (99.8 kg)   SpO2 95%   BMI 40.24 kg/m²   Temp (24hrs), Av.1 °F (36.2 °C), Min:96.1 °F (35.6 °C), Max:97.7 °F (36.5 °C)    Recent Labs     22  1636 22  2325 22  0441   POCGLU 84 106* 126* 123*       I/O (24Hr):     Intake/Output Summary (Last 24 hours) at 2022 0751  Last data filed at 2022 3821  Gross per 24 hour   Intake 1132.38 ml   Output 725 ml   Net 407.38 ml       Labs:  Hematology:  Recent Labs     22  0300   WBC 5.2   RBC 3.00*   HGB 7.7*   HCT 26.6*   MCV 88.7   MCH 25.7   MCHC 28.9   RDW 25.0*   PLT 81*   MPV 9.5     Chemistry:  Recent Labs     22  0721 227 22  0623    141  --    K 3. 6* 3.7  --    * 112*  --    CO2 21 22  --    GLUCOSE 79 73  --    BUN 12 10  --    CREATININE 0.45* 0.47*  --    MG 2.0 1.9 2.2   ANIONGAP 5* 7*  --    LABGLOM >60 >60  --    GFRAA >60 >60  --    CALCIUM 7.3* 7.8*  --    PHOS 2.1* 2.5*  --      Recent Labs     09/12/22  0721 09/12/22  1105 09/13/22  0317 09/13/22  0413 09/13/22  1222 09/13/22  1550 09/13/22  1636 09/13/22 2015 09/13/22  2325 09/14/22  0441   PROT 4.6*  --  4.9*  --   --   --   --   --   --   --    LABALBU 1.4*  --  1.7*  --   --   --   --   --   --   --    AST 45*  --  48*  --   --   --   --   --   --   --    ALT 30  --  30  --   --   --   --   --   --   --    ALKPHOS 121*  --  131*  --   --   --   --   --   --   --    BILITOT 0.6  --  0.8  --   --   --   --   --   --   --    BILIDIR 0.3  --   --   --   --   --   --   --   --   --    POCGLU  --    < >  --    < > 94 97 84 106* 126* 123*    < > = values in this interval not displayed. ABG:  Lab Results   Component Value Date/Time    FIO2 NOT REPORTED 02/08/2022 07:10 PM     Lab Results   Component Value Date/Time    SPECIAL 7ML RT [facility] 08/30/2022 12:57 AM     Lab Results   Component Value Date/Time    CULTURE NO GROWTH 5 DAYS 08/30/2022 12:57 AM       Radiology:  XR ABDOMEN (KUB) (SINGLE AP VIEW)    Result Date: 9/11/2022  Dilated air-filled loop colon that is stable compared to prior exam.     XR ABDOMEN (KUB) (SINGLE AP VIEW)    Result Date: 9/10/2022  Prominent air-filled loops of colon that are similar compared to prior that may correlate with patient's history of [place] syndrome. XR ABDOMEN (KUB) (SINGLE AP VIEW)    Result Date: 9/9/2022  Enteric tube remains at the level of the body of the stomach. Redemonstration of colonic distension. XR ABDOMEN (KUB) (SINGLE AP VIEW)    Result Date: 9/8/2022  Gas distended colon with moderate stool distally, similar in appearance to prior radiograph.      XR ABDOMEN (KUB) (SINGLE AP VIEW)    Result Date: 9/8/2022  Dilated colon again demonstrated with findings suggestive of at least moderate rectal stool burden. This may be due to a functional or mechanical distal colonic obstruction. XR ABDOMEN FOR NG/OG/NE TUBE PLACEMENT    Result Date: 9/12/2022  Enteric device terminating in the stomach     XR ABDOMEN FOR NG/OG/NE TUBE PLACEMENT    Result Date: 9/8/2022  Enteric tube terminates at the level of the body of the stomach.        Physical Examination:        General appearance:  alert, cooperative and no distress  Mental Status:  oriented to person, place and time and normal affect  Lungs:  clear to auscultation bilaterally, normal effort  Heart:  regular rate and rhythm, no murmur  Abdomen:  soft, nontender, mildly distended, tinkling bowel sounds, no masses, hepatomegaly, splenomegaly  Extremities:  no edema, redness, tenderness in the calves  Skin:  no gross lesions, rashes, induration    Assessment:        Hospital Problems             Last Modified POA    * (Principal) Shock, septic ([address] 9/4/2022 Yes    Urinary tract infection associated with indwelling urethral catheter ([address] 9/4/2022 Yes    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    Hypotension 9/4/2022 Yes    Acute urinary retention 9/4/2022 Yes    Encephalopathy acute 9/4/2022 Yes    Stroke-like symptoms 9/3/2022 Yes    Leukocytosis 9/3/2022 Yes    Dysphagia 9/4/2022 Yes    Hypertension (Chronic) 8/30/2022 Yes    LEONARDA (obstructive sleep apnea) (Chronic) 8/30/2022 Yes    Lymphedema (Chronic) 8/30/2022 Yes    COPD (chronic obstructive pulmonary disease) (HCC) (Chronic) 8/30/2022 Yes    Hepatic cirrhosis ([address] 8/31/2022 Yes       Plan:        Continue to feed as tolerated  Swallow study today  Monitoring control blood pressure  Oxygen and aerosols as ordered  GI and DVT prophylaxis  CPAP nightly and as needed  Antibiotics as ordered  Discharge planning    Aurora Null DO  9/14/2022  7:51 AM

## 2022-09-14 NOTE — CARE COORDINATION
Social [facility]. Will confirm this choice with patient and spouse. Will need precert. Hens started. 1500 - Patient failed swallow study and needs to keep the NG in. Will need LTACH.

## 2022-09-15 LAB
ANION GAP SERPL CALCULATED.3IONS-SCNC: 5 MMOL/L (ref 9–17)
BUN BLDV-MCNC: 13 MG/DL (ref 8–23)
BUN/CREAT BLD: 28 (ref 9–20)
CALCIUM SERPL-MCNC: 8 MG/DL (ref 8.6–10.4)
CHLORIDE BLD-SCNC: 111 MMOL/L (ref 98–107)
CO2: 26 MMOL/L (ref 20–31)
CREAT SERPL-MCNC: 0.46 MG/DL (ref 0.5–0.9)
GFR AFRICAN AMERICAN: >60 ML/MIN
GFR NON-AFRICAN AMERICAN: >60 ML/MIN
GFR SERPL CREATININE-BSD FRML MDRD: ABNORMAL ML/MIN/{1.73_M2}
GLUCOSE BLD-MCNC: 167 MG/DL (ref 65–105)
GLUCOSE BLD-MCNC: 221 MG/DL (ref 65–105)
GLUCOSE BLD-MCNC: 234 MG/DL (ref 70–99)
GLUCOSE BLD-MCNC: 235 MG/DL (ref 65–105)
HCT VFR BLD CALC: 24.2 % (ref 36.3–47.1)
HEMOGLOBIN: 7.4 G/DL (ref 11.9–15.1)
MCH RBC QN AUTO: 26.3 PG (ref 25.2–33.5)
MCHC RBC AUTO-ENTMCNC: 30.6 G/DL (ref 28.4–34.8)
MCV RBC AUTO: 86.1 FL (ref 82.6–102.9)
NRBC AUTOMATED: 0 PER 100 WBC
PDW BLD-RTO: 24.6 % (ref 11.8–14.4)
PLATELET # BLD: 72 K/UL (ref 138–453)
PMV BLD AUTO: 9.1 FL (ref 8.1–13.5)
POTASSIUM SERPL-SCNC: 3.8 MMOL/L (ref 3.7–5.3)
RBC # BLD: 2.81 M/UL (ref 3.95–5.11)
SODIUM BLD-SCNC: 142 MMOL/L (ref 135–144)
WBC # BLD: 3.3 K/UL (ref 3.5–11.3)

## 2022-09-15 PROCEDURE — 2500000003 HC RX 250 WO HCPCS: Performed by: INTERNAL MEDICINE

## 2022-09-15 PROCEDURE — 2580000003 HC RX 258: Performed by: STUDENT IN AN ORGANIZED HEALTH CARE EDUCATION/TRAINING PROGRAM

## 2022-09-15 PROCEDURE — 6370000000 HC RX 637 (ALT 250 FOR IP): Performed by: STUDENT IN AN ORGANIZED HEALTH CARE EDUCATION/TRAINING PROGRAM

## 2022-09-15 PROCEDURE — 80048 BASIC METABOLIC PNL TOTAL CA: CPT

## 2022-09-15 PROCEDURE — 6370000000 HC RX 637 (ALT 250 FOR IP): Performed by: INTERNAL MEDICINE

## 2022-09-15 PROCEDURE — 94640 AIRWAY INHALATION TREATMENT: CPT

## 2022-09-15 PROCEDURE — 6370000000 HC RX 637 (ALT 250 FOR IP): Performed by: NURSE PRACTITIONER

## 2022-09-15 PROCEDURE — 94761 N-INVAS EAR/PLS OXIMETRY MLT: CPT

## 2022-09-15 PROCEDURE — 6370000000 HC RX 637 (ALT 250 FOR IP): Performed by: FAMILY MEDICINE

## 2022-09-15 PROCEDURE — 2500000003 HC RX 250 WO HCPCS: Performed by: STUDENT IN AN ORGANIZED HEALTH CARE EDUCATION/TRAINING PROGRAM

## 2022-09-15 PROCEDURE — 1200000000 HC SEMI PRIVATE

## 2022-09-15 PROCEDURE — 85027 COMPLETE CBC AUTOMATED: CPT

## 2022-09-15 PROCEDURE — 6360000002 HC RX W HCPCS: Performed by: INTERNAL MEDICINE

## 2022-09-15 PROCEDURE — 2580000003 HC RX 258: Performed by: INTERNAL MEDICINE

## 2022-09-15 PROCEDURE — 82947 ASSAY GLUCOSE BLOOD QUANT: CPT

## 2022-09-15 PROCEDURE — 36415 COLL VENOUS BLD VENIPUNCTURE: CPT

## 2022-09-15 RX ORDER — 0.9 % SODIUM CHLORIDE 0.9 %
500 INTRAVENOUS SOLUTION INTRAVENOUS ONCE
Status: COMPLETED | OUTPATIENT
Start: 2022-09-15 | End: 2022-09-15

## 2022-09-15 RX ADMIN — BUDESONIDE 500 MCG: 0.5 SUSPENSION RESPIRATORY (INHALATION) at 20:21

## 2022-09-15 RX ADMIN — MIDODRINE HYDROCHLORIDE 10 MG: 10 TABLET ORAL at 16:52

## 2022-09-15 RX ADMIN — POLYETHYLENE GLYCOL (3350) 17 G: 17 POWDER, FOR SOLUTION ORAL at 10:16

## 2022-09-15 RX ADMIN — SILVER SULFADIAZINE: 10 CREAM TOPICAL at 00:58

## 2022-09-15 RX ADMIN — SODIUM CHLORIDE, PRESERVATIVE FREE 10 ML: 5 INJECTION INTRAVENOUS at 21:17

## 2022-09-15 RX ADMIN — ANTI-FUNGAL POWDER MICONAZOLE NITRATE TALC FREE: 1.42 POWDER TOPICAL at 21:15

## 2022-09-15 RX ADMIN — INSULIN GLARGINE 20 UNITS: 100 INJECTION, SOLUTION SUBCUTANEOUS at 10:17

## 2022-09-15 RX ADMIN — TRAZODONE HYDROCHLORIDE 100 MG: 100 TABLET ORAL at 01:28

## 2022-09-15 RX ADMIN — INSULIN GLARGINE 20 UNITS: 100 INJECTION, SOLUTION SUBCUTANEOUS at 21:11

## 2022-09-15 RX ADMIN — SODIUM CHLORIDE 500 ML: 9 INJECTION, SOLUTION INTRAVENOUS at 17:28

## 2022-09-15 RX ADMIN — NYSTATIN: 100000 CREAM TOPICAL at 21:16

## 2022-09-15 RX ADMIN — TRAZODONE HYDROCHLORIDE 100 MG: 100 TABLET ORAL at 21:15

## 2022-09-15 RX ADMIN — FAMOTIDINE 20 MG: 20 TABLET, FILM COATED ORAL at 10:16

## 2022-09-15 RX ADMIN — INSULIN LISPRO 4 UNITS: 100 INJECTION, SOLUTION INTRAVENOUS; SUBCUTANEOUS at 01:00

## 2022-09-15 RX ADMIN — NYSTATIN: 100000 CREAM TOPICAL at 01:00

## 2022-09-15 RX ADMIN — NYSTATIN: 100000 CREAM TOPICAL at 10:16

## 2022-09-15 RX ADMIN — MIDODRINE HYDROCHLORIDE 10 MG: 10 TABLET ORAL at 12:35

## 2022-09-15 RX ADMIN — INSULIN LISPRO 4 UNITS: 100 INJECTION, SOLUTION INTRAVENOUS; SUBCUTANEOUS at 12:35

## 2022-09-15 RX ADMIN — BUDESONIDE 500 MCG: 0.5 SUSPENSION RESPIRATORY (INHALATION) at 09:24

## 2022-09-15 RX ADMIN — POTASSIUM BICARBONATE 40 MEQ: 782 TABLET, EFFERVESCENT ORAL at 00:59

## 2022-09-15 RX ADMIN — SERTRALINE HYDROCHLORIDE 100 MG: 100 TABLET ORAL at 10:16

## 2022-09-15 RX ADMIN — INSULIN LISPRO 4 UNITS: 100 INJECTION, SOLUTION INTRAVENOUS; SUBCUTANEOUS at 06:14

## 2022-09-15 RX ADMIN — INSULIN GLARGINE 20 UNITS: 100 INJECTION, SOLUTION SUBCUTANEOUS at 01:01

## 2022-09-15 RX ADMIN — SODIUM CHLORIDE, PRESERVATIVE FREE 10 ML: 5 INJECTION INTRAVENOUS at 10:00

## 2022-09-15 RX ADMIN — FAMOTIDINE 20 MG: 20 TABLET, FILM COATED ORAL at 21:15

## 2022-09-15 RX ADMIN — FAMOTIDINE 20 MG: 20 TABLET, FILM COATED ORAL at 00:59

## 2022-09-15 RX ADMIN — SILVER SULFADIAZINE: 10 CREAM TOPICAL at 21:16

## 2022-09-15 RX ADMIN — SODIUM CHLORIDE, PRESERVATIVE FREE 10 ML: 5 INJECTION INTRAVENOUS at 01:28

## 2022-09-15 RX ADMIN — MIDODRINE HYDROCHLORIDE 10 MG: 10 TABLET ORAL at 10:16

## 2022-09-15 ASSESSMENT — PAIN DESCRIPTION - ONSET: ONSET: ON-GOING

## 2022-09-15 ASSESSMENT — PAIN SCALES - GENERAL
PAINLEVEL_OUTOF10: 0
PAINLEVEL_OUTOF10: 0

## 2022-09-15 ASSESSMENT — PAIN DESCRIPTION - DESCRIPTORS: DESCRIPTORS: SORE

## 2022-09-15 ASSESSMENT — PAIN DESCRIPTION - LOCATION: LOCATION: THROAT

## 2022-09-15 ASSESSMENT — PAIN DESCRIPTION - FREQUENCY: FREQUENCY: CONTINUOUS

## 2022-09-15 NOTE — PROGRESS NOTES
[facility]  Office: [address], DO, Odalis Din, DO, Kelby Acron, DO, Mook Jenkins Blood, DO, Kady Bustillo MD, Jagdish Varela MD, Katina Tsai MD, Abiodun aBrajas MD,  Blayne Robertson MD, Kyleigh Holman MD, Feroz Carrillo, DO, Honey Fry MD,  Clifton Ro MD, Kevin Lee MD, Bria Wood, DO, Kim Sanchez MD, Arabella Neff MD, Kaylee Dozier MD, Steff Lipscomb MD, Monae Briceño MD, Lilli Dyer MD, Niko Diaz DO, Adarsh Renee MD, Orlin Ovalle MD, Franci Carson, CNP,  Adelia Alpers, CNP, Jenny Hernandez, CNP, Margot Turner, CNP,  Alicia Harper, DNP, Neville Fletcher, CNP, Jessica Haddad, CNP, Lien Delgadillo, CNP, Nikko Cuadra, CNP, Rochelle Esparza, CNP, DILAN ObrienC, Amaya Ponce, CNS, Canelo Paredes, DNP, Zhanna Bejarano, CNP, Pancho Cunningham, CNP, Amanda Williamson, [id number]    Progress Note    9/15/2022    8:13 AM    Name:   Soy Arredondo  MRN:     [id number]     [place]:      [de-identified]   Room:   2014/2014-02   Day:  16  Admit Date:  8/29/2022  8:35 PM    PCP:   Sudheer Gatica MD  Code Status:  Full Code    Subjective:     C/C:   Chief Complaint   Patient presents with    Abdominal Pain     N/V/D    Hematuria     X few months       Interval History Status: improved. Patient is more alert every day. Denies any complaints of chest pain, shortness of breath, nausea vomiting, fevers or chills. Other than the sore throat which is associate with her NG tube she has no acute complaints. Brief History: This is a 73-year-old female with a history of dementia and type 2 diabetes that presents with abdominal pain, nausea and vomiting. Upon evaluation she is found to have markedly dilated colon consistent with [place] syndrome. She underwent decompressive colonoscopy with colorectal surgery and was placed on stool softeners and laxative.   She is transatrial with TPN due to prolonged bowel rest also has been started on tube feeding. Due to her cognitive status she has been unable to undergo swallow study. Currently awaiting LTAC placement. Review of Systems:     Constitutional:  negative for chills, fevers, sweats  Respiratory:  negative for cough, dyspnea on exertion, shortness of breath, wheezing  Cardiovascular:  negative for chest pain, chest pressure/discomfort, lower extremity edema, palpitations  Gastrointestinal:  negative for abdominal pain, constipation, diarrhea, nausea, vomiting  Neurological:  negative for dizziness, headache    Medications: Allergies:     Allergies   Allergen Reactions    Quetiapine      Other reaction(s): Unknown  Other reaction(s): Hallucinations  seroquel    Venlafaxine      Other reaction(s): Hallucinations, Unknown  effexor      Aspirin Other (See Comments)     Bleeding disorder  Other reaction(s): Unknown  Bleeding disorder      Fentanyl      Other reaction(s): Unknown    Other Other (See Comments)     \"NO SUPPOSED TO HAVE ASPIRIN\"    Quetiapine Fumarate      Other reaction(s): Unknown    Venlafaxine Hcl      Other reaction(s): Unknown       Current Meds:   Scheduled Meds:    famotidine  20 mg Per NG tube BID    insulin lispro  0-16 Units SubCUTAneous Q6H    nystatin   Topical BID    docusate sodium  100 mg Oral Daily    potassium bicarb-citric acid  40 mEq Per NG tube BID    insulin glargine  20 Units SubCUTAneous BID    budesonide  0.5 mg Nebulization BID    midodrine  10 mg Per NG tube TID WC    sertraline  100 mg Per NG tube Daily    potassium bicarb-citric acid  20 mEq Per NG tube Daily    [Held by provider] enoxaparin  1 mg/kg SubCUTAneous BID    polyethylene glycol  17 g Oral Daily    traZODone  100 mg Oral Nightly    sodium chloride flush  5-40 mL IntraVENous 2 times per day    silver sulfADIAZINE   Topical Nightly    [Held by provider] sodium chloride  1,000 mL IntraVENous Once     Continuous Infusions:    sodium chloride 10 mL/hr at 22 1653    dextrose       PRN Meds: magnesium sulfate, potassium chloride **OR** potassium alternative oral replacement, sodium phosphate IVPB **OR** sodium phosphate IVPB, sodium chloride flush, potassium chloride, morphine, sodium chloride flush, sodium chloride, magnesium sulfate, ondansetron **OR** ondansetron, acetaminophen **OR** acetaminophen, glucose, dextrose bolus **OR** dextrose bolus, glucagon (rDNA), dextrose, oxybutynin, albuterol sulfate HFA    Data:     Past Medical History:   has a past medical history of Arthritis, CAD (coronary artery disease), CHF (congestive heart failure) ([facility] COPD (chronic obstructive pulmonary disease) ([facility] Diabetes mellitus ([facility] Fibromyalgia, Headache, Hypertension, Liver disease, LEONARDA (obstructive sleep apnea), Panic attack, and Paroxysmal atrial fibrillation ([facility] Social History:   reports that she has never smoked. She has never used smokeless tobacco. She reports that she does not currently use alcohol. She reports that she does not use drugs. Family History:   Family History   Problem Relation Age of Onset    Heart Failure Mother     Cancer Father     Cancer Sister     Cancer Brother        Vitals:  BP (!) 112/41   Pulse 100   Temp 97.7 °F (36.5 °C) (Oral)   Resp 17   Ht 5' 2\" (1.575 m)   Wt 220 lb (99.8 kg)   SpO2 97%   BMI 40.24 kg/m²   Temp (24hrs), Av.6 °F (36.4 °C), Min:97.3 °F (36.3 °C), Max:97.9 °F (36.6 °C)    Recent Labs     22  1649 22  1959 22  2337 09/15/22  0609   POCGLU 216* 208* 242* 235*       I/O (24Hr):     Intake/Output Summary (Last 24 hours) at 9/15/2022 0813  Last data filed at 9/15/2022 7270  Gross per 24 hour   Intake 996 ml   Output 550 ml   Net 446 ml       Labs:  Hematology:  Recent Labs     09/15/22  0714   WBC 3.3*   RBC 2.81*   HGB 7.4*   HCT 24.2*   MCV 86.1   MCH 26.3   MCHC 30.6   RDW 24.6*   PLT 72*   MPV 9.1     Chemistry:  Recent Labs     22  8833 09/14/22  0623 09/15/22  0714     --  142   K 3.7  --  3.8   *  --  111*   CO2 22  --  26   GLUCOSE 73  --  234*   BUN 10  --  13   CREATININE 0.47*  --  0.46*   MG 1.9 2.2  --    ANIONGAP 7*  --  5*   LABGLOM >60  --  >60   GFRAA >60  --  >60   CALCIUM 7.8*  --  8.0*   PHOS 2.5*  --   --      Recent Labs     09/13/22  0317 09/13/22  0413 09/14/22  0757 09/14/22  1025 09/14/22  1649 09/14/22  1959 09/14/22  2337 09/15/22  0609   PROT 4.9*  --   --   --   --   --   --   --    LABALBU 1.7*  --   --   --   --   --   --   --    AST 48*  --   --   --   --   --   --   --    ALT 30  --   --   --   --   --   --   --    ALKPHOS 131*  --   --   --   --   --   --   --    BILITOT 0.8  --   --   --   --   --   --   --    POCGLU  --    < > 188* 199* 216* 208* 242* 235*    < > = values in this interval not displayed. ABG:  Lab Results   Component Value Date/Time    FIO2 NOT REPORTED 02/08/2022 07:10 PM     Lab Results   Component Value Date/Time    SPECIAL 7ML RT [facility] 08/30/2022 12:57 AM     Lab Results   Component Value Date/Time    CULTURE NO GROWTH 5 DAYS 08/30/2022 12:57 AM       Radiology:  XR ABDOMEN (KUB) (SINGLE AP VIEW)    Result Date: 9/11/2022  Dilated air-filled loop colon that is stable compared to prior exam.     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 XR ABDOMEN (KUB) (SINGLE AP VIEW)    Result Date: 9/9/2022  Enteric tube remains at the level of the body of the stomach. Redemonstration of colonic distension. XR ABDOMEN (KUB) (SINGLE AP VIEW)    Result Date: 9/8/2022  Gas distended colon with moderate stool distally, similar in appearance to prior radiograph. XR ABDOMEN (KUB) (SINGLE AP VIEW)    Result Date: 9/8/2022  Dilated colon again demonstrated with findings suggestive of at least moderate rectal stool burden.   This may be due to a functional or mechanical distal colonic obstruction. XR ABDOMEN FOR NG/OG/NE TUBE PLACEMENT    Result Date: 9/12/2022  Enteric device terminating in the stomach     FL MODIFIED BARIUM SWALLOW W VIDEO    Result Date: 9/14/2022  Penetration and aspiration observed with the administration of pureed and thick liquid barium textures. Please see separate speech pathology report for full discussion of findings and recommendations. Physical Examination:        General appearance:  alert, cooperative and no distress  Mental Status:  oriented to person, place and time and normal affect  Lungs:  clear to auscultation bilaterally, normal effort, diminished throughout  Heart:  regular rate and rhythm, no murmur  Abdomen:  soft, nontender, nondistended, normal bowel sounds, no masses, hepatomegaly, splenomegaly  Extremities:  no edema, redness, tenderness in the calves  Skin:  no gross lesions, rashes, induration    Assessment:        Hospital Problems             Last Modified POA    * (Principal) Shock, septic ([address] 9/4/2022 Yes    Urinary tract infection associated with indwelling urethral catheter ([address] 9/4/2022 Yes    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    Hypotension 9/4/2022 Yes    Acute urinary retention 9/4/2022 Yes    Encephalopathy acute 9/4/2022 Yes    Stroke-like symptoms 9/3/2022 Yes    Leukocytosis 9/3/2022 Yes    Dysphagia 9/4/2022 Yes    Hypertension (Chronic) 8/30/2022 Yes    LEONARDA (obstructive sleep apnea) (Chronic) 8/30/2022 Yes    Lymphedema (Chronic) 8/30/2022 Yes    COPD (chronic obstructive pulmonary disease) (HCC) (Chronic) 8/30/2022 Yes    Hepatic cirrhosis ([address] 8/31/2022 Yes       Plan:        Continue tube feeding as ordered  Monitor and control blood pressure  Supplement electrolytes as needed  Stool softeners as ordered  Oxygen and aerosols as ordered  GI and DVT prophylaxis  Continue insulin scale  CPAP nightly  Discharge planning, LTAC denied. Unable to go to skilled facility with NG tube.

## 2022-09-15 NOTE — PROGRESS NOTES
Patient was sitting up in bed as writer entered the room (no family members present). Patient engaged in a brief conversation but seemed confused (complained of bad leg pain). Writer noticed patient was becoming agitated, so decided to allow the patient to continue resting. Writer provided words of hope and comfort and left a prayer card as additional support. The patient was grateful for the prayer card. Spiritual care will maintain daily follow ups and visits.      09/15/22 1426   Encounter Summary   Service Provided For: Patient   Referral/Consult From: Palliative Care   Last Encounter  09/15/22   Complexity of Encounter Low   Begin Time 1225   End Time  1235   Total Time Calculated 10 min   Encounter    Type Follow up   Spiritual/Emotional needs   Type Spiritual Support   Palliative Care   Type Palliative Care, Follow-up   Assessment/Intervention/Outcome   Assessment Anxious   Intervention Nurtured Hope;Sustaining Presence/Ministry of presence;Read/Provided Scripture   Outcome Expressed Gratitude   Plan and Referrals   Plan/Referrals Continue to visit, (comment)

## 2022-09-15 NOTE — PROGRESS NOTES
Colorectal Surgery:  Daily Progress Note                  PATIENT NAME: Estevan Sebastian     TODAY'S DATE: 9/15/2022  CC: confusion    SUBJECTIVE:     Pt seen and examined at bedside. Afebrile, HR low 100's, normotensive. More confused this morning than days prior. Somewhat more distended today but having regular bowel movements. Denies abdominal pain this AM. Tube feeds running at 40cc/h, no emesis. Patient denies nausea.     OBJECTIVE:   VITALS:  BP (!) 128/44   Pulse (!) 105   Temp 97.8 °F (36.6 °C) (Oral)   Resp 16   Ht 5' 2\" (1.575 m)   Wt 220 lb (99.8 kg)   SpO2 99%   BMI 40.24 kg/m²      INTAKE/OUTPUT:      Intake/Output Summary (Last 24 hours) at 9/15/2022 0642  Last data filed at 9/15/2022 9400  Gross per 24 hour   Intake 1026 ml   Output 975 ml   Net 51 ml       PHYSICAL EXAM:     General appearance: Alert, intermittent confusion  Lungs: normal effort with symmetric rise and fall of chest wall, no accessory muscle use   Heart:  regular rate and rhythm  Abdomen: softly distended, nontender to palpation in all quadrants, no guarding or peritoneal signs, scabs in multiple locations without underlying fluctuance, erythema, or drainage  Extremities: no cyanosis, clubbing, or edema  Skin:  scratch marks to anterior shins, no gross lesions, induration      Data:  CBC with Differential:    Lab Results   Component Value Date/Time    WBC 5.2 09/12/2022 03:00 AM    RBC 3.00 09/12/2022 03:00 AM    HGB 7.7 09/12/2022 03:00 AM    HCT 26.6 09/12/2022 03:00 AM    PLT 81 09/12/2022 03:00 AM    MCV 88.7 09/12/2022 03:00 AM    MCH 25.7 09/12/2022 03:00 AM    MCHC 28.9 09/12/2022 03:00 AM    RDW 25.0 09/12/2022 03:00 AM    LYMPHOPCT 19 09/12/2022 03:00 AM    MONOPCT 9 09/12/2022 03:00 AM    BASOPCT 1 09/12/2022 03:00 AM    MONOSABS 0.47 09/12/2022 03:00 AM    LYMPHSABS 0.99 09/12/2022 03:00 AM    EOSABS 0.05 09/12/2022 03:00 AM    BASOSABS 0.05 09/12/2022 03:00 AM    DIFFTYPE NOT REPORTED 02/09/2022 01:43 PM     BMP: Lab Results   Component Value Date/Time     09/13/2022 03:17 AM    K 3.7 09/13/2022 03:17 AM     09/13/2022 03:17 AM    CO2 22 09/13/2022 03:17 AM    BUN 10 09/13/2022 03:17 AM    LABALBU 1.7 09/13/2022 03:17 AM    CREATININE 0.47 09/13/2022 03:17 AM    CALCIUM 7.8 09/13/2022 03:17 AM    GFRAA >60 09/13/2022 03:17 AM    LABGLOM >60 09/13/2022 03:17 AM    GLUCOSE 73 09/13/2022 03:17 AM       Radiology Review:    XR ABDOMEN (KUB) (SINGLE AP VIEW)    Result Date: 9/11/2022  EXAMINATION: ONE SUPINE XRAY VIEW(S) OF THE ABDOMEN 9/11/2022 5:51 am COMPARISON: Abdominal radiographs performed 09/10/2022. HISTORY: ORDERING SYSTEM PROVIDED HISTORY: check for Juan Jose TECHNOLOGIST PROVIDED HISTORY: check for Juan Jose Reason for Exam: Distension FINDINGS: There is redemonstration of prominent air-filled loop colon and this appears stable. There is no definite free air. There are no suspicious calcifications. There is a gastric tube the tip in the proximal aspect of the stomach. Dilated air-filled loop colon that is stable compared to prior exam.     XR ABDOMEN (KUB) (SINGLE AP VIEW)    Result Date: 9/10/2022  EXAMINATION: ONE SUPINE XRAY VIEW(S) OF THE ABDOMEN 9/10/2022 8:12 am COMPARISON: Abdominal radiograph performed 09/09/2022. HISTORY: ORDERING SYSTEM PROVIDED HISTORY: Juan Jose's TECHNOLOGIST PROVIDED HISTORY: Tiffanie Gan Reason for Exam: [place] FINDINGS: There are prominent air-filled loops of colon that are similar compared to prior. There is no free air. There are no suspicious calcifications. There is no acute osseous abnormality. The surrounding soft tissues are unremarkable. Prominent air-filled loops of colon that are similar compared to prior that may correlate with patient's history of Juan Jose syndrome.         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 female with concern for chronic pseudo-obstruction. Pt is s/p decompressive colonoscopy on 9/1/2022. Plan:  - Goal K >4, Mg >2, replace PRN  - Continue tube feeds for nutrition   - Continue bowel regimen - colace, miralax daily.   - Monitor for bowel fxn. Multiple bowel movements/24h  - Continue turning patient q2hrs, OOB to chair as able   - No plans for acute surgical intervention. Abdomen somewhat more distended this morning, but patient continues to tolerate tube feeds and had multiple bowel movements yesterday. Continue to monitor. Uriel Rivera, DO  General Surgery PGY-4  I Dr. Austin Spine saw and examined the patient. I have edited the above and agree with the above. Alvin Parrish  Colorectal Surgery

## 2022-09-15 NOTE — PLAN OF CARE
Problem: Discharge Planning  Goal: Discharge to home or other facility with appropriate resources  9/15/2022 1531 by Destiny Robertson RN  Outcome: Progressing  9/15/2022 0638 by Eren Caceres RN  Outcome: Progressing  Flowsheets (Taken 9/15/2022 0230)  Discharge to home or other facility with appropriate resources:   Identify barriers to discharge with patient and caregiver   Arrange for needed discharge resources and transportation as appropriate   Identify discharge learning needs (meds, wound care, etc)   Refer to discharge planning if patient needs post-hospital services based on physician order or complex needs related to functional status, cognitive ability or social support system  9/15/2022 0229 by Fernando Rogers RN  Outcome: Progressing     Problem: Pain  Goal: Verbalizes/displays adequate comfort level or baseline comfort level  9/15/2022 1531 by Destiny Robertson RN  Outcome: Progressing  9/15/2022 0638 by Eren Caceres RN  Outcome: Progressing  9/15/2022 0229 by Fernando Rogers RN  Outcome: Progressing     Problem: Skin/Tissue Integrity  Goal: Absence of new skin breakdown  Description: 1. Monitor for areas of redness and/or skin breakdown  2. Assess vascular access sites hourly  3. Every 4-6 hours minimum:  Change oxygen saturation probe site  4. Every 4-6 hours:  If on nasal continuous positive airway pressure, respiratory therapy assess nares and determine need for appliance change or resting period.   9/15/2022 1531 by Destiny Robertson RN  Outcome: Progressing  9/15/2022 0638 by Eren Caceres RN  Outcome: Progressing  9/15/2022 0229 by Fernando Rogers RN  Outcome: Progressing     Problem: Safety - Adult  Goal: Free from fall injury  9/15/2022 1531 by Destiny Robertson RN  Outcome: Progressing  9/15/2022 0638 by Eren Caceres RN  Outcome: Progressing  9/15/2022 0229 by Fernando Rogers RN  Outcome: Progressing     Problem: ABCDS Injury Assessment  Goal: Absence of physical injury  9/15/2022 1531 by Destiny Robertson RN  Outcome: Progressing  9/15/2022 0638 by Alexsandra Emery RN  Outcome: Progressing  9/15/2022 0229 by Fatoumata Wise RN  Outcome: Progressing     Problem: Chronic Conditions and Co-morbidities  Goal: Patient's chronic conditions and co-morbidity symptoms are monitored and maintained or improved  9/15/2022 1531 by Lauren Hickman RN  Outcome: Progressing  9/15/2022 0638 by Alexsandra Emery RN  Outcome: Progressing  Flowsheets (Taken 9/15/2022 0230)  Care Plan - Patient's Chronic Conditions and Co-Morbidity Symptoms are Monitored and Maintained or Improved:   Monitor and assess patient's chronic conditions and comorbid symptoms for stability, deterioration, or improvement   Collaborate with multidisciplinary team to address chronic and comorbid conditions and prevent exacerbation or deterioration   Update acute care plan with appropriate goals if chronic or comorbid symptoms are exacerbated and prevent overall improvement and discharge  9/15/2022 0229 by Fatoumata Wise RN  Outcome: Progressing     Problem: Nutrition Deficit:  Goal: Optimize nutritional status  9/15/2022 1531 by Lauren Hickman RN  Outcome: Progressing  9/15/2022 0638 by Alexsandra Emery RN  Outcome: Progressing  9/15/2022 0229 by Fatoumata Wise RN  Outcome: Progressing     Problem: Respiratory - Adult  Goal: Achieves optimal ventilation and oxygenation  9/15/2022 1531 by Lauren Hickman RN  Outcome: Progressing  9/15/2022 0638 by Alexsandra Emery RN  Outcome: Progressing  Flowsheets (Taken 9/15/2022 0230)  Achieves optimal ventilation and oxygenation:   Assess for changes in respiratory status   Assess for changes in mentation and behavior   Position to facilitate oxygenation and minimize respiratory effort   Assess and instruct to report shortness of breath or any respiratory difficulty   Respiratory therapy support as indicated  9/15/2022 0229 by Fatoumata Wise RN  Outcome: Progressing

## 2022-09-15 NOTE — PROGRESS NOTES
Pt sleeping. I did not wake. Our team talked with patient's  yesterday about goals of care and code status.  wants patient to remain a full code and full treatment at this time. I talked with Dr. Tawanna Dunbar about the patient's condition. He is going to call  to give him options for care. Will follow up tomorrow.     [address], EZRA Aguilar, [facility]

## 2022-09-15 NOTE — PLAN OF CARE
Problem: Discharge Planning  Goal: Discharge to home or other facility with appropriate resources  9/15/2022 0638 by Mele Melissa RN  Outcome: Progressing  Flowsheets (Taken 9/15/2022 0230)  Discharge to home or other facility with appropriate resources:   Identify barriers to discharge with patient and caregiver   Arrange for needed discharge resources and transportation as appropriate   Identify discharge learning needs (meds, wound care, etc)   Refer to discharge planning if patient needs post-hospital services based on physician order or complex needs related to functional status, cognitive ability or social support system     Problem: Pain  Goal: Verbalizes/displays adequate comfort level or baseline comfort level  9/15/2022 0638 by Mele Melissa RN  Outcome: Progressing     Problem: Skin/Tissue Integrity  Goal: Absence of new skin breakdown  Description: 1. Monitor for areas of redness and/or skin breakdown  2. Assess vascular access sites hourly  3. Every 4-6 hours minimum:  Change oxygen saturation probe site  4. Every 4-6 hours:  If on nasal continuous positive airway pressure, respiratory therapy assess nares and determine need for appliance change or resting period.   9/15/2022 5830 by Mele Melissa RN  Outcome: Progressing     Problem: Safety - Adult  Goal: Free from fall injury  9/15/2022 0638 by Mele Melissa RN  Outcome: Progressing     Problem: ABCDS Injury Assessment  Goal: Absence of physical injury  9/15/2022 7706 by Mele Melissa RN  Outcome: Progressing     Problem: Chronic Conditions and Co-morbidities  Goal: Patient's chronic conditions and co-morbidity symptoms are monitored and maintained or improved  9/15/2022 0638 by Mele Melissa RN  Outcome: Progressing  Flowsheets (Taken 9/15/2022 0230)  Care Plan - Patient's Chronic Conditions and Co-Morbidity Symptoms are Monitored and Maintained or Improved:   Monitor and assess patient's chronic conditions and comorbid symptoms for stability, deterioration, or improvement   Collaborate with multidisciplinary team to address chronic and comorbid conditions and prevent exacerbation or deterioration   Update acute care plan with appropriate goals if chronic or comorbid symptoms are exacerbated and prevent overall improvement and discharge     Problem: Nutrition Deficit:  Goal: Optimize nutritional status  9/15/2022 0638 by Beau Jay RN  Outcome: Progressing     Problem: Respiratory - Adult  Goal: Achieves optimal ventilation and oxygenation  9/15/2022 0638 by Beau Jay RN  Outcome: Progressing  Flowsheets (Taken 9/15/2022 0230)  Achieves optimal ventilation and oxygenation:   Assess for changes in respiratory status   Assess for changes in mentation and behavior   Position to facilitate oxygenation and minimize respiratory effort   Assess and instruct to report shortness of breath or any respiratory difficulty   Respiratory therapy support as indicated     Problem: Respiratory - Adult  Goal: Able to breathe comfortably    Problem: Respiratory - Adult  Goal: Patient's breath sounds will be clear and equal  Outcome: Progressing

## 2022-09-15 NOTE — FLOWSHEET NOTE
Patient transferred from CVU per bed. Awake and alert Denies any needs at this time  NGT patent with TF infusing.  Bed low and locked with call light in reach

## 2022-09-15 NOTE — PROGRESS NOTES
Pulmonary Critical Care Progress Note  Yves Rogers MD     Patient seen for the follow up of  Shock, septic ([facility] [address]     Subjective:  No significant overnight events noted. She is awake and alert, remains confused. She is saturating well on room air. She denies shortness of breath, cough or chest pain. She failed her swallow study yesterday, remains on tube feeds, tolerating at 40 mL an hour. Her abdomen does seem a little more distended today however. Examination:  Vitals: BP (!) 112/41   Pulse 100   Temp 97.7 °F (36.5 °C) (Oral)   Resp 17   Ht 5' 2\" (1.575 m)   Wt 220 lb (99.8 kg)   SpO2 97%   BMI 40.24 kg/m²   General appearance:  awake, confused and cooperative with exam  Neck: No JVD  Lungs: Decreased breath sound no crackles or wheezes  Heart: regular rate and rhythm, S1, S2 normal, no gallop  Abdomen: Soft, non tender, + BS  Extremities: no cyanosis or clubbing. No significant edema, scratches bilateral lower legs     LABs:  CBC:   Recent Labs     09/15/22  0714   WBC 3.3*   HGB 7.4*   HCT 24.2*   PLT 72*     BMP:   Recent Labs     09/13/22  0317 09/15/22  0714    142   K 3.7 3.8   CO2 22 26   BUN 10 13   CREATININE 0.47* 0.46*   LABGLOM >60 >60   GLUCOSE 73 234*     LIVER PROFILE:  Recent Labs     09/13/22 0317   AST 48*   ALT 30   LABALBU 1.7*     ABG:  Lab Results   Component Value Date/Time    FIO2 NOT REPORTED 02/08/2022 07:10 PM       Lab Results   Component Value Date/Time    FIO2 NOT REPORTED 02/08/2022 07:10 PM     Radiology:  9/5/22 CXR  Cardiomegaly with left basilar infiltrate. Support tubes as described above. MRI brain 9/6/22  Chronic microvascular disease without acute intracranial abnormality. X-ray abdomen 9/12/22  Enteric device extends to the stomach. Partially visualized distended distal   large bowel redemonstrated.          Impression:  Chronic hypoxic respiratory failure  Atelectasis  Hypotension/Septic Shock requiring vasopressors [place]/hypernatremia  UTI/Indwelling angeles /stenotrophomonas maltophilia resistant to Bactrim  on urine culture status post cefepime  Juan Jose's syndrome   Obstructive sleep apnea/Obesity  A. fib, CAD, CHF, DM, dementia, HTN, liver cirrhosis, esophageal varices, discitis    Recommendations:  Monitor off of oxygen   Pulmicort 0.5 b.i.d. Albuterol HFA as needed  Continue tube feeds as tolerated per NG tube  Colorectal surgery following, s/p decompressive colonoscopy, per RN patient not a surgical candidate  ?  Long-term plan for nutrition  GI signed off    Continue midodrine/monitor BP off of pressors  Finished Levaquin for UTI   Consider neurology evaluation   Colorectal surgery following, s/p decompressive colonoscopy, possible surgical management eventually  PT/OT  GI prophylaxis, Pepcid  DVT prophylaxis with EPC, Lovenox on hold, monitor platelets  PT/OT  Rehab discharge planning  Discussed with RN  Will follow with you     Electronically signed by     Mike Holloway MD on 9/15/2022 at 12:44 PM  Pulmonary Critical Care and Sleep Medicine,  [facility]  Cell: [phone]  Office: [phone]

## 2022-09-15 NOTE — PLAN OF CARE
Problem: Respiratory - Adult  Goal: Achieves optimal ventilation and oxygenation  Outcome: Progressing     Problem: Respiratory - Adult  Goal: Able to breathe comfortably  Outcome: Progressing     Problem: Respiratory - Adult  Goal: Patient's breath sounds will be clear and equal  Outcome: Progressing      BRONCHOSPASM/BRONCHOCONSTRICTION    IMPROVE  AERATION/BREATHSOUNDS  ADMINISTER BRONCHODILATOR THERAPY AS APPROPRIATE  ASSESS BREATH SOUNDS  PATIENT EDUCATION AS NEEDED

## 2022-09-15 NOTE — PLAN OF CARE
Problem: Discharge Planning  Goal: Discharge to home or other facility with appropriate resources  Outcome: Progressing     Problem: Pain  Goal: Verbalizes/displays adequate comfort level or baseline comfort level  Outcome: Progressing     Problem: Skin/Tissue Integrity  Goal: Absence of new skin breakdown  Description: 1. Monitor for areas of redness and/or skin breakdown  2. Assess vascular access sites hourly  3. Every 4-6 hours minimum:  Change oxygen saturation probe site  4. Every 4-6 hours:  If on nasal continuous positive airway pressure, respiratory therapy assess nares and determine need for appliance change or resting period.   Outcome: Progressing     Problem: Safety - Adult  Goal: Free from fall injury  Outcome: Progressing     Problem: ABCDS Injury Assessment  Goal: Absence of physical injury  Outcome: Progressing     Problem: Chronic Conditions and Co-morbidities  Goal: Patient's chronic conditions and co-morbidity symptoms are monitored and maintained or improved  Outcome: Progressing     Problem: Nutrition Deficit:  Goal: Optimize nutritional status  Outcome: Progressing     Problem: Respiratory - Adult  Goal: Achieves optimal ventilation and oxygenation  9/15/2022 0229 by Luna Ortiz RN  Outcome: Progressing  9/14/2022 2016 by Roma Delgado RCP  Outcome: Progressing  Goal: Able to breathe comfortably  9/14/2022 2016 by Roma Delgado RCP  Outcome: Progressing  Goal: Patient's breath sounds will be clear and equal  9/14/2022 2016 by Roma Delgado RCP  Outcome: Progressing

## 2022-09-16 LAB
ANION GAP SERPL CALCULATED.3IONS-SCNC: 6 MMOL/L (ref 9–17)
BUN BLDV-MCNC: 12 MG/DL (ref 8–23)
BUN/CREAT BLD: 30 (ref 9–20)
CALCIUM SERPL-MCNC: 8.1 MG/DL (ref 8.6–10.4)
CHLORIDE BLD-SCNC: 111 MMOL/L (ref 98–107)
CO2: 25 MMOL/L (ref 20–31)
CREAT SERPL-MCNC: 0.4 MG/DL (ref 0.5–0.9)
GFR AFRICAN AMERICAN: >60 ML/MIN
GFR NON-AFRICAN AMERICAN: >60 ML/MIN
GFR SERPL CREATININE-BSD FRML MDRD: ABNORMAL ML/MIN/{1.73_M2}
GLUCOSE BLD-MCNC: 181 MG/DL (ref 65–105)
GLUCOSE BLD-MCNC: 183 MG/DL (ref 65–105)
GLUCOSE BLD-MCNC: 183 MG/DL (ref 65–105)
GLUCOSE BLD-MCNC: 186 MG/DL (ref 65–105)
GLUCOSE BLD-MCNC: 223 MG/DL (ref 65–105)
GLUCOSE BLD-MCNC: 228 MG/DL (ref 70–99)
HCT VFR BLD CALC: 24.7 % (ref 36.3–47.1)
HEMOGLOBIN: 7.5 G/DL (ref 11.9–15.1)
MAGNESIUM: 2.1 MG/DL (ref 1.6–2.6)
MCH RBC QN AUTO: 26.4 PG (ref 25.2–33.5)
MCHC RBC AUTO-ENTMCNC: 30.4 G/DL (ref 28.4–34.8)
MCV RBC AUTO: 87 FL (ref 82.6–102.9)
NRBC AUTOMATED: 0 PER 100 WBC
PDW BLD-RTO: 24.5 % (ref 11.8–14.4)
PLATELET # BLD: 68 K/UL (ref 138–453)
PMV BLD AUTO: 9.2 FL (ref 8.1–13.5)
POTASSIUM SERPL-SCNC: 3.3 MMOL/L (ref 3.7–5.3)
RBC # BLD: 2.84 M/UL (ref 3.95–5.11)
SODIUM BLD-SCNC: 142 MMOL/L (ref 135–144)
WBC # BLD: 3.7 K/UL (ref 3.5–11.3)

## 2022-09-16 PROCEDURE — 6370000000 HC RX 637 (ALT 250 FOR IP): Performed by: NURSE PRACTITIONER

## 2022-09-16 PROCEDURE — 2580000003 HC RX 258: Performed by: STUDENT IN AN ORGANIZED HEALTH CARE EDUCATION/TRAINING PROGRAM

## 2022-09-16 PROCEDURE — 1200000000 HC SEMI PRIVATE

## 2022-09-16 PROCEDURE — 6360000002 HC RX W HCPCS: Performed by: INTERNAL MEDICINE

## 2022-09-16 PROCEDURE — 94761 N-INVAS EAR/PLS OXIMETRY MLT: CPT

## 2022-09-16 PROCEDURE — 36415 COLL VENOUS BLD VENIPUNCTURE: CPT

## 2022-09-16 PROCEDURE — 94640 AIRWAY INHALATION TREATMENT: CPT

## 2022-09-16 PROCEDURE — 6370000000 HC RX 637 (ALT 250 FOR IP): Performed by: FAMILY MEDICINE

## 2022-09-16 PROCEDURE — 80048 BASIC METABOLIC PNL TOTAL CA: CPT

## 2022-09-16 PROCEDURE — 85027 COMPLETE CBC AUTOMATED: CPT

## 2022-09-16 PROCEDURE — 92526 ORAL FUNCTION THERAPY: CPT

## 2022-09-16 PROCEDURE — 83735 ASSAY OF MAGNESIUM: CPT

## 2022-09-16 PROCEDURE — 6370000000 HC RX 637 (ALT 250 FOR IP): Performed by: STUDENT IN AN ORGANIZED HEALTH CARE EDUCATION/TRAINING PROGRAM

## 2022-09-16 PROCEDURE — 6370000000 HC RX 637 (ALT 250 FOR IP): Performed by: INTERNAL MEDICINE

## 2022-09-16 PROCEDURE — 82947 ASSAY GLUCOSE BLOOD QUANT: CPT

## 2022-09-16 RX ORDER — DOCUSATE SODIUM LIQUID 100 MG/10ML
100 LIQUID ORAL DAILY
Status: DISCONTINUED | OUTPATIENT
Start: 2022-09-16 | End: 2022-09-18 | Stop reason: CLARIF

## 2022-09-16 RX ORDER — ALBUTEROL SULFATE 2.5 MG/3ML
2.5 SOLUTION RESPIRATORY (INHALATION) 2 TIMES DAILY
Status: DISCONTINUED | OUTPATIENT
Start: 2022-09-16 | End: 2022-09-23 | Stop reason: HOSPADM

## 2022-09-16 RX ADMIN — SILVER SULFADIAZINE: 10 CREAM TOPICAL at 21:04

## 2022-09-16 RX ADMIN — NYSTATIN: 100000 CREAM TOPICAL at 15:44

## 2022-09-16 RX ADMIN — INSULIN GLARGINE 20 UNITS: 100 INJECTION, SOLUTION SUBCUTANEOUS at 10:28

## 2022-09-16 RX ADMIN — ANTI-FUNGAL POWDER MICONAZOLE NITRATE TALC FREE: 1.42 POWDER TOPICAL at 15:43

## 2022-09-16 RX ADMIN — BUDESONIDE 500 MCG: 0.5 SUSPENSION RESPIRATORY (INHALATION) at 07:44

## 2022-09-16 RX ADMIN — SERTRALINE HYDROCHLORIDE 100 MG: 100 TABLET ORAL at 10:26

## 2022-09-16 RX ADMIN — FAMOTIDINE 20 MG: 20 TABLET, FILM COATED ORAL at 10:26

## 2022-09-16 RX ADMIN — POTASSIUM BICARBONATE 40 MEQ: 782 TABLET, EFFERVESCENT ORAL at 10:26

## 2022-09-16 RX ADMIN — FAMOTIDINE 20 MG: 20 TABLET, FILM COATED ORAL at 21:25

## 2022-09-16 RX ADMIN — BUDESONIDE 500 MCG: 0.5 SUSPENSION RESPIRATORY (INHALATION) at 20:34

## 2022-09-16 RX ADMIN — INSULIN GLARGINE 20 UNITS: 100 INJECTION, SOLUTION SUBCUTANEOUS at 21:35

## 2022-09-16 RX ADMIN — ANTI-FUNGAL POWDER MICONAZOLE NITRATE TALC FREE: 1.42 POWDER TOPICAL at 21:03

## 2022-09-16 RX ADMIN — MIDODRINE HYDROCHLORIDE 10 MG: 10 TABLET ORAL at 15:27

## 2022-09-16 RX ADMIN — SODIUM CHLORIDE, PRESERVATIVE FREE 10 ML: 5 INJECTION INTRAVENOUS at 21:37

## 2022-09-16 RX ADMIN — NYSTATIN: 100000 CREAM TOPICAL at 21:04

## 2022-09-16 RX ADMIN — MIDODRINE HYDROCHLORIDE 10 MG: 10 TABLET ORAL at 10:26

## 2022-09-16 RX ADMIN — ALBUTEROL SULFATE 2.5 MG: 2.5 SOLUTION RESPIRATORY (INHALATION) at 20:34

## 2022-09-16 RX ADMIN — INSULIN LISPRO 4 UNITS: 100 INJECTION, SOLUTION INTRAVENOUS; SUBCUTANEOUS at 07:08

## 2022-09-16 RX ADMIN — TRAZODONE HYDROCHLORIDE 100 MG: 100 TABLET ORAL at 21:25

## 2022-09-16 RX ADMIN — MIDODRINE HYDROCHLORIDE 10 MG: 10 TABLET ORAL at 18:50

## 2022-09-16 NOTE — PROGRESS NOTES
Speech Language Pathology  [address] [facility]    Dysphagia Treatment Note    Date: 9/16/2022  Patients Name: Landon Mitchell  MRN: [id number]  Diagnosis: dysphagia   Patient Active Problem List   Diagnosis Code    Hypokalemia E87.6    CHF (congestive heart failure) (HCC) I50.9    Atrial fibrillation (HCC) I48.91    Hypertension I10    LEONARDA (obstructive sleep apnea) G47.33    Hyperglycemia due to diabetes mellitus (HCC) E11.65    Lymphedema I89.0    Noncompliance Z91.19    CAD (coronary artery disease) I25.10    COPD (chronic obstructive pulmonary disease) (HCC) J44.9    Hyperammonemia (HCC) E72.20    Thrombocytopenia (HCC) D69.6    Chronic anemia D64.9    Hepatic cirrhosis (HCC) K74.60    Fecal impaction (HCC) K56.41    Pancytopenia (HCC) Y75.405    Urinary tract infection associated with indwelling urethral catheter (HCC) T83.511A, N39.0    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    Dementia (HCC) F03.90    Diabetes mellitus (HCC) E11.9    Fibromyalgia M79.7    Liver disease K76.9    Panic attack F41.0    Hypotension I95.9    Chronic diastolic CHF (congestive heart failure), NYHA class 3 (HCC) I50.32    Acute urinary retention R33.8    Acute on chronic respiratory failure with hypoxemia (HCC) R65.07    Alcoholic cirrhosis (HCC) E97.87    Anasarca R60.1    Asthma without status asthmaticus J45.909    Bacterial pneumonia J15.9    Cauda equina syndrome (HCC) G83.4    Sepsis (HCC) A41.9    Hydronephrosis N13.30    Hyperlipidemia E78.5    Head contusion S00.93XA    Gastroesophageal reflux disease K21.9    Chronic fatigue syndrome R53.82    Fatigue R53.83    Essential tremor G25.0    Esophageal varices (HCC) I85.00    Epidural abscess G06.2    Displacement of lumbar intervertebral disc without myelopathy M51.26    Degenerative joint disease involving multiple joints M15.9    Diarrhea R19.7    Disorders of both mitral and tricuspid valves I08.1    Cervical spondylosis without myelopathy M47.812    Compression fracture of lumbar vertebra with routine healing S32.000D    Lumbar radiculopathy M54.16    Shock, septic (HCC) A41.9, R65.21    Encephalopathy acute G93.40    Stroke-like symptoms R29.90    Leukocytosis D72.829    Dysphagia R13.10       Pain: denies     Dysphagia Treatment  Treatment time:[id number]    Subjective: [] Alert [] Cooperative     [x] Confused     [] Agitated    [] Lethargic    Spoke with RN prior to arrival. RN states physician wanted ST to re-assess PO at bedside to determine if she is appropriate for PO diet. MBS 9/14/22 indicates penetration with eventual aspiration of nectar thick liquids. Pureed consistency resulted in vallecular residue with eventual penetration/aspiration of residue. No other PO assessed. Objective/Assessment:    Order for evaluation acknowledged, patient is already on ST caseload with a plan of care. This session billed as tx. Patient confused but agreeable to tx. OMX completed 5x each with max cues. Positioned HOB to 40 degrees, patient refuses to sit higher despite education re safe feeding strategies. Oral care completed by ST to decrease potential of aspiration pneumonia. Patient tolerated ice chips, one at a time 2x trials. Patient given 6x 1/2 tsp of water. Swallow is timely. No overt s/s of aspiration, however, it should be noted patient had absent cough response on MBS with silent aspiration. Puree (applesauce) given x2 for pleasure. No overt s/s of aspiration observed. At this time, would recommend continuing NPO status with tube feeds until patient is appropriate for repeat MBS for objective assessment of swallowing function. Would recommend the exception of 3-4x ice chips one at a time or 3-4x 1/2 tsp of water for pleasure, following oral care with 1:1 assistance. Patient and RN educated re high potential for aspiration per most recent swallow study and CXR indicating left bibasilar infiltrate. Free water protocol for pleasure and to help with dry mouth only. Discontinue should respiratory status decline. Will continue to follow. Plan:  [x] Continue ST services    [] Discharge from ST:        Discharge recommendations: []  Further therapy recommended at discharge. The patient should be able to tolerate at least 3 hours of therapy per day over 5 days or 15 hours over 7 days. [x] Further therapy recommended at discharge. [] No therapy recommended at discharge.          Treatment completed by:   Mandeep Elizondo CCC-SLP   Speech-Language Pathologist

## 2022-09-16 NOTE — PLAN OF CARE
Problem: Respiratory - Adult  Goal: Achieves optimal ventilation and oxygenation  9/15/2022 2000 by Ronen Puente RCP  Outcome: Progressing     Problem: Respiratory - Adult  Goal: Able to breathe comfortably  Outcome: Progressing     Problem: Respiratory - Adult  Goal: Patient's breath sounds will be clear and equal  Outcome: Progressing       BRONCHOSPASM/BRONCHOCONSTRICTION    IMPROVE  AERATION/BREATHSOUNDS  ADMINISTER BRONCHODILATOR THERAPY AS APPROPRIATE  ASSESS BREATH SOUNDS  PATIENT EDUCATION AS NEEDED

## 2022-09-16 NOTE — PLAN OF CARE
Problem: Discharge Planning  Goal: Discharge to home or other facility with appropriate resources  9/15/2022 2330 by Lore Cali RN  Outcome: Progressing     Problem: Pain  Goal: Verbalizes/displays adequate comfort level or baseline comfort level  9/15/2022 2330 by Lore Cali RN  Outcome: Progressing     Problem: Skin/Tissue Integrity  Goal: Absence of new skin breakdown  Description: 1. Monitor for areas of redness and/or skin breakdown  2. Assess vascular access sites hourly  3. Every 4-6 hours minimum:  Change oxygen saturation probe site  4. Every 4-6 hours:  If on nasal continuous positive airway pressure, respiratory therapy assess nares and determine need for appliance change or resting period.   9/15/2022 2330 by Lore Cali RN  Outcome: Progressing     Problem: Safety - Adult  Goal: Free from fall injury  9/15/2022 2330 by Lore Cali RN  Outcome: Progressing  Flowsheets (Taken 9/15/2022 1533 by Daphne Grace, RN)  Free From Fall Injury: Instruct family/caregiver on patient safety     Problem: ABCDS Injury Assessment  Goal: Absence of physical injury  9/15/2022 2330 by Lore Cali RN  Outcome: Progressing  Flowsheets (Taken 9/15/2022 1533 by Daphne Grace, RN)  Absence of Physical Injury: Implement safety measures based on patient assessment     Problem: Chronic Conditions and Co-morbidities  Goal: Patient's chronic conditions and co-morbidity symptoms are monitored and maintained or improved  9/15/2022 2330 by Lore Cali RN  Outcome: Progressing     Problem: Nutrition Deficit:  Goal: Optimize nutritional status  9/15/2022 2330 by Lore Cali RN  Outcome: Progressing     Problem: Respiratory - Adult  Goal: Achieves optimal ventilation and oxygenation  9/15/2022 2330 by Lore Cali RN  Outcome: Progressing

## 2022-09-16 NOTE — PROGRESS NOTES
Colorectal Surgery:  Daily Progress Note                  PATIENT NAME: Alan Burns     TODAY'S DATE: 9/16/2022  CC: confusion    SUBJECTIVE:     Pt seen and examined at bedside. Afebrile, HR 90's, normotensive. More confused this morning than days prior. Continues to have loose stools. Denies abdominal pain this AM. Tube feeds running at 40cc/h, no emesis.      OBJECTIVE:   VITALS:  BP (!) 124/57   Pulse 98   Temp 97.9 °F (36.6 °C) (Oral)   Resp 18   Ht 5' 2\" (1.575 m)   Wt 226 lb (102.5 kg)   SpO2 96%   BMI 41.34 kg/m²      INTAKE/OUTPUT:      Intake/Output Summary (Last 24 hours) at 9/16/2022 0078  Last data filed at 9/16/2022 0525  Gross per 24 hour   Intake 1613.88 ml   Output 500 ml   Net 1113.88 ml       PHYSICAL EXAM:     General appearance: Alert, intermittent confusion, follows simple commands   Lungs: normal effort with symmetric rise and fall of chest wall, no accessory muscle use   Heart:  regular rate and rhythm  Abdomen: softly distended, nontender to palpation in all quadrants, no guarding or peritoneal signs, scabs in multiple locations without underlying fluctuance, erythema, or drainage  Extremities: no cyanosis, clubbing, or edema  Skin:  scratch marks to anterior shins, no gross lesions, induration    Data:  CBC with Differential:    Lab Results   Component Value Date/Time    WBC 3.3 09/15/2022 07:14 AM    RBC 2.81 09/15/2022 07:14 AM    HGB 7.4 09/15/2022 07:14 AM    HCT 24.2 09/15/2022 07:14 AM    PLT 72 09/15/2022 07:14 AM    MCV 86.1 09/15/2022 07:14 AM    MCH 26.3 09/15/2022 07:14 AM    MCHC 30.6 09/15/2022 07:14 AM    RDW 24.6 09/15/2022 07:14 AM    LYMPHOPCT 19 09/12/2022 03:00 AM    MONOPCT 9 09/12/2022 03:00 AM    BASOPCT 1 09/12/2022 03:00 AM    MONOSABS 0.47 09/12/2022 03:00 AM    LYMPHSABS 0.99 09/12/2022 03:00 AM    EOSABS 0.05 09/12/2022 03:00 AM    BASOSABS 0.05 09/12/2022 03:00 AM    DIFFTYPE NOT REPORTED 02/09/2022 01:43 PM     BMP:    Lab Results   Component Value Date/Time     09/15/2022 07:14 AM    K 3.8 09/15/2022 07:14 AM     09/15/2022 07:14 AM    CO2 26 09/15/2022 07:14 AM    BUN 13 09/15/2022 07:14 AM    LABALBU 1.7 09/13/2022 03:17 AM    CREATININE 0.46 09/15/2022 07:14 AM    CALCIUM 8.0 09/15/2022 07:14 AM    GFRAA >60 09/15/2022 07:14 AM    LABGLOM >60 09/15/2022 07:14 AM    GLUCOSE 234 09/15/2022 07:14 AM       Radiology Review:    XR ABDOMEN (KUB) (SINGLE AP VIEW)    Result Date: 9/11/2022  EXAMINATION: ONE SUPINE XRAY VIEW(S) OF THE ABDOMEN 9/11/2022 5:51 am COMPARISON: Abdominal radiographs performed 09/10/2022. HISTORY: ORDERING SYSTEM PROVIDED HISTORY: check for Juan Jose TECHNOLOGIST PROVIDED HISTORY: check for Axson Reason for Exam: Distension FINDINGS: There is redemonstration of prominent air-filled loop colon and this appears stable. There is no definite free air. There are no suspicious calcifications. There is a gastric tube the tip in the proximal aspect of the stomach. Dilated air-filled loop colon that is stable compared to prior exam.     XR ABDOMEN (KUB) (SINGLE AP VIEW)    Result Date: 9/10/2022  EXAMINATION: ONE SUPINE XRAY VIEW(S) OF THE ABDOMEN 9/10/2022 8:12 am COMPARISON: Abdominal radiograph performed 09/09/2022. HISTORY: ORDERING SYSTEM PROVIDED HISTORY: Juan Jose's TECHNOLOGIST PROVIDED HISTORY: Sky Maciel Reason for Exam: Axson FINDINGS: There are prominent air-filled loops of colon that are similar compared to prior. There is no free air. There are no suspicious calcifications. There is no acute osseous abnormality. The surrounding soft tissues are unremarkable. Prominent air-filled loops of colon that are similar compared to prior that may correlate with patient's history of Juan Jose syndrome.         ASSESSMENT:  Active Hospital Problems    Diagnosis Date Noted    Dysphagia [R13.10] 09/04/2022     Priority: Medium    Encephalopathy acute [G93.40] 09/03/2022     Priority: Medium    Stroke-like symptoms [R29.90] 09/03/2022     Priority: Medium    Leukocytosis [D72.829] 09/03/2022     Priority: Medium    Hypotension [I95.9] 08/31/2022     Priority: Medium    Shock, septic ([facility] [address] [A41.9, R65.21] 08/31/2022     Priority: Medium    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] 08/30/2022     Priority: Medium    Urinary tract infection associated with indwelling urethral catheter ([facility] [address] [T83.511A, N39.0] 08/29/2022     Priority: Medium    Acute urinary retention [R33.8] 09/20/2020     Priority: Medium    Lymphedema [I89.0] 02/09/2022    COPD (chronic obstructive pulmonary disease) ([facility] [address] [J44.9]     LEONARDA (obstructive sleep apnea) [G47.33]     Hypertension [I10]     Hepatic cirrhosis ([facility] [address] [K74.60]        74 y.o. female with concern for chronic pseudo-obstruction. Pt is s/p decompressive colonoscopy on 9/1/2022. Plan:  - Goal K >4, Mg >2, replace PRN  - Continue tube feeds for nutrition   - Continue bowel regimen - colace, miralax daily. Should be placed on a consistent bowel regimen at discharge. - Monitor bowel fxn. Multiple bowel movements/24h  - Continue turning patient q2hrs, OOB to chair as able   - No plans for acute surgical intervention. Tolerating tube feeds and having bowel fxn. Surgery will sign off but call back with any questions/concerns/clinical changes     Xander Rausch, DO  General Surgery PGY-4  I Dr. Hazel Washington saw and examined the patient. I have edited the above and agree with the above. Alvin Parrish  Colorectal Surgery

## 2022-09-16 NOTE — PLAN OF CARE
Problem: Respiratory - Adult  Goal: Achieves optimal ventilation and oxygenation  9/16/2022 0914 by Verlin Curling, RCP  Outcome: Progressing  9/15/2022 2330 by Ishan Aiken RN  Outcome: Progressing  9/15/2022 2000 by Damián Penn RCP  Outcome: Progressing  Goal: Able to breathe comfortably  9/16/2022 0914 by Verlin Curling, RCP  Outcome: Progressing  9/15/2022 2000 by Damián Penn RCP  Outcome: Progressing  Goal: Patient's breath sounds will be clear and equal  9/16/2022 0914 by Verlin Curling, RCP  Outcome: Progressing  9/15/2022 2000 by Damián Penn RCP  Outcome: Progressing

## 2022-09-16 NOTE — PROGRESS NOTES
Pulmonary Critical Care Progress Note  Cathy Doll MD     Patient seen for the follow up of  Shock, septic ([facility] [address]     Subjective:  No significant overnight events noted. She is awake and alert, seems to be a little less confused today. She is saturating well on room air. She denies shortness of breath, cough or chest pain. She remains on tube feeds, tolerating at 40 mL an hour. Examination:  Vitals: BP (!) 126/45   Pulse 100   Temp 97.9 °F (36.6 °C)   Resp 20   Ht 5' 2\" (1.575 m)   Wt 226 lb (102.5 kg)   SpO2 97%   BMI 41.34 kg/m²   General appearance:  awake, mildly confused but cooperative with exam  Neck: No JVD  Lungs: Decreased breath sound no crackles or wheezes  Heart: regular rate and rhythm, S1, S2 normal, no gallop  Abdomen: Soft, non tender, + BS  Extremities: no cyanosis or clubbing. Trace edema, scratches bilateral lower legs     LABs:  CBC:   Recent Labs     09/15/22  0714 09/16/22  0719   WBC 3.3* 3.7   HGB 7.4* 7.5*   HCT 24.2* 24.7*   PLT 72* 68*     BMP:   Recent Labs     09/15/22  0714 09/16/22  0719    142   K 3.8 3.3*   CO2 26 25   BUN 13 12   CREATININE 0.46* 0.40*   LABGLOM >60 >60   GLUCOSE 234* 228*     ABG:  Lab Results   Component Value Date/Time    FIO2 NOT REPORTED 02/08/2022 07:10 PM       Lab Results   Component Value Date/Time    FIO2 NOT REPORTED 02/08/2022 07:10 PM     Radiology:  9/5/22 CXR  Cardiomegaly with left basilar infiltrate. Support tubes as described above. MRI brain 9/6/22  Chronic microvascular disease without acute intracranial abnormality. X-ray abdomen 9/12/22  Enteric device extends to the stomach. Partially visualized distended distal   large bowel redemonstrated.          Impression:  Chronic hypoxic respiratory failure  Atelectasis  Hypotension/Septic Shock requiring vasopressors   BROOKLYN/hypernatremia  UTI/Indwelling angeles /stenotrophomonas maltophilia resistant to Bactrim  on urine culture status post cefepime  Juan Jose's syndrome   Obstructive sleep apnea/Obesity  A. fib, CAD, CHF, DM, dementia, HTN, liver cirrhosis, esophageal varices, discitis    Recommendations:  Monitor off of oxygen   Pulmicort 0.5 b.i.d. Albuterol HFA as needed  Continue tube feeds as tolerated per NG tube  Colorectal surgery following, s/p decompressive colonoscopy, per RN patient not a surgical candidate  ?  Long-term plan for nutrition  GI signed off    Continue midodrine/monitor BP off of pressors  Finished Levaquin for UTI   Consider neurology evaluation   Colorectal surgery s/o;  s/p decompressive colonoscopy, possible surgical management eventually  PT/OT  GI prophylaxis, Pepcid  DVT prophylaxis with EPC, Lovenox on hold, monitor platelets  PT/OT  Rehab discharge planning  Discussed with RN  Will follow with you   Electronically signed by     Morena Giang MD on 9/16/2022 at 3:33 PM  Pulmonary Critical Care and Sleep Medicine,  [facility]  Cell: [phone]  Office: [phone]

## 2022-09-16 NOTE — PROGRESS NOTES
Physical Therapy  DATE: 2022    NAME: Marilou Delgadillo  MRN: [id number]   : 1955    Patient not seen this date for Physical Therapy due to:      [] Cancel by RN or physician due to:    [] Hemodialysis    [] Critical Lab Value Level     [] Blood transfusion in progress    [] Acute or unstable cardiovascular status   _MAP < 55 or more than >115  _HR < 40 or > 130    [] Acute or unstable pulmonary status   -FiO2 > 60%   _RR < 5 or >40    _O2 sats < 85%    [] Strict Bedrest    [] Off Unit for surgery or procedure    [] Off Unit for testing       [] Pending imaging to R/O fracture    [x] Refusal by Patient Pt too tired      [] Other      [] PT being discontinued at this time. Patient independent. No further needs. [] PT being discontinued at this time as the patient has been transferred to hospice care. No further needs.       AILYN QUINONEZ, PTA

## 2022-09-16 NOTE — PROGRESS NOTES
cognitive status she has been unable to undergo swallow study. Currently awaiting discharge disposition. Review of Systems:     Constitutional:  negative for chills, fevers, sweats  Respiratory:  negative for cough, dyspnea on exertion, shortness of breath, wheezing  Cardiovascular:  negative for chest pain, chest pressure/discomfort, lower extremity edema, palpitations  Gastrointestinal:  negative for abdominal pain, constipation, diarrhea, nausea, vomiting  Neurological:  negative for dizziness, headache    Medications: Allergies:     Allergies   Allergen Reactions    Quetiapine      Other reaction(s): Unknown  Other reaction(s): Hallucinations  seroquel    Venlafaxine      Other reaction(s): Hallucinations, Unknown  effexor      Aspirin Other (See Comments)     Bleeding disorder  Other reaction(s): Unknown  Bleeding disorder      Fentanyl      Other reaction(s): Unknown    Other Other (See Comments)     \"NO SUPPOSED TO HAVE ASPIRIN\"    Quetiapine Fumarate      Other reaction(s): Unknown    Venlafaxine Hcl      Other reaction(s): Unknown       Current Meds:   Scheduled Meds:    albuterol  2.5 mg Nebulization BID    potassium bicarb-citric acid  40 mEq Per NG tube Daily    miconazole   Topical BID    famotidine  20 mg Per NG tube BID    insulin lispro  0-16 Units SubCUTAneous Q6H    nystatin   Topical BID    docusate sodium  100 mg Oral Daily    insulin glargine  20 Units SubCUTAneous BID    budesonide  0.5 mg Nebulization BID    midodrine  10 mg Per NG tube TID WC    sertraline  100 mg Per NG tube Daily    [Held by provider] enoxaparin  1 mg/kg SubCUTAneous BID    polyethylene glycol  17 g Oral Daily    traZODone  100 mg Oral Nightly    sodium chloride flush  5-40 mL IntraVENous 2 times per day    silver sulfADIAZINE   Topical Nightly    [Held by provider] sodium chloride  1,000 mL IntraVENous Once     Continuous Infusions:    sodium chloride 10 mL/hr at 09/12/22 1653    dextrose       PRN Meds: magnesium sulfate, potassium chloride **OR** potassium alternative oral replacement, sodium phosphate IVPB **OR** sodium phosphate IVPB, sodium chloride flush, potassium chloride, morphine, sodium chloride flush, sodium chloride, magnesium sulfate, ondansetron **OR** ondansetron, acetaminophen **OR** acetaminophen, glucose, dextrose bolus **OR** dextrose bolus, glucagon (rDNA), dextrose, oxybutynin, albuterol sulfate HFA    Data:     Past Medical History:   has a past medical history of Arthritis, CAD (coronary artery disease), CHF (congestive heart failure) ([facility] [address] COPD (chronic obstructive pulmonary disease) ([facility] [address] [facility] Diabetes mellitus ([facility] Fibromyalgia, Headache, Hypertension, Liver disease, LEONARDA (obstructive sleep apnea), Panic attack, and Paroxysmal atrial fibrillation ([facility] Social History:   reports that she has never smoked. She has never used smokeless tobacco. She reports that she does not currently use alcohol. She reports that she does not use drugs. Family History:   Family History   Problem Relation Age of Onset    Heart Failure Mother     Cancer Father     Cancer Sister     Cancer Brother        Vitals:  BP (!) 126/45   Pulse 100   Temp 97.9 °F (36.6 °C)   Resp 20   Ht 5' 2\" (1.575 m)   Wt 226 lb (102.5 kg)   SpO2 97%   BMI 41.34 kg/m²   Temp (24hrs), Av.1 °F (36.7 °C), Min:97.9 °F (36.6 °C), Max:98.4 °F (36.9 °C)    Recent Labs     09/15/22  1142 09/15/22  1619 22  0110 22  0636   POCGLU 221* 167* 181* 223*       I/O (24Hr):     Intake/Output Summary (Last 24 hours) at 2022 0935  Last data filed at 2022 0525  Gross per 24 hour   Intake 1613.88 ml   Output 425 ml   Net 1188.88 ml       Labs:  Hematology:  Recent Labs     09/15/22  0714 22  0719   WBC 3.3* 3.7   RBC 2.81* 2.84*   HGB 7.4* 7.5*   HCT 24.2* 24.7*   MCV 86.1 87.0   MCH 26.3 26.4   MCHC 30.6 30.4   RDW 24.6* 24.5*   PLT 72* 68*   MPV 9.1 9.2     Chemistry:  Recent Labs     22  4538 09/15/22  0714 09/16/22  0719   NA  --  142 142   K  --  3.8 3.3*   CL  --  111* 111*   CO2  --  26 25   GLUCOSE  --  234* 228*   BUN  --  13 12   CREATININE  --  0.46* 0.40*   MG 2.2  --  2.1   ANIONGAP  --  5* 6*   LABGLOM  --  >60 >60   GFRAA  --  >60 >60   CALCIUM  --  8.0* 8.1*     Recent Labs     09/14/22  2337 09/15/22  0609 09/15/22  1142 09/15/22  1619 09/16/22  0110 09/16/22  0636   POCGLU 242* 235* 221* 167* 181* 223*     ABG:  Lab Results   Component Value Date/Time    FIO2 NOT REPORTED 02/08/2022 07:10 PM     Lab Results   Component Value Date/Time    SPECIAL 7ML RT [facility] 08/30/2022 12:57 AM     Lab Results   Component Value Date/Time    CULTURE NO GROWTH 5 DAYS 08/30/2022 12:57 AM       Radiology:  XR ABDOMEN (KUB) (SINGLE AP VIEW)    Result Date: 9/11/2022  Dilated air-filled loop colon that is stable compared to prior exam.     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 XR ABDOMEN FOR NG/OG/NE TUBE PLACEMENT    Result Date: 9/12/2022  Enteric device terminating in the stomach     FL MODIFIED BARIUM SWALLOW W VIDEO    Result Date: 9/14/2022  Penetration and aspiration observed with the administration of pureed and thick liquid barium textures. Please see separate speech pathology report for full discussion of findings and recommendations.        Physical Examination:        General appearance:  alert, cooperative and no distress  Mental Status:  oriented to person, place and time and normal affect  Lungs:  clear to auscultation bilaterally, normal effort  Heart:  regular rate and rhythm, no murmur  Abdomen:  soft, nontender, moderate distention, high-pitched bowel sounds, no masses, hepatomegaly, splenomegaly  Extremities:  no edema, redness, tenderness in the calves  Skin:  no gross lesions, rashes, induration    Assessment:        Hospital Problems Last Modified POA    * (Principal) Shock, septic ([address] 9/4/2022 Yes    Urinary tract infection associated with indwelling urethral catheter ([address] 9/4/2022 Yes    [place]'s syndrome 9/4/2022 Yes    Hypotension 9/4/2022 Yes    Acute urinary retention 9/4/2022 Yes    Encephalopathy acute 9/4/2022 Yes    Stroke-like symptoms 9/3/2022 Yes    Leukocytosis 9/3/2022 Yes    Dysphagia 9/4/2022 Yes    Hypertension (Chronic) 8/30/2022 Yes    LEONARDA (obstructive sleep apnea) (Chronic) 8/30/2022 Yes    Lymphedema (Chronic) 8/30/2022 Yes    COPD (chronic obstructive pulmonary disease) ([address] (Chronic) 8/30/2022 Yes    Hepatic cirrhosis ([address] 8/31/2022 Yes       Plan:        Continue tube feeding as ordered  PT, OT and speech therapy  Monitoring control blood pressure  GI DVT prophylaxis  Oxygen and aerosols as ordered  Pedroza care  Discharge planning pending resolution of nutritional needs.     Stevan Ceron DO  9/16/2022  9:35 AM

## 2022-09-16 NOTE — RT PROTOCOL NOTE
RT Inhaler-Nebulizer Bronchodilator Protocol Note    There is a bronchodilator order in the chart from a provider indicating to follow the RT Bronchodilator Protocol and there is an Initiate RT Inhaler-Nebulizer Bronchodilator Protocol order as well (see protocol at bottom of note). CXR Findings:  No results found. The findings from the last RT Protocol Assessment were as follows:   History Pulmonary Disease: Chronic pulmonary disease  Respiratory Pattern: Regular pattern and RR 12-20 bpm  Breath Sounds: Slightly diminished and/or crackles  Cough: Strong, spontaneous, non-productive  Indication for Bronchodilator Therapy: Decreased or absent breath sounds  Bronchodilator Assessment Score: 4    Aerosolized bronchodilator medication orders have been revised according to the RT Inhaler-Nebulizer Bronchodilator Protocol below. Respiratory Therapist to perform RT Therapy Protocol Assessment initially then follow the protocol. Repeat RT Therapy Protocol Assessment PRN for score 0-3 or on second treatment, BID, and PRN for scores above 3. No Indications - adjust the frequency to every 6 hours PRN wheezing or bronchospasm, if no treatments needed after 48 hours then discontinue using Per Protocol order mode. If indication present, adjust the RT bronchodilator orders based on the Bronchodilator Assessment Score as indicated below. Use Inhaler orders unless patient has one or more of the following: on home nebulizer, not able to hold breath for 10 seconds, is not alert and oriented, cannot activate and use MDI correctly, or respiratory rate 25 breaths per minute or more, then use the equivalent nebulizer order(s) with same Frequency and PRN reasons based on the score. If a patient is on this medication at home then do not decrease Frequency below that used at home.     0-3 - enter or revise RT bronchodilator order(s) to equivalent RT Bronchodilator order with Frequency of every 4 hours PRN for wheezing or increased work of breathing using Per Protocol order mode. 4-6 - enter or revise RT Bronchodilator order(s) to two equivalent RT bronchodilator orders with one order with BID Frequency and one order with Frequency of every 4 hours PRN wheezing or increased work of breathing using Per Protocol order mode. 7-10 - enter or revise RT Bronchodilator order(s) to two equivalent RT bronchodilator orders with one order with TID Frequency and one order with Frequency of every 4 hours PRN wheezing or increased work of breathing using Per Protocol order mode. 11-13 - enter or revise RT Bronchodilator order(s) to one equivalent RT bronchodilator order with QID Frequency and an Albuterol order with Frequency of every 4 hours PRN wheezing or increased work of breathing using Per Protocol order mode. Greater than 13 - enter or revise RT Bronchodilator order(s) to one equivalent RT bronchodilator order with every 4 hours Frequency and an Albuterol order with Frequency of every 2 hours PRN wheezing or increased work of breathing using Per Protocol order mode. RT to enter RT Home Evaluation for COPD & MDI Assessment order using Per Protocol order mode.     Electronically signed by Paulo Kramer RCP on 9/16/2022 at 9:14 AM

## 2022-09-16 NOTE — CARE COORDINATION
Social work:  [facility] continues to follow, but cannot re-submit for precert until its been 7 days since appeal denial(9/13/22, can restart on Tuesday, 9/20). Avi Steen is also following, but need plan for removal of ng prior to acceptance. Patient has failed swallow studies and is not a candidate for surgical intervention. SW met with patient and spouse at bedside to update on dc planning barriers, she has been to [facility], [facility] and [facility] in the past, and states she doesn't want to return to those buildings. Palliative care also following.

## 2022-09-17 ENCOUNTER — APPOINTMENT (OUTPATIENT)
Dept: GENERAL RADIOLOGY | Age: 67
DRG: 698 | End: 2022-09-17
Payer: COMMERCIAL

## 2022-09-17 LAB
ANION GAP SERPL CALCULATED.3IONS-SCNC: 4 MMOL/L (ref 9–17)
BUN BLDV-MCNC: 12 MG/DL (ref 8–23)
BUN/CREAT BLD: ABNORMAL (ref 9–20)
CALCIUM SERPL-MCNC: 8.1 MG/DL (ref 8.6–10.4)
CHLORIDE BLD-SCNC: 110 MMOL/L (ref 98–107)
CO2: 27 MMOL/L (ref 20–31)
CREAT SERPL-MCNC: <0.4 MG/DL (ref 0.5–0.9)
GFR AFRICAN AMERICAN: ABNORMAL ML/MIN
GFR NON-AFRICAN AMERICAN: ABNORMAL ML/MIN
GFR SERPL CREATININE-BSD FRML MDRD: ABNORMAL ML/MIN/{1.73_M2}
GLUCOSE BLD-MCNC: 188 MG/DL (ref 65–105)
GLUCOSE BLD-MCNC: 200 MG/DL (ref 70–99)
GLUCOSE BLD-MCNC: 208 MG/DL (ref 65–105)
GLUCOSE BLD-MCNC: 215 MG/DL (ref 65–105)
GLUCOSE BLD-MCNC: 215 MG/DL (ref 65–105)
HCT VFR BLD CALC: 25.8 % (ref 36.3–47.1)
HEMOGLOBIN: 7.5 G/DL (ref 11.9–15.1)
MCH RBC QN AUTO: 26.5 PG (ref 25.2–33.5)
MCHC RBC AUTO-ENTMCNC: 29.1 G/DL (ref 28.4–34.8)
MCV RBC AUTO: 91.2 FL (ref 82.6–102.9)
NRBC AUTOMATED: 0 PER 100 WBC
PDW BLD-RTO: 24.8 % (ref 11.8–14.4)
PLATELET # BLD: 59 K/UL (ref 138–453)
PMV BLD AUTO: 8.8 FL (ref 8.1–13.5)
POTASSIUM SERPL-SCNC: 3.8 MMOL/L (ref 3.7–5.3)
RBC # BLD: 2.83 M/UL (ref 3.95–5.11)
SODIUM BLD-SCNC: 141 MMOL/L (ref 135–144)
WBC # BLD: 3.1 K/UL (ref 3.5–11.3)

## 2022-09-17 PROCEDURE — 74230 X-RAY XM SWLNG FUNCJ C+: CPT

## 2022-09-17 PROCEDURE — 94761 N-INVAS EAR/PLS OXIMETRY MLT: CPT

## 2022-09-17 PROCEDURE — 80048 BASIC METABOLIC PNL TOTAL CA: CPT

## 2022-09-17 PROCEDURE — 6370000000 HC RX 637 (ALT 250 FOR IP): Performed by: STUDENT IN AN ORGANIZED HEALTH CARE EDUCATION/TRAINING PROGRAM

## 2022-09-17 PROCEDURE — 94640 AIRWAY INHALATION TREATMENT: CPT

## 2022-09-17 PROCEDURE — 1200000000 HC SEMI PRIVATE

## 2022-09-17 PROCEDURE — 6370000000 HC RX 637 (ALT 250 FOR IP): Performed by: FAMILY MEDICINE

## 2022-09-17 PROCEDURE — 2580000003 HC RX 258: Performed by: STUDENT IN AN ORGANIZED HEALTH CARE EDUCATION/TRAINING PROGRAM

## 2022-09-17 PROCEDURE — 6370000000 HC RX 637 (ALT 250 FOR IP): Performed by: NURSE PRACTITIONER

## 2022-09-17 PROCEDURE — 85027 COMPLETE CBC AUTOMATED: CPT

## 2022-09-17 PROCEDURE — 6370000000 HC RX 637 (ALT 250 FOR IP): Performed by: INTERNAL MEDICINE

## 2022-09-17 PROCEDURE — 6360000002 HC RX W HCPCS: Performed by: INTERNAL MEDICINE

## 2022-09-17 PROCEDURE — 82947 ASSAY GLUCOSE BLOOD QUANT: CPT

## 2022-09-17 PROCEDURE — 36415 COLL VENOUS BLD VENIPUNCTURE: CPT

## 2022-09-17 PROCEDURE — 92611 MOTION FLUOROSCOPY/SWALLOW: CPT

## 2022-09-17 RX ADMIN — DOCUSATE SODIUM LIQUID 100 MG: 100 LIQUID ORAL at 09:20

## 2022-09-17 RX ADMIN — MIDODRINE HYDROCHLORIDE 10 MG: 10 TABLET ORAL at 17:59

## 2022-09-17 RX ADMIN — INSULIN GLARGINE 20 UNITS: 100 INJECTION, SOLUTION SUBCUTANEOUS at 20:49

## 2022-09-17 RX ADMIN — NYSTATIN: 100000 CREAM TOPICAL at 21:01

## 2022-09-17 RX ADMIN — FAMOTIDINE 20 MG: 20 TABLET, FILM COATED ORAL at 20:49

## 2022-09-17 RX ADMIN — INSULIN LISPRO 4 UNITS: 100 INJECTION, SOLUTION INTRAVENOUS; SUBCUTANEOUS at 17:59

## 2022-09-17 RX ADMIN — MIDODRINE HYDROCHLORIDE 10 MG: 10 TABLET ORAL at 13:02

## 2022-09-17 RX ADMIN — NYSTATIN: 100000 CREAM TOPICAL at 07:44

## 2022-09-17 RX ADMIN — FAMOTIDINE 20 MG: 20 TABLET, FILM COATED ORAL at 09:20

## 2022-09-17 RX ADMIN — INSULIN LISPRO 4 UNITS: 100 INJECTION, SOLUTION INTRAVENOUS; SUBCUTANEOUS at 00:33

## 2022-09-17 RX ADMIN — INSULIN GLARGINE 20 UNITS: 100 INJECTION, SOLUTION SUBCUTANEOUS at 09:19

## 2022-09-17 RX ADMIN — ALBUTEROL SULFATE 2.5 MG: 2.5 SOLUTION RESPIRATORY (INHALATION) at 21:26

## 2022-09-17 RX ADMIN — TRAZODONE HYDROCHLORIDE 100 MG: 100 TABLET ORAL at 20:49

## 2022-09-17 RX ADMIN — ANTI-FUNGAL POWDER MICONAZOLE NITRATE TALC FREE: 1.42 POWDER TOPICAL at 07:45

## 2022-09-17 RX ADMIN — SILVER SULFADIAZINE: 10 CREAM TOPICAL at 07:43

## 2022-09-17 RX ADMIN — ANTI-FUNGAL POWDER MICONAZOLE NITRATE TALC FREE: 1.42 POWDER TOPICAL at 21:01

## 2022-09-17 RX ADMIN — SODIUM CHLORIDE, PRESERVATIVE FREE 10 ML: 5 INJECTION INTRAVENOUS at 09:32

## 2022-09-17 RX ADMIN — INSULIN LISPRO 4 UNITS: 100 INJECTION, SOLUTION INTRAVENOUS; SUBCUTANEOUS at 13:10

## 2022-09-17 RX ADMIN — BUDESONIDE 500 MCG: 0.5 SUSPENSION RESPIRATORY (INHALATION) at 21:26

## 2022-09-17 RX ADMIN — SERTRALINE HYDROCHLORIDE 100 MG: 100 TABLET ORAL at 09:20

## 2022-09-17 RX ADMIN — SODIUM CHLORIDE, PRESERVATIVE FREE 10 ML: 5 INJECTION INTRAVENOUS at 23:30

## 2022-09-17 RX ADMIN — MIDODRINE HYDROCHLORIDE 10 MG: 10 TABLET ORAL at 10:23

## 2022-09-17 RX ADMIN — POTASSIUM BICARBONATE 40 MEQ: 782 TABLET, EFFERVESCENT ORAL at 09:20

## 2022-09-17 RX ADMIN — SILVER SULFADIAZINE: 10 CREAM TOPICAL at 21:00

## 2022-09-17 RX ADMIN — APIXABAN 5 MG: 5 TABLET, FILM COATED ORAL at 20:49
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
## 2022-09-17 NOTE — PROGRESS NOTES
Nutrition Assessment     Type and Reason for Visit: Reassess    Nutrition Recommendations/Plan:   Continue NPO Exceptions are: Ice Chips  Continue Glucerna 1.5 Saturnino at 48 mL/hr (1152 mL total volume, 1728 kcal and 95 gm of protein)  Monitor tube feeding rate, tolerance, long-term nutrition plan and labs     Malnutrition Assessment:  Malnutrition Status: At risk for malnutrition (Comment)    Nutrition Assessment:  Patient is tolerating tube feeding Glucerna 1.5 Saturnino at goal rate 48 mL/hr. Patient continues to be confused and has been unable to undergo swallow study. Patient is currently receiving NG tube feedings. Consider need for PEG tube placement for long-term feedings. Will monitor tube feeding rate, tolerance, GI function and labs. Estimated Daily Nutrient Needs:  Energy (kcal):  7511-9931 kcal (16-18 kcal/kg) Weight Used for Energy Requirements: Other (Comment) (214 lb. 9/2/22)     Protein (g):   gm protein (1.8-2.0 g/kg) Weight Used for Protein Requirements: Ideal        Fluid (ml/day):  1728 mL Method Used for Fluid Requirements: 1 ml/kcal    Nutrition Related Findings:   Edema: +1 BUE, +1 BLE. Active bowel sounds.  NG tube feedings Wound Type: None    Current Nutrition Therapies:    Diet NPO Exceptions are: Ice Chips  ADULT TUBE FEEDING; Nasogastric; Diabetic; Continuous; 15; Yes; 10; Q 6 hours; 48; 200; Q 6 hours    Anthropometric Measures:  Height: 5' 2\" (157.5 cm)  Current Body Wt: 226 lb (102.5 kg)   BMI: 41.3    Nutrition Diagnosis:   Inadequate protein-energy intake related to inadequate protein-energy intake as evidenced by nutrition support - enteral nutrition, NPO or clear liquid status due to medical condition  Swallowing difficulty related to swallowing difficulty as evidenced by NPO or clear liquid status due to medical condition, swallow study results (failed swallowing evaluation)    Nutrition Interventions:   Food and/or Nutrient Delivery: Continue NPO, Continue Current Tube Feeding (Re-start)  Nutrition Education/Counseling: Education not indicated  Coordination of Nutrition Care: Continue to monitor while inpatient       Goals:  Previous Goal Met: Progressing toward Goal(s)  Goals:  Tolerate nutrition support at goal rate, Meet at least 75% of estimated needs       Nutrition Monitoring and Evaluation:   Behavioral-Environmental Outcomes: None Identified  Food/Nutrient Intake Outcomes: Enteral Nutrition Intake/Tolerance  Physical Signs/Symptoms Outcomes: Biochemical Data, Fluid Status or Edema, Skin, Weight, GI Status    Discharge Planning:    Enteral Nutrition             Kilo Webb  MFN, RDN, LDN  Lead Clinical Dietitian  RD Office Phone [phone]
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
## 2022-09-17 NOTE — RT PROTOCOL NOTE
RT Inhaler-Nebulizer Bronchodilator Protocol Note    There is a bronchodilator order in the chart from a provider indicating to follow the RT Bronchodilator Protocol and there is an Initiate RT Inhaler-Nebulizer Bronchodilator Protocol order as well (see protocol at bottom of note). CXR Findings:  No results found. The findings from the last RT Protocol Assessment were as follows:   History Pulmonary Disease: Chronic pulmonary disease  Respiratory Pattern: Regular pattern and RR 12-20 bpm  Breath Sounds: Slightly diminished and/or crackles  Cough: Strong, spontaneous, non-productive  Indication for Bronchodilator Therapy: Decreased or absent breath sounds  Bronchodilator Assessment Score: 4    Aerosolized bronchodilator medication orders have been revised according to the RT Inhaler-Nebulizer Bronchodilator Protocol below. Respiratory Therapist to perform RT Therapy Protocol Assessment initially then follow the protocol. Repeat RT Therapy Protocol Assessment PRN for score 0-3 or on second treatment, BID, and PRN for scores above 3. No Indications - adjust the frequency to every 6 hours PRN wheezing or bronchospasm, if no treatments needed after 48 hours then discontinue using Per Protocol order mode. If indication present, adjust the RT bronchodilator orders based on the Bronchodilator Assessment Score as indicated below. Use Inhaler orders unless patient has one or more of the following: on home nebulizer, not able to hold breath for 10 seconds, is not alert and oriented, cannot activate and use MDI correctly, or respiratory rate 25 breaths per minute or more, then use the equivalent nebulizer order(s) with same Frequency and PRN reasons based on the score. If a patient is on this medication at home then do not decrease Frequency below that used at home.     0-3 - enter or revise RT bronchodilator order(s) to equivalent RT Bronchodilator order with Frequency of every 4 hours PRN for wheezing or increased work of breathing using Per Protocol order mode. 4-6 - enter or revise RT Bronchodilator order(s) to two equivalent RT bronchodilator orders with one order with BID Frequency and one order with Frequency of every 4 hours PRN wheezing or increased work of breathing using Per Protocol order mode. 7-10 - enter or revise RT Bronchodilator order(s) to two equivalent RT bronchodilator orders with one order with TID Frequency and one order with Frequency of every 4 hours PRN wheezing or increased work of breathing using Per Protocol order mode. 11-13 - enter or revise RT Bronchodilator order(s) to one equivalent RT bronchodilator order with QID Frequency and an Albuterol order with Frequency of every 4 hours PRN wheezing or increased work of breathing using Per Protocol order mode. Greater than 13 - enter or revise RT Bronchodilator order(s) to one equivalent RT bronchodilator order with every 4 hours Frequency and an Albuterol order with Frequency of every 2 hours PRN wheezing or increased work of breathing using Per Protocol order mode. RT to enter RT Home Evaluation for COPD & MDI Assessment order using Per Protocol order mode.     Electronically signed by Almas Roca RCP on 9/16/2022 at 8:40 PM

## 2022-09-17 NOTE — PLAN OF CARE
Problem: Respiratory - Adult  Goal: Achieves optimal ventilation and oxygenation  9/16/2022 2039 by Froilan Hawk RCP  Outcome: Progressing     Problem: Respiratory - Adult  Goal: Able to breathe comfortably  9/16/2022 2039 by Froilan Hawk RCP  Outcome: Progressing     Problem: Respiratory - Adult  Goal: Patient's breath sounds will be clear and equal  9/16/2022 2039 by Froilan Hawk RCP  Outcome: Progressing      BRONCHOSPASM/BRONCHOCONSTRICTION    IMPROVE  AERATION/BREATHSOUNDS  ADMINISTER BRONCHODILATOR THERAPY AS APPROPRIATE  ASSESS BREATH SOUNDS  PATIENT EDUCATION AS NEEDED

## 2022-09-17 NOTE — PROGRESS NOTES
Pulmonary Critical Care Progress Note  Jabier Bryant MD     Patient seen for the follow up of  Shock, septic ([facility] [address]     Subjective:  No significant overnight events noted. She is awake and alert, pleasantly confused, cooperative with exam.  She remains on room air. She denies shortness of breath, cough or chest pain. She remains on tube feeds, tolerating at 40 mL an hour. Examination:  Vitals: BP (!) 117/56   Pulse 96   Temp 97.5 °F (36.4 °C) (Oral)   Resp 16   Ht 5' 2\" (1.575 m)   Wt 226 lb 3.2 oz (102.6 kg)   SpO2 96%   BMI 41.37 kg/m²   General appearance:  awake, mildly confused but cooperative with exam  Neck: No JVD  Lungs: Decreased breath sound no crackles or wheezes  Heart: regular rate and rhythm, S1, S2 normal, no gallop  Abdomen: Soft, non tender, + BS  Extremities: no cyanosis or clubbing. Trace edema, scratches bilateral lower legs     LABs:  CBC:   Recent Labs     09/16/22  0719 09/17/22  0632   WBC 3.7 3.1*   HGB 7.5* 7.5*   HCT 24.7* 25.8*   PLT 68* 59*     BMP:   Recent Labs     09/16/22  0719 09/17/22  0632    141   K 3.3* 3.8   CO2 25 27   BUN 12 12   CREATININE 0.40* <0.40*   LABGLOM >60 Can not be calculated   GLUCOSE 228* 200*     ABG:  Lab Results   Component Value Date/Time    FIO2 NOT REPORTED 02/08/2022 07:10 PM       Lab Results   Component Value Date/Time    FIO2 NOT REPORTED 02/08/2022 07:10 PM     Radiology:  9/5/22 CXR  Cardiomegaly with left basilar infiltrate. Support tubes as described above. MRI brain 9/6/22  Chronic microvascular disease without acute intracranial abnormality. X-ray abdomen 9/12/22  Enteric device extends to the stomach. Partially visualized distended distal   large bowel redemonstrated.          Impression:  Chronic hypoxic respiratory failure  Atelectasis  Hypotension/Septic Shock requiring vasopressors   [place]/hypernatremia  UTI/Indwelling angeles /stenotrophomonas maltophilia resistant to Bactrim  on urine culture status post cefepime  [place] syndrome   Obstructive sleep apnea/Obesity  A. fib, CAD, CHF, DM, dementia, HTN, liver cirrhosis, esophageal varices, discitis    Recommendations:  Monitor off of oxygen   Pulmicort 0.5 b.i.d. Albuterol HFA as needed  Continue tube feeds as tolerated per NG tube  Colorectal surgery s/o; s/p decompressive colonoscopy,  patient not a surgical candidate  ?  Long-term plan for nutrition  GI signed off    Continue midodrine/monitor BP off of pressors  Finished Levaquin for UTI   PT/OT  GI prophylaxis, Pepcid  DVT prophylaxis with EPC, Lovenox on hold, monitor platelets  Rehab discharge planning  Will follow with you     Electronically signed by     Nereida Morgan MD on 9/17/2022 at 12:31 PM  Pulmonary Critical Care and Sleep Medicine,  [facility]  Cell: [phone]  Office: [phone]

## 2022-09-17 NOTE — PROCEDURES
INSTRUMENTAL SWALLOW REPORT  MODIFIED BARIUM SWALLOW    NAME: Abigail Bailey   : 1955  MRN: [id number]       Date of Eval: 2022        Radiologist: Dr. Thalia Jalloh     Referring Diagnosis(es):  Severe Oral-Pharyngeal Dysphagia    Past Medical History:  has a past medical history of Arthritis, CAD (coronary artery disease), CHF (congestive heart failure) ([address] COPD (chronic obstructive pulmonary disease) ([address] Dementia ([address] Diabetes mellitus ([address] Fibromyalgia, Headache, Hypertension, Liver disease, LEONARDA (obstructive sleep apnea), Panic attack, and Paroxysmal atrial fibrillation ([address] Past Surgical History:  has a past surgical history that includes Cardiac catheterization; Cholecystectomy; Hysterectomy; laminectomy; Ovary removal; Appendectomy; and Colonoscopy (N/A, 2022). Recent CXR/CT of Chest:   XR Chest - 2022  Cardiomegaly with left basilar infiltrate. Support tubes as described above. Patient Complaints/Reason for Referral:  Abigail Bailey was referred for a MBS to assess the efficiency of his/her swallow function, assess for aspiration, and to make recommendations regarding safe dietary consistencies, effective compensatory strategies, and safe eating environment. Onset of problem:    Polo Garza, a 73 yo woman with dementia was admitted 22  wtih generalized abdominal discomfort and concern about urinary tract infection. She was dx with [place] Syndrome and severe colonic dilation s/p decompressive colonoscopy 22    On 9/3/22 a stroke alert was called as pt became confused and had difficulty communicating that was significantly different from her baseline. CT was negative for acute intracranial pathology. She was on TPN due to prolonged bowel rest and was started on tube feeding          PRIOR MODIFIED BARIUM SWALLOW STUDY (MBSS)  Pt underwent a MBSS on 22 with Ricky Sarkar CCC-SLP.      Results revealed aspiration and supraglottic penetration with pureed and nectar thick liquids. It was recommended that pt be NPO            IMPRESSIONS  1) Pt noted to aspiration thin liquids via cup    2) However no overt radiographic evidence of aspiration was observed on pureed, honey thick or nectar thick liquids    3) Oral phase for above consistencies reveals premature vallecular fill which clears with swallow completion    4) Pharyngeal phase reveals some stasis that is cleared with a cued swallow    5) more solid textures were not attempted at this time    6) Monitor for c/o GI issues related to [place] Syndrome             RECOMMENDATIONS  1) PO diet of Dysphagia I/Pureed and Mildly Thick/Nectar Thick    2) Monitor for clinical s/s of aspiration during meal; if identified then d/c PO and repeat MBSS    3) Monitor calorie counts    4) Pt may need to eat smaller amounts and more frequently given h/o [place] Syndrome                   Dysphagia Outcome Severity Scale: Level 3: Moderate dysphagia- Total assisstance, supervision or strategies. Two or more diet consistencies restricted  Penetration-Aspiration Scale (PAS): 6 - Material enters the airway, passes below the vocal folds, and is ejected into the larynx or out of the airway (Thin by cup)    Recommended Diet:  Solid consistency: Pureed  Liquid consistency: Mildly Thick  Liquid administration via: Cup;Straw    Medication administration: Meds in puree    Safe Swallow Protocol:  Supervision: Close                 Recommendations/Treatment           D/C Recommendations: To be determined         Recommended Exercises:    Therapeutic Interventions: Diet tolerance monitoring         Education: Images and recommendations were reviewed with Dr. Sarmad Gastelum following this exam.      Patient Education Response: Demonstrated understanding;Needs reinforcement           Goals:    Long Term:               Short Term:                         Dysphagia Goals:  The patient will tolerate recommended diet without observed clinical signs of aspiration      Pain      Pain Level: 0  Pain Location: Throat (NGT)      Therapy Time:   Individual Concurrent Group Co-treatment   Time In           Time Out           Minutes                     AYSHA Jacob, 9/17/2022, 4:12 PM

## 2022-09-17 NOTE — PROGRESS NOTES
[facility]  Office: [phone]  Charly Michaels, DO, Atilio Brown, DO, Vaishnavi Ingram, DO, Lakia Chatman, DO, Chloe Andersen MD, Ewelina Jolly MD, Kristopher Sandoval MD, Kylah Capps MD,  Tayler Lacey MD, Migdalia Toledo MD, Ann-Marie Tucker DO, Tea Apodaca MD,  Marta Pugh MD, Isaías Cantu MD, Cosme Fortune DO, Jourdan Selby MD, Jas Garsia MD, Bryce Sin MD, Dolores Carrillo MD, James Song MD, Disha Vila MD, Claudell Orion, DO, Edy Londono MD, Luis Pierson MD, Moira Severino, CNP,  Arcenio Aquino, CNP, Yael Eastman, CNP, Thomas Low, CNP,  Ivelisse Montaño, [facility], Kyrie Roberts, CNP, Berlinda Boast, CNP, Laci Downey, CNP, Alexandra Chery, CNP, Diandra Pagan, CNP, DILAN BarryC, Reed De Leon, CNS, Jocelyn Slaughter, [facility], Jim Ibrahim, CNP, [facility], [facility], [place], [facility]    Progress Note    9/17/2022    8:46 AM    Name:   Edi Godoy  MRN:     [id number]     [place]:      [de-identified]   Room:   2014/2014-02  IP Day:  23  Admit Date:  8/29/2022  8:35 PM    PCP:   Neel Rosenthal MD  Code Status:  Full Code    Subjective:     C/C:   Chief Complaint   Patient presents with    Abdominal Pain     N/V/D    Hematuria     X few months       Interval History Status: improved. Patient's mentation continues to improve today. Denies any chest pain, shortness of breath, nausea or vomiting, fevers or chills. Brief History: This is a 72-year-old female with a history of dementia and type 2 diabetes that presents with abdominal pain, nausea and vomiting. Upon evaluation she is found to have markedly dilated colon consistent with [place] syndrome. She underwent decompressive colonoscopy with colorectal surgery and was placed on stool softeners and laxative. She is transatrial with TPN due to prolonged bowel rest also has been started on tube feeding.   Due to her cognitive status she has been unable to undergo swallow study. Currently awaiting discharge disposition. Review of Systems:     Constitutional:  negative for chills, fevers, sweats  Respiratory:  negative for cough, dyspnea on exertion, shortness of breath, wheezing  Cardiovascular:  negative for chest pain, chest pressure/discomfort, lower extremity edema, palpitations  Gastrointestinal:  negative for abdominal pain, constipation, diarrhea, nausea, vomiting  Neurological:  negative for dizziness, headache    Medications: Allergies:     Allergies   Allergen Reactions    Quetiapine      Other reaction(s): Unknown  Other reaction(s): Hallucinations  seroquel    Venlafaxine      Other reaction(s): Hallucinations, Unknown  effexor      Aspirin Other (See Comments)     Bleeding disorder  Other reaction(s): Unknown  Bleeding disorder      Fentanyl      Other reaction(s): Unknown    Other Other (See Comments)     \"NO SUPPOSED TO HAVE ASPIRIN\"    Quetiapine Fumarate      Other reaction(s): Unknown    Venlafaxine Hcl      Other reaction(s): Unknown       Current Meds:   Scheduled Meds:    albuterol  2.5 mg Nebulization BID    Docusate Sodium  100 mg Oral Daily    potassium bicarb-citric acid  40 mEq Per NG tube Daily    miconazole   Topical BID    famotidine  20 mg Per NG tube BID    insulin lispro  0-16 Units SubCUTAneous Q6H    nystatin   Topical BID    insulin glargine  20 Units SubCUTAneous BID    budesonide  0.5 mg Nebulization BID    midodrine  10 mg Per NG tube TID WC    sertraline  100 mg Per NG tube Daily    [Held by provider] enoxaparin  1 mg/kg SubCUTAneous BID    polyethylene glycol  17 g Oral Daily    traZODone  100 mg Oral Nightly    sodium chloride flush  5-40 mL IntraVENous 2 times per day    silver sulfADIAZINE   Topical Nightly    [Held by provider] sodium chloride  1,000 mL IntraVENous Once     Continuous Infusions:    sodium chloride 10 mL/hr at 09/12/22 1653    dextrose       PRN Meds: magnesium sulfate, potassium chloride **OR** potassium alternative oral replacement, sodium phosphate IVPB **OR** sodium phosphate IVPB, sodium chloride flush, potassium chloride, morphine, sodium chloride flush, sodium chloride, magnesium sulfate, ondansetron **OR** ondansetron, acetaminophen **OR** acetaminophen, glucose, dextrose bolus **OR** dextrose bolus, glucagon (rDNA), dextrose, oxybutynin, albuterol sulfate HFA    Data:     Past Medical History:   has a past medical history of Arthritis, CAD (coronary artery disease), CHF (congestive heart failure) ([facility] COPD (chronic obstructive pulmonary disease) ([facility] [address] D[facility] ([facility] Diabetes mellitus ([facility] Fibromyalgia, Headache, Hypertension, Liver disease, LEONARDA (obstructive sleep apnea), Panic attack, and Paroxysmal atrial fibrillation ([facility] Social History:   reports that she has never smoked. She has never used smokeless tobacco. She reports that she does not currently use alcohol. She reports that she does not use drugs. Family History:   Family History   Problem Relation Age of Onset    Heart Failure Mother     Cancer Father     Cancer Sister     Cancer Brother        Vitals:  BP (!) 117/56   Pulse 96   Temp 97.5 °F (36.4 °C) (Oral)   Resp 16   Ht 5' 2\" (1.575 m)   Wt 226 lb 3.2 oz (102.6 kg)   SpO2 96%   BMI 41.37 kg/m²   Temp (24hrs), Av °F (36.7 °C), Min:97.5 °F (36.4 °C), Max:98.4 °F (36.9 °C)    Recent Labs     22  1709 22  0005 22  0555   POCGLU 183* 186* 208* 188*       I/O (24Hr):     Intake/Output Summary (Last 24 hours) at 2022 0846  Last data filed at 2022 0547  Gross per 24 hour   Intake 1230 ml   Output --   Net 1230 ml       Labs:  Hematology:  Recent Labs     09/15/22  0714 22  0719 22  0632   WBC 3.3* 3.7 3.1*   RBC 2.81* 2.84* 2.83*   HGB 7.4* 7.5* 7.5*   HCT 24.2* 24.7* 25.8*   MCV 86.1 87.0 91.2   MCH 26.3 26.4 26.5   MCHC 30.6 30.4 29.1   RDW 24.6* 24.5* 24.8*   PLT 72* 68* 59* MPV 9.1 9.2 8.8     Chemistry:  Recent Labs     09/15/22  0714 09/16/22  0719 09/17/22  0632    142 141   K 3.8 3.3* 3.8   * 111* 110*   CO2 26 25 27   GLUCOSE 234* 228* 200*   BUN 13 12 12   CREATININE 0.46* 0.40* <0.40*   MG  --  2.1  --    ANIONGAP 5* 6* 4*   LABGLOM >60 >60 Can not be calculated   GFRAA >60 >60 Can not be calculated   CALCIUM 8.0* 8.1* 8.1*     Recent Labs     09/16/22  0636 09/16/22  1317 09/16/22  1709 09/16/22 2028 09/17/22  0005 09/17/22  0555   POCGLU 223* 183* 183* 186* 208* 188*     ABG:  Lab Results   Component Value Date/Time    FIO2 NOT REPORTED 02/08/2022 07:10 PM     Lab Results   Component Value Date/Time    SPECIAL 7ML RT [facility] 08/30/2022 12:57 AM     Lab Results   Component Value Date/Time    CULTURE NO GROWTH 5 DAYS 08/30/2022 12:57 AM       Radiology:  XR ABDOMEN (KUB) (SINGLE AP VIEW)    Result Date: 9/11/2022  Dilated air-filled loop colon that is stable compared to prior exam.     XR ABDOMEN FOR NG/OG/NE TUBE PLACEMENT    Result Date: 9/12/2022  Enteric device terminating in the stomach     FL MODIFIED BARIUM SWALLOW W VIDEO    Result Date: 9/14/2022  Penetration and aspiration observed with the administration of pureed and thick liquid barium textures. Please see separate speech pathology report for full discussion of findings and recommendations.        Physical Examination:        General appearance:  alert, cooperative and no distress  Mental Status:  oriented to person, place and time and normal affect  Lungs:  clear to auscultation bilaterally, normal effort  Heart:  regular rate and rhythm, no murmur  Abdomen:  soft, nontender, nondistended, normal bowel sounds, no masses, hepatomegaly, splenomegaly  Extremities:  no edema, redness, tenderness in the calves  Skin:  no gross lesions, rashes, induration    Assessment:        Hospital Problems             Last Modified POA    * (Principal) Shock, septic ([address] 9/4/2022 Yes    Urinary tract infection associated with indwelling urethral catheter ([facility] [facility] 9/4/2022 Yes    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    Hypotension 9/4/2022 Yes    Acute urinary retention 9/4/2022 Yes    Encephalopathy acute 9/4/2022 Yes    Stroke-like symptoms 9/3/2022 Yes    Leukocytosis 9/3/2022 Yes    Dysphagia 9/4/2022 Yes    Hypertension (Chronic) 8/30/2022 Yes    LEONARDA (obstructive sleep apnea) (Chronic) 8/30/2022 Yes    Lymphedema (Chronic) 8/30/2022 Yes    COPD (chronic obstructive pulmonary disease) ([facility] [address] (Chronic) 8/30/2022 Yes    Hepatic cirrhosis ([facility] [address] 8/31/2022 Yes       Plan:        TF as ordered  Swallow study today  PT, OT and speech therapy  Oxygen and aerosols as needed  Stool softeners as ordered  GI & DVT prophylaxis  See orders for details    Natalia Coffman DO  9/17/2022  8:46 AM

## 2022-09-18 LAB
GLUCOSE BLD-MCNC: 159 MG/DL (ref 65–105)
GLUCOSE BLD-MCNC: 190 MG/DL (ref 65–105)
GLUCOSE BLD-MCNC: 215 MG/DL (ref 65–105)
GLUCOSE BLD-MCNC: 217 MG/DL (ref 65–105)
GLUCOSE BLD-MCNC: 222 MG/DL (ref 65–105)

## 2022-09-18 PROCEDURE — 94761 N-INVAS EAR/PLS OXIMETRY MLT: CPT

## 2022-09-18 PROCEDURE — 94640 AIRWAY INHALATION TREATMENT: CPT

## 2022-09-18 PROCEDURE — 2580000003 HC RX 258: Performed by: STUDENT IN AN ORGANIZED HEALTH CARE EDUCATION/TRAINING PROGRAM

## 2022-09-18 PROCEDURE — 6360000002 HC RX W HCPCS: Performed by: INTERNAL MEDICINE

## 2022-09-18 PROCEDURE — 6370000000 HC RX 637 (ALT 250 FOR IP): Performed by: FAMILY MEDICINE

## 2022-09-18 PROCEDURE — 6370000000 HC RX 637 (ALT 250 FOR IP): Performed by: INTERNAL MEDICINE

## 2022-09-18 PROCEDURE — 6370000000 HC RX 637 (ALT 250 FOR IP): Performed by: STUDENT IN AN ORGANIZED HEALTH CARE EDUCATION/TRAINING PROGRAM

## 2022-09-18 PROCEDURE — 6370000000 HC RX 637 (ALT 250 FOR IP): Performed by: NURSE PRACTITIONER

## 2022-09-18 PROCEDURE — 1200000000 HC SEMI PRIVATE

## 2022-09-18 PROCEDURE — 82947 ASSAY GLUCOSE BLOOD QUANT: CPT

## 2022-09-18 RX ORDER — ALBUTEROL SULFATE 2.5 MG/3ML
2.5 SOLUTION RESPIRATORY (INHALATION) 2 TIMES DAILY
Qty: 120 EACH | Refills: 3 | DISCHARGE
Start: 2022-09-18

## 2022-09-18 RX ORDER — INSULIN LISPRO 100 [IU]/ML
0-16 INJECTION, SOLUTION INTRAVENOUS; SUBCUTANEOUS
Status: DISCONTINUED | OUTPATIENT
Start: 2022-09-18 | End: 2022-09-18

## 2022-09-18 RX ORDER — INSULIN LISPRO 100 [IU]/ML
0-16 INJECTION, SOLUTION INTRAVENOUS; SUBCUTANEOUS
Status: DISCONTINUED | OUTPATIENT
Start: 2022-09-19 | End: 2022-09-23 | Stop reason: HOSPADM

## 2022-09-18 RX ORDER — TRAMADOL HYDROCHLORIDE 50 MG/1
50 TABLET ORAL EVERY 6 HOURS PRN
Status: DISCONTINUED | OUTPATIENT
Start: 2022-09-18 | End: 2022-09-23 | Stop reason: HOSPADM

## 2022-09-18 RX ORDER — PSEUDOEPHEDRINE HCL 30 MG
100 TABLET ORAL DAILY
DISCHARGE
Start: 2022-09-19

## 2022-09-18 RX ORDER — FAMOTIDINE 20 MG/1
20 TABLET, FILM COATED ORAL 2 TIMES DAILY
Qty: 60 TABLET | Refills: 3 | DISCHARGE
Start: 2022-09-18

## 2022-09-18 RX ORDER — POTASSIUM CHLORIDE 20 MEQ/1
40 TABLET, EXTENDED RELEASE ORAL
Status: DISCONTINUED | OUTPATIENT
Start: 2022-09-19 | End: 2022-09-18

## 2022-09-18 RX ORDER — POTASSIUM CHLORIDE 20 MEQ/1
40 TABLET, EXTENDED RELEASE ORAL
Status: DISCONTINUED | OUTPATIENT
Start: 2022-09-18 | End: 2022-09-23 | Stop reason: HOSPADM

## 2022-09-18 RX ORDER — NYSTATIN 100000 U/G
CREAM TOPICAL
DISCHARGE
Start: 2022-09-18

## 2022-09-18 RX ORDER — DOCUSATE SODIUM 100 MG/1
100 CAPSULE, LIQUID FILLED ORAL DAILY
Status: DISCONTINUED | OUTPATIENT
Start: 2022-09-19 | End: 2022-09-22

## 2022-09-18 RX ORDER — INSULIN GLARGINE 100 [IU]/ML
20 INJECTION, SOLUTION SUBCUTANEOUS 2 TIMES DAILY
Qty: 5 ADJUSTABLE DOSE PRE-FILLED PEN SYRINGE | Refills: 3 | DISCHARGE
Start: 2022-09-18

## 2022-09-18 RX ORDER — POTASSIUM CHLORIDE 20 MEQ/1
40 TABLET, EXTENDED RELEASE ORAL
Qty: 60 TABLET | Refills: 3 | DISCHARGE
Start: 2022-09-19

## 2022-09-18 RX ORDER — INSULIN LISPRO 100 [IU]/ML
0-4 INJECTION, SOLUTION INTRAVENOUS; SUBCUTANEOUS NIGHTLY
Status: DISCONTINUED | OUTPATIENT
Start: 2022-09-18 | End: 2022-09-23 | Stop reason: HOSPADM

## 2022-09-18 RX ADMIN — MIDODRINE HYDROCHLORIDE 10 MG: 10 TABLET ORAL at 09:16

## 2022-09-18 RX ADMIN — NYSTATIN: 100000 CREAM TOPICAL at 20:57

## 2022-09-18 RX ADMIN — POTASSIUM CHLORIDE 40 MEQ: 1500 TABLET, EXTENDED RELEASE ORAL at 10:15

## 2022-09-18 RX ADMIN — ANTI-FUNGAL POWDER MICONAZOLE NITRATE TALC FREE: 1.42 POWDER TOPICAL at 09:16

## 2022-09-18 RX ADMIN — INSULIN LISPRO 4 UNITS: 100 INJECTION, SOLUTION INTRAVENOUS; SUBCUTANEOUS at 12:09

## 2022-09-18 RX ADMIN — BUDESONIDE 500 MCG: 0.5 SUSPENSION RESPIRATORY (INHALATION) at 07:35

## 2022-09-18 RX ADMIN — ALBUTEROL SULFATE 2.5 MG: 2.5 SOLUTION RESPIRATORY (INHALATION) at 07:35

## 2022-09-18 RX ADMIN — BUDESONIDE 500 MCG: 0.5 SUSPENSION RESPIRATORY (INHALATION) at 21:17

## 2022-09-18 RX ADMIN — SERTRALINE HYDROCHLORIDE 100 MG: 100 TABLET ORAL at 09:16

## 2022-09-18 RX ADMIN — SILVER SULFADIAZINE: 10 CREAM TOPICAL at 20:58

## 2022-09-18 RX ADMIN — FAMOTIDINE 20 MG: 20 TABLET, FILM COATED ORAL at 09:16

## 2022-09-18 RX ADMIN — NYSTATIN: 100000 CREAM TOPICAL at 09:17

## 2022-09-18 RX ADMIN — APIXABAN 5 MG: 5 TABLET, FILM COATED ORAL at 20:54

## 2022-09-18 RX ADMIN — SODIUM CHLORIDE, PRESERVATIVE FREE 10 ML: 5 INJECTION INTRAVENOUS at 09:36

## 2022-09-18 RX ADMIN — INSULIN LISPRO 4 UNITS: 100 INJECTION, SOLUTION INTRAVENOUS; SUBCUTANEOUS at 16:48

## 2022-09-18 RX ADMIN — DOCUSATE SODIUM LIQUID 100 MG: 100 LIQUID ORAL at 09:16

## 2022-09-18 RX ADMIN — SODIUM CHLORIDE, PRESERVATIVE FREE 10 ML: 5 INJECTION INTRAVENOUS at 21:03

## 2022-09-18 RX ADMIN — ANTI-FUNGAL POWDER MICONAZOLE NITRATE TALC FREE: 1.42 POWDER TOPICAL at 20:58

## 2022-09-18 RX ADMIN — FAMOTIDINE 20 MG: 20 TABLET, FILM COATED ORAL at 20:55

## 2022-09-18 RX ADMIN — TRAMADOL HYDROCHLORIDE 50 MG: 50 TABLET ORAL at 20:54

## 2022-09-18 RX ADMIN — SILVER SULFADIAZINE: 10 CREAM TOPICAL at 09:17

## 2022-09-18 RX ADMIN — ALBUTEROL SULFATE 2.5 MG: 2.5 SOLUTION RESPIRATORY (INHALATION) at 21:17

## 2022-09-18 RX ADMIN — MIDODRINE HYDROCHLORIDE 10 MG: 10 TABLET ORAL at 16:48

## 2022-09-18 RX ADMIN — APIXABAN 5 MG: 5 TABLET, FILM COATED ORAL at 09:16

## 2022-09-18 RX ADMIN — INSULIN GLARGINE 20 UNITS: 100 INJECTION, SOLUTION SUBCUTANEOUS at 09:17

## 2022-09-18 RX ADMIN — MIDODRINE HYDROCHLORIDE 10 MG: 10 TABLET ORAL at 12:09

## 2022-09-18 RX ADMIN — TRAZODONE HYDROCHLORIDE 100 MG: 100 TABLET ORAL at 20:55

## 2022-09-18 RX ADMIN — INSULIN GLARGINE 20 UNITS: 100 INJECTION, SOLUTION SUBCUTANEOUS at 20:55

## 2022-09-18 ASSESSMENT — PAIN DESCRIPTION - LOCATION: LOCATION: BACK

## 2022-09-18 ASSESSMENT — PAIN SCALES - GENERAL: PAINLEVEL_OUTOF10: 5

## 2022-09-18 ASSESSMENT — PAIN DESCRIPTION - DESCRIPTORS: DESCRIPTORS: DISCOMFORT;SORE

## 2022-09-18 NOTE — DISCHARGE SUMMARY
[facility]  Office: [phone]  Cecil Gross DO, Elizabeth Castaneda, DO, Silvia Cushing, DO, Raquel Chatman, DO, Christian Castellanos MD, Delma Patel MD, Sharonda Jacob MD, Aris Chavez MD,  Yu Mcclellan MD, Evi Castellanos MD, Valentina Ibarra DO, Jim Olvera MD,  Gena Siemens, DO, Shelia Morales MD, Cecy Ruelas MD, Edilia Spence DO, Behzad Gorman MD, Tika Díaz MD, Alicia Hirsch MD, Domitila Perez MD, Reuben Hull MD, Tate Haynes MD, Charli Anaya DO, Rahel Thrasher MD, Robel Corbin MD, Sandra Spivey, CNP,  Cole Blocker, CNP, Josh Servant, CNP, Lynn Exon, CNP,  Vickie Short, DNP, Savita Flynn, CNP, Elyssa Pilon, CNP, Letty Bryant, CNP, Geeta Richard, CNP, Ofyasir Cornea, CNP, Roylene Goodell, PASTEPHANE, Pérez Chandra, CNS, Janki Koehler, DNP, Callie Langford, CNP, Rubio Pro, CNP, Angel Owens    Discharge Summary     Patient ID: Alan Burns  :  1955   MRN: [id number]     ACCOUNT:  [de-identified]   Patient's PCP: Mervat Rodriguez MD  Admit Date: 2022   Discharge Date: 2022     Length of Stay: 20  Code Status:  Full Code  Admitting Physician: Tika Díaz MD  Discharge Physician: Alan Terrell DO     Active Discharge Diagnoses:     Hospital Problem Lists:  Principal Problem:    Shock, septic [facility])  Active Problems:    Urinary tract infection associated with indwelling urethral catheter ([facility]    [place]'s syndrome    Hypotension    Acute urinary retention    Encephalopathy acute    Stroke-like symptoms    Leukocytosis    Dysphagia    Hypertension    LEONARDA (obstructive sleep apnea)    Lymphedema    COPD (chronic obstructive pulmonary disease) ([facility]    Hepatic cirrhosis ([facility]  Resolved Problems:    [place] (acute kidney injury) [facility])      Admission Condition:  fair     Discharged Condition: fair    Hospital Stay:     Hospital Course:  Alan Burns is a 74 y.o. female who was admitted for the management of  Shock, septic ([facility] [address] , presented to ER with Abdominal Pain (N/V/D) and Hematuria (X few months/)    This is a 63-year-old female with a history of dementia, diabetes type 2 that presents with a complaint of abdominal pain and distention with associated nausea vomiting. On evaluation she is found to have a markedly dilated colon consistent with [place] syndrome. She finds concerning for sepsis and was treated with a course of antibiotics throughout her hospital stay. Ultimately she underwent colonoscopy with bowel decompression was followed by colorectal surgery. She had stool softener laxative titrated to manage her GI symptoms. She continued to improve. Ultimately she had failed swallow study and had an NG placed for nutritional support. Plan was to be discharged to [facility] which was denied by the insurance company and underwent expedited appeal.  Expedited appeal again was denied and ultimately attempts were made to find a skilled nursing facility. Unfortunate with nasogastric tube in place she was not a candidate for skilled nursing placement, not a candidate for PEG tube due to underlying liver disease as well as the [place] syndrome. She was maintained on NG tube for nutritional support and had improvement in her mentation. Ultimately repeat swallow study was attempted after several days and she passed with puréed consistency and nectar thick liquids. Her diet was advanced and tolerated and NG tube was discontinued on the 18th.       Significant therapeutic interventions: As above, see chart for further details    Significant Diagnostic Studies:   Labs / Micro:  CBC:   Lab Results   Component Value Date/Time    WBC 3.1 09/17/2022 06:32 AM    RBC 2.83 09/17/2022 06:32 AM    HGB 7.5 09/17/2022 06:32 AM    HCT 25.8 09/17/2022 06:32 AM    MCV 91.2 09/17/2022 06:32 AM    MCH 26.5 09/17/2022 06:32 AM    MCHC 29.1 09/17/2022 06:32 AM    RDW 24.8 09/17/2022 06:32 AM    PLT 59 09/17/2022 06:32 AM     BMP:    Lab Results   Component Value Date/Time    GLUCOSE 200 09/17/2022 06:32 AM     09/17/2022 06:32 AM    K 3.8 09/17/2022 06:32 AM     09/17/2022 06:32 AM    CO2 27 09/17/2022 06:32 AM    ANIONGAP 4 09/17/2022 06:32 AM    BUN 12 09/17/2022 06:32 AM    CREATININE <0.40 09/17/2022 06:32 AM    BUNCRER Can not be calculated 09/17/2022 06:32 AM    CALCIUM 8.1 09/17/2022 06:32 AM    LABGLOM Can not be calculated 09/17/2022 06:32 AM    GFRAA Can not be calculated 09/17/2022 06:32 AM    GFR      09/17/2022 06:32 AM        Radiology:  XR ABDOMEN FOR NG/OG/NE TUBE PLACEMENT    Result Date: 9/12/2022  Enteric device terminating in the stomach     FL MODIFIED BARIUM SWALLOW W VIDEO    Result Date: 9/17/2022  Aspiration with thin liquid. Premature vallecular spillage with many materials. Vallecular residue which clears out. FL MODIFIED BARIUM SWALLOW W VIDEO    Result Date: 9/14/2022  Penetration and aspiration observed with the administration of pureed and thick liquid barium textures. Please see separate speech pathology report for full discussion of findings and recommendations. Consultations:    Consults:     Final Specialist Recommendations/Findings:   IP CONSULT TO INTERNAL MEDICINE  IP CONSULT TO COLORECTAL SURGERY  IP CONSULT TO CRITICAL CARE  IP CONSULT TO DIETITIAN  IP CONSULT TO PHARMACY  IP CONSULT TO DIETITIAN  IP CONSULT TO GI  PALLIATIVE CARE EVAL  IP CONSULT TO GI  IP CONSULT TO PALLIATIVE CARE      The patient was seen and examined on day of discharge and this discharge summary is in conjunction with any daily progress note from day of discharge. Discharge plan:     Disposition:  To a non-[facility] facility    Physician Follow Up:   PCP 1 week  Rex Hill MD  [address] [place] [address]  [phone]    Schedule an appointment as soon as possible for a visit in 4 week(s)  cirrhosis romero syndrome       Requiring Further Evaluation/Follow Up POST HOSPITALIZATION/Incidental Findings: Follow-up labs at discretion of PCP. Maintain potassium greater than 4 and magnesium greater than 2 with her [place] syndrome. Diet:  Puréed diet with nectar thick liquids    Activity: As tolerated    Instructions to Patient: Take medication as prescribed    Discharge Medications:      Medication List        START taking these medications      famotidine 20 MG tablet  Commonly known as: PEPCID  1 tablet by Per NG tube route 2 times daily     glucose 4 g chewable tablet  Take 4 tablets by mouth as needed for Low blood sugar     miconazole 2 % powder  Commonly known as: MICOTIN  Apply topically 2 times daily. midodrine 10 MG tablet  Commonly known as: PROAMATINE  Take 1 tablet by mouth 3 times daily     nystatin 399805 UNIT/GM cream  Commonly known as: MYCOSTATIN  Apply topically 2 times daily. CHANGE how you take these medications      docusate 100 MG Caps  Commonly known as: COLACE, DULCOLAX  Take 100 mg by mouth daily  Start taking on: September 19, 2022  What changed: when to take this     lactulose 10 GM/15ML solution  Commonly known as: CHRONULAC  Take 30 mLs by mouth 3 times daily  What changed:   when to take this  additional instructions     Lantus SoloStar 100 UNIT/ML injection pen  Generic drug: insulin glargine  Inject 20 Units into the skin 2 times daily  What changed:   how much to take  when to take this     * polyethylene glycol 17 GM/SCOOP powder  Commonly known as: GLYCOLAX  Take 17 g by mouth daily  What changed:   Another medication with the same name was added. Make sure you understand how and when to take each. Another medication with the same name was changed. Make sure you understand how and when to take each.      * polyethylene glycol 17 g packet  Commonly known as: GLYCOLAX  Take 17 g by mouth daily  What changed:   when to take this  reasons to take this     * polyethylene glycol 17 g packet  Commonly known Jeffrey Logan  Take 17 g by mouth daily  What changed: You were already taking a medication with the same name, and this prescription was added. Make sure you understand how and when to take each. potassium chloride 20 MEQ extended release tablet  Commonly known as: KLOR-CON M  Take 2 tablets by mouth daily (with breakfast)  Start taking on: September 19, 2022  What changed:   how much to take  when to take this  Another medication with the same name was removed. Continue taking this medication, and follow the directions you see here. sertraline 100 MG tablet  Commonly known as: Zoloft  1 tablet by Per NG tube route daily  What changed: how to take this     silver sulfADIAZINE 1 % cream  Commonly known as: SILVADENE  Indications: to burns Apply topically daily. What changed:   how to take this  when to take this  additional instructions     * Ventolin  (90 Base) MCG/ACT inhaler  Generic drug: albuterol sulfate HFA  What changed: Another medication with the same name was added. Make sure you understand how and when to take each. * albuterol (2.5 MG/3ML) 0.083% nebulizer solution  Commonly known as: PROVENTIL  Take 3 mLs by nebulization in the morning and 3 mLs in the evening. What changed: You were already taking a medication with the same name, and this prescription was added. Make sure you understand how and when to take each. * This list has 5 medication(s) that are the same as other medications prescribed for you. Read the directions carefully, and ask your doctor or other care provider to review them with you.                 CONTINUE taking these medications      Breo Ellipta 100-25 MCG/INH Aepb inhaler  Generic drug: fluticasone-vilanterol     Eliquis 5 MG Tabs tablet  Generic drug: apixaban     ferrous sulfate 325 (65 Fe) MG EC tablet  Commonly known as: FE TABS 325     gabapentin 300 MG capsule  Commonly known as: NEURONTIN     oxybutynin 5 MG tablet  Commonly known as: the opportunity to be involved in this patient's care.

## 2022-09-18 NOTE — PLAN OF CARE
Problem: Respiratory - Adult  Goal: Achieves optimal ventilation and oxygenation  9/17/2022 2132 by Emile Anand RCP  Outcome: Progressing     Problem: Respiratory - Adult  Goal: Able to breathe comfortably  9/17/2022 2132 by Emile Anand RCP  Outcome: Progressing     Problem: Respiratory - Adult  Goal: Patient's breath sounds will be clear and equal  9/17/2022 2132 by Emile Anand RCP  Outcome: Progressing

## 2022-09-18 NOTE — PLAN OF CARE
Problem: Discharge Planning  Goal: Discharge to home or other facility with appropriate resources  9/18/2022 0750 by Judit Schulte RN  Outcome: Progressing  9/18/2022 0419 by Jose Fields RN  Outcome: Progressing     Problem: Pain  Goal: Verbalizes/displays adequate comfort level or baseline comfort level  9/18/2022 0750 by Judit Schulte RN  Outcome: Progressing  9/18/2022 0419 by Jose Fields RN  Outcome: Progressing     Problem: Skin/Tissue Integrity  Goal: Absence of new skin breakdown  Description: 1. Monitor for areas of redness and/or skin breakdown  2. Assess vascular access sites hourly  3. Every 4-6 hours minimum:  Change oxygen saturation probe site  4. Every 4-6 hours:  If on nasal continuous positive airway pressure, respiratory therapy assess nares and determine need for appliance change or resting period.   9/18/2022 0750 by Judit Schulte RN  Outcome: Progressing  9/18/2022 0419 by Jose Fields RN  Outcome: Progressing     Problem: Safety - Adult  Goal: Free from fall injury  9/18/2022 0750 by Judit Schulte RN  Outcome: Progressing  9/18/2022 0419 by Jose Fields RN  Outcome: Progressing     Problem: ABCDS Injury Assessment  Goal: Absence of physical injury  Outcome: Progressing  Flowsheets (Taken 9/18/2022 0420 by Jose Fields RN)  Absence of Physical Injury: Implement safety measures based on patient assessment     Problem: Chronic Conditions and Co-morbidities  Goal: Patient's chronic conditions and co-morbidity symptoms are monitored and maintained or improved  Outcome: Progressing     Problem: Nutrition Deficit:  Goal: Optimize nutritional status  9/18/2022 0750 by Judit Schulte RN  Outcome: Progressing  9/18/2022 0419 by Jose Fields RN  Outcome: Progressing     Problem: Respiratory - Adult  Goal: Achieves optimal ventilation and oxygenation  9/18/2022 0750 by Judit Schulte RN  Outcome: Progressing  9/18/2022 0739 by Nyasia Watkins RCP  Outcome: Progressing  9/17/2022 2132 by Ryne Alvarenga RCP  Outcome: Progressing  Goal: Able to breathe comfortably  9/18/2022 0739 by Asael Concepcion RCP  Outcome: Progressing  9/17/2022 2132 by Ryne Alvarenga RCP  Outcome: Progressing  Goal: Patient's breath sounds will be clear and equal  9/18/2022 0739 by Asael Concepcion RCP  Outcome: Progressing  9/17/2022 2132 by Ryne Alvarenga RCP  Outcome: Progressing

## 2022-09-18 NOTE — PLAN OF CARE
Problem: Respiratory - Adult  Goal: Achieves optimal ventilation and oxygenation  9/18/2022 0739 by Tobin Paredes, RCP  Outcome: Progressing     Problem: Respiratory - Adult  Goal: Able to breathe comfortably  9/18/2022 0739 by Tobin Paredes, RCP  Outcome: Progressing     Problem: Respiratory - Adult  Goal: Patient's breath sounds will be clear and equal  9/18/2022 0739 by Tobin Paredes, RCP  Outcome: Progressing

## 2022-09-18 NOTE — PROGRESS NOTES
[facility]  Office: [address], DO, Shavonne Skbelindas, DO, Jonathon Andersen, DO, Katie Can Blood, DO, Sebastian Browne MD, Sveta Evans MD, Shira Lee MD, John Bergman MD,  Ernesto Durant MD, Yonny Fletcher MD, Oskar Pena, DO, Tomer Johnson MD,  Giovanna Camara MD, Rachele Yanes MD, Coye Meigs, DO, Julio Cesar Murphy MD, Bobby Ghotra MD, Siomara García MD, Simba Waite MD, Alicia Mesa MD, Tano Quick MD, Gal Han DO, Blue Stubbs MD, Ronald Garcia MD, Ministerio Fiore, CNP,  Jennifer Forbes, CNP, Marjorie Rangel, CNP, Seymour Barreto, CNP,  Екатерина Lemus, DNP, Merlyn Black, CNP, Dee Winchester, CNP, Salvador Gilbert, CNP, Jesus Manuel Spangler, CNP, Philippe Earl, CNP, Damian Syed PADharmeshC, Cindy Pantoja, CNS, Tim Murray, DNP, Fernando Glynn, CNP, Cheikh Westfall, CNP, Yuval Noble, Angel Harman    Progress Note    9/18/2022    8:25 AM    Name:   Laci Menendez  MRN:     [id number]     [place]:      [de-identified]   Room:   2014/2014-02   Day:  20  Admit Date:  8/29/2022  8:35 PM    PCP:   Maya Gutierrez MD  Code Status:  Full Code    Subjective:     C/C:   Chief Complaint   Patient presents with    Abdominal Pain     N/V/D    Hematuria     X few months       Interval History Status: improved. Patient remains confused at times but much more alert. Denies any chest pain, shortness of breath, nausea or vomiting, fevers or chills. Had repeat swallow study yesterday and has been cleared for puréed diet with thickened liquids, tolerating thus far. Brief History: This is a 61-year-old female with a history of dementia and type 2 diabetes that presents with abdominal pain, nausea and vomiting. Upon evaluation she is found to have markedly dilated colon consistent with [place] syndrome.   She underwent decompressive colonoscopy with colorectal surgery and was placed on stool softeners and laxative. She is transatrial with TPN due to prolonged bowel rest also has been started on tube feeding. Due to her cognitive status she has been unable to undergo swallow study. Currently awaiting discharge disposition. Review of Systems:     Constitutional:  negative for chills, fevers, sweats  Respiratory:  negative for cough, dyspnea on exertion, shortness of breath, wheezing  Cardiovascular:  negative for chest pain, chest pressure/discomfort, lower extremity edema, palpitations  Gastrointestinal:  negative for abdominal pain, constipation, diarrhea, nausea, vomiting  Neurological:  negative for dizziness, headache    Medications: Allergies:     Allergies   Allergen Reactions    Quetiapine      Other reaction(s): Unknown  Other reaction(s): Hallucinations  seroquel    Venlafaxine      Other reaction(s): Hallucinations, Unknown  effexor      Aspirin Other (See Comments)     Bleeding disorder  Other reaction(s): Unknown  Bleeding disorder      Fentanyl      Other reaction(s): Unknown    Other Other (See Comments)     \"NO SUPPOSED TO HAVE ASPIRIN\"    Quetiapine Fumarate      Other reaction(s): Unknown    Venlafaxine Hcl      Other reaction(s): Unknown       Current Meds:   Scheduled Meds:    apixaban  5 mg Per NG tube BID    albuterol  2.5 mg Nebulization BID    Docusate Sodium  100 mg Oral Daily    potassium bicarb-citric acid  40 mEq Per NG tube Daily    miconazole   Topical BID    famotidine  20 mg Per NG tube BID    insulin lispro  0-16 Units SubCUTAneous Q6H    nystatin   Topical BID    insulin glargine  20 Units SubCUTAneous BID    budesonide  0.5 mg Nebulization BID    midodrine  10 mg Per NG tube TID WC    sertraline  100 mg Per NG tube Daily    polyethylene glycol  17 g Oral Daily    traZODone  100 mg Oral Nightly    sodium chloride flush  5-40 mL IntraVENous 2 times per day    silver sulfADIAZINE   Topical Nightly    [Held by provider] sodium chloride  1,000 mL IntraVENous Once     Continuous Infusions:    sodium chloride 10 mL/hr at 22 1653    dextrose       PRN Meds: magnesium sulfate, potassium chloride **OR** potassium alternative oral replacement, sodium phosphate IVPB **OR** sodium phosphate IVPB, sodium chloride flush, potassium chloride, morphine, sodium chloride flush, sodium chloride, ondansetron **OR** ondansetron, acetaminophen **OR** acetaminophen, glucose, dextrose bolus **OR** dextrose bolus, glucagon (rDNA), dextrose, oxybutynin, albuterol sulfate HFA    Data:     Past Medical History:   has a past medical history of Arthritis, CAD (coronary artery disease), CHF (congestive heart failure) ([facility] COPD (chronic obstructive pulmonary disease) ([facility] [address] [facility] Diabetes mellitus ([facility] [address] Fibromyalgia, Headache, Hypertension, Liver disease, LEONARDA (obstructive sleep apnea), Panic attack, and Paroxysmal atrial fibrillation ([facility] [address] Social History:   reports that she has never smoked. She has never used smokeless tobacco. She reports that she does not currently use alcohol. She reports that she does not use drugs. Family History:   Family History   Problem Relation Age of Onset    Heart Failure Mother     Cancer Father     Cancer Sister     Cancer Brother        Vitals:  /75   Pulse 95   Temp 97.8 °F (36.6 °C) (Oral)   Resp 17   Ht 5' 2\" (1.575 m)   Wt 229 lb (103.9 kg)   SpO2 97%   BMI 41.88 kg/m²   Temp (24hrs), Av.2 °F (36.8 °C), Min:97.8 °F (36.6 °C), Max:98.4 °F (36.9 °C)    Recent Labs     22  1140 22  1754 22  0012 22  0621   POCGLU 215* 215* 190* 159*       I/O (24Hr):     Intake/Output Summary (Last 24 hours) at 2022 0825  Last data filed at 2022 0700  Gross per 24 hour   Intake 1200 ml   Output 675 ml   Net 525 ml       Labs:  Hematology:  Recent Labs     22  0719 22  0632   WBC 3.7 3.1*   RBC 2.84* 2.83*   HGB 7.5* 7.5*   HCT 24.7* 25.8*   MCV 87.0 91.2   MCH 26.4 26.5   MCHC 30.4 29.1   RDW 24.5* 24.8*   PLT 68* 59*   MPV 9.2 8.8     Chemistry:  Recent Labs     09/16/22  0719 09/17/22  0632    141   K 3.3* 3.8   * 110*   CO2 25 27   GLUCOSE 228* 200*   BUN 12 12   CREATININE 0.40* <0.40*   MG 2.1  --    ANIONGAP 6* 4*   LABGLOM >60 Can not be calculated   GFRAA >60 Can not be calculated   CALCIUM 8.1* 8.1*     Recent Labs     09/17/22  0005 09/17/22  0555 09/17/22  1140 09/17/22  1754 09/18/22  0012 09/18/22  0621   POCGLU 208* 188* 215* 215* 190* 159*     ABG:  Lab Results   Component Value Date/Time    FIO2 NOT REPORTED 02/08/2022 07:10 PM     Lab Results   Component Value Date/Time    SPECIAL 7ML RT [facility] 08/30/2022 12:57 AM     Lab Results   Component Value Date/Time    CULTURE NO GROWTH 5 DAYS 08/30/2022 12:57 AM       Radiology:  XR ABDOMEN FOR NG/OG/NE TUBE PLACEMENT    Result Date: 9/12/2022  Enteric device terminating in the stomach     FL MODIFIED BARIUM SWALLOW W VIDEO    Result Date: 9/17/2022  Aspiration with thin liquid. Premature vallecular spillage with many materials. Vallecular residue which clears out. FL MODIFIED BARIUM SWALLOW W VIDEO    Result Date: 9/14/2022  Penetration and aspiration observed with the administration of pureed and thick liquid barium textures. Please see separate speech pathology report for full discussion of findings and recommendations.        Physical Examination:        General appearance:  alert, cooperative and no distress  Mental Status:  oriented to person, place and time and normal affect  Lungs:  clear to auscultation bilaterally, normal effort  Heart:  regular rate and rhythm, no murmur  Abdomen:  soft, nontender, nondistended, normal bowel sounds, no masses, hepatomegaly, splenomegaly  Extremities:  no edema, redness, tenderness in the calves  Skin:  no gross lesions, rashes, induration    Assessment:        Hospital Problems             Last Modified POA    * (Principal) Shock, septic ([address] 9/4/2022 Yes    Urinary tract infection associated with indwelling urethral catheter ([facility] [address] 9/4/2022 Yes    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    Hypotension 9/4/2022 Yes    Acute urinary retention 9/4/2022 Yes    Encephalopathy acute 9/4/2022 Yes    Stroke-like symptoms 9/3/2022 Yes    Leukocytosis 9/3/2022 Yes    Dysphagia 9/4/2022 Yes    Hypertension (Chronic) 8/30/2022 Yes    LEONARDA (obstructive sleep apnea) (Chronic) 8/30/2022 Yes    Lymphedema (Chronic) 8/30/2022 Yes    COPD (chronic obstructive pulmonary disease) ([facility] [address] (Chronic) 8/30/2022 Yes    Hepatic cirrhosis ([facility] [address] 8/31/2022 Yes       Plan:        Puréed diet, thickened liquids  Remove NG tube  GI and DVT prophylaxis  Continue stool softeners, laxatives as ordered  PT, OT and speech therapy  Discharge planning to skilled facility    Stu Celestin DO  9/18/2022  8:25 AM

## 2022-09-18 NOTE — RT PROTOCOL NOTE
increased work of breathing using Per Protocol order mode. 4-6 - enter or revise RT Bronchodilator order(s) to two equivalent RT bronchodilator orders with one order with BID Frequency and one order with Frequency of every 4 hours PRN wheezing or increased work of breathing using Per Protocol order mode. 7-10 - enter or revise RT Bronchodilator order(s) to two equivalent RT bronchodilator orders with one order with TID Frequency and one order with Frequency of every 4 hours PRN wheezing or increased work of breathing using Per Protocol order mode. 11-13 - enter or revise RT Bronchodilator order(s) to one equivalent RT bronchodilator order with QID Frequency and an Albuterol order with Frequency of every 4 hours PRN wheezing or increased work of breathing using Per Protocol order mode. Greater than 13 - enter or revise RT Bronchodilator order(s) to one equivalent RT bronchodilator order with every 4 hours Frequency and an Albuterol order with Frequency of every 2 hours PRN wheezing or increased work of breathing using Per Protocol order mode. RT to enter RT Home Evaluation for COPD & MDI Assessment order using Per Protocol order mode.     Electronically signed by David Ervin RCP on 9/18/2022 at 7:38 AM

## 2022-09-18 NOTE — RT PROTOCOL NOTE
RT Inhaler-Nebulizer Bronchodilator Protocol Note    There is a bronchodilator order in the chart from a provider indicating to follow the RT Bronchodilator Protocol and there is an Initiate RT Inhaler-Nebulizer Bronchodilator Protocol order as well (see protocol at bottom of note). CXR Findings:  No results found. The findings from the last RT Protocol Assessment were as follows:   History Pulmonary Disease: Chronic pulmonary disease  Respiratory Pattern: Regular pattern and RR 12-20 bpm  Breath Sounds: Slightly diminished and/or crackles  Cough: Strong, spontaneous, non-productive  Indication for Bronchodilator Therapy: Decreased or absent breath sounds  Bronchodilator Assessment Score: 4    Aerosolized bronchodilator medication orders have been revised according to the RT Inhaler-Nebulizer Bronchodilator Protocol below. Respiratory Therapist to perform RT Therapy Protocol Assessment initially then follow the protocol. Repeat RT Therapy Protocol Assessment PRN for score 0-3 or on second treatment, BID, and PRN for scores above 3. No Indications - adjust the frequency to every 6 hours PRN wheezing or bronchospasm, if no treatments needed after 48 hours then discontinue using Per Protocol order mode. If indication present, adjust the RT bronchodilator orders based on the Bronchodilator Assessment Score as indicated below. Use Inhaler orders unless patient has one or more of the following: on home nebulizer, not able to hold breath for 10 seconds, is not alert and oriented, cannot activate and use MDI correctly, or respiratory rate 25 breaths per minute or more, then use the equivalent nebulizer order(s) with same Frequency and PRN reasons based on the score. If a patient is on this medication at home then do not decrease Frequency below that used at home.     0-3 - enter or revise RT bronchodilator order(s) to equivalent RT Bronchodilator order with Frequency of every 4 hours PRN for wheezing or increased work of breathing using Per Protocol order mode. 4-6 - enter or revise RT Bronchodilator order(s) to two equivalent RT bronchodilator orders with one order with BID Frequency and one order with Frequency of every 4 hours PRN wheezing or increased work of breathing using Per Protocol order mode. 7-10 - enter or revise RT Bronchodilator order(s) to two equivalent RT bronchodilator orders with one order with TID Frequency and one order with Frequency of every 4 hours PRN wheezing or increased work of breathing using Per Protocol order mode. 11-13 - enter or revise RT Bronchodilator order(s) to one equivalent RT bronchodilator order with QID Frequency and an Albuterol order with Frequency of every 4 hours PRN wheezing or increased work of breathing using Per Protocol order mode. Greater than 13 - enter or revise RT Bronchodilator order(s) to one equivalent RT bronchodilator order with every 4 hours Frequency and an Albuterol order with Frequency of every 2 hours PRN wheezing or increased work of breathing using Per Protocol order mode. RT to enter RT Home Evaluation for COPD & MDI Assessment order using Per Protocol order mode.     Electronically signed by uLis Manuel Perkins RCP on 9/17/2022 at 9:32 PM

## 2022-09-19 PROBLEM — T21.32XA: Status: ACTIVE | Noted: 2022-09-19

## 2022-09-19 LAB
GLUCOSE BLD-MCNC: 145 MG/DL (ref 65–105)
GLUCOSE BLD-MCNC: 163 MG/DL (ref 65–105)
GLUCOSE BLD-MCNC: 82 MG/DL (ref 65–105)
MAGNESIUM: 1.9 MG/DL (ref 1.6–2.6)

## 2022-09-19 PROCEDURE — 82947 ASSAY GLUCOSE BLOOD QUANT: CPT

## 2022-09-19 PROCEDURE — 6370000000 HC RX 637 (ALT 250 FOR IP): Performed by: FAMILY MEDICINE

## 2022-09-19 PROCEDURE — 97110 THERAPEUTIC EXERCISES: CPT

## 2022-09-19 PROCEDURE — 97530 THERAPEUTIC ACTIVITIES: CPT

## 2022-09-19 PROCEDURE — 36415 COLL VENOUS BLD VENIPUNCTURE: CPT

## 2022-09-19 PROCEDURE — 6370000000 HC RX 637 (ALT 250 FOR IP): Performed by: INTERNAL MEDICINE

## 2022-09-19 PROCEDURE — 6360000002 HC RX W HCPCS: Performed by: INTERNAL MEDICINE

## 2022-09-19 PROCEDURE — 83735 ASSAY OF MAGNESIUM: CPT

## 2022-09-19 PROCEDURE — 1200000000 HC SEMI PRIVATE

## 2022-09-19 PROCEDURE — 2580000003 HC RX 258: Performed by: STUDENT IN AN ORGANIZED HEALTH CARE EDUCATION/TRAINING PROGRAM

## 2022-09-19 PROCEDURE — 6360000002 HC RX W HCPCS: Performed by: STUDENT IN AN ORGANIZED HEALTH CARE EDUCATION/TRAINING PROGRAM

## 2022-09-19 PROCEDURE — 6370000000 HC RX 637 (ALT 250 FOR IP): Performed by: STUDENT IN AN ORGANIZED HEALTH CARE EDUCATION/TRAINING PROGRAM

## 2022-09-19 PROCEDURE — 6370000000 HC RX 637 (ALT 250 FOR IP): Performed by: NURSE PRACTITIONER

## 2022-09-19 PROCEDURE — 94640 AIRWAY INHALATION TREATMENT: CPT

## 2022-09-19 PROCEDURE — 94761 N-INVAS EAR/PLS OXIMETRY MLT: CPT

## 2022-09-19 PROCEDURE — 92526 ORAL FUNCTION THERAPY: CPT

## 2022-09-19 RX ORDER — TRAMADOL HYDROCHLORIDE 50 MG/1
50 TABLET ORAL EVERY 6 HOURS PRN
Qty: 10 TABLET | Refills: 0 | Status: SHIPPED | OUTPATIENT
Start: 2022-09-19 | End: 2022-09-23 | Stop reason: SDUPTHER

## 2022-09-19 RX ADMIN — FAMOTIDINE 20 MG: 20 TABLET, FILM COATED ORAL at 09:29

## 2022-09-19 RX ADMIN — MIDODRINE HYDROCHLORIDE 10 MG: 10 TABLET ORAL at 09:29

## 2022-09-19 RX ADMIN — SODIUM CHLORIDE, PRESERVATIVE FREE 10 ML: 5 INJECTION INTRAVENOUS at 21:00

## 2022-09-19 RX ADMIN — MIDODRINE HYDROCHLORIDE 10 MG: 10 TABLET ORAL at 12:31

## 2022-09-19 RX ADMIN — INSULIN GLARGINE 20 UNITS: 100 INJECTION, SOLUTION SUBCUTANEOUS at 09:29

## 2022-09-19 RX ADMIN — ANTI-FUNGAL POWDER MICONAZOLE NITRATE TALC FREE: 1.42 POWDER TOPICAL at 09:38

## 2022-09-19 RX ADMIN — FAMOTIDINE 20 MG: 20 TABLET, FILM COATED ORAL at 20:55

## 2022-09-19 RX ADMIN — APIXABAN 5 MG: 5 TABLET, FILM COATED ORAL at 09:29

## 2022-09-19 RX ADMIN — ANTI-FUNGAL POWDER MICONAZOLE NITRATE TALC FREE: 1.42 POWDER TOPICAL at 20:56

## 2022-09-19 RX ADMIN — BUDESONIDE 500 MCG: 0.5 SUSPENSION RESPIRATORY (INHALATION) at 09:28

## 2022-09-19 RX ADMIN — SODIUM CHLORIDE, PRESERVATIVE FREE 20 ML: 5 INJECTION INTRAVENOUS at 09:30

## 2022-09-19 RX ADMIN — NYSTATIN: 100000 CREAM TOPICAL at 09:38

## 2022-09-19 RX ADMIN — BUDESONIDE 500 MCG: 0.5 SUSPENSION RESPIRATORY (INHALATION) at 21:17

## 2022-09-19 RX ADMIN — SILVER SULFADIAZINE: 10 CREAM TOPICAL at 21:00

## 2022-09-19 RX ADMIN — ALBUTEROL SULFATE 2.5 MG: 2.5 SOLUTION RESPIRATORY (INHALATION) at 09:28

## 2022-09-19 RX ADMIN — TRAZODONE HYDROCHLORIDE 100 MG: 100 TABLET ORAL at 20:55

## 2022-09-19 RX ADMIN — SERTRALINE HYDROCHLORIDE 100 MG: 100 TABLET ORAL at 09:29

## 2022-09-19 RX ADMIN — NYSTATIN: 100000 CREAM TOPICAL at 20:59

## 2022-09-19 RX ADMIN — APIXABAN 5 MG: 5 TABLET, FILM COATED ORAL at 20:55

## 2022-09-19 RX ADMIN — ONDANSETRON 4 MG: 2 INJECTION INTRAMUSCULAR; INTRAVENOUS at 09:51

## 2022-09-19 RX ADMIN — ALBUTEROL SULFATE 2.5 MG: 2.5 SOLUTION RESPIRATORY (INHALATION) at 21:17
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
## 2022-09-19 NOTE — PROGRESS NOTES
Speech Language Pathology  Speech Language Pathology  Nolberto Murray    Dysphagia Treatment Note    Date: 9/19/2022  Patients Name: Zac Aguiar  MRN: [id number]  Diagnosis:   Patient Active Problem List   Diagnosis Code    Hypokalemia E87.6    CHF (congestive heart failure) ([facility] [address] I50.9    Atrial fibrillation (HCC) I48.91    Hypertension I10    LEONARDA (obstructive sleep apnea) G47.33    Hyperglycemia due to diabetes mellitus (HCC) E11.65    Lymphedema I89.0    Noncompliance Z91.19    CAD (coronary artery disease) I25.10    COPD (chronic obstructive pulmonary disease) (HCC) J44.9    Hyperammonemia (HCC) E72.20    Thrombocytopenia (HCC) D69.6    Chronic anemia D64.9    Hepatic cirrhosis (HCC) K74.60    Fecal impaction (HCC) K56.41    Pancytopenia (HCC) D61.818    Urinary tract infection associated with indwelling urethral catheter (HCC) T83.511A, N39.0    Juan Jose's syndrome K59.81    Dementia (HCC) F03.90    Diabetes mellitus (HCC) E11.9    Fibromyalgia M79.7    Liver disease K76.9    Panic attack F41.0    Hypotension I95.9    Chronic diastolic CHF (congestive heart failure), NYHA class 3 (HCC) I50.32    Acute urinary retention R33.8    Acute on chronic respiratory failure with hypoxemia (HCC) Q58.96    Alcoholic cirrhosis (HCC) G83.60    Anasarca R60.1    Asthma without status asthmaticus J45.909    Bacterial pneumonia J15.9    Cauda equina syndrome (HCC) G83.4    Sepsis (HCC) A41.9    Hydronephrosis N13.30    Hyperlipidemia E78.5    Head contusion S00.93XA    Gastroesophageal reflux disease K21.9    Chronic fatigue syndrome R53.82    Fatigue R53.83    Essential tremor G25.0    Esophageal varices (HCC) I85.00    Epidural abscess G06.2    Displacement of lumbar intervertebral disc without myelopathy M51.26    Degenerative joint disease involving multiple joints M15.9    Diarrhea R19.7    Disorders of both mitral and tricuspid valves I08.1    Cervical spondylosis without myelopathy M47.812 Compression fracture of lumbar vertebra with routine healing S32.000D    Lumbar radiculopathy M54.16    Shock, septic (HCC) A41.9, R65.21    Encephalopathy acute G93.40    Stroke-like symptoms R29.90    Leukocytosis D72.829    Dysphagia R13.10       Pain: pt denies    Dysphagia Treatment  Treatment time: 2263-0491    Subjective: [x] Alert [x] Cooperative     [] Confused     [] Agitated    [] Lethargic    Objective/Assessment:    Pt. Seen for O/M treatment program for dysphagia. Puree diet with Mildly thick liuqids recommended following MBSS on 9/17/22. Pt. Completed O/M exercises X 10 X 1 sets with mod-max verbal and visual cues. Note fatigue as reps continued of each exercises. Pt. Completed carlton maneuver and effortful swallow X 0 reps with max cues. Pt reports decreased appetite. Education provided and exercises left at bedside. Plan:  [x] Continue ST services    [] Discharge from ST:        Discharge recommendations: []  Further therapy recommended at discharge. The patient should be able to tolerate at least 3 hours of therapy per day over 5 days or 15 hours over 7 days. [x] Further therapy recommended at discharge. [] No therapy recommended at discharge.          Treatment completed by: Valentina Barnard, SLP, M.S. [place]
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
## 2022-09-19 NOTE — PLAN OF CARE
Problem: Respiratory - Adult  Goal: Achieves optimal ventilation and oxygenation  9/18/2022 2102 by Hermilo Nash RCP  Outcome: Progressing     Problem: Respiratory - Adult  Goal: Able to breathe comfortably  9/18/2022 2102 by Hermilo Nash RCP  Outcome: Progressing     Problem: Respiratory - Adult  Goal: Patient's breath sounds will be clear and equal  9/18/2022 2102 by Hermilo Nash RCP  Outcome: Progressing

## 2022-09-19 NOTE — PLAN OF CARE
Problem: Nutrition Deficit:  Goal: Optimize nutritional status  Outcome: Not Progressing     Problem: Discharge Planning  Goal: Discharge to home or other facility with appropriate resources  Outcome: Progressing     Problem: Pain  Goal: Verbalizes/displays adequate comfort level or baseline comfort level  Outcome: Progressing     Problem: Skin/Tissue Integrity  Goal: Absence of new skin breakdown  Description: 1. Monitor for areas of redness and/or skin breakdown  2. Assess vascular access sites hourly  3. Every 4-6 hours minimum:  Change oxygen saturation probe site  4. Every 4-6 hours:  If on nasal continuous positive airway pressure, respiratory therapy assess nares and determine need for appliance change or resting period.   Outcome: Progressing  Note: Skin assessment completed and documented  Alex scale updated  Relieve pressure to bony prominences  Avoid shearing  Assess s/sx of infection   Air mattress in place  Turned q 2 hours  Priya care given and barrier cream applied to prevent breakdown  Heels elevated         Problem: Safety - Adult  Goal: Free from fall injury  Outcome: Progressing  Flowsheets (Taken 9/19/2022 1103)  Free From Fall Injury: Instruct family/caregiver on patient safety     Problem: ABCDS Injury Assessment  Goal: Absence of physical injury  Outcome: Progressing  Flowsheets (Taken 9/19/2022 1103)  Absence of Physical Injury: Implement safety measures based on patient assessment     Problem: Chronic Conditions and Co-morbidities  Goal: Patient's chronic conditions and co-morbidity symptoms are monitored and maintained or improved  Outcome: Progressing     Problem: Respiratory - Adult  Goal: Achieves optimal ventilation and oxygenation  Outcome: Progressing     Problem: Nutrition Deficit:  Goal: Optimize nutritional status  Outcome: Not Progressing

## 2022-09-19 NOTE — PROGRESS NOTES
Patient refusing dinner. Refused her midodrine. Patient with 160ml of urine output in [place] in 12 hours. Dr. Everett Mao notified via secure message, states to leave [place] in and encourage fluids. Patient agreed to drink orange juice. Drank an estimated 60ml.

## 2022-09-19 NOTE — CARE COORDINATION
Social work: Patient passed swallow study and ng tube pulled. Jose completed onsite visit, needs PT/OT notes to confirm acceptance.

## 2022-09-20 LAB
GLUCOSE BLD-MCNC: 102 MG/DL (ref 65–105)
GLUCOSE BLD-MCNC: 113 MG/DL (ref 65–105)
GLUCOSE BLD-MCNC: 115 MG/DL (ref 65–105)
GLUCOSE BLD-MCNC: 144 MG/DL (ref 65–105)
GLUCOSE BLD-MCNC: 177 MG/DL (ref 65–105)
GLUCOSE BLD-MCNC: 55 MG/DL (ref 65–105)
GLUCOSE BLD-MCNC: 58 MG/DL (ref 65–105)
GLUCOSE BLD-MCNC: 71 MG/DL (ref 65–105)
GLUCOSE BLD-MCNC: 82 MG/DL (ref 65–105)
GLUCOSE BLD-MCNC: 96 MG/DL (ref 65–105)

## 2022-09-20 PROCEDURE — 94640 AIRWAY INHALATION TREATMENT: CPT

## 2022-09-20 PROCEDURE — 1200000000 HC SEMI PRIVATE

## 2022-09-20 PROCEDURE — 6370000000 HC RX 637 (ALT 250 FOR IP): Performed by: STUDENT IN AN ORGANIZED HEALTH CARE EDUCATION/TRAINING PROGRAM

## 2022-09-20 PROCEDURE — 97110 THERAPEUTIC EXERCISES: CPT

## 2022-09-20 PROCEDURE — 6360000002 HC RX W HCPCS: Performed by: INTERNAL MEDICINE

## 2022-09-20 PROCEDURE — 92526 ORAL FUNCTION THERAPY: CPT

## 2022-09-20 PROCEDURE — 6370000000 HC RX 637 (ALT 250 FOR IP): Performed by: FAMILY MEDICINE

## 2022-09-20 PROCEDURE — 97530 THERAPEUTIC ACTIVITIES: CPT

## 2022-09-20 PROCEDURE — 6370000000 HC RX 637 (ALT 250 FOR IP): Performed by: INTERNAL MEDICINE

## 2022-09-20 PROCEDURE — 2580000003 HC RX 258: Performed by: STUDENT IN AN ORGANIZED HEALTH CARE EDUCATION/TRAINING PROGRAM

## 2022-09-20 PROCEDURE — 94761 N-INVAS EAR/PLS OXIMETRY MLT: CPT

## 2022-09-20 RX ADMIN — BUDESONIDE 500 MCG: 0.5 SUSPENSION RESPIRATORY (INHALATION) at 08:08

## 2022-09-20 RX ADMIN — Medication 16 G: at 00:33

## 2022-09-20 RX ADMIN — FAMOTIDINE 20 MG: 20 TABLET, FILM COATED ORAL at 10:27

## 2022-09-20 RX ADMIN — SODIUM CHLORIDE, PRESERVATIVE FREE 20 ML: 5 INJECTION INTRAVENOUS at 10:30

## 2022-09-20 RX ADMIN — NYSTATIN: 100000 CREAM TOPICAL at 10:30

## 2022-09-20 RX ADMIN — APIXABAN 5 MG: 5 TABLET, FILM COATED ORAL at 22:31

## 2022-09-20 RX ADMIN — ANTI-FUNGAL POWDER MICONAZOLE NITRATE TALC FREE: 1.42 POWDER TOPICAL at 22:30

## 2022-09-20 RX ADMIN — MIDODRINE HYDROCHLORIDE 10 MG: 10 TABLET ORAL at 10:27

## 2022-09-20 RX ADMIN — SILVER SULFADIAZINE: 10 CREAM TOPICAL at 22:30

## 2022-09-20 RX ADMIN — BUDESONIDE 500 MCG: 0.5 SUSPENSION RESPIRATORY (INHALATION) at 20:22

## 2022-09-20 RX ADMIN — SODIUM CHLORIDE, PRESERVATIVE FREE 10 ML: 5 INJECTION INTRAVENOUS at 22:31

## 2022-09-20 RX ADMIN — ANTI-FUNGAL POWDER MICONAZOLE NITRATE TALC FREE: 1.42 POWDER TOPICAL at 10:29

## 2022-09-20 RX ADMIN — TRAZODONE HYDROCHLORIDE 100 MG: 100 TABLET ORAL at 22:31

## 2022-09-20 RX ADMIN — FAMOTIDINE 20 MG: 20 TABLET, FILM COATED ORAL at 22:31

## 2022-09-20 RX ADMIN — Medication 16 G: at 00:50

## 2022-09-20 RX ADMIN — SERTRALINE HYDROCHLORIDE 100 MG: 100 TABLET ORAL at 10:27

## 2022-09-20 RX ADMIN — APIXABAN 5 MG: 5 TABLET, FILM COATED ORAL at 10:27

## 2022-09-20 RX ADMIN — ALBUTEROL SULFATE 2.5 MG: 2.5 SOLUTION RESPIRATORY (INHALATION) at 20:21

## 2022-09-20 RX ADMIN — NYSTATIN: 100000 CREAM TOPICAL at 22:30

## 2022-09-20 RX ADMIN — ALBUTEROL SULFATE 2.5 MG: 2.5 SOLUTION RESPIRATORY (INHALATION) at 08:08
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
## 2022-09-20 NOTE — PLAN OF CARE
Problem: Nutrition Deficit:  Goal: Optimize nutritional status  9/20/2022 1259 by Lauren Tijerina RN  Outcome: Not Progressing     Problem: Discharge Planning  Goal: Discharge to home or other facility with appropriate resources  9/20/2022 1259 by Lauren Tijerina RN  Outcome: Progressing     Problem: Pain  Goal: Verbalizes/displays adequate comfort level or baseline comfort level  9/20/2022 1259 by Lauren Tijerina RN  Outcome: Progressing     Problem: Skin/Tissue Integrity  Goal: Absence of new skin breakdown  Description: 1. Monitor for areas of redness and/or skin breakdown  2. Assess vascular access sites hourly  3. Every 4-6 hours minimum:  Change oxygen saturation probe site  4. Every 4-6 hours:  If on nasal continuous positive airway pressure, respiratory therapy assess nares and determine need for appliance change or resting period.   9/20/2022 1259 by Lauren Tijerina RN  Outcome: Progressing     Problem: Safety - Adult  Goal: Free from fall injury  9/20/2022 1259 by Lauren Tijerina RN  Outcome: Progressing     Problem: ABCDS Injury Assessment  Goal: Absence of physical injury  9/20/2022 1259 by Lauren Tijerina RN  Outcome: Progressing     Problem: Chronic Conditions and Co-morbidities  Goal: Patient's chronic conditions and co-morbidity symptoms are monitored and maintained or improved  9/20/2022 1259 by Lauren Tijerina RN  Outcome: Progressing     Problem: Respiratory - Adult  Goal: Achieves optimal ventilation and oxygenation  9/20/2022 1259 by Lauren Tijerina RN  Outcome: Progressing     Problem: Nutrition Deficit:  Goal: Optimize nutritional status  9/20/2022 1259 by Lauren Tijerina RN  Outcome: Not Progressing  9/20/2022 0324 by Vamsi Giron RN  Outcome: Progressing
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
## 2022-09-20 NOTE — PLAN OF CARE
Awaiting placement. Patient needs to increase po intake to maintain glucose needs.    Problem: Nutrition Deficit:  Goal: Optimize nutritional status  9/20/2022 0324 by Yolis Packer RN  Outcome: Progressing  9/19/2022 1834 by Kaiser Torres, RN  Outcome: Not Progressing

## 2022-09-20 NOTE — RT PROTOCOL NOTE
increased work of breathing using Per Protocol order mode. 4-6 - enter or revise RT Bronchodilator order(s) to two equivalent RT bronchodilator orders with one order with BID Frequency and one order with Frequency of every 4 hours PRN wheezing or increased work of breathing using Per Protocol order mode. 7-10 - enter or revise RT Bronchodilator order(s) to two equivalent RT bronchodilator orders with one order with TID Frequency and one order with Frequency of every 4 hours PRN wheezing or increased work of breathing using Per Protocol order mode. 11-13 - enter or revise RT Bronchodilator order(s) to one equivalent RT bronchodilator order with QID Frequency and an Albuterol order with Frequency of every 4 hours PRN wheezing or increased work of breathing using Per Protocol order mode. Greater than 13 - enter or revise RT Bronchodilator order(s) to one equivalent RT bronchodilator order with every 4 hours Frequency and an Albuterol order with Frequency of every 2 hours PRN wheezing or increased work of breathing using Per Protocol order mode. RT to enter RT Home Evaluation for COPD & MDI Assessment order using Per Protocol order mode.     Electronically signed by Neda Davies RCP on 9/20/2022 at 12:07 AM

## 2022-09-20 NOTE — PLAN OF CARE
Problem: Respiratory - Adult  Goal: Achieves optimal ventilation and oxygenation  9/19/2022 2116 by Vinh Barraza RCP  Outcome: Progressing     Problem: Respiratory - Adult  Goal: Able to breathe comfortably  Outcome: Progressing     Problem: Respiratory - Adult  Goal: Patient's breath sounds will be clear and equal  Outcome: Progressing     Problem: Nutrition Deficit:  Goal: Optimize nutritional status  9/19/2022 1834 by Aaron Connor RN  Outcome: Not Progressing       BRONCHOSPASM/BRONCHOCONSTRICTION    IMPROVE  AERATION/BREATHSOUNDS  ADMINISTER BRONCHODILATOR THERAPY AS APPROPRIATE  ASSESS BREATH SOUNDS  PATIENT EDUCATION AS NEEDED

## 2022-09-20 NOTE — PROGRESS NOTES
Encouraged patient to try dinner. Ate pureed meat and half of the mashed potatoes. Drank half thickened vanilla ensure shake.

## 2022-09-21 LAB
GLUCOSE BLD-MCNC: 118 MG/DL (ref 65–105)
GLUCOSE BLD-MCNC: 137 MG/DL (ref 65–105)
GLUCOSE BLD-MCNC: 156 MG/DL (ref 65–105)
GLUCOSE BLD-MCNC: 207 MG/DL (ref 65–105)
GLUCOSE BLD-MCNC: 217 MG/DL (ref 65–105)
MAGNESIUM: 1.8 MG/DL (ref 1.6–2.6)

## 2022-09-21 PROCEDURE — 6360000002 HC RX W HCPCS: Performed by: INTERNAL MEDICINE

## 2022-09-21 PROCEDURE — 97530 THERAPEUTIC ACTIVITIES: CPT

## 2022-09-21 PROCEDURE — 2580000003 HC RX 258: Performed by: STUDENT IN AN ORGANIZED HEALTH CARE EDUCATION/TRAINING PROGRAM

## 2022-09-21 PROCEDURE — 36592 COLLECT BLOOD FROM PICC: CPT

## 2022-09-21 PROCEDURE — 83735 ASSAY OF MAGNESIUM: CPT

## 2022-09-21 PROCEDURE — 82947 ASSAY GLUCOSE BLOOD QUANT: CPT

## 2022-09-21 PROCEDURE — 6370000000 HC RX 637 (ALT 250 FOR IP): Performed by: NURSE PRACTITIONER

## 2022-09-21 PROCEDURE — 6370000000 HC RX 637 (ALT 250 FOR IP): Performed by: FAMILY MEDICINE

## 2022-09-21 PROCEDURE — 1200000000 HC SEMI PRIVATE

## 2022-09-21 PROCEDURE — 6370000000 HC RX 637 (ALT 250 FOR IP): Performed by: INTERNAL MEDICINE

## 2022-09-21 PROCEDURE — 6370000000 HC RX 637 (ALT 250 FOR IP): Performed by: STUDENT IN AN ORGANIZED HEALTH CARE EDUCATION/TRAINING PROGRAM

## 2022-09-21 PROCEDURE — 6370000000 HC RX 637 (ALT 250 FOR IP): Performed by: CLINICAL NURSE SPECIALIST

## 2022-09-21 PROCEDURE — 94761 N-INVAS EAR/PLS OXIMETRY MLT: CPT

## 2022-09-21 PROCEDURE — 94640 AIRWAY INHALATION TREATMENT: CPT

## 2022-09-21 RX ORDER — DIPHENHYDRAMINE HCL 25 MG
25 TABLET ORAL ONCE
Status: COMPLETED | OUTPATIENT
Start: 2022-09-21 | End: 2022-09-21

## 2022-09-21 RX ORDER — LANOLIN ALCOHOL/MO/W.PET/CERES
3 CREAM (GRAM) TOPICAL NIGHTLY PRN
Status: DISCONTINUED | OUTPATIENT
Start: 2022-09-21 | End: 2022-09-23 | Stop reason: HOSPADM

## 2022-09-21 RX ADMIN — SODIUM CHLORIDE, PRESERVATIVE FREE 10 ML: 5 INJECTION INTRAVENOUS at 21:25

## 2022-09-21 RX ADMIN — TRAMADOL HYDROCHLORIDE 50 MG: 50 TABLET ORAL at 23:47

## 2022-09-21 RX ADMIN — MIDODRINE HYDROCHLORIDE 10 MG: 10 TABLET ORAL at 17:02

## 2022-09-21 RX ADMIN — SODIUM CHLORIDE, PRESERVATIVE FREE 10 ML: 5 INJECTION INTRAVENOUS at 08:38

## 2022-09-21 RX ADMIN — BUDESONIDE 500 MCG: 0.5 SUSPENSION RESPIRATORY (INHALATION) at 09:16

## 2022-09-21 RX ADMIN — APIXABAN 5 MG: 5 TABLET, FILM COATED ORAL at 23:02

## 2022-09-21 RX ADMIN — DIPHENHYDRAMINE HCL 25 MG: 25 TABLET ORAL at 23:01

## 2022-09-21 RX ADMIN — ANTI-FUNGAL POWDER MICONAZOLE NITRATE TALC FREE: 1.42 POWDER TOPICAL at 08:37

## 2022-09-21 RX ADMIN — Medication 3 MG: at 23:01

## 2022-09-21 RX ADMIN — ALBUTEROL SULFATE 2.5 MG: 2.5 SOLUTION RESPIRATORY (INHALATION) at 18:33

## 2022-09-21 RX ADMIN — FAMOTIDINE 20 MG: 20 TABLET, FILM COATED ORAL at 23:02

## 2022-09-21 RX ADMIN — APIXABAN 5 MG: 5 TABLET, FILM COATED ORAL at 08:35

## 2022-09-21 RX ADMIN — NYSTATIN: 100000 CREAM TOPICAL at 23:02

## 2022-09-21 RX ADMIN — MIDODRINE HYDROCHLORIDE 10 MG: 10 TABLET ORAL at 08:35

## 2022-09-21 RX ADMIN — INSULIN LISPRO 4 UNITS: 100 INJECTION, SOLUTION INTRAVENOUS; SUBCUTANEOUS at 12:12

## 2022-09-21 RX ADMIN — ALBUTEROL SULFATE 2.5 MG: 2.5 SOLUTION RESPIRATORY (INHALATION) at 09:16

## 2022-09-21 RX ADMIN — NYSTATIN: 100000 CREAM TOPICAL at 08:37

## 2022-09-21 RX ADMIN — TRAZODONE HYDROCHLORIDE 100 MG: 100 TABLET ORAL at 23:02

## 2022-09-21 RX ADMIN — MIDODRINE HYDROCHLORIDE 10 MG: 10 TABLET ORAL at 12:13

## 2022-09-21 RX ADMIN — BUDESONIDE 500 MCG: 0.5 SUSPENSION RESPIRATORY (INHALATION) at 18:33

## 2022-09-21 RX ADMIN — SERTRALINE HYDROCHLORIDE 100 MG: 100 TABLET ORAL at 08:35

## 2022-09-21 RX ADMIN — ANTI-FUNGAL POWDER MICONAZOLE NITRATE TALC FREE: 1.42 POWDER TOPICAL at 23:02

## 2022-09-21 RX ADMIN — SILVER SULFADIAZINE: 10 CREAM TOPICAL at 23:02

## 2022-09-21 ASSESSMENT — PAIN DESCRIPTION - LOCATION: LOCATION: COCCYX

## 2022-09-21 ASSESSMENT — PAIN DESCRIPTION - PAIN TYPE: TYPE: ACUTE PAIN

## 2022-09-21 ASSESSMENT — PAIN - FUNCTIONAL ASSESSMENT: PAIN_FUNCTIONAL_ASSESSMENT: ACTIVITIES ARE NOT PREVENTED

## 2022-09-21 ASSESSMENT — PAIN DESCRIPTION - DESCRIPTORS: DESCRIPTORS: BURNING

## 2022-09-21 ASSESSMENT — PAIN SCALES - GENERAL: PAINLEVEL_OUTOF10: 9

## 2022-09-21 ASSESSMENT — PAIN DESCRIPTION - ONSET: ONSET: ON-GOING

## 2022-09-21 ASSESSMENT — PAIN DESCRIPTION - ORIENTATION: ORIENTATION: MID

## 2022-09-21 ASSESSMENT — PAIN DESCRIPTION - FREQUENCY: FREQUENCY: INTERMITTENT

## 2022-09-21 NOTE — PLAN OF CARE
Problem: Respiratory - Adult  Goal: Achieves optimal ventilation and oxygenation  9/20/2022 2019 by Braxton Ledezma RCP  Outcome: Progressing     Problem: Respiratory - Adult  Goal: Able to breathe comfortably  Outcome: Progressing     Problem: Respiratory - Adult  Goal: Patient's breath sounds will be clear and equal  Outcome: Progressing      BRONCHOSPASM/BRONCHOCONSTRICTION    IMPROVE  AERATION/BREATHSOUNDS  ADMINISTER BRONCHODILATOR THERAPY AS APPROPRIATE  ASSESS BREATH SOUNDS  PATIENT EDUCATION AS NEEDED

## 2022-09-21 NOTE — RT PROTOCOL NOTE
RT Inhaler-Nebulizer Bronchodilator Protocol Note    There is a bronchodilator order in the chart from a provider indicating to follow the RT Bronchodilator Protocol and there is an Initiate RT Inhaler-Nebulizer Bronchodilator Protocol order as well (see protocol at bottom of note). CXR Findings:  No results found. The findings from the last RT Protocol Assessment were as follows:   History Pulmonary Disease: Chronic pulmonary disease  Respiratory Pattern: Regular pattern and RR 12-20 bpm  Breath Sounds: Slightly diminished and/or crackles  Cough: Strong, spontaneous, non-productive  Indication for Bronchodilator Therapy: Decreased or absent breath sounds  Bronchodilator Assessment Score: 4    Aerosolized bronchodilator medication orders have been revised according to the RT Inhaler-Nebulizer Bronchodilator Protocol below. Respiratory Therapist to perform RT Therapy Protocol Assessment initially then follow the protocol. Repeat RT Therapy Protocol Assessment PRN for score 0-3 or on second treatment, BID, and PRN for scores above 3. No Indications - adjust the frequency to every 6 hours PRN wheezing or bronchospasm, if no treatments needed after 48 hours then discontinue using Per Protocol order mode. If indication present, adjust the RT bronchodilator orders based on the Bronchodilator Assessment Score as indicated below. Use Inhaler orders unless patient has one or more of the following: on home nebulizer, not able to hold breath for 10 seconds, is not alert and oriented, cannot activate and use MDI correctly, or respiratory rate 25 breaths per minute or more, then use the equivalent nebulizer order(s) with same Frequency and PRN reasons based on the score. If a patient is on this medication at home then do not decrease Frequency below that used at home.     0-3 - enter or revise RT bronchodilator order(s) to equivalent RT Bronchodilator order with Frequency of every 4 hours PRN for wheezing or increased work of breathing using Per Protocol order mode. 4-6 - enter or revise RT Bronchodilator order(s) to two equivalent RT bronchodilator orders with one order with BID Frequency and one order with Frequency of every 4 hours PRN wheezing or increased work of breathing using Per Protocol order mode. 7-10 - enter or revise RT Bronchodilator order(s) to two equivalent RT bronchodilator orders with one order with TID Frequency and one order with Frequency of every 4 hours PRN wheezing or increased work of breathing using Per Protocol order mode. 11-13 - enter or revise RT Bronchodilator order(s) to one equivalent RT bronchodilator order with QID Frequency and an Albuterol order with Frequency of every 4 hours PRN wheezing or increased work of breathing using Per Protocol order mode. Greater than 13 - enter or revise RT Bronchodilator order(s) to one equivalent RT bronchodilator order with every 4 hours Frequency and an Albuterol order with Frequency of every 2 hours PRN wheezing or increased work of breathing using Per Protocol order mode. RT to enter RT Home Evaluation for COPD & MDI Assessment order using Per Protocol order mode.     Electronically signed by Kimmy Erwin RCP on 9/20/2022 at 8:25 PM

## 2022-09-21 NOTE — CARE COORDINATION
Social work: snf request denied for  Communications. P2P scheduled with Dr Susan Jones. [facility] will not have a long-term bed if denial is upheld. Spouse updated, he requested referral to [facility] in case ltc bed is necessary.

## 2022-09-21 NOTE — CARE COORDINATION
DC Planning    Moses Kahn from [facility] called she is pt's CM she is available for dc assistance [phone].

## 2022-09-21 NOTE — PROGRESS NOTES
Patient with poor return on carry over and unable to minimally promote in log roll techniques this date. Patient educated on AURELIA LE placement for log roll technique and patient unable to maintain bent leg position even after therpist assist with proper placement this date. Neuromuscular Education  NDT Treatment: Lower extremity      PT Exercises  PROM Exercises: Patient educated and minimally performs supine AP this date. Therapist performs AURELIA LE PROM to AURELIA LE this date. Patient educated on quad sets and glute sets and assisted with motions. Patient unable to duplicate movements this date. Therpapist initiates motion in quad sets and patient able to maintain hold and release x3 seconds with 10 reps this date. P      ASSESSMENT/PROGRESS TOWARDS GOALS     [facility] In[facility] Mobility Raw Score  6   [facility] In[facility] T-Scale Score  23.55   Mobility Inpatient CMS 0-100% Score 100   Mobility Inpatient CMS G-Code Modifier  CN       Assessment  Assessment: PROM/AAROM performed to all 4 extremities this date, patient able to participate minimally and overall with good tolerance to activities this date.   Activity Tolerance: Patient limited by endurance  Discharge Recommendations: Patient would benefit from continued therapy after discharge  PT Equipment Recommendations  Other: Recommended [place] lift room for patient to RN John Poon    Goals  Patient Goals   Patient goals : pt goal is to be with her   Short Term Goals  Time Frame for Short term goals: 6  Short term goal 1: PROM>AAROM>AROM as able x 4 extremities to prevent further contractures/ stiffness/ weakness  Short term goal 2: Assist in repositioning pt. for pressure relief and ensuring all bony prominences are off-loaded  Short term goal 3: rolling mod assist x 1 - goal for patient to be able to go home with  assist at time of d/c    PLAN OF CARE/SAFETY  Plan  Plan: 3-5 times per week  Specific Instructions for Next Treatment: work on rolling/ positioning  Current Treatment Recommendations: ROM; Strengthening;Positioning  Safety Devices  Type of Devices: Call light within reach; Heels elevated for pressure relief;Nurse notified; All axel prominences offloaded;Bed alarm in place; Left in bed    EDUCATION  Education  Education Given To: Patient  Education Provided: Safety  Education Provided Comments: Patient educated on the importance of repositioing to promote skin integrity and prevent secondary complications.   Education Method: Verbal;Demonstration  Barriers to Learning: Cognition  Education Outcome: Continued education needed        Therapy Time   Individual Concurrent Group Co-treatment   Time In 0451         Time Out 1411 [address] [place] [address] [address] [place], [place], 09/21/22 at 2:34 PM

## 2022-09-21 NOTE — PROGRESS NOTES
Nutrition Assessment     Type and Reason for Visit: Reassess    Nutrition Recommendations/Plan:   Continue ADULT DIET; Dysphagia - Pureed; Mildly Thick (Nectar)  Start Ensure Enlive 3x/day (mildly thick)  Monitor p.o intakes, diet tolerance and labs     Malnutrition Assessment:  Malnutrition Status: At risk for malnutrition (Comment)    Nutrition Assessment:  Patient is status post modified barum swallow study on 9/17 and diet was advanced to Dysphagia Pureed; mildly thick (nectar). Patient needs encouragement to eat but was able to eat 25-50% of dinner last night. At time of visit patient did not want to eat after lunch tray was delivered. Will start Ensure Enlive (nectar) thick with each meal.  Encourage patient to eat at meals. Monitor p.o intakes, diet tolerance and labs. Estimated Daily Nutrient Needs:  Energy (kcal):  6087-9011 kcal (16-18 kcal/kg) Weight Used for Energy Requirements: Other (Comment) (214 lb. 9/2/22)     Protein (g):   gm protein (1.8-2.0 g/kg) Weight Used for Protein Requirements: Ideal        Fluid (ml/day):  1728 mL Method Used for Fluid Requirements: 1 ml/kcal    Nutrition Related Findings:   Edema: +1 BUE, +1 BLE. MBSS (9/17/22). NG tube feedings discontinues 9/17 Wound Type: None    Current Nutrition Therapies:    ADULT DIET;  Dysphagia - Pureed; Mildly Thick (Nectar)  ADULT ORAL NUTRITION SUPPLEMENT; Breakfast, Lunch, Dinner; Standard High Calorie/High Protein Oral Supplement    Anthropometric Measures:  Height: 5' 2\" (157.5 cm)  Current Body Wt: 229 lb (103.9 kg)   BMI: 41.9    Nutrition Diagnosis:   Inadequate protein-energy intake related to inadequate protein-energy intake as evidenced by intake 0-25%, intake 26-50%  Swallowing difficulty related to  (Dysphagia) as evidenced by swallow study results (Pureed diet; mildly thick liquids)    Nutrition Interventions:   Food and/or Nutrient Delivery: Continue Current Diet, Start Oral Nutrition Supplement  Nutrition

## 2022-09-21 NOTE — PLAN OF CARE
Problem: Nutrition Deficit:  Goal: Optimize nutritional status  9/20/2022 2315 by Vernon Sood RN  Outcome: Not Progressing     Problem: Discharge Planning  Goal: Discharge to home or other facility with appropriate resources  9/20/2022 2315 by Vernon Sood RN  Outcome: Progressing  Flowsheets (Taken 9/20/2022 2230)  Discharge to home or other facility with appropriate resources:   Identify barriers to discharge with patient and caregiver   Arrange for needed discharge resources and transportation as appropriate   Identify discharge learning needs (meds, wound care, etc)   Refer to discharge planning if patient needs post-hospital services based on physician order or complex needs related to functional status, cognitive ability or social support system     Problem: Pain  Goal: Verbalizes/displays adequate comfort level or baseline comfort level  9/20/2022 2315 by Vernon Sood RN  Outcome: Progressing     Problem: Skin/Tissue Integrity  Goal: Absence of new skin breakdown  Description: 1. Monitor for areas of redness and/or skin breakdown  2. Assess vascular access sites hourly  3. Every 4-6 hours minimum:  Change oxygen saturation probe site  4. Every 4-6 hours:  If on nasal continuous positive airway pressure, respiratory therapy assess nares and determine need for appliance change or resting period.   9/20/2022 2315 by Vernon Sood RN  Outcome: Progressing     Problem: Safety - Adult  Goal: Free from fall injury  9/20/2022 2315 by Vernon Sood RN  Outcome: Progressing  Flowsheets (Taken 9/20/2022 2230)  Free From Fall Injury: Instruct family/caregiver on patient safety     Problem: ABCDS Injury Assessment  Goal: Absence of physical injury  9/20/2022 2315 by Vernon Sood RN  Outcome: Progressing  Flowsheets  Taken 9/20/2022 2230 by Vernon Sood RN  Absence of Physical Injury: Implement safety measures based on patient assessment     Problem: Chronic Conditions and Co-morbidities  Goal: Patient's chronic conditions and co-morbidity symptoms are monitored and maintained or improved  9/20/2022 2315 by Muna Smith RN  Outcome: Progressing  Flowsheets (Taken 9/20/2022 2230)  Care Plan - Patient's Chronic Conditions and Co-Morbidity Symptoms are Monitored and Maintained or Improved:   Monitor and assess patient's chronic conditions and comorbid symptoms for stability, deterioration, or improvement   Collaborate with multidisciplinary team to address chronic and comorbid conditions and prevent exacerbation or deterioration   Update acute care plan with appropriate goals if chronic or comorbid symptoms are exacerbated and prevent overall improvement and discharge     Problem: Respiratory - Adult  Goal: Achieves optimal ventilation and oxygenation  9/20/2022 2315 by Muna Smith RN  Outcome: Progressing  Flowsheets (Taken 9/20/2022 2230)  Achieves optimal ventilation and oxygenation:   Assess for changes in respiratory status   Assess for changes in mentation and behavior   Position to facilitate oxygenation and minimize respiratory effort   Oxygen supplementation based on oxygen saturation or arterial blood gases   Encourage broncho-pulmonary hygiene including cough, deep breathe, incentive spirometry   Assess and instruct to report shortness of breath or any respiratory difficulty   Respiratory therapy support as indicated     Problem: Neurosensory - Adult  Goal: Achieves stable or improved neurological status  Outcome: Progressing  Flowsheets (Taken 9/20/2022 2230)  Achieves stable or improved neurological status: Assess for and report changes in neurological status     Problem: Cardiovascular - Adult  Goal: Maintains optimal cardiac output and hemodynamic stability  Outcome: Progressing  Flowsheets (Taken 9/20/2022 2230)  Maintains optimal cardiac output and hemodynamic stability: Monitor blood pressure and heart rate     Problem: Skin/Tissue Integrity - Adult  Goal: Skin integrity remains intact  Outcome: Progressing  Flowsheets  Taken 9/20/2022 2230 by Muna Smith RN  Skin Integrity Remains Intact: Monitor for areas of redness and/or skin breakdown     Problem: Skin/Tissue Integrity - Adult  Goal: Incisions, wounds, or drain sites healing without S/S of infection  Outcome: Progressing  Flowsheets (Taken 9/20/2022 2230)  Incisions, Wounds, or Drain Sites Healing Without Sign and Symptoms of Infection:   TWICE DAILY: Assess and document skin integrity   TWICE DAILY: Assess and document dressing/incision, wound bed, drain sites and surrounding tissue   Implement wound care per orders     Problem: Musculoskeletal - Adult  Goal: Return mobility to safest level of function  Outcome: Progressing  Flowsheets (Taken 9/20/2022 2230)  Return Mobility to Safest Level of Function:   Assess patient stability and activity tolerance for standing, transferring and ambulating with or without assistive devices   Assist with transfers and ambulation using safe patient handling equipment as needed   Ensure adequate protection for wounds/incisions during mobilization   Obtain physical therapy/occupational therapy consults as needed   Apply continuous passive motion per provider or physical therapy orders to increase flexion toward goal   Instruct patient/family in ordered activity level     Problem: Gastrointestinal - Adult  Goal: Maintains adequate nutritional intake  Outcome: Progressing  Flowsheets (Taken 9/20/2022 2230)  Maintains adequate nutritional intake:   Monitor percentage of each meal consumed   Identify factors contributing to decreased intake, treat as appropriate   Assist with meals as needed   Monitor intake and output, weight and lab values   Obtain nutritional consult as needed     Problem: Genitourinary - Adult  Goal: Urinary catheter remains patent  Outcome: Progressing  Flowsheets (Taken 9/20/2022 2230)  Urinary catheter remains patent: Assess patency of urinary catheter     Problem: Nutrition Deficit:  Goal: Optimize nutritional status  9/20/2022 2315 by Umesh Paulino RN  Outcome: Not Progressing  9/20/2022 1259 by Kayleigh Meredith RN  Outcome: Not Progressing
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
## 2022-09-21 NOTE — PROGRESS NOTES
Patient was sitting up in bed awake and alert (no family members present). Patient engaged in a brief conversation (talks slowly) and shared she was upset because her  is not answering the phone. Patient seems to understand the writer questions about her overall care and is responding appropriately. Writer decided to allow the patient to continue resting and stated he will pray for continue healing and comfort. The patient was thankful for the visit and continued prayers. Spiritual care will maintain daily follow ups and visits.      09/21/22 1048   Encounter Summary   Service Provided For: Patient   Referral/Consult From: Palliative Care   Last Encounter  09/21/22   Complexity of Encounter Low   Begin Time 0900   End Time  0910   Total Time Calculated 10 min   Encounter    Type Follow up   Spiritual/Emotional needs   Type Spiritual Support   Palliative Care   Type Palliative Care, Follow-up   Assessment/Intervention/Outcome   Assessment Calm;Coping;Peaceful   Intervention Active listening;Discussed illness injury and its impact;Nurtured Hope;Sustaining Presence/Ministry of presence   Outcome Engaged in conversation;Expressed Gratitude   Plan and Referrals   Plan/Referrals Continue to visit, (comment)

## 2022-09-22 ENCOUNTER — APPOINTMENT (OUTPATIENT)
Dept: GENERAL RADIOLOGY | Age: 67
DRG: 698 | End: 2022-09-22
Payer: COMMERCIAL

## 2022-09-22 ENCOUNTER — TELEPHONE (OUTPATIENT)
Dept: GASTROENTEROLOGY | Age: 67
End: 2022-09-22

## 2022-09-22 LAB
GLUCOSE BLD-MCNC: 139 MG/DL (ref 65–105)
GLUCOSE BLD-MCNC: 159 MG/DL (ref 65–105)
GLUCOSE BLD-MCNC: 196 MG/DL (ref 65–105)
GLUCOSE BLD-MCNC: 206 MG/DL (ref 65–105)

## 2022-09-22 PROCEDURE — 6360000002 HC RX W HCPCS: Performed by: INTERNAL MEDICINE

## 2022-09-22 PROCEDURE — 1200000000 HC SEMI PRIVATE

## 2022-09-22 PROCEDURE — 94761 N-INVAS EAR/PLS OXIMETRY MLT: CPT

## 2022-09-22 PROCEDURE — 2700000000 HC OXYGEN THERAPY PER DAY

## 2022-09-22 PROCEDURE — 94640 AIRWAY INHALATION TREATMENT: CPT

## 2022-09-22 PROCEDURE — 6370000000 HC RX 637 (ALT 250 FOR IP): Performed by: STUDENT IN AN ORGANIZED HEALTH CARE EDUCATION/TRAINING PROGRAM

## 2022-09-22 PROCEDURE — 6370000000 HC RX 637 (ALT 250 FOR IP): Performed by: INTERNAL MEDICINE

## 2022-09-22 PROCEDURE — 6370000000 HC RX 637 (ALT 250 FOR IP): Performed by: FAMILY MEDICINE

## 2022-09-22 PROCEDURE — 71045 X-RAY EXAM CHEST 1 VIEW: CPT

## 2022-09-22 PROCEDURE — 2580000003 HC RX 258: Performed by: STUDENT IN AN ORGANIZED HEALTH CARE EDUCATION/TRAINING PROGRAM

## 2022-09-22 PROCEDURE — 6370000000 HC RX 637 (ALT 250 FOR IP): Performed by: NURSE PRACTITIONER

## 2022-09-22 PROCEDURE — 97530 THERAPEUTIC ACTIVITIES: CPT

## 2022-09-22 PROCEDURE — 82947 ASSAY GLUCOSE BLOOD QUANT: CPT

## 2022-09-22 RX ORDER — FUROSEMIDE 10 MG/ML
20 INJECTION INTRAMUSCULAR; INTRAVENOUS ONCE
Status: COMPLETED | OUTPATIENT
Start: 2022-09-22 | End: 2022-09-22

## 2022-09-22 RX ADMIN — TRAZODONE HYDROCHLORIDE 100 MG: 100 TABLET ORAL at 23:00

## 2022-09-22 RX ADMIN — ANTI-FUNGAL POWDER MICONAZOLE NITRATE TALC FREE: 1.42 POWDER TOPICAL at 23:02

## 2022-09-22 RX ADMIN — NYSTATIN: 100000 CREAM TOPICAL at 09:14

## 2022-09-22 RX ADMIN — POLYETHYLENE GLYCOL (3350) 17 G: 17 POWDER, FOR SOLUTION ORAL at 09:13

## 2022-09-22 RX ADMIN — BUDESONIDE 500 MCG: 0.5 SUSPENSION RESPIRATORY (INHALATION) at 20:54

## 2022-09-22 RX ADMIN — INSULIN GLARGINE 20 UNITS: 100 INJECTION, SOLUTION SUBCUTANEOUS at 09:11

## 2022-09-22 RX ADMIN — FAMOTIDINE 20 MG: 20 TABLET, FILM COATED ORAL at 09:13

## 2022-09-22 RX ADMIN — FAMOTIDINE 20 MG: 20 TABLET, FILM COATED ORAL at 23:00

## 2022-09-22 RX ADMIN — ANTI-FUNGAL POWDER MICONAZOLE NITRATE TALC FREE: 1.42 POWDER TOPICAL at 09:14

## 2022-09-22 RX ADMIN — SODIUM CHLORIDE, PRESERVATIVE FREE 10 ML: 5 INJECTION INTRAVENOUS at 23:04

## 2022-09-22 RX ADMIN — SILVER SULFADIAZINE: 10 CREAM TOPICAL at 23:03

## 2022-09-22 RX ADMIN — TRAMADOL HYDROCHLORIDE 50 MG: 50 TABLET ORAL at 23:01

## 2022-09-22 RX ADMIN — ALBUTEROL SULFATE 2.5 MG: 2.5 SOLUTION RESPIRATORY (INHALATION) at 08:40

## 2022-09-22 RX ADMIN — SODIUM CHLORIDE, PRESERVATIVE FREE 10 ML: 5 INJECTION INTRAVENOUS at 09:16

## 2022-09-22 RX ADMIN — INSULIN GLARGINE 20 UNITS: 100 INJECTION, SOLUTION SUBCUTANEOUS at 22:58

## 2022-09-22 RX ADMIN — APIXABAN 5 MG: 5 TABLET, FILM COATED ORAL at 23:00

## 2022-09-22 RX ADMIN — BUDESONIDE 500 MCG: 0.5 SUSPENSION RESPIRATORY (INHALATION) at 08:40

## 2022-09-22 RX ADMIN — MIDODRINE HYDROCHLORIDE 10 MG: 10 TABLET ORAL at 09:15

## 2022-09-22 RX ADMIN — MIDODRINE HYDROCHLORIDE 10 MG: 10 TABLET ORAL at 17:02

## 2022-09-22 RX ADMIN — DOCUSATE SODIUM 100 MG: 100 CAPSULE, LIQUID FILLED ORAL at 09:13

## 2022-09-22 RX ADMIN — SERTRALINE HYDROCHLORIDE 100 MG: 100 TABLET ORAL at 09:13

## 2022-09-22 RX ADMIN — MIDODRINE HYDROCHLORIDE 10 MG: 10 TABLET ORAL at 13:21

## 2022-09-22 RX ADMIN — POTASSIUM CHLORIDE 40 MEQ: 1500 TABLET, EXTENDED RELEASE ORAL at 09:16

## 2022-09-22 RX ADMIN — APIXABAN 5 MG: 5 TABLET, FILM COATED ORAL at 09:13

## 2022-09-22 RX ADMIN — ALBUTEROL SULFATE 2.5 MG: 2.5 SOLUTION RESPIRATORY (INHALATION) at 20:54

## 2022-09-22 RX ADMIN — NYSTATIN: 100000 CREAM TOPICAL at 23:03

## 2022-09-22 RX ADMIN — FUROSEMIDE 20 MG: 10 INJECTION, SOLUTION INTRAMUSCULAR; INTRAVENOUS at 17:02

## 2022-09-22 ASSESSMENT — PAIN SCALES - GENERAL
PAINLEVEL_OUTOF10: 9
PAINLEVEL_OUTOF10: 0

## 2022-09-22 ASSESSMENT — PAIN DESCRIPTION - DESCRIPTORS: DESCRIPTORS: ACHING

## 2022-09-22 ASSESSMENT — PAIN DESCRIPTION - LOCATION: LOCATION: BACK;COCCYX

## 2022-09-22 NOTE — TELEPHONE ENCOUNTER
The patient first consulted with Samira Sat on 9-5-22. No valid number on file for the patient therefore, mailing a letter.

## 2022-09-22 NOTE — PLAN OF CARE
Problem: Respiratory - Adult  Goal: Achieves optimal ventilation and oxygenation  Outcome: Progressing     Problem: Respiratory - Adult  Goal: Able to breathe comfortably  Outcome: Progressing     Problem: Respiratory - Adult  Goal: Patient's breath sounds will be clear and equal  Outcome: Progressing
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
## 2022-09-22 NOTE — PLAN OF CARE
Problem: Discharge Planning  Goal: Discharge to home or other facility with appropriate resources  Outcome: Progressing  Flowsheets (Taken 9/21/2022 2125)  Discharge to home or other facility with appropriate resources:   Identify barriers to discharge with patient and caregiver   Arrange for needed discharge resources and transportation as appropriate   Identify discharge learning needs (meds, wound care, etc)   Refer to discharge planning if patient needs post-hospital services based on physician order or complex needs related to functional status, cognitive ability or social support system     Problem: Pain  Goal: Verbalizes/displays adequate comfort level or baseline comfort level  Outcome: Progressing     Problem: Skin/Tissue Integrity  Goal: Absence of new skin breakdown  Description: 1. Monitor for areas of redness and/or skin breakdown  2. Assess vascular access sites hourly  3. Every 4-6 hours minimum:  Change oxygen saturation probe site  4. Every 4-6 hours:  If on nasal continuous positive airway pressure, respiratory therapy assess nares and determine need for appliance change or resting period.   Outcome: Progressing     Problem: Safety - Adult  Goal: Free from fall injury  Outcome: Progressing     Problem: ABCDS Injury Assessment  Goal: Absence of physical injury  Outcome: Progressing     Problem: Chronic Conditions and Co-morbidities  Goal: Patient's chronic conditions and co-morbidity symptoms are monitored and maintained or improved  Outcome: Progressing  Flowsheets (Taken 9/21/2022 2125)  Care Plan - Patient's Chronic Conditions and Co-Morbidity Symptoms are Monitored and Maintained or Improved:   Monitor and assess patient's chronic conditions and comorbid symptoms for stability, deterioration, or improvement   Collaborate with multidisciplinary team to address chronic and comorbid conditions and prevent exacerbation or deterioration   Update acute care plan with appropriate goals if chronic surrounding tissue   Implement wound care per orders     Problem: Musculoskeletal - Adult  Goal: Return mobility to safest level of function  Outcome: Progressing  Flowsheets (Taken 9/21/2022 2125)  Return Mobility to Safest Level of Function:   Assess patient stability and activity tolerance for standing, transferring and ambulating with or without assistive devices   Assist with transfers and ambulation using safe patient handling equipment as needed   Ensure adequate protection for wounds/incisions during mobilization   Obtain physical therapy/occupational therapy consults as needed   Apply continuous passive motion per provider or physical therapy orders to increase flexion toward goal   Instruct patient/family in ordered activity level  Goal: Return ADL status to a safe level of function  Outcome: Progressing  Flowsheets (Taken 9/21/2022 2125)  Return ADL Status to a Safe Level of Function:   Administer medication as ordered   Assess activities of daily living deficits and provide assistive devices as needed   Obtain physical therapy/occupational therapy consults as needed   Assist and instruct patient to increase activity and self care as tolerated     Problem: Gastrointestinal - Adult  Goal: Maintains adequate nutritional intake  Outcome: Progressing  Flowsheets (Taken 9/21/2022 2125)  Maintains adequate nutritional intake:   Monitor percentage of each meal consumed   Identify factors contributing to decreased intake, treat as appropriate   Monitor intake and output, weight and lab values   Obtain nutritional consult as needed     Problem: Genitourinary - Adult  Goal: Urinary catheter remains patent  Outcome: Progressing  Flowsheets (Taken 9/21/2022 2125)  Urinary catheter remains patent: Assess patency of urinary catheter

## 2022-09-22 NOTE — RT PROTOCOL NOTE
RT Inhaler-Nebulizer Bronchodilator Protocol Note    There is a bronchodilator order in the chart from a provider indicating to follow the RT Bronchodilator Protocol and there is an Initiate RT Inhaler-Nebulizer Bronchodilator Protocol order as well (see protocol at bottom of note). CXR Findings:  No results found. The findings from the last RT Protocol Assessment were as follows:   History Pulmonary Disease: Chronic pulmonary disease  Respiratory Pattern: Regular pattern and RR 12-20 bpm  Breath Sounds: Slightly diminished and/or crackles  Cough: Strong, spontaneous, non-productive  Indication for Bronchodilator Therapy: Decreased or absent breath sounds  Bronchodilator Assessment Score: 4    Aerosolized bronchodilator medication orders have been revised according to the RT Inhaler-Nebulizer Bronchodilator Protocol below. Respiratory Therapist to perform RT Therapy Protocol Assessment initially then follow the protocol. Repeat RT Therapy Protocol Assessment PRN for score 0-3 or on second treatment, BID, and PRN for scores above 3. No Indications - adjust the frequency to every 6 hours PRN wheezing or bronchospasm, if no treatments needed after 48 hours then discontinue using Per Protocol order mode. If indication present, adjust the RT bronchodilator orders based on the Bronchodilator Assessment Score as indicated below. Use Inhaler orders unless patient has one or more of the following: on home nebulizer, not able to hold breath for 10 seconds, is not alert and oriented, cannot activate and use MDI correctly, or respiratory rate 25 breaths per minute or more, then use the equivalent nebulizer order(s) with same Frequency and PRN reasons based on the score. If a patient is on this medication at home then do not decrease Frequency below that used at home.     0-3 - enter or revise RT bronchodilator order(s) to equivalent RT Bronchodilator order with Frequency of every 4 hours PRN for wheezing or increased work of breathing using Per Protocol order mode. 4-6 - enter or revise RT Bronchodilator order(s) to two equivalent RT bronchodilator orders with one order with BID Frequency and one order with Frequency of every 4 hours PRN wheezing or increased work of breathing using Per Protocol order mode. 7-10 - enter or revise RT Bronchodilator order(s) to two equivalent RT bronchodilator orders with one order with TID Frequency and one order with Frequency of every 4 hours PRN wheezing or increased work of breathing using Per Protocol order mode. 11-13 - enter or revise RT Bronchodilator order(s) to one equivalent RT bronchodilator order with QID Frequency and an Albuterol order with Frequency of every 4 hours PRN wheezing or increased work of breathing using Per Protocol order mode. Greater than 13 - enter or revise RT Bronchodilator order(s) to one equivalent RT bronchodilator order with every 4 hours Frequency and an Albuterol order with Frequency of every 2 hours PRN wheezing or increased work of breathing using Per Protocol order mode. RT to enter RT Home Evaluation for COPD & MDI Assessment order using Per Protocol order mode.     Electronically signed by Marla Licona RCP on 9/22/2022 at 8:43 AM

## 2022-09-22 NOTE — CARE COORDINATION
SOCIAL WORK: peer to peer denied for skilled care at [facility]. [facility] not have a long-term care bed available. Spoke to spouse yesterday, he was agreeable to referral to [facility] in the event skilled precert was denied. They can accept and will submit for LTC precert.  from [facility] will need to come to the hospital for onsite visit of patient. Will change hens.

## 2022-09-22 NOTE — PROGRESS NOTES
Patient was sleeping as writer entered the room (no family members present). Writer provided a silent prayer of continued healing, comfort, and rest.    Spiritual care will maintain daily follow ups and visits.      09/22/22 1110   Encounter Summary   Service Provided For: Patient   Referral/Consult From: Palliative Care   Last Encounter  09/22/22  (9/22/2022 Sleeping)   Complexity of Encounter Low   Begin Time 1010   End Time  1015   Total Time Calculated 5 min   Encounter    Type Follow up   Spiritual/Emotional needs   Type Spiritual Support   Palliative Care   Type Palliative Care, Follow-up   Assessment/Intervention/Outcome   Assessment Other (Comment)  (Sleeping)   Intervention Prayer (assurance of)/[place];Sustaining Presence/Ministry of presence   Outcome Did not respond   Plan and Referrals   Plan/Referrals Continue to visit, (comment)

## 2022-09-22 NOTE — PLAN OF CARE
Problem: Discharge Planning  Goal: Discharge to home or other facility with appropriate resources  Outcome: Progressing  Flowsheets (Taken 9/21/2022 2125 by Marium Gregory RN)  Discharge to home or other facility with appropriate resources:   Identify barriers to discharge with patient and caregiver   Arrange for needed discharge resources and transportation as appropriate   Identify discharge learning needs (meds, wound care, etc)   Refer to discharge planning if patient needs post-hospital services based on physician order or complex needs related to functional status, cognitive ability or social support system     Problem: Pain  Goal: Verbalizes/displays adequate comfort level or baseline comfort level  Outcome: Progressing  Flowsheets (Taken 9/8/2022 2000 by Moses Ramos RN)  Verbalizes/displays adequate comfort level or baseline comfort level:   Encourage patient to monitor pain and request assistance   Assess pain using appropriate pain scale   Implement non-pharmacological measures as appropriate and evaluate response   Notify Licensed Independent Practitioner if interventions unsuccessful or patient reports new pain     Problem: Skin/Tissue Integrity  Goal: Absence of new skin breakdown  Description: 1. Monitor for areas of redness and/or skin breakdown  2. Assess vascular access sites hourly  3. Every 4-6 hours minimum:  Change oxygen saturation probe site  4. Every 4-6 hours:  If on nasal continuous positive airway pressure, respiratory therapy assess nares and determine need for appliance change or resting period.   Outcome: Progressing     Problem: Safety - Adult  Goal: Free from fall injury  Outcome: Progressing  Flowsheets (Taken 9/20/2022 2230 by Marium Gregory RN)  Free From Fall Injury: Instruct family/caregiver on patient safety     Problem: ABCDS Injury Assessment  Goal: Absence of physical injury  Outcome: Progressing  Flowsheets (Taken 9/20/2022 2230 by Marium Gregory RN)  Absence of Physical Injury: Implement safety measures based on patient assessment     Problem: Chronic Conditions and Co-morbidities  Goal: Patient's chronic conditions and co-morbidity symptoms are monitored and maintained or improved  Outcome: Progressing  Flowsheets (Taken 9/21/2022 2125 by Lisbet Connor RN)  Care Plan - Patient's Chronic Conditions and Co-Morbidity Symptoms are Monitored and Maintained or Improved:   Monitor and assess patient's chronic conditions and comorbid symptoms for stability, deterioration, or improvement   Collaborate with multidisciplinary team to address chronic and comorbid conditions and prevent exacerbation or deterioration   Update acute care plan with appropriate goals if chronic or comorbid symptoms are exacerbated and prevent overall improvement and discharge     Problem: Nutrition Deficit:  Goal: Optimize nutritional status  Outcome: Progressing  Flowsheets (Taken 9/21/2022 1310 by Dilma Dunlap RD, LD)  Nutrient intake appropriate for improving, restoring, or maintaining nutritional needs: Monitor oral intake, labs, and treatment plans     Problem: Respiratory - Adult  Goal: Achieves optimal ventilation and oxygenation  9/22/2022 1645 by Rasheed Carpio RN  Outcome: Progressing  Flowsheets (Taken 9/22/2022 0840)  Achieves optimal ventilation and oxygenation:   Assess for changes in respiratory status   Assess for changes in mentation and behavior  9/22/2022 1352 by Nu Boswell RCP  Outcome: Progressing  Flowsheets (Taken 9/22/2022 0840 by Rasheed Carpio RN)  Achieves optimal ventilation and oxygenation:   Assess for changes in respiratory status   Assess for changes in mentation and behavior  Goal: Able to breathe comfortably  9/22/2022 1352 by Nu Boswell RCP  Outcome: Progressing  Goal: Patient's breath sounds will be clear and equal  9/22/2022 1352 by Nu Boswell RCP  Outcome: Progressing     Problem: Neurosensory - Adult  Goal: Achieves stable or improved neurological status  Outcome: Progressing  Flowsheets (Taken 9/22/2022 0840)  Achieves stable or improved neurological status:   Assess for and report changes in neurological status   Initiate measures to prevent increased intracranial pressure   Maintain blood pressure and fluid volume within ordered parameters to optimize cerebral perfusion and minimize risk of hemorrhage   Monitor temperature, glucose, and sodium.  Initiate appropriate interventions as ordered     Problem: Cardiovascular - Adult  Goal: Maintains optimal cardiac output and hemodynamic stability  Outcome: Progressing  Flowsheets (Taken 9/22/2022 0840)  Maintains optimal cardiac output and hemodynamic stability:   Monitor blood pressure and heart rate   Monitor urine output and notify Licensed Independent Practitioner for values outside of normal range   Assess for signs of decreased cardiac output     Problem: Skin/Tissue Integrity - Adult  Goal: Skin integrity remains intact  Outcome: Progressing  Flowsheets (Taken 9/21/2022 2125 by Genaro Painter RN)  Skin Integrity Remains Intact: Monitor for areas of redness and/or skin breakdown  Goal: Incisions, wounds, or drain sites healing without S/S of infection  Outcome: Progressing  Flowsheets (Taken 9/21/2022 2125 by Genaro Painter RN)  Incisions, Wounds, or Drain Sites Healing Without Sign and Symptoms of Infection:   TWICE DAILY: Assess and document skin integrity   TWICE DAILY: Assess and document dressing/incision, wound bed, drain sites and surrounding tissue   Implement wound care per orders     Problem: Musculoskeletal - Adult  Goal: Return mobility to safest level of function  Outcome: Progressing  Flowsheets (Taken 9/21/2022 2125 by Genaro Painter RN)  Return Mobility to Safest Level of Function:   Assess patient stability and activity tolerance for standing, transferring and ambulating with or without assistive devices   Assist with transfers and ambulation using safe patient handling equipment as needed   Ensure adequate protection for wounds/incisions during mobilization   Obtain physical therapy/occupational therapy consults as needed   Apply continuous passive motion per provider or physical therapy orders to increase flexion toward goal   Instruct patient/family in ordered activity level  Goal: Return ADL status to a safe level of function  Outcome: Progressing  Flowsheets (Taken 9/21/2022 2125 by Kassie Comer RN)  Return ADL Status to a Safe Level of Function:   Administer medication as ordered   Assess activities of daily living deficits and provide assistive devices as needed   Obtain physical therapy/occupational therapy consults as needed   Assist and instruct patient to increase activity and self care as tolerated     Problem: Gastrointestinal - Adult  Goal: Maintains adequate nutritional intake  Outcome: Progressing  Flowsheets (Taken 9/22/2022 0840)  Maintains adequate nutritional intake:   Monitor percentage of each meal consumed   Identify factors contributing to decreased intake, treat as appropriate   Assist with meals as needed   Monitor intake and output, weight and lab values   Obtain nutritional consult as needed     Problem: Genitourinary - Adult  Goal: Urinary catheter remains patent  Outcome: Progressing  Flowsheets (Taken 9/22/2022 0840)  Urinary catheter remains patent: Assess patency of urinary catheter

## 2022-09-22 NOTE — TELEPHONE ENCOUNTER
----- Message from LEILA Zepeda CNP sent at 9/14/2022  2:45 PM EDT -----  Need f/u appt for cirrhosis and romero with dr Evetta Aschoff . LEILA Casper CNP

## 2022-09-23 VITALS
OXYGEN SATURATION: 94 % | RESPIRATION RATE: 18 BRPM | WEIGHT: 228 LBS | HEIGHT: 62 IN | BODY MASS INDEX: 41.96 KG/M2 | TEMPERATURE: 98.4 F | HEART RATE: 82 BPM | SYSTOLIC BLOOD PRESSURE: 103 MMHG | DIASTOLIC BLOOD PRESSURE: 38 MMHG

## 2022-09-23 PROBLEM — R10.84 GENERALIZED ABDOMINAL PAIN: Status: ACTIVE | Noted: 2022-09-23

## 2022-09-23 LAB
GLUCOSE BLD-MCNC: 117 MG/DL (ref 65–105)
GLUCOSE BLD-MCNC: 86 MG/DL (ref 65–105)

## 2022-09-23 PROCEDURE — 2580000003 HC RX 258: Performed by: STUDENT IN AN ORGANIZED HEALTH CARE EDUCATION/TRAINING PROGRAM

## 2022-09-23 PROCEDURE — 6370000000 HC RX 637 (ALT 250 FOR IP): Performed by: NURSE PRACTITIONER

## 2022-09-23 PROCEDURE — 6360000002 HC RX W HCPCS: Performed by: INTERNAL MEDICINE

## 2022-09-23 PROCEDURE — 6370000000 HC RX 637 (ALT 250 FOR IP): Performed by: INTERNAL MEDICINE

## 2022-09-23 PROCEDURE — 6370000000 HC RX 637 (ALT 250 FOR IP): Performed by: FAMILY MEDICINE

## 2022-09-23 PROCEDURE — 94761 N-INVAS EAR/PLS OXIMETRY MLT: CPT

## 2022-09-23 PROCEDURE — 82947 ASSAY GLUCOSE BLOOD QUANT: CPT

## 2022-09-23 PROCEDURE — 94640 AIRWAY INHALATION TREATMENT: CPT

## 2022-09-23 RX ORDER — TRAMADOL HYDROCHLORIDE 50 MG/1
50 TABLET ORAL EVERY 6 HOURS PRN
Qty: 10 TABLET | Refills: 0 | Status: SHIPPED | OUTPATIENT
Start: 2022-09-23 | End: 2022-09-26

## 2022-09-23 RX ORDER — FAMOTIDINE 20 MG/1
20 TABLET, FILM COATED ORAL 2 TIMES DAILY
Status: DISCONTINUED | OUTPATIENT
Start: 2022-09-23 | End: 2022-09-23 | Stop reason: HOSPADM

## 2022-09-23 RX ORDER — SERTRALINE HYDROCHLORIDE 100 MG/1
100 TABLET, FILM COATED ORAL DAILY
Status: DISCONTINUED | OUTPATIENT
Start: 2022-09-23 | End: 2022-09-23 | Stop reason: HOSPADM

## 2022-09-23 RX ORDER — MIDODRINE HYDROCHLORIDE 10 MG/1
10 TABLET ORAL
Status: DISCONTINUED | OUTPATIENT
Start: 2022-09-23 | End: 2022-09-23 | Stop reason: HOSPADM

## 2022-09-23 RX ADMIN — MIDODRINE HYDROCHLORIDE 10 MG: 10 TABLET ORAL at 08:09

## 2022-09-23 RX ADMIN — INSULIN GLARGINE 20 UNITS: 100 INJECTION, SOLUTION SUBCUTANEOUS at 08:11

## 2022-09-23 RX ADMIN — SODIUM CHLORIDE, PRESERVATIVE FREE 10 ML: 5 INJECTION INTRAVENOUS at 08:14

## 2022-09-23 RX ADMIN — ANTI-FUNGAL POWDER MICONAZOLE NITRATE TALC FREE: 1.42 POWDER TOPICAL at 08:09

## 2022-09-23 RX ADMIN — APIXABAN 5 MG: 5 TABLET, FILM COATED ORAL at 09:52

## 2022-09-23 RX ADMIN — POTASSIUM CHLORIDE 40 MEQ: 1500 TABLET, EXTENDED RELEASE ORAL at 08:19

## 2022-09-23 RX ADMIN — SERTRALINE 100 MG: 100 TABLET, FILM COATED ORAL at 08:27

## 2022-09-23 RX ADMIN — NYSTATIN: 100000 CREAM TOPICAL at 08:08

## 2022-09-23 RX ADMIN — ALBUTEROL SULFATE 2.5 MG: 2.5 SOLUTION RESPIRATORY (INHALATION) at 09:52

## 2022-09-23 RX ADMIN — MIDODRINE HYDROCHLORIDE 10 MG: 10 TABLET ORAL at 12:37

## 2022-09-23 RX ADMIN — BUDESONIDE 500 MCG: 0.5 SUSPENSION RESPIRATORY (INHALATION) at 09:52

## 2022-09-23 RX ADMIN — FAMOTIDINE 20 MG: 20 TABLET, FILM COATED ORAL at 08:19

## 2022-09-23 NOTE — PROGRESS NOTES
Nutrition Assessment     Type and Reason for Visit: Reassess    Nutrition Recommendations/Plan:   Continue ADULT DIET; Dysphagia - Pureed; Mildly Thick (Nectar); Start Magic Cup 2x/day  Continue Ensure [employer] 3x/day (mildly thick)  Monitor p.o intakes and labs     Malnutrition Assessment:  Malnutrition Status: At risk for malnutrition (Comment)    Nutrition Assessment:  Patient remains on a Dysphagia Pureed diet; mildly thick liquids. Patient is eating very little of the Pureed diet. Patient drank Ensure Enlive supplement (mildly thick) but stated she does not always consume them. Patient likes ice chips. Will start Magic Cup 2x/day. Patient may benefit from an appetite stimulant and PEG tube feedings maybe needed for long-term nutrition. Monitor p.o intakes and labs. Estimated Daily Nutrient Needs:  Energy (kcal):  8056-5490 kcal (14-16 kcal/kg) Weight Used for Energy Requirements: Current     Protein (g):   gm protein (1.8-2.0 g/kg) Weight Used for Protein Requirements: Ideal        Fluid (ml/day):  1728 mL Method Used for Fluid Requirements: 1 ml/kcal    Nutrition Related Findings:   Edema: +1 non-pitting BUE, +1 non-pitting BLE. Active bowel sounds. MBSS (9/17/22). NG tube feedings discontinued (9/17). Poor appetite Wound Type: None    Current Nutrition Therapies:    ADULT DIET;  Dysphagia - Pureed; Mildly Thick (Nectar)  ADULT ORAL NUTRITION SUPPLEMENT; Breakfast, Lunch, Dinner; Standard High Calorie/High Protein Oral Supplement    Anthropometric Measures:  Height: 5' 2\" (157.5 cm)  Current Body Wt: 228 lb (103.4 kg)   BMI: 41.7    Nutrition Diagnosis:   Inadequate protein-energy intake related to inadequate protein-energy intake as evidenced by intake 0-25%, intake 26-50%  Swallowing difficulty related to swallowing difficulty (Dysphagia) as evidenced by swallow study results (Pureed diet; mildly thick liquids)    Nutrition Interventions:   Food and/or Nutrient Delivery: Continue Current Diet, Modify Oral Nutrition Supplement  Nutrition Education/Counseling: Education not indicated  Coordination of Nutrition Care: Continue to monitor while inpatient       Goals:  Previous Goal Met: Progressing toward Goal(s)  Goals: PO intake 75% or greater       Nutrition Monitoring and Evaluation:   Behavioral-Environmental Outcomes: None Identified  Food/Nutrient Intake Outcomes: Enteral Nutrition Intake/Tolerance  Physical Signs/Symptoms Outcomes: Biochemical Data, Fluid Status or Edema, Skin, Weight, GI Status    Discharge Planning:    Continue current diet, Continue Oral Nutrition Supplement             Domenico NICHOLS, RDN, LDN  Lead Clinical Dietitian  RD Office Phone [phone]

## 2022-09-23 NOTE — PROGRESS NOTES
Pt discharged to [facility] in fair condition with belongings  Discharge instructions given  Transport picked her up. Left via stretcher. Pt denies having any further questions at this time  Personal items given to patient at discharge  Patient/family state they have everything they were admitted with. Called to give report selected nurses' station option and there was no answer x2.

## 2022-09-23 NOTE — PLAN OF CARE
Problem: Discharge Planning  Goal: Discharge to home or other facility with appropriate resources  Outcome: Adequate for Discharge     Problem: Pain  Goal: Verbalizes/displays adequate comfort level or baseline comfort level  Outcome: Adequate for Discharge     Problem: Skin/Tissue Integrity  Goal: Absence of new skin breakdown  Description: 1. Monitor for areas of redness and/or skin breakdown  2. Assess vascular access sites hourly  3. Every 4-6 hours minimum:  Change oxygen saturation probe site  4. Every 4-6 hours:  If on nasal continuous positive airway pressure, respiratory therapy assess nares and determine need for appliance change or resting period.   Outcome: Adequate for Discharge     Problem: Safety - Adult  Goal: Free from fall injury  Outcome: Adequate for Discharge     Problem: ABCDS Injury Assessment  Goal: Absence of physical injury  Outcome: Adequate for Discharge     Problem: Chronic Conditions and Co-morbidities  Goal: Patient's chronic conditions and co-morbidity symptoms are monitored and maintained or improved  Outcome: Adequate for Discharge     Problem: Nutrition Deficit:  Goal: Optimize nutritional status  Outcome: Adequate for Discharge     Problem: Respiratory - Adult  Goal: Achieves optimal ventilation and oxygenation  Outcome: Adequate for Discharge     Problem: Neurosensory - Adult  Goal: Achieves stable or improved neurological status  Outcome: Adequate for Discharge     Problem: Cardiovascular - Adult  Goal: Maintains optimal cardiac output and hemodynamic stability  Outcome: Adequate for Discharge     Problem: Skin/Tissue Integrity - Adult  Goal: Skin integrity remains intact  Outcome: Adequate for Discharge  Goal: Incisions, wounds, or drain sites healing without S/S of infection  Outcome: Adequate for Discharge     Problem: Musculoskeletal - Adult  Goal: Return mobility to safest level of function  Outcome: Adequate for Discharge  Goal: Return ADL status to a safe level of function  Outcome: Adequate for Discharge     Problem: Gastrointestinal - Adult  Goal: Maintains adequate nutritional intake  Outcome: Adequate for Discharge     Problem: Genitourinary - Adult  Goal: Urinary catheter remains patent  Outcome: Adequate for Discharge

## 2022-09-23 NOTE — CARE COORDINATION
Patient to dc to [facility] via [facility]  at [place].  # for RN report: [phone]. Completed BLAISE faxed to [phone]. Informed RN, pt's spouse and facility of dc time, agreeable to plan. HENS completed.
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
## 2022-10-14 ENCOUNTER — HOSPITAL ENCOUNTER (OUTPATIENT)
Age: 67
Setting detail: SPECIMEN
Discharge: HOME | End: 2022-10-14
Payer: COMMERCIAL

## 2022-10-14 LAB
ALBUMIN SERPL-MCNC: 1.5 G/DL (ref 3.5–5.2)
ALP BLD-CCNC: 130 U/L (ref 35–104)
ALT SERPL-CCNC: 19 U/L (ref 5–33)
ANION GAP SERPL CALCULATED.3IONS-SCNC: 6 MMOL/L (ref 9–17)
AST SERPL-CCNC: 41 U/L
BILIRUB SERPL-MCNC: 0.7 MG/DL (ref 0.3–1.2)
BUN BLDV-MCNC: 12 MG/DL (ref 8–23)
BUN/CREAT BLD: 21 (ref 9–20)
CALCIUM SERPL-MCNC: 7.4 MG/DL (ref 8.6–10.4)
CHLORIDE BLD-SCNC: 110 MMOL/L (ref 98–107)
CO2: 25 MMOL/L (ref 20–31)
CREAT SERPL-MCNC: 0.58 MG/DL (ref 0.5–0.9)
GFR SERPL CREATININE-BSD FRML MDRD: >60 ML/MIN/1.73M2
GLUCOSE BLD-MCNC: 174 MG/DL (ref 70–99)
POTASSIUM SERPL-SCNC: 3 MMOL/L (ref 3.7–5.3)
SODIUM BLD-SCNC: 141 MMOL/L (ref 135–144)
TOTAL PROTEIN: 5.1 G/DL (ref 6.4–8.3)

## 2022-10-14 PROCEDURE — 36415 COLL VENOUS BLD VENIPUNCTURE: CPT

## 2022-10-14 PROCEDURE — 80053 COMPREHEN METABOLIC PANEL: CPT

## 2022-10-14 PROCEDURE — P9603 ONE-WAY ALLOW PRORATED MILES: HCPCS

## 2024-05-24 NOTE — PLAN OF CARE
Problem: Discharge Planning  Goal: Discharge to home or other facility with appropriate resources  Outcome: Progressing     Problem: Pain  Goal: Verbalizes/displays adequate comfort level or baseline comfort level  Outcome: Progressing  Flowsheets (Taken 9/7/2022 2000)  Verbalizes/displays adequate comfort level or baseline comfort level:   Encourage patient to monitor pain and request assistance   Assess pain using appropriate pain scale   Administer analgesics based on type and severity of pain and evaluate response   Implement non-pharmacological measures as appropriate and evaluate response   Notify Licensed Independent Practitioner if interventions unsuccessful or patient reports new pain     Problem: Skin/Tissue Integrity  Goal: Absence of new skin breakdown  Description: 1. Monitor for areas of redness and/or skin breakdown  2. Assess vascular access sites hourly  3. Every 4-6 hours minimum:  Change oxygen saturation probe site  4. Every 4-6 hours:  If on nasal continuous positive airway pressure, respiratory therapy assess nares and determine need for appliance change or resting period.   Outcome: Progressing     Problem: Safety - Adult  Goal: Free from fall injury  Outcome: Progressing     Problem: ABCDS Injury Assessment  Goal: Absence of physical injury  Outcome: Progressing     Problem: Chronic Conditions and Co-morbidities  Goal: Patient's chronic conditions and co-morbidity symptoms are monitored and maintained or improved  Outcome: Progressing  Flowsheets (Taken 9/7/2022 2250)  Care Plan - Patient's Chronic Conditions and Co-Morbidity Symptoms are Monitored and Maintained or Improved:   Monitor and assess patient's chronic conditions and comorbid symptoms for stability, deterioration, or improvement   Collaborate with multidisciplinary team to address chronic and comorbid conditions and prevent exacerbation or deterioration   Update acute care plan with appropriate goals if chronic or comorbid symptoms are exacerbated and prevent overall improvement and discharge  Note: Vital signs stable. NSR/BST.      Problem: Nutrition Deficit:  Goal: Optimize nutritional status  Outcome: Progressing  Flowsheets  Taken 9/7/2022 2253 by Rangel Gonzalez RN  Nutrient intake appropriate for improving, restoring, or maintaining nutritional needs:   Assess nutritional status and recommend course of action   Recommend, monitor, and adjust tube feedings and TPN/PPN based on assessed needs   Provide specific nutrition education to patient or family as appropriate  Nutrient intake appropriate for improving, restoring, or maintaining nutritional needs:   Recommend, monitor, and adjust tube feedings and TPN/PPN based on assessed needs   Assess nutritional status and recommend course of action  Note: Tolerating TF at goal. Yes

## (undated) DEVICE — SYRINGE MED 50ML LUERLOCK TIP

## (undated) DEVICE — MEDICINE CUP, GRADUATED, STER: Brand: MEDLINE

## (undated) DEVICE — CO2 CANNULA,SUPERSOFT, ADLT,7'O2,7'CO2: Brand: MEDLINE

## (undated) DEVICE — JELLY,LUBE,STERILE,FLIP TOP,TUBE,2-OZ: Brand: MEDLINE

## (undated) DEVICE — TRAP SURG QUAD PARABOLA SLOT DSGN SFTY SCRN TRAPEASE

## (undated) DEVICE — ELECTRODE PT RET AD L9FT HI MOIST COND ADH HYDRGEL CORDED

## (undated) DEVICE — Device: Brand: DEFENDO VALVE AND CONNECTOR KIT

## (undated) DEVICE — GAUZE,SPONGE,4"X4",16PLY,STRL,LF,10/TRAY: Brand: MEDLINE

## (undated) DEVICE — TUBING, SUCTION, 1/4" X 12', STRAIGHT: Brand: MEDLINE

## (undated) DEVICE — BASIN EMSIS 700ML GRAPHITE PLAS KID SHP GRAD

## (undated) DEVICE — ADAPTER TBNG LUER STUB 15 GA INTMED

## (undated) DEVICE — CUP MED 1OZ CLR POLYPR FEED GRAD W/O LID

## (undated) DEVICE — YANKAUER,FLEXIBLE HANDLE,REGLR CAPACITY: Brand: MEDLINE INDUSTRIES, INC.

## (undated) DEVICE — FORCEPS BX L240CM WRK CHN 2.8MM STD CAP W/ NDL MIC MESH

## (undated) DEVICE — GOWN,AURORA,NONREINFORCED,LARGE: Brand: MEDLINE